# Patient Record
Sex: FEMALE | Race: WHITE | Employment: FULL TIME | URBAN - METROPOLITAN AREA
[De-identification: names, ages, dates, MRNs, and addresses within clinical notes are randomized per-mention and may not be internally consistent; named-entity substitution may affect disease eponyms.]

---

## 2017-01-17 ENCOUNTER — OFFICE VISIT (OUTPATIENT)
Dept: INTERNAL MEDICINE | Facility: CLINIC | Age: 42
End: 2017-01-17
Payer: COMMERCIAL

## 2017-01-17 VITALS
WEIGHT: 182.7 LBS | DIASTOLIC BLOOD PRESSURE: 76 MMHG | TEMPERATURE: 98.2 F | RESPIRATION RATE: 16 BRPM | BODY MASS INDEX: 31.34 KG/M2 | HEART RATE: 74 BPM | OXYGEN SATURATION: 98 % | SYSTOLIC BLOOD PRESSURE: 104 MMHG

## 2017-01-17 DIAGNOSIS — Z01.818 PREOP GENERAL PHYSICAL EXAM: Primary | ICD-10-CM

## 2017-01-17 DIAGNOSIS — Z23 NEED FOR TDAP VACCINATION: ICD-10-CM

## 2017-01-17 PROCEDURE — 99204 OFFICE O/P NEW MOD 45 MIN: CPT | Mod: 25 | Performed by: INTERNAL MEDICINE

## 2017-01-17 PROCEDURE — 90715 TDAP VACCINE 7 YRS/> IM: CPT | Performed by: INTERNAL MEDICINE

## 2017-01-17 PROCEDURE — 90471 IMMUNIZATION ADMIN: CPT | Performed by: INTERNAL MEDICINE

## 2017-01-17 RX ORDER — PHENYTOIN SODIUM 100 MG/1
CAPSULE, EXTENDED RELEASE ORAL
Status: ON HOLD | COMMUNITY
Start: 2017-01-17 | End: 2018-07-21

## 2017-01-17 NOTE — NURSING NOTE
"Chief Complaint   Patient presents with     Pre-Op Exam       Initial /76 mmHg  Pulse 74  Temp(Src) 98.2  F (36.8  C) (Oral)  Resp 16  Wt 182 lb 11.2 oz (82.872 kg)  SpO2 98% Estimated body mass index is 31.34 kg/(m^2) as calculated from the following:    Height as of 12/7/15: 5' 4\" (1.626 m).    Weight as of this encounter: 182 lb 11.2 oz (82.872 kg).  BP completed using cuff size: kim Beasley CMA      "

## 2017-01-17 NOTE — MR AVS SNAPSHOT
After Visit Summary   1/17/2017    Nelsy Cota    MRN: [id number]           Patient Information     Date Of Birth          1975        Visit Information        Provider Department      1/17/2017 4:20 PM Kasandra Solano MD [facility]        Today's Diagnoses     Preop general physical exam    -  1     Need for Tdap vaccination           Care Instructions      Before Your Surgery      Call your surgeon if there is any change in your health. This includes signs of a cold or flu (such as a sore throat, runny nose, cough, rash or fever).    Do not smoke, drink alcohol or take over the counter medicine (unless your surgeon or primary care doctor tells you to) for the 24 hours before and after surgery.    If you take prescribed drugs: Follow your doctor s orders about which medicines to take and which to stop until after surgery.    Eating and drinking prior to surgery: follow the instructions from your surgeon    Take a shower or bath the night before surgery. Use the soap your surgeon gave you to gently clean your skin. If you do not have soap from your surgeon, use your regular soap. Do not shave or scrub the surgery site.  Wear clean pajamas and have clean sheets on your bed.         Follow-ups after your visit        Who to contact     If you have questions or need follow up information about today's clinic visit or your schedule please contact [facility] directly at [phone].  Normal or non-critical lab and imaging results will be communicated to you by MyChart, letter or phone within 4 business days after the clinic has received the results. If you do not hear from us within 7 days, please contact the clinic through MyChart or phone. If you have a critical or abnormal lab result, we will notify you by phone as soon as possible.  Submit refill requests through [employer] or call your pharmacy and they will forward the refill request to us. Please allow 3  business days for your refill to be completed.          Additional Information About Your Visit        MyChart Information     [employer] gives you secure access to your electronic health record. If you see a primary care provider, you can also send messages to your care team and make appointments. If you have questions, please call your primary care clinic.  If you do not have a primary care provider, please call [phone] and they will assist you.        Care EveryWhere ID     This is your Care EveryWhere ID. This could be used by other organizations to access your [place] medical records  [id number]        Your Vitals Were     Pulse Temperature Respirations Pulse Oximetry          74 98.2  F (36.8  C) (Oral) 16 98%         Blood Pressure from Last 3 Encounters:   01/17/17 104/76   12/07/15 123/81   12/07/15 110/68    Weight from Last 3 Encounters:   01/17/17 182 lb 11.2 oz (82.872 kg)   12/07/15 176 lb (79.833 kg)   12/07/15 176 lb (79.833 kg)              We Performed the Following     TDAP (ADACEL AGES 11-64)          Today's Medication Changes          These changes are accurate as of: 1/17/17  4:53 PM.  If you have any questions, ask your nurse or doctor.               These medicines have changed or have updated prescriptions.        Dose/Directions    DILANTIN 100 MG CR capsule   This may have changed:  See the new instructions.   Generic drug:  phenytoin   Changed by:  Kasandra Solano MD        3 caps po bid   Refills:  0                Primary Care Provider Office Phone # Fax #    Kasandra Solano [phone]       [facility] [address]  [facility] [address]        Thank you!     Thank you for choosing [facility]  for your care. Our goal is always to provide you with excellent care. Hearing back from our patients is one way we can continue to improve our services. Please take a few minutes to complete the written survey that you may receive in the  mail after your visit with us. Thank you!             Your Updated Medication List - Protect others around you: Learn how to safely use, store and throw away your medicines at www.disposemymeds.org.          This list is accurate as of: 1/17/17  4:53 PM.  Always use your most recent med list.                   Brand Name Dispense Instructions for use    DILANTIN 100 MG CR capsule   Generic drug:  phenytoin      3 caps po bid

## 2017-01-17 NOTE — PROGRESS NOTES
[place] [address]  [facility] [address]  [phone]  Dept: [phone]    PRE-OP EVALUATION:  Today's date: 2017    Nelsy Cota (: 1975) presents for pre-operative evaluation assessment as requested by Dr. Orosco.  She requires evaluation and anesthesia risk assessment prior to undergoing surgery/procedure for treatment of cataracts .  Proposed procedure: Cataract 2017 LEFT, 02/15/2017 RIGHT    Date of Surgery/ Procedure: 2017, 02/15/2017  Time of Surgery/ Procedure: 3:30 pm  Hospital/Surgical Facility: [facility]   Fax number for surgical facility: [phone]  Primary Physician: Kasandra Solano  Type of Anesthesia Anticipated: Local with MAC    Patient has a Health Care Directive or Living Will:  NO    1. NO - Do you have a history of heart attack, stroke, stent, bypass or surgery on an artery in the head, neck, heart or legs?  2. NO - Do you ever have any pain or discomfort in your chest?  3. NO - Do you have a history of  Heart Failure?  4. NO - Are you troubled by shortness of breath when: walking on the level, up a slight hill or at night?  5. NO - Do you currently have a cold, bronchitis or other respiratory infection?  6. NO - Do you have a cough, shortness of breath or wheezing?  7. NO - Do you sometimes get pains in the calves of your legs when you walk?  8. NO - Do you or anyone in your family have previous history of blood clots?  9. NO - Do you or does anyone in your family have a serious bleeding problem such as prolonged bleeding following surgeries or cuts?  10. NO - Have you ever had problems with anemia or been told to take iron pills?  11. NO - Have you had any abnormal blood loss such as black, tarry or bloody stools, or abnormal vaginal bleeding?  12. NO - Have you ever had a blood transfusion?  13. NO - Have you or any of your relatives ever had problems with anesthesia?  14. NO - Do you have sleep apnea, excessive  snoring or daytime drowsiness?  15. NO - Do you have any prosthetic heart valves?  16. NO - Do you have prosthetic joints?  17. NO - Is there any chance that you may be pregnant?      HPI:                                                      Brief HPI related to upcoming procedure: cataracts      See problem list for active medical problems.  Problems all longstanding and stable, except as noted/documented.  See ROS for pertinent symptoms related to these conditions.                                                                                                  .    MEDICAL HISTORY:                                                      Patient Active Problem List    Diagnosis Date Noted     Convulsions, unspecified convulsion type (H) 12/07/2015     Priority: Medium     Health Care Home 09/19/2013     Priority: Medium     FAM HX-CARDIOVAS DIS NEC 12/21/2009     Priority: Medium     Convulsions (H) 05/29/2008     Priority: Medium     Papanicolaou smear of cervix with atypical squamous cells cannot exclude high grade squamous intraepithelial lesion (ASC-H) 01/24/2007     Priority: Medium      Past Medical History   Diagnosis Date     Epilepsy (H)      2004 last seizure     Past Surgical History   Procedure Laterality Date     C nonspecific procedure  1997     R ankle fracture repair     C nonspecific procedure  2004     R ankle surgery for ligament injury, also tibia injury     Hc closed tx calcaneal fx w/o manipulation  2004     fracture of calcaneus     Hc colp cervix/upper vagina  2005     cryotherapy     Current Outpatient Prescriptions   Medication Sig Dispense Refill     phenytoin (DILANTIN) 100 MG CR capsule 3 caps po bid       OTC products: None, except as noted above and occasionally aleve    No Known Allergies   Latex Allergy: NO    Social History   Substance Use Topics     Smoking status: Current Every Day Smoker -- 1.00 packs/day for 15 years     Types: Cigarettes     Smokeless tobacco: Never Used      Alcohol Use: 0.5 oz/week     1 Standard drinks or equivalent per week      Comment: 12 drinks per week     History   Drug Use No       REVIEW OF SYSTEMS:                                                    GENERAL: negative for, fever, chills, weight loss, weight gain  EYES: visual blurring  ENT: negative  RESPIRATORY: No dyspnea on exertion and No cough  CARDIOVASCULAR: negative for, palpitations, tachycardia, irregular heart beat and chest pain  GI: negative for, nausea, vomiting and abdominal pain  : negative for, dysuria and hematuria  MUSCULOSKELETAL: ankle pain  NEUROLOGIC: positive for seizure history, none since 2004, negative for, headaches, local weakness, numbness or tingling of hands and numbness or tingling of feet  SKIN: negative  ENDOCRINE: negative         EXAM:                                                      Patient is alert, oriented, cooperative in no acute distress.  /76 mmHg  Pulse 74  Temp(Src) 98.2  F (36.8  C) (Oral)  Resp 16  Wt 182 lb 11.2 oz (82.872 kg)  SpO2 98%    HEENT: PERRL, EOMI, TM's are normal. Oropharynx is clear.  NECK: No lymphadenopathy or thyromegaly. Carotid pulses full without bruits.  LUNGS: clear  CV: normal S1, S2 without murmur, S3 or S4 present. Pulses are 2/2 throughout. No JVD.  ABDOMEN: Bowel sounds present, nontender without hepatosplenomegaly. Liver is normal size to percussion.  EXTREMITIES: no edema present, unremarkable joints  NEUROLOGIC: Cranial nerves II-XII intact, reflexes 2/4 throughout, strength 5/5, sensation grossly intact, gait normal.  SKIN: without rashes or significant lesions       DIAGNOSTICS:                                                    No labs or EKG required for low risk surgery (cataract, skin procedure, breast biopsy, etc)    Recent Labs   Lab Test  12/07/15   1615  10/26/11   1318  12/22/09   0400   HGB  11.8  12.8   --    PLT  285  300   --    NA  138   --   138   POTASSIUM  3.5   --   5.0   CR  0.64   --   0.63         IMPRESSION:                                                    Reason for surgery/procedure: cataracts  Diagnosis/reason for consult: preop    The proposed surgical procedure is considered LOW risk.    REVISED CARDIAC RISK INDEX  The patient has the following serious cardiovascular risks for perioperative complications such as (MI, PE, VFib and 3  AV Block):  No serious cardiac risks  INTERPRETATION: 0 risks: Class I (very low risk - 0.4% complication rate)    The patient has the following additional risks for perioperative complications:  No identified additional risks      ICD-10-CM    1. Preop general physical exam Z01.818        RECOMMENDATIONS:                                                        She will take dilantin the am of surgery.       APPROVAL GIVEN to proceed with proposed procedure, without further diagnostic evaluation       Signed Electronically by: Kasandra Solano MD    Copy of this evaluation report is provided to requesting physician.    [place] Preop Guidelines

## 2017-03-16 ENCOUNTER — APPOINTMENT (OUTPATIENT)
Dept: ULTRASOUND IMAGING | Facility: CLINIC | Age: 42
End: 2017-03-16
Attending: EMERGENCY MEDICINE
Payer: COMMERCIAL

## 2017-03-16 ENCOUNTER — HOSPITAL ENCOUNTER (EMERGENCY)
Facility: CLINIC | Age: 42
Discharge: HOME | End: 2017-03-16
Attending: EMERGENCY MEDICINE | Admitting: EMERGENCY MEDICINE
Payer: COMMERCIAL

## 2017-03-16 ENCOUNTER — APPOINTMENT (OUTPATIENT)
Dept: GENERAL RADIOLOGY | Facility: CLINIC | Age: 42
End: 2017-03-16
Attending: EMERGENCY MEDICINE
Payer: COMMERCIAL

## 2017-03-16 VITALS
RESPIRATION RATE: 16 BRPM | SYSTOLIC BLOOD PRESSURE: 121 MMHG | TEMPERATURE: 97.8 F | DIASTOLIC BLOOD PRESSURE: 79 MMHG | HEART RATE: 81 BPM | OXYGEN SATURATION: 97 %

## 2017-03-16 DIAGNOSIS — M25.571 ACUTE RIGHT ANKLE PAIN: ICD-10-CM

## 2017-03-16 PROCEDURE — 99284 EMERGENCY DEPT VISIT MOD MDM: CPT | Mod: 25

## 2017-03-16 PROCEDURE — 93971 EXTREMITY STUDY: CPT | Mod: RT

## 2017-03-16 PROCEDURE — 25000132 ZZH RX MED GY IP 250 OP 250 PS 637: Performed by: EMERGENCY MEDICINE

## 2017-03-16 PROCEDURE — 73610 X-RAY EXAM OF ANKLE: CPT | Mod: RT

## 2017-03-16 RX ORDER — TRAMADOL HYDROCHLORIDE 50 MG/1
50-100 TABLET ORAL EVERY 6 HOURS PRN
Qty: 12 TABLET | Refills: 0 | Status: SHIPPED | OUTPATIENT
Start: 2017-03-16 | End: 2018-07-19

## 2017-03-16 RX ORDER — HYDROCODONE BITARTRATE AND ACETAMINOPHEN 5; 325 MG/1; MG/1
2 TABLET ORAL ONCE
Status: COMPLETED | OUTPATIENT
Start: 2017-03-16 | End: 2017-03-16

## 2017-03-16 RX ADMIN — HYDROCODONE BITARTRATE AND ACETAMINOPHEN 2 TABLET: 5; 325 TABLET ORAL at 12:59

## 2017-03-16 ASSESSMENT — ENCOUNTER SYMPTOMS
HEMATURIA: 0
ARTHRALGIAS: 1
MYALGIAS: 1
BRUISES/BLEEDS EASILY: 1
SHORTNESS OF BREATH: 0
COUGH: 0

## 2017-03-16 NOTE — ED PROVIDER NOTES
History     Chief Complaint:  Leg Pain      HPI   Nelsy Cota is a 41 year old female with a history of right ankle fracture requiring ORIF in 2004 and epilepsy who presents to the emergency department today for evaluation of right leg pain since 0300 this morning. The patient states that she woke up to the pain, which is localized to the anterior aspect of the right ankle that shoots up her right leg. The patient states that she took 2 Aleve this morning, which failed to relieve her symptoms, as she still needs to ambulate with a walker, which is unusual for her. The patient notes that her right ankle has always been more swollen than the left since the fracture, and she regularly receives Cortisone shots to this area. The patient endorses some bruising along the right leg. The patient denies recent injury or fall. The patient denies fever hematuria, bloody nose and hemoptysis. The patient denies chest pain and shortness of breath. The patient denies a history of gout or joint issues.     Cardiac/PE/DVT Risk Factors:  History of hypertension - negative  History of hyperlipidemia - negative  History of diabetes - negative  History of smoking - positive  Personal history of PE/DVT - negative  Family history of PE/DVT - negative  Family history of heart complications - negative  Recent travel - negative  Recent surgery - negative  Other immobilizations - negative  Cancer - negative      Allergies:  No Known Drug Allergies    Medications:    Dilantin      Past Medical History:    Epilepsy   Convulsions   Atypical pap smear     Past Surgical History:    R ankle fracture repair  R ankle surgery for ligament injury, also tibia injury  Closed tx calcaneal fx w/o manipulation   Totz cervix/upper vaginal: cryotherapy    Family History:    Hypertension: father   Epilepsy: father     Social History:  The patient was accompanied to the ED by .  Smoking Status: current, 1.00 ppd for 15 years.   Smokeless Tobacco:  never  Alcohol Use: positive  Marital Status:  Single [1]     Review of Systems   HENT: Negative for nosebleeds.    Respiratory: Negative for cough (hemoptysis) and shortness of breath.    Cardiovascular: Positive for leg swelling (right leg). Negative for chest pain.   Genitourinary: Negative for hematuria.   Musculoskeletal: Positive for arthralgias (right ankle) and myalgias (right leg).   Hematological: Bruises/bleeds easily (right leg).   All other systems reviewed and are negative.    Physical Exam   Vitals:  Patient Vitals for the past 24 hrs:   BP Temp Temp src Pulse Resp SpO2   03/16/17 1345 - - - 81 16 -   03/16/17 1344 - - - - - 97 %   03/16/17 1343 121/79 - - - - -   03/16/17 1210 130/81 97.8  F (36.6  C) Oral 93 16 100 %          Physical Exam    Constitutional:  Pleasant, age appropriate.   EYES:   Conjunctiva normal.  NECK:    Supple, no meningismus.   CV:     Regular rate and rhythm, no murmurs, rubs or gallops.       2+ DP pulses bilateral.  PULM:    Clear to auscultation bilateral.       No respiratory distress.      No wheezing, rales or stridor.  ABD:    Soft, non-tender, non-distended.  No pulsatile masses.       No rebound or guarding.  MSK:     Right lower extremity : No gross deformity.       No tenderness to the proximal fibula..        Cruz test within normal limits.        Achilles tendon is palpated without tenderness and without defect.           Tenderness to the ankle with mild-moderate soft tissue swelling.         Full passive ROM of the ankle although with mild discomfort       No erythema or warmth      No bony tenderness to the foot.  LYMPH:   No cervical lymphadenopathy.  NEURO:   Alert.      Right lower extremity:      Strength and sensation intact.  SKIN:    Warm, dry and intact.       No rash.  PSYCH:    Mood is good and affect is appropriate.    Emergency Department Course     Imaging:  Radiology findings were communicated with the patient who voiced understanding of the  findings.    US Lower Extremity Venous Duplex Right  IMPRESSION: No evidence of DVT.  Reading per radiology    Ankle XR, G/E 3 views, right  IMPRESSION: Postoperative changes of the right distal fibula with an  intact cortical plate and multiple intact cortical screws in place. No  evidence of acute fracture or malalignment otherwise appreciated.  Marked degenerative changes of the right ankle.  Reading per radiology      Interventions:  1259: Norco 2 tablets Oral        Emergency Department Course:  Nursing notes and vitals reviewed.  I performed an exam of the patient as documented above.   The patient was sent for a XR right ankle, US right LE venous duplex while in the emergency department, results above.   The patient was rechecked and was updated on the results of his imaging studies.   I discussed the treatment plan with the patient. They expressed understanding of this plan and consented to discharge. They will be discharged home with instructions for care and follow up. In addition, the patient will return to the emergency department if their symptoms persist, worsen, if new symptoms arise or if there is any concern.  All questions were answered.  I personally reviewed the imaging results with the Patient and answered all related questions prior to discharge.    Impression & Plan      Medical Decision Making:  Nelsy Cota is a 41 year old female with a history of prior right ankle fracture requiring ORIF  who presents to the emergency department today for evaluation of atraumatic pain and swelling of the right leg. On examination, she does have swelling and focal tenderness to the ankle with mild edema to the lower extremity. There is no evidence of infectious symptoms. No evidence of septic arthritis or inflammatory arthritis. Plain films are without hardware malfunction. Doppler ultrasound is negative for DVT. This is likely an exacerbation of her chronic process with chronic swelling in the face of  unrecognized ankle sprain. The patient is safe for discharge to home and an air cast splint was provided along with short term analgesics and RICE therapy. The patient should return to the ED with new or worsening symptoms.       Diagnosis:    ICD-10-CM    1. Acute right ankle pain M25.571        Disposition:   The patient is discharged home.       Discharge Medications:     Details   traMADol (ULTRAM) 50 MG tablet Take 1-2 tablets ( mg) by mouth every 6 hours as needed for pain, Disp-12 tablet, R-0, Local Print             Scribe Disclosure:  I, Cayden Freed, am serving as a scribe at 12:10 PM on 3/16/2017 to document services personally performed by Franco Arnett MD, based on my observations and the provider's statements to me.    3/16/2017   [facility] EMERGENCY DEPARTMENT       Franco Arnett MD  03/16/17 2539

## 2017-03-16 NOTE — ED AVS SNAPSHOT
[facility] Emergency Department    [address]    [facility] [address]    Phone:  [phone]    Fax:  [phone]                                       Nelsy Cota   MRN: [id number]    Department:  [facility] Emergency Department   Date of Visit:  3/16/2017           After Visit Summary Signature Page     I have received my discharge instructions, and my questions have been answered. I have discussed any challenges I see with this plan with the nurse or doctor.    ..........................................................................................................................................  Patient/Patient Representative Signature      ..........................................................................................................................................  Patient Representative Print Name and Relationship to Patient    ..................................................               ................................................  Date                                            Time    ..........................................................................................................................................  Reviewed by Signature/Title    ...................................................              ..............................................  Date                                                            Time

## 2017-03-16 NOTE — ED AVS SNAPSHOT
[facility] Emergency Department    [address]    [facility] [address]    Phone:  [phone]    Fax:  [phone]                                       Nelsy Cota   MRN: [id number]    Department:  [facility] Emergency Department   Date of Visit:  3/16/2017           Patient Information     Date Of Birth          1975        Your diagnoses for this visit were:     Acute right ankle pain        You were seen by Franco Arnett MD.      Follow-up Information     Follow up with Kasandra Solano MD. Schedule an appointment as soon as possible for a visit in 1 week.    Specialty:  Internal Medicine    Why:  As needed    Contact information:    [facility]  [address]  [facility] [address]  [phone]          Discharge Instructions         Arthralgia    Arthralgia is the term for pain in or around the joint. It is a symptom, not a disease. This pain may involve one or more joints. In some cases, the pain moves from joint to joint.  There are many causes for joint pain. These include:    Injury    Osteoarthritis (wearing out of the joint surface)    Gout (inflammation of the joint due to crystals in the joint fluid)    Infection inside the joint      Bursitis (inflammation of the fluid-filled sacs around the joint)    Autoimmune disorders such as rheumatoid arthritis or lupus    Tendonitis (inflamation of chords that attach muscle to bone)  Home care    Rest the involved joint(s) until your symptoms improve.     You may be prescribed pain medication. If none is prescribed, you may use acetaminophen or ibuprofen to control pain and inflammation.  Follow up  Follow up with your healthcare provider or our staff as advised.  When to seek medical care  Contact your healthcare provider right away if any of the following occurs:    Pain, swelling, or redness of joint increases    Pain worsens or recurs after a period of improvement    Pain moves  to other joints    You cannot bear weight on the affected joint     You cannot move the affected joint    Joint appears deformed    New rash appears    Fever of 101 F (38.8 C) or higher, or as directed by your healthcare provider    3584-8441 [employer]. [address] [place] [address], [place] [address]. All rights reserved. This information is not intended as a substitute for professional medical care. Always follow your healthcare professional's instructions.          24 Hour Appointment Hotline       To make an appointment at any [facility], call [phone] ([phone]). If you don't have a family doctor or clinic, we will help you find one. [facility] clinics are conveniently located to serve the needs of you and your family.             Review of your medicines      START taking        Dose / Directions Last dose taken    traMADol 50 MG tablet   Commonly known as:  ULTRAM   Dose:   mg   Quantity:  12 tablet        Take 1-2 tablets ( mg) by mouth every 6 hours as needed for pain   Refills:  0          Our records show that you are taking the medicines listed below. If these are incorrect, please call your family doctor or clinic.        Dose / Directions Last dose taken    DILANTIN 100 MG CR capsule   Generic drug:  phenytoin        3 caps po bid   Refills:  0                Prescriptions were sent or printed at these locations (1 Prescription)                   Other Prescriptions                Printed at Department/Unit printer (1 of 1)         traMADol (ULTRAM) 50 MG tablet                Procedures and tests performed during your visit     Ankle XR, G/E 3 views, right    US Lower Extremity Venous Duplex Right      Orders Needing Specimen Collection     None      Pending Results     Date and Time Order Name Status Description    3/16/2017 1220 US Lower Extremity Venous Duplex Right Preliminary             Pending Culture Results     No orders found from 3/14/2017 to 3/17/2017.              Test Results from your hospital stay     3/16/2017  1:08 PM - Interface, Radiant Ib      Narrative     XR ANKLE RT G/E 3 VW 3/16/2017 12:52 PM    HISTORY: Pain.    COMPARISON: None.        Impression     IMPRESSION: Postoperative changes of the right distal fibula with an  intact cortical plate and multiple intact cortical screws in place. No  evidence of acute fracture or malalignment otherwise appreciated.  Marked degenerative changes of the right ankle.    BRO IVERSON MD         3/16/2017  1:11 PM - Interface, Radiant Ib      Narrative     ULTRASOUND RIGHT LOWER EXTREMITY VENOUS DUPLEX  3/16/2017 12:46 PM     HISTORY: Atraumatic leg pain and swelling.    FINDINGS: The deep veins in the right lower extremity are compressible  throughout. The deep veins demonstrate normal venous augmentation,  waveforms and color Doppler flow. No evidence of superficial  thrombophlebitis.        Impression     IMPRESSION: No evidence of DVT.                Clinical Quality Measure: Blood Pressure Screening     Your blood pressure was checked while you were in the emergency department today. The last reading we obtained was  BP: 130/81 . Please read the guidelines below about what these numbers mean and what you should do about them.  If your systolic blood pressure (the top number) is less than 120 and your diastolic blood pressure (the bottom number) is less than 80, then your blood pressure is normal. There is nothing more that you need to do about it.  If your systolic blood pressure (the top number) is 120-139 or your diastolic blood pressure (the bottom number) is 80-89, your blood pressure may be higher than it should be. You should have your blood pressure rechecked within a year by a primary care provider.  If your systolic blood pressure (the top number) is 140 or greater or your diastolic blood pressure (the bottom number) is 90 or greater, you may have high blood pressure. High blood pressure is treatable,  but if left untreated over time it can put you at risk for heart attack, stroke, or kidney failure. You should have your blood pressure rechecked by a primary care provider within the next 4 weeks.  If your provider in the emergency department today gave you specific instructions to follow-up with your doctor or provider even sooner than that, you should follow that instruction and not wait for up to 4 weeks for your follow-up visit.        Thank you for choosing [facility]       Thank you for choosing [facility] for your care. Our goal is always to provide you with excellent care. Hearing back from our patients is one way we can continue to improve our services. Please take a few minutes to complete the written survey that you may receive in the mail after you visit with us. Thank you!        [employer] Information     [employer] gives you secure access to your electronic health record. If you see a primary care provider, you can also send messages to your care team and make appointments. If you have questions, please call your primary care clinic.  If you do not have a primary care provider, please call [phone] and they will assist you.        Care EveryWhere ID     This is your Care EveryWhere ID. This could be used by other organizations to access your [facility] medical records  [id number]        After Visit Summary       This is your record. Keep this with you and show to your community pharmacist(s) and doctor(s) at your next visit.

## 2017-03-16 NOTE — DISCHARGE INSTRUCTIONS
Arthralgia    Arthralgia is the term for pain in or around the joint. It is a symptom, not a disease. This pain may involve one or more joints. In some cases, the pain moves from joint to joint.  There are many causes for joint pain. These include:    Injury    Osteoarthritis (wearing out of the joint surface)    Gout (inflammation of the joint due to crystals in the joint fluid)    Infection inside the joint      Bursitis (inflammation of the fluid-filled sacs around the joint)    Autoimmune disorders such as rheumatoid arthritis or lupus    Tendonitis (inflamation of chords that attach muscle to bone)  Home care    Rest the involved joint(s) until your symptoms improve.     You may be prescribed pain medication. If none is prescribed, you may use acetaminophen or ibuprofen to control pain and inflammation.  Follow up  Follow up with your healthcare provider or our staff as advised.  When to seek medical care  Contact your healthcare provider right away if any of the following occurs:    Pain, swelling, or redness of joint increases    Pain worsens or recurs after a period of improvement    Pain moves to other joints    You cannot bear weight on the affected joint     You cannot move the affected joint    Joint appears deformed    New rash appears    Fever of 101 F (38.8 C) or higher, or as directed by your healthcare provider    [id number] [employer]. [address] [place] [address], [place] [address]. All rights reserved. This information is not intended as a substitute for professional medical care. Always follow your healthcare professional's instructions.

## 2017-03-16 NOTE — ED NOTES
Right leg and ankle pain flare up starting last night. Patient notes that her ankle is more swollen than normal, and states that there is a bruise on the back of her right calf. Denies injury or strain. Denies SOB or chest pain. Patient took 2 aleve this am. Rates pain 8/10. Intermittent tingling in leg. ABCDs intact.

## 2017-05-02 ENCOUNTER — TELEPHONE (OUTPATIENT)
Dept: INTERNAL MEDICINE | Facility: CLINIC | Age: 42
End: 2017-05-02

## 2017-05-02 NOTE — TELEPHONE ENCOUNTER
Panel Management Review      Patient has the following on her problem list: None      Composite cancer screening  Chart review shows that this patient is due/due soon for the following Pap Smear  Summary:    Patient is due/failing the following:   PAP    Action needed:   Patient needs office visit for GYN with pap.    Type of outreach:    Phone, left message for patient to call back.     Questions for provider review:    None                                                                                                                                     Johnny Beasley, [facility]       Chart routed to Care Team .

## 2017-06-17 ENCOUNTER — HEALTH MAINTENANCE LETTER (OUTPATIENT)
Age: 42
End: 2017-06-17

## 2018-01-04 ENCOUNTER — TELEPHONE (OUTPATIENT)
Dept: INTERNAL MEDICINE | Facility: CLINIC | Age: 43
End: 2018-01-04

## 2018-01-04 NOTE — TELEPHONE ENCOUNTER
Panel Management Review      Patient has the following on her problem list: None      Composite cancer screening  Chart review shows that this patient is due/due soon for the following Pap Smear  Summary:    Patient is due/failing the following:   PAP    Action needed:   Patient needs office visit for physical with pap.    Type of outreach:    Phone, left message for patient to call back.     Questions for provider review:    None                                                                                                                                     Johnny Beasley, [facility]       Chart routed to Care Team .

## 2018-06-23 ENCOUNTER — HEALTH MAINTENANCE LETTER (OUTPATIENT)
Age: 43
End: 2018-06-23

## 2018-07-19 ENCOUNTER — APPOINTMENT (OUTPATIENT)
Dept: CT IMAGING | Facility: CLINIC | Age: 43
DRG: 176 | End: 2018-07-19
Attending: EMERGENCY MEDICINE

## 2018-07-19 ENCOUNTER — APPOINTMENT (OUTPATIENT)
Dept: ULTRASOUND IMAGING | Facility: CLINIC | Age: 43
DRG: 176 | End: 2018-07-19
Attending: EMERGENCY MEDICINE

## 2018-07-19 ENCOUNTER — HOSPITAL ENCOUNTER (INPATIENT)
Facility: CLINIC | Age: 43
LOS: 3 days | Discharge: HOME | DRG: 176 | End: 2018-07-22
Attending: EMERGENCY MEDICINE | Admitting: INTERNAL MEDICINE

## 2018-07-19 DIAGNOSIS — I26.99 PULMONARY EMBOLISM, BILATERAL (H): ICD-10-CM

## 2018-07-19 DIAGNOSIS — R56.9 CONVULSIONS, UNSPECIFIED CONVULSION TYPE (H): ICD-10-CM

## 2018-07-19 DIAGNOSIS — I82.4Z1 ACUTE DEEP VEIN THROMBOSIS (DVT) OF DISTAL VEIN OF RIGHT LOWER EXTREMITY (H): ICD-10-CM

## 2018-07-19 DIAGNOSIS — D53.9 MACROCYTIC ANEMIA: Primary | ICD-10-CM

## 2018-07-19 LAB
ABO + RH BLD: NORMAL
ABO + RH BLD: NORMAL
ALBUMIN SERPL-MCNC: 2.2 G/DL (ref 3.4–5)
ALP SERPL-CCNC: 126 U/L (ref 40–150)
ALT SERPL W P-5'-P-CCNC: 31 U/L (ref 0–50)
ANION GAP SERPL CALCULATED.3IONS-SCNC: 7 MMOL/L (ref 3–14)
AST SERPL W P-5'-P-CCNC: 74 U/L (ref 0–45)
BASOPHILS # BLD AUTO: 0.1 10E9/L (ref 0–0.2)
BASOPHILS NFR BLD AUTO: 0.5 %
BILIRUB DIRECT SERPL-MCNC: 0.2 MG/DL (ref 0–0.2)
BILIRUB SERPL-MCNC: 0.8 MG/DL (ref 0.2–1.3)
BLD GP AB SCN SERPL QL: NORMAL
BLOOD BANK CMNT PATIENT-IMP: NORMAL
BUN SERPL-MCNC: 14 MG/DL (ref 7–30)
CALCIUM SERPL-MCNC: 7.9 MG/DL (ref 8.5–10.1)
CHLORIDE SERPL-SCNC: 97 MMOL/L (ref 94–109)
CO2 SERPL-SCNC: 33 MMOL/L (ref 20–32)
CREAT SERPL-MCNC: 0.56 MG/DL (ref 0.52–1.04)
DIFFERENTIAL METHOD BLD: ABNORMAL
EOSINOPHIL # BLD AUTO: 0.3 10E9/L (ref 0–0.7)
EOSINOPHIL NFR BLD AUTO: 3.1 %
ERYTHROCYTE [DISTWIDTH] IN BLOOD BY AUTOMATED COUNT: 17.5 % (ref 10–15)
GFR SERPL CREATININE-BSD FRML MDRD: >90 ML/MIN/1.7M2
GLUCOSE SERPL-MCNC: 117 MG/DL (ref 70–99)
HCT VFR BLD AUTO: 28 % (ref 35–47)
HGB BLD-MCNC: 10.2 G/DL (ref 11.7–15.7)
IMM GRANULOCYTES # BLD: 0 10E9/L (ref 0–0.4)
IMM GRANULOCYTES NFR BLD: 0.3 %
LIPASE SERPL-CCNC: 156 U/L (ref 73–393)
LYMPHOCYTES # BLD AUTO: 2.4 10E9/L (ref 0.8–5.3)
LYMPHOCYTES NFR BLD AUTO: 22.6 %
MCH RBC QN AUTO: 46.8 PG (ref 26.5–33)
MCHC RBC AUTO-ENTMCNC: 36.4 G/DL (ref 31.5–36.5)
MCV RBC AUTO: 128 FL (ref 78–100)
MONOCYTES # BLD AUTO: 0.6 10E9/L (ref 0–1.3)
MONOCYTES NFR BLD AUTO: 5.8 %
NEUTROPHILS # BLD AUTO: 7.1 10E9/L (ref 1.6–8.3)
NEUTROPHILS NFR BLD AUTO: 67.7 %
NRBC # BLD AUTO: 0 10*3/UL
NRBC BLD AUTO-RTO: 0 /100
PLATELET # BLD AUTO: 330 10E9/L (ref 150–450)
POTASSIUM SERPL-SCNC: 3.1 MMOL/L (ref 3.4–5.3)
POTASSIUM SERPL-SCNC: 3.1 MMOL/L (ref 3.4–5.3)
PROT SERPL-MCNC: 6.5 G/DL (ref 6.8–8.8)
RADIOLOGIST FLAGS: ABNORMAL
RBC # BLD AUTO: 2.18 10E12/L (ref 3.8–5.2)
SODIUM SERPL-SCNC: 137 MMOL/L (ref 133–144)
SPECIMEN EXP DATE BLD: NORMAL
TROPONIN I SERPL-MCNC: <0.015 UG/L (ref 0–0.04)
WBC # BLD AUTO: 10.4 10E9/L (ref 4–11)

## 2018-07-19 PROCEDURE — 99285 EMERGENCY DEPT VISIT HI MDM: CPT | Mod: 25

## 2018-07-19 PROCEDURE — 96365 THER/PROPH/DIAG IV INF INIT: CPT

## 2018-07-19 PROCEDURE — 25000132 ZZH RX MED GY IP 250 OP 250 PS 637: Performed by: EMERGENCY MEDICINE

## 2018-07-19 PROCEDURE — 25000128 H RX IP 250 OP 636: Performed by: EMERGENCY MEDICINE

## 2018-07-19 PROCEDURE — 25000125 ZZHC RX 250: Performed by: INTERNAL MEDICINE

## 2018-07-19 PROCEDURE — 86900 BLOOD TYPING SEROLOGIC ABO: CPT | Performed by: EMERGENCY MEDICINE

## 2018-07-19 PROCEDURE — 96366 THER/PROPH/DIAG IV INF ADDON: CPT

## 2018-07-19 PROCEDURE — 84132 ASSAY OF SERUM POTASSIUM: CPT | Performed by: INTERNAL MEDICINE

## 2018-07-19 PROCEDURE — 86901 BLOOD TYPING SEROLOGIC RH(D): CPT | Performed by: EMERGENCY MEDICINE

## 2018-07-19 PROCEDURE — 93971 EXTREMITY STUDY: CPT | Mod: RT

## 2018-07-19 PROCEDURE — 71260 CT THORAX DX C+: CPT

## 2018-07-19 PROCEDURE — 80048 BASIC METABOLIC PNL TOTAL CA: CPT | Performed by: EMERGENCY MEDICINE

## 2018-07-19 PROCEDURE — 96375 TX/PRO/DX INJ NEW DRUG ADDON: CPT

## 2018-07-19 PROCEDURE — 85025 COMPLETE CBC W/AUTO DIFF WBC: CPT | Performed by: EMERGENCY MEDICINE

## 2018-07-19 PROCEDURE — 84484 ASSAY OF TROPONIN QUANT: CPT | Performed by: EMERGENCY MEDICINE

## 2018-07-19 PROCEDURE — 96376 TX/PRO/DX INJ SAME DRUG ADON: CPT

## 2018-07-19 PROCEDURE — 99223 1ST HOSP IP/OBS HIGH 75: CPT | Mod: AI | Performed by: INTERNAL MEDICINE

## 2018-07-19 PROCEDURE — 93005 ELECTROCARDIOGRAM TRACING: CPT

## 2018-07-19 PROCEDURE — 76705 ECHO EXAM OF ABDOMEN: CPT

## 2018-07-19 PROCEDURE — 80076 HEPATIC FUNCTION PANEL: CPT | Performed by: EMERGENCY MEDICINE

## 2018-07-19 PROCEDURE — 83690 ASSAY OF LIPASE: CPT | Performed by: EMERGENCY MEDICINE

## 2018-07-19 PROCEDURE — 96361 HYDRATE IV INFUSION ADD-ON: CPT

## 2018-07-19 PROCEDURE — 86850 RBC ANTIBODY SCREEN: CPT | Performed by: EMERGENCY MEDICINE

## 2018-07-19 PROCEDURE — 36415 COLL VENOUS BLD VENIPUNCTURE: CPT | Performed by: INTERNAL MEDICINE

## 2018-07-19 PROCEDURE — 12000007 ZZH R&B INTERMEDIATE

## 2018-07-19 RX ORDER — POTASSIUM CL/LIDO/0.9 % NACL 10MEQ/0.1L
10 INTRAVENOUS SOLUTION, PIGGYBACK (ML) INTRAVENOUS
Status: DISCONTINUED | OUTPATIENT
Start: 2018-07-19 | End: 2018-07-22 | Stop reason: HOSPADM

## 2018-07-19 RX ORDER — IOPAMIDOL 755 MG/ML
500 INJECTION, SOLUTION INTRAVASCULAR ONCE
Status: COMPLETED | OUTPATIENT
Start: 2018-07-19 | End: 2018-07-19

## 2018-07-19 RX ORDER — ONDANSETRON 4 MG/1
4 TABLET, ORALLY DISINTEGRATING ORAL EVERY 6 HOURS PRN
Status: DISCONTINUED | OUTPATIENT
Start: 2018-07-19 | End: 2018-07-22 | Stop reason: HOSPADM

## 2018-07-19 RX ORDER — ACETAMINOPHEN 325 MG/1
650 TABLET ORAL EVERY 4 HOURS PRN
Status: DISCONTINUED | OUTPATIENT
Start: 2018-07-19 | End: 2018-07-22 | Stop reason: HOSPADM

## 2018-07-19 RX ORDER — POLYETHYLENE GLYCOL 3350 17 G/17G
17 POWDER, FOR SOLUTION ORAL DAILY PRN
Status: DISCONTINUED | OUTPATIENT
Start: 2018-07-19 | End: 2018-07-22 | Stop reason: HOSPADM

## 2018-07-19 RX ORDER — ACETAMINOPHEN 325 MG/1
650 TABLET ORAL ONCE
Status: COMPLETED | OUTPATIENT
Start: 2018-07-19 | End: 2018-07-19

## 2018-07-19 RX ORDER — POTASSIUM CHLORIDE 29.8 MG/ML
20 INJECTION INTRAVENOUS
Status: DISCONTINUED | OUTPATIENT
Start: 2018-07-19 | End: 2018-07-22 | Stop reason: HOSPADM

## 2018-07-19 RX ORDER — POTASSIUM CHLORIDE 7.45 MG/ML
10 INJECTION INTRAVENOUS
Status: DISCONTINUED | OUTPATIENT
Start: 2018-07-19 | End: 2018-07-22 | Stop reason: HOSPADM

## 2018-07-19 RX ORDER — AMOXICILLIN 250 MG
1 CAPSULE ORAL 2 TIMES DAILY PRN
Status: DISCONTINUED | OUTPATIENT
Start: 2018-07-19 | End: 2018-07-22 | Stop reason: HOSPADM

## 2018-07-19 RX ORDER — AMOXICILLIN 250 MG
2 CAPSULE ORAL 2 TIMES DAILY PRN
Status: DISCONTINUED | OUTPATIENT
Start: 2018-07-19 | End: 2018-07-22 | Stop reason: HOSPADM

## 2018-07-19 RX ORDER — POTASSIUM CHLORIDE 1.5 G/1.58G
20-40 POWDER, FOR SOLUTION ORAL
Status: DISCONTINUED | OUTPATIENT
Start: 2018-07-19 | End: 2018-07-22 | Stop reason: HOSPADM

## 2018-07-19 RX ORDER — NALOXONE HYDROCHLORIDE 0.4 MG/ML
.1-.4 INJECTION, SOLUTION INTRAMUSCULAR; INTRAVENOUS; SUBCUTANEOUS
Status: DISCONTINUED | OUTPATIENT
Start: 2018-07-19 | End: 2018-07-22 | Stop reason: HOSPADM

## 2018-07-19 RX ORDER — MORPHINE SULFATE 4 MG/ML
4 INJECTION, SOLUTION INTRAMUSCULAR; INTRAVENOUS
Status: COMPLETED | OUTPATIENT
Start: 2018-07-19 | End: 2018-07-19

## 2018-07-19 RX ORDER — OXYCODONE HYDROCHLORIDE 5 MG/1
5-10 TABLET ORAL
Status: DISCONTINUED | OUTPATIENT
Start: 2018-07-19 | End: 2018-07-22 | Stop reason: HOSPADM

## 2018-07-19 RX ORDER — ONDANSETRON 2 MG/ML
4 INJECTION INTRAMUSCULAR; INTRAVENOUS EVERY 6 HOURS PRN
Status: DISCONTINUED | OUTPATIENT
Start: 2018-07-19 | End: 2018-07-22 | Stop reason: HOSPADM

## 2018-07-19 RX ORDER — PROCHLORPERAZINE MALEATE 5 MG
10 TABLET ORAL EVERY 6 HOURS PRN
Status: DISCONTINUED | OUTPATIENT
Start: 2018-07-19 | End: 2018-07-22 | Stop reason: HOSPADM

## 2018-07-19 RX ORDER — POTASSIUM CHLORIDE 1500 MG/1
40 TABLET, EXTENDED RELEASE ORAL ONCE
Status: COMPLETED | OUTPATIENT
Start: 2018-07-19 | End: 2018-07-19

## 2018-07-19 RX ORDER — PROCHLORPERAZINE 25 MG
25 SUPPOSITORY, RECTAL RECTAL EVERY 12 HOURS PRN
Status: DISCONTINUED | OUTPATIENT
Start: 2018-07-19 | End: 2018-07-22 | Stop reason: HOSPADM

## 2018-07-19 RX ORDER — POTASSIUM CHLORIDE 1500 MG/1
20-40 TABLET, EXTENDED RELEASE ORAL
Status: DISCONTINUED | OUTPATIENT
Start: 2018-07-19 | End: 2018-07-22 | Stop reason: HOSPADM

## 2018-07-19 RX ORDER — SODIUM CHLORIDE 9 MG/ML
1000 INJECTION, SOLUTION INTRAVENOUS CONTINUOUS
Status: DISCONTINUED | OUTPATIENT
Start: 2018-07-19 | End: 2018-07-19

## 2018-07-19 RX ADMIN — HEPARIN SODIUM 18 UNITS/KG/HR: 10000 INJECTION, SOLUTION INTRAVENOUS at 19:45

## 2018-07-19 RX ADMIN — SODIUM CHLORIDE 1000 ML: 9 INJECTION, SOLUTION INTRAVENOUS at 18:05

## 2018-07-19 RX ADMIN — POTASSIUM CHLORIDE 40 MEQ: 1500 TABLET, EXTENDED RELEASE ORAL at 19:45

## 2018-07-19 RX ADMIN — MORPHINE SULFATE 4 MG: 4 INJECTION INTRAVENOUS at 20:31

## 2018-07-19 RX ADMIN — ONDANSETRON 4 MG: 4 TABLET, ORALLY DISINTEGRATING ORAL at 22:47

## 2018-07-19 RX ADMIN — IOPAMIDOL 69 ML: 755 INJECTION, SOLUTION INTRAVENOUS at 18:32

## 2018-07-19 RX ADMIN — ACETAMINOPHEN 650 MG: 325 TABLET, FILM COATED ORAL at 20:30

## 2018-07-19 RX ADMIN — Medication 5096 UNITS: at 19:45

## 2018-07-19 RX ADMIN — SODIUM CHLORIDE 64 ML: 900 INJECTION, SOLUTION INTRAVENOUS at 18:32

## 2018-07-19 ASSESSMENT — ENCOUNTER SYMPTOMS
CHILLS: 0
DIARRHEA: 0
VOMITING: 0
DYSURIA: 0
NAUSEA: 1
ABDOMINAL PAIN: 0
SHORTNESS OF BREATH: 1
FEVER: 0
CONSTIPATION: 0
COUGH: 0

## 2018-07-19 NOTE — ED PROVIDER NOTES
"  History     Chief Complaint:  Chest Pain; Shortness of Breath; Leg Swelling    HPI   Nelsy Cota is a 43 year old female 1ppd smoker with a history of DVT 1 year ago, not anticoagulated, who presents for evaluation of leg swelling, pleuritic chest pain, and shortness of breath. The patient reports she developed pain and swelling in her right lower leg and right foot 3 weeks ago. She states pain felt similar to her prior DVT. She tried taking Tylenol at home because she thought it would \"thin her blood\" and go away. 5 days ago, the patient developed shortness of breath and pleuritic midsternal chest pain. She describes intermittent episodes of sharp midsternal chest pain that radiates underneath her right breast. Pain is a 5/10 currently. Pain is worse with a deep breath and also when she swallows. She states she feels short of breath and can only walk short distances secondary to dyspnea. She has never had shortness of breath like this before. The patient notes ongoing pain and swelling in her right foot and states it has migrated towards her right foot. She has a history of right ankle fracture repair. She reports associated nausea. She denies any fever, chills, abdominal pain, diarrhea, constipation. No rash or skin changes over her chest wall. The patient is not anticoagulated.     PE/DVT RISK FACTORS:  Sex:    Female  Hormones:   No  Tobacco:   Yes  Cancer:   No  Travel:   No  Surgery:   No  Other immobilization: No  Personal history:  Yes  Family history:  No    Allergies:  No Known Allergies     Medications:    Phenytoin   Tylenol prn    Past Medical History:    Epilepsy  DVT    Past Surgical History:    Right ankle fracture repair  Right ankle surgery for ligament injury and tibia injury  Cryotherapy of cervix     Family History:    Hypertension  Epilepsy    Social History:  Smoking status: Current everyday smoker, at least 1 ppd  Alcohol use: 1-2 drinks daily   Presents to the ED with her significant " "other.   Marital Status:  Single [1]     Review of Systems   Constitutional: Negative for chills and fever.   HENT: Negative for congestion.    Respiratory: Positive for shortness of breath. Negative for cough.    Cardiovascular: Positive for chest pain and leg swelling.   Gastrointestinal: Positive for nausea. Negative for abdominal pain, constipation, diarrhea and vomiting.   Genitourinary: Negative for dysuria.   All other systems reviewed and are negative.      Physical Exam   Patient Vitals for the past 24 hrs:   BP Temp Temp src Pulse Heart Rate Resp SpO2 Height Weight   07/19/18 2030 105/70 - - - 76 - - - -   07/19/18 2015 106/72 - - - 79 - 98 % - -   07/19/18 2000 103/63 - - - 78 - 98 % - -   07/19/18 1945 103/66 - - - 75 - 97 % - -   07/19/18 1930 115/75 - - - 75 - 98 % - -   07/19/18 1900 - - - - - - - 1.626 m (5' 4\") 77.1 kg (170 lb)   07/19/18 1818 110/78 - - - 96 - 100 % - -   07/19/18 1805 - - - - 72 - 100 % - -   07/19/18 1804 101/60 - - - 75 - - - -   07/19/18 1739 117/83 98.7  F (37.1  C) Oral 112 112 - 100 % - -       Physical Exam  Constitutional: Well developed, mildly ill appearing.   Head: Atraumatic.   Mouth/Throat: Oropharynx is clear and moist.   Neck:  no stridor  Eyes: no scleral icterus  Cardiovascular: RRR, 2+ bilat radial, DP pulses  Pulmonary/Chest: nml resp effort, Clear BS bilat  Abdominal: ND, +BS, soft, NT, no rebound or guarding   Ext: Warm, well perfused, no edema  RLE: Medial ankle and dorsal foot erythema, swelling, and tenderness. No calf tenderness or swelling. Extremities warm and well perfused. No LLE edema.   Neurological: A&O, symmetric facies, moves ext x4  Skin: Skin is warm and dry.   Psychiatric: Behavior is normal. Thought content normal.   Nursing note and vitals reviewed.    Emergency Department Course   ECG (17:43:37):  Rate 88 bpm. LA interval 150. QRS duration 82. QT/QTc 374/452. P-R-T axes 59 63 -69. Normal sinus rhythm. ST&T wave abnormality, consider inferior " ischemia. ST & T wave abnormality, consider anterolateral ischemia. Abnormal ECG   Interpreted at 1800 by Tarik Levy MD.    Imaging:  Radiographic findings were communicated with the patient who voiced understanding of the findings.    CT Chest Pulmonary Embolism w contrast  1. A few acute pulmonary emboli within segmental and subsegmental  pulmonary arteries in bilateral lower lobes and the right middle lobe.  2. Small probable pulmonary infarcts in bilateral lower lobes.    [Critical Result: Pulmonary embolism]  As read by Radiology.    US Lower Extremity Venous Duplex Right  1. Occlusive thrombus within the right popliteal vein.  2. Occlusive thrombus within the right gastrocnemius vein.  As read by Radiology.    Abdomen US RUQ only  1. Unremarkable appearance of the gallbladder.  2. No biliary dilatation.  3. Moderate to marked diffuse fatty infiltration of the liver.  As read by Radiology.    Laboratory:  Troponin: <0.015  CBC: HGB 10.2(L), o/w WNL (WBC 10.4, )  BMP: Potassium 3.1(L), Carbon dioxide 33(H), Calcium 7.9(L), Glucose 117(H), o/w WNL (Creatinine 0.56)  Hepatic panel: Albumin 2.2(L), Protein total 6.5(L), AST 74(H), o/w WNL  Lipase: 156  ABO/Rh: B Pos    Interventions:  1805: NS 1L IV Bolus  1945: Heparin infusion 18 units/kg/hr IV  1945: Heparin bolus 5,096 units IV  1945: Potassium chloride 40 mEq oral    Emergency Department Course:  Past medical records, nursing notes, and vitals reviewed.  1815: I performed an exam of the patient and obtained history, as documented above.  IV inserted and blood drawn. The patient was placed on continuous cardiac monitoring and pulse oximetry.  ECG obtained, results above.   The patient was sent for a CT Chest while in the emergency department, findings above.    1855: Call from Dr. Middleton of radiology. CT shows bilateral PE.   1926: Spoke to Dr. Hurst of radiology, US shows DVT.    1927: I rechecked the patient. Explained findings to the  patient.    : I spoke to Dr. Araiza of the hospitalist service who accepts the patient for admission.     Findings and plan explained to the Patient who consents to admission.   Discussed the patient with Dr. Araiza, who will admit the patient to a [facility] bed for further monitoring, evaluation, and treatment.     Impression & Plan      Medical Decision Makin y.o. F presenting with chest pain, shortness of breath, tachycardia on initial VS    DDx includes PE, DVT, biliary pathology, pna, ptx.  ACS seems less likely given the symptomatology although EKG with concerning ST depression.  Labs and imaging noted above and sig for bilat PEs and RLE DVT.  No evidence of arterial insufficiency, cerulea or alba dolens  Given EKG, pt is likely experiencing heart strain. IVF given, heparin gtt ordered.  Pt subsequently admitted to hospitalist for service for further evaluation and mgmt.  Counseled on all results, disposition and diagnosis.  Pt understanding and agreeable to plan. Patient admitted in stable condition.        Diagnosis:    ICD-10-CM   1. Pulmonary embolism, bilateral (H) I26.99   2. Acute deep vein thrombosis (DVT) of distal vein of right lower extremity (H) I82.4Z1     Disposition: Admitted    Jolly Matthewsmore  2018   [facility] EMERGENCY DEPARTMENT    I, Jolly Lawrence, am serving as a scribe at 6:15 PM on 2018 to document services personally performed by Tarik Levy MD based on my observations and the provider's statements to me.        Tarik Levy MD  18 0440

## 2018-07-19 NOTE — LETTER
Transition Communication Hand-off for Care Transitions to Next Level of Care Provider    Hand-off for Care Transitions to Next Level of Care Provider  Name: Nelsy Cota  : 1975    TO ESTABLISH CARE AT [facility]  MRN #: [id number]  Reason for Hospitalization:  Pulmonary embolism, bilateral (H) [I26.99]  Acute deep vein thrombosis (DVT) of distal vein of right lower extremity (H) [I82.4Z1]  Admit Date/Time: 2018  5:35 PM  Discharge Date: 2018    Reason for Communication Hand-off Referral: Other  Pulmonary emboli and right leg DVT    Discharge Plan:  Discharged to: Home with support                   Patient agreeable to post-hospital support suggestions:  Yes    Patient is on new medications:   Yes    MTM follow up recommended: No    Tel-Assurance program:  Ineligible    Follow-up specialty is recommended: Yes. Follow up with Hematology/Oncology in clinic. Recommend a bone marrow biopsy.      Follow-up plan:  Future Appointments  Date Time Provider Department Center   2018 2:20 PM Hamzah Thomas MD \[facility]\""     Any outstanding tests or procedures:   No.       Key Recommendations:  Patient discharged on Lovenox bridge and Warfarin. Please assess pt understanding of these medications. Reinforce importance of f/u with H/O for bone marrow biopsy.     Communicated handoff via EPIC Comm Mgt to Dr. Kasandra Solano's CC at [place] Care Coord.      Diane Lofton RN, BSN, CTS  [facility]  [phone]    AVS/Discharge Summary is the source of truth; this is a helpful guide for improved communication of patient story

## 2018-07-19 NOTE — IP AVS SNAPSHOT
[place] [address] [facility]    [address]    [facility] [address]    Phone:  [phone]    Fax:  [phone]                                       After Visit Summary   7/19/2018    Nelsy Cota    MRN: [id number]           After Visit Summary Signature Page     I have received my discharge instructions, and my questions have been answered. I have discussed any challenges I see with this plan with the nurse or doctor.    ..........................................................................................................................................  Patient/Patient Representative Signature      ..........................................................................................................................................  Patient Representative Print Name and Relationship to Patient    ..................................................               ................................................  Date                                            Time    ..........................................................................................................................................  Reviewed by Signature/Title    ...................................................              ..............................................  Date                                                            Time

## 2018-07-19 NOTE — LETTER
[place] [address] [facility]  [address] [facility] [address]  [phone] July 22, 2018    RE:  Nelsy Cota                                                                                                                                                       [address] [facility] [address]            To whom it may concern:    Nelsy Cota was hospitalized from 7/19-07/22/18.  She will be unable to return to work at this time.  She can return to work on 8/6/2018 without restrictions.      Sincerely,            Neela Araiza MD

## 2018-07-19 NOTE — ED TRIAGE NOTES
Pt presents to ED with complaints of swelling in right leg/ankle for over a week. Pt says SOB started on Sunday and today started having severe midsternal chest pain that's worse with a deep breath. HX DVT last year. ABCs intact. A&Ox3. VSS.

## 2018-07-19 NOTE — IP AVS SNAPSHOT
MRN:[id number]                      After Visit Summary   7/19/2018    Nelsy Cota    MRN: [id number]           Thank you!     Thank you for choosing [facility] for your care. Our goal is always to provide you with excellent care. Hearing back from our patients is one way we can continue to improve our services. Please take a few minutes to complete the written survey that you may receive in the mail after you visit. If you would like to speak to someone directly about your visit please contact Patient Relations at [phone]. Thank you!          Patient Information     Date Of Birth          1975        Designated Caregiver       Most Recent Value    Caregiver    Will someone help with your care after discharge? yes    Name of designated caregiver Silas    Phone number of caregiver [phone]    Caregiver address On file      About your hospital stay     You were admitted on:  July 19, 2018 You last received care in the:  [place] [address] [facility]    You were discharged on:  July 22, 2018        Reason for your hospital stay       You were hospitalized for blood clots in the lung (pulmonary embolus) and in the right leg (deep venous thromboembolism).  You were also found to have low hemoglobin with large cells (will need bone marrow biopsy).                  Who to Call     For medical emergencies, please call 911.  For non-urgent questions about your medical care, please call your primary care provider or clinic, [phone]          Attending Provider     Provider Specialty    Tarik Levy MD Emergency Medicine    Araiza, Neela Bee MD Internal Medicine       Primary Care Provider Office Phone # Fax #    Kasandra Solano [phone]      After Care Instructions     Activity       Your activity upon discharge: activity as tolerated            Diet       Follow this diet upon discharge: Orders Placed This Encounter      Combination Diet  Regular Diet Adult                  Follow-up Appointments     Follow-up and recommended labs and tests        Follow up with primary care on 7/24/2018, at that time you will need an INR checked with dose adjustment of your Coumadin based on those results.    Follow up with Oncology/Hematology in clinic.  You will need to have a bone marrow biopsy.                  Your next 10 appointments already scheduled     Jul 24, 2018  2:20 PM CDT   [facility]    [address]  [facility] [place] [phone]           Bring a current list of meds and any records pertaining to this visit. For Physicals, please bring immunization records and any forms needing to be filled out. Please arrive 10 minutes early to complete paperwork.              Further instructions from your care team       1. Follow up with primary care on 7/24/2018, at that time you will need an INR checked with dose adjustment of your Coumadin based on those results.  When your INR level is 2-3 your Lovenox injections can be stopped.    2. Follow up with Oncology/Hematology in clinic.  You will need to have a bone marrow biopsy.  This is to determine the reason for your low hemoglobin and large red blood cells.    3. You should not take Aspirin, Aleve (Naproxen) or Advil (Ibuprofen) as these will also thin your blood.  You can take Tylenol as needed for pain as this does not thin your blood.    4. It is important to quit smoking as this increases your risk for blood clots.    5. Keep your Right leg elevated to help with swelling and pain until the blood clot resolves.    Warfarin Instruction     You have started taking a medicine called warfarin. This is a blood-thinning medicine (anticoagulant). It helps prevent and treat blood clots.      Before leaving the hospital, make sure you know how much to take and how long to take it.      You will need  "regular blood tests to make sure your blood is clotting safely. It is very important to see your doctor for regular blood tests.    Talk to your doctor before taking any new medicine (this includes over-the-counter drugs and herbal products). Many medicines can interact with warfarin. This may cause more bleeding or too much clotting.     Eating a lot of vitamin K--found in green, leafy vegetables--can change the way warfarin works in your body. Do NOT avoid these foods. Instead, try to eat the same amount each day.     Bleeding is the most common side-effect of warfarin. You may notice bleeding gums, a bloody nose, bruises and bleeding longer when you cut yourself. See a doctor at once if:   o You cough up blood  o You find blood in your stool (poop)  o You have a deep cut, or a cut that bleeds longer than 10 minutes   o You have a bad cut, hard fall, accident or hit your head (go to urgent care or the emergency room).    For women who can get pregnant: This medicine can harm an unborn baby. Be very careful not to get pregnant while taking this medicine. If you think you might be pregnant, call your doctor right away.    For more information, read \"Guide to Warfarin Therapy,  the booklet you received in the hospital.        Pending Results     Date and Time Order Name Status Description    7/21/2018 0000 Protein Immunofixation Serum In process             Statement of Approval     Ordered          07/22/18 1103  I have reviewed and agree with all the recommendations and orders detailed in this document.  EFFECTIVE NOW     Approved and electronically signed by:  Neela Araiza MD             Admission Information     Date & Time Provider Department Dept. Phone    7/19/2018 Neela Araiza MD [place] [address] [facility] [phone]      Your Vitals Were     Blood Pressure Pulse Temperature Respirations Height Weight    87/45 65 98.6  F (37  C) (Oral) 16 1.626 m (5' 4\") 79 kg (174 lb 3.2 oz)    Pulse " Oximetry BMI (Body Mass Index)                97% 29.9 kg/m2          MyChart Information     [employer] ([employer]) gives you secure access to your electronic health record. If you see a primary care provider, you can also send messages to your care team and make appointments. If you have questions, please call your primary care clinic.  If you do not have a primary care provider, please call [phone] and they will assist you.        Care EveryWhere ID     This is your Care EveryWhere ID. This could be used by other organizations to access your [facility] medical records  [id number]        Equal Access to Services     [facility]: Hadanne marie Bhatti, allen snyder, vishal hamilton, shawn rosen . So [facility] [phone].    ATENCIÓN: Si habla español, tiene a ordaz disposición servicios gratuitos de asistencia lingüística. Llame al [phone].    We comply with applicable federal civil rights laws and [place] laws. We do not discriminate on the basis of race, color, national origin, age, disability, sex, sexual orientation, or gender identity.               Review of your medicines      START taking        Dose / Directions    enoxaparin 120 MG/0.8ML injection   Commonly known as:  LOVENOX   Used for:  Pulmonary embolism, bilateral (H), Acute deep vein thrombosis (DVT) of distal vein of right lower extremity (H)        Dose:  1.5 mg/kg   Inject 0.8 mLs (120 mg) Subcutaneous every 24 hours for 5 days   Quantity:  4 mL   Refills:  0       folic acid 1 MG tablet   Commonly known as:  FOLVITE   Used for:  Macrocytic anemia        Dose:  1 mg   Take 1 tablet (1 mg) by mouth daily   Quantity:  30 tablet   Refills:  0       oxyCODONE IR 5 MG tablet   Commonly known as:  ROXICODONE   Used for:  Acute deep vein thrombosis (DVT) of distal vein of right lower extremity (H)        Dose:  5 mg   Take 1 tablet (5 mg) by mouth every 8 hours as needed for other (pain control or improvement  in physical function. Hold dose for analgesic side effects.)   Quantity:  9 tablet   Refills:  0       Phenytoin Sodium Extended 300 MG Caps   Used for:  Convulsions, unspecified convulsion type (H)        Dose:  300 mg   Take 300 mg by mouth 2 times daily   Quantity:  60 capsule   Refills:  0       warfarin 5 MG tablet   Commonly known as:  COUMADIN   Used for:  Acute deep vein thrombosis (DVT) of distal vein of right lower extremity (H), Pulmonary embolism, bilateral (H)        Take 5 mg every evening.  INR needs to be repeated on 7/24/2018.   Quantity:  30 tablet   Refills:  0            Where to get your medicines      These medications were sent [place], [place] - [address] [facility]  [address] [facility] [address]     Phone:  [phone]     enoxaparin 120 MG/0.8ML injection    folic acid 1 MG tablet    Phenytoin Sodium Extended 300 MG Caps    warfarin 5 MG tablet         Some of these will need a paper prescription and others can be bought over the counter. Ask your nurse if you have questions.     Bring a paper prescription for each of these medications     oxyCODONE IR 5 MG tablet                Protect others around you: Learn how to safely use, store and throw away your medicines at www.disposemymeds.org.        Information about OPIOIDS     PRESCRIPTION OPIOIDS: WHAT YOU NEED TO KNOW   We gave you an opioid (narcotic) pain medicine. It is important to manage your pain, but opioids are not always the best choice. You should first try all the other options your care team gave you. Take this medicine for as short a time (and as few doses) as possible.     These medicines have risks:    DO NOT drive when on new or higher doses of pain medicine. These medicines can affect your alertness and reaction times, and you could be arrested for driving under the influence (DUI). If you need to use opioids long-term, talk to your care team about driving.    DO NOT  operate heave machinery    DO NOT do any other dangerous activities while taking these medicines.     DO NOT drink any alcohol while taking these medicines.      If the opioid prescribed includes acetaminophen, DO NOT take with any other medicines that contain acetaminophen. Read all labels carefully. Look for the word  acetaminophen  or  Tylenol.  Ask your pharmacist if you have questions or are unsure.    You can get addicted to pain medicines, especially if you have a history of addiction (chemical, alcohol or substance dependence). Talk to your care team about ways to reduce this risk.    Store your pills in a secure place, locked if possible. We will not replace any lost or stolen medicine. If you don t finish your medicine, please throw away (dispose) as directed by your pharmacist. The [place] Pollution Control Agency has more information about safe disposal: https://www.pca.[facility].[place].us/living-green/managing-unwanted-medications.     All opioids tend to cause constipation. Drink plenty of water and eat foods that have a lot of fiber, such as fruits, vegetables, prune juice, apple juice and high-fiber cereal. Take a laxative (Miralax, milk of magnesia, Colace, Senna) if you don t move your bowels at least every other day.              Medication List: This is a list of all your medications and when to take them. Check marks below indicate your daily home schedule. Keep this list as a reference.      Medications           Morning Afternoon Evening Bedtime As Needed    enoxaparin 120 MG/0.8ML injection   Commonly known as:  LOVENOX   Inject 0.8 mLs (120 mg) Subcutaneous every 24 hours for 5 days   Last time this was given:  120 mg on 7/21/2018  6:43 PM                                   folic acid 1 MG tablet   Commonly known as:  FOLVITE   Take 1 tablet (1 mg) by mouth daily   Last time this was given:  1 mg on 7/22/2018  7:39 AM                                   oxyCODONE IR 5 MG tablet   Commonly known as:   ROXICODONE   Take 1 tablet (5 mg) by mouth every 8 hours as needed for other (pain control or improvement in physical function. Hold dose for analgesic side effects.)   Last time this was given:  10 mg on 7/22/2018  6:33 AM                                   Phenytoin Sodium Extended 300 MG Caps   Take 300 mg by mouth 2 times daily   Last time this was given:  300 mg on 7/22/2018  7:40 AM                                      warfarin 5 MG tablet   Commonly known as:  COUMADIN   Take 5 mg every evening.  INR needs to be repeated on 7/24/2018.   Last time this was given:  7.5 mg on 7/21/2018  6:43 PM                                             More Information        * Deep Vein Thrombosis    Deep Vein Thrombosis (DVT) means there is a blood clot in a deep vein of the leg. This may cause redness, swelling, warmth and pain of the leg. If the blood clot grows larger, a piece may break off and go to the lungs (pulmonary embolus)  Factors that increase risk of a DVT include: overweight, smoking, use of estrogen replacement therapy. Prolonged periods without movement (such as long distance travel, wearing a fracture cast, and prolonged bed rest after surgery or during an illness) also increases the risk of a DVT.  Home Care:    Wear compression stockings as directed by your doctor.    Move your ankles, toes and knees frequently to stimulate blood flow.    You will be prescribed a blood-thinning (anti-coagulant) medicine, either pills or shots. Take it exactly as directed.  Follow Up  Follow up with your doctor as advised. You will need regular blood tests to check your INR (International Normalized Ratio).  Get Prompt Medical Attention  Call your doctor or 911 if any of the following occur:    Shortness of breath or painful breathing    Chest pain, repeated cough or coughing up blood    Increasing swelling or increasing pain in the leg    Spreading redness    Unexpected bleeding (nose, gums, cuts, urinary tract, vagina,  rectum)    7219-4360 [employer] [facility]. [address] [place] [address], [place] [address]. All rights reserved. This information is not intended as a substitute for professional medical care. Always follow your healthcare professional's instructions.  This information has been modified by your health care provider with permission from the publisher.                Pulmonary Embolism (PE)  A pulmonary embolus is most often due to a blood clot that develops in a deep vein of the leg (deep vein thrombosis). If that clot, breaks loose and travels to the lung, it is called a pulmonary embolism (PE). This can cut off the flow of blood in the lungs.  A blood clot in the lungs is a medical emergency and may cause death.   Healthcare providers use the term venous thromboembolism (VTE) to describe these 2 conditions: deep vein thrombosis and pulmonary embolism. They use the term VTE because the 2 conditions are very closely related. And, because their prevention and treatment are also closely related.      A pulmonary embolism occurs when a blood clot forms in a vein and travels to the lungs.   How is pulmonary embolism diagnosed?  Your healthcare provider examines you and asks about your symptoms and health history. You may also have one or more of the following:    Blood tests to check for blood clotting or other problems    Imaging tests to look for clots in the veins or lung    Electrocardiography (ECG) to test how well the heart is working  How is pulmonary embolism treated?    Blood-thinning medicines (anticoagulants). These medicines thin the blood. They may be given as a pill, as an injection, or through a tube into a vein (intravenous or IV). Blood thinners help prevent more blood clots from forming. They also help to prevent an existing clot from getting larger.    Thrombolysis. Thrombolytic medicines are used to quickly dissolve a blood clot. A long, narrow tube (catheter) is used to deliver medicine directly to  the clot. Thrombolytic medicines increase the risk of bleeding so they are used very carefully.    Inferior vena cava (IVC) filter surgery. The vena cava is the body s largest vein. It carries blood from the body to the heart. A small filter traps blood clots in the lower body and prevents them from traveling to the lungs. The filter is inserted into the vein through a catheter. The filter may be used if blood thinners cannot be taken or if they don't work.     Pulmonary embolectomy. This is a procedure to remove a blood clot in the lungs. It may be done with surgery or with a catheter inserted in the body. It may be done when other treatments aren't safe or don't work.  What are the long-term concerns?  With treatment, blood clots are usually dissolved or removed. Some treatments can even help prevent future clots. But having a PE can put you at risk for another life-threatening blood clot. So, you will likely need to take anticoagulants to help keep blood clots from forming again. You may need to take this medicine for months or years.  You may also need to make lifestyle changes. This may include getting more active and eating healthier. You may need to wear elastic (compression) stockings and and take breaks on long trips.  Call 911  Call 911 or get emergency help if you have symptoms of a blood clot that has traveled to the lungs. The symptoms include:    Chest pain    Trouble breathing    Coughing (may cough up blood)    Fainting    Fast heartbeat    Sweating  Call 911 if you have heavy or uncontrolled bleeding.  When to call your healthcare provider  Call your healthcare provider if you have swelling or pain in your leg, arm, or other area. These are symptoms of a blood clot.  You may have bleeding if you take medicine to help prevent blood clots.  Call your healthcare provider if you have signs or symptoms of bleeding. This includes:    Blood in the urine    Bleeding with bowel movements    Bleeding from the  nose, gums, a cut, or vagina   Date Last Reviewed: 2/1/2017 2000-2017 [employer]. [address] [place] [address], [place] [address]. All rights reserved. This information is not intended as a substitute for professional medical care. Always follow your healthcare professional's instructions.

## 2018-07-20 LAB
ALBUMIN SERPL-MCNC: 1.9 G/DL (ref 3.4–5)
ALP SERPL-CCNC: 106 U/L (ref 40–150)
ALT SERPL W P-5'-P-CCNC: 27 U/L (ref 0–50)
ANION GAP SERPL CALCULATED.3IONS-SCNC: 5 MMOL/L (ref 3–14)
AST SERPL W P-5'-P-CCNC: 50 U/L (ref 0–45)
BASOPHILS # BLD AUTO: 0.1 10E9/L (ref 0–0.2)
BASOPHILS NFR BLD AUTO: 0.5 %
BILIRUB SERPL-MCNC: 0.8 MG/DL (ref 0.2–1.3)
BUN SERPL-MCNC: 14 MG/DL (ref 7–30)
CALCIUM SERPL-MCNC: 7.5 MG/DL (ref 8.5–10.1)
CHLORIDE SERPL-SCNC: 102 MMOL/L (ref 94–109)
CO2 SERPL-SCNC: 29 MMOL/L (ref 20–32)
COPATH REPORT: NORMAL
CREAT SERPL-MCNC: 0.57 MG/DL (ref 0.52–1.04)
DIFFERENTIAL METHOD BLD: NORMAL
EOSINOPHIL # BLD AUTO: 0.5 10E9/L (ref 0–0.7)
EOSINOPHIL NFR BLD AUTO: 5.3 %
ERYTHROCYTE [DISTWIDTH] IN BLOOD BY AUTOMATED COUNT: 17.4 % (ref 10–15)
FOLATE SERPL-MCNC: 1 NG/ML
GFR SERPL CREATININE-BSD FRML MDRD: >90 ML/MIN/1.7M2
GLUCOSE SERPL-MCNC: 74 MG/DL (ref 70–99)
HCT VFR BLD AUTO: 24.8 % (ref 35–47)
HGB BLD-MCNC: 8.8 G/DL (ref 11.7–15.7)
IMM GRANULOCYTES # BLD: 0 10E9/L (ref 0–0.4)
IMM GRANULOCYTES NFR BLD: 0.3 %
INR PPP: 1.24 (ref 0.86–1.14)
INTERPRETATION ECG - MUSE: NORMAL
LMWH PPP CHRO-ACNC: 0.39 IU/ML
LMWH PPP CHRO-ACNC: 0.44 IU/ML
LYMPHOCYTES # BLD AUTO: 3.6 10E9/L (ref 0.8–5.3)
LYMPHOCYTES NFR BLD AUTO: 37.2 %
MCH RBC QN AUTO: 47.8 PG (ref 26.5–33)
MCHC RBC AUTO-ENTMCNC: 35.5 G/DL (ref 31.5–36.5)
MCV RBC AUTO: 135 FL (ref 78–100)
MONOCYTES # BLD AUTO: 0.4 10E9/L (ref 0–1.3)
MONOCYTES NFR BLD AUTO: 4 %
NEUTROPHILS # BLD AUTO: 5.2 10E9/L (ref 1.6–8.3)
NEUTROPHILS NFR BLD AUTO: 52.7 %
NRBC # BLD AUTO: 0 10*3/UL
NRBC BLD AUTO-RTO: 0 /100
PLATELET # BLD AUTO: 259 10E9/L (ref 150–450)
POTASSIUM SERPL-SCNC: 3.7 MMOL/L (ref 3.4–5.3)
POTASSIUM SERPL-SCNC: NORMAL MMOL/L (ref 3.4–5.3)
PROT SERPL-MCNC: 5.7 G/DL (ref 6.8–8.8)
RBC # BLD AUTO: 1.84 10E12/L (ref 3.8–5.2)
RETICS # AUTO: 26.1 10E9/L (ref 25–95)
RETICS/RBC NFR AUTO: 1.4 % (ref 0.5–2)
SODIUM SERPL-SCNC: 136 MMOL/L (ref 133–144)
T4 FREE SERPL-MCNC: 1.46 NG/DL (ref 0.76–1.46)
TSH SERPL DL<=0.005 MIU/L-ACNC: 6.98 MU/L (ref 0.4–4)
VIT B12 SERPL-MCNC: 526 PG/ML (ref 193–986)
WBC # BLD AUTO: 9.6 10E9/L (ref 4–11)

## 2018-07-20 PROCEDURE — 82607 VITAMIN B-12: CPT | Performed by: INTERNAL MEDICINE

## 2018-07-20 PROCEDURE — 40000847 ZZHCL STATISTIC MORPHOLOGY W/INTERP HISTOLOGY TC 85060: Performed by: INTERNAL MEDICINE

## 2018-07-20 PROCEDURE — 85060 BLOOD SMEAR INTERPRETATION: CPT | Performed by: INTERNAL MEDICINE

## 2018-07-20 PROCEDURE — 36415 COLL VENOUS BLD VENIPUNCTURE: CPT | Performed by: INTERNAL MEDICINE

## 2018-07-20 PROCEDURE — 85004 AUTOMATED DIFF WBC COUNT: CPT | Performed by: INTERNAL MEDICINE

## 2018-07-20 PROCEDURE — 84439 ASSAY OF FREE THYROXINE: CPT | Performed by: INTERNAL MEDICINE

## 2018-07-20 PROCEDURE — 85520 HEPARIN ASSAY: CPT | Performed by: INTERNAL MEDICINE

## 2018-07-20 PROCEDURE — 85027 COMPLETE CBC AUTOMATED: CPT | Performed by: INTERNAL MEDICINE

## 2018-07-20 PROCEDURE — 99233 SBSQ HOSP IP/OBS HIGH 50: CPT | Performed by: INTERNAL MEDICINE

## 2018-07-20 PROCEDURE — 25000128 H RX IP 250 OP 636: Performed by: INTERNAL MEDICINE

## 2018-07-20 PROCEDURE — 85610 PROTHROMBIN TIME: CPT | Performed by: INTERNAL MEDICINE

## 2018-07-20 PROCEDURE — 84132 ASSAY OF SERUM POTASSIUM: CPT | Performed by: INTERNAL MEDICINE

## 2018-07-20 PROCEDURE — 85045 AUTOMATED RETICULOCYTE COUNT: CPT | Performed by: INTERNAL MEDICINE

## 2018-07-20 PROCEDURE — 80053 COMPREHEN METABOLIC PANEL: CPT | Performed by: INTERNAL MEDICINE

## 2018-07-20 PROCEDURE — 84443 ASSAY THYROID STIM HORMONE: CPT | Performed by: INTERNAL MEDICINE

## 2018-07-20 PROCEDURE — 12000007 ZZH R&B INTERMEDIATE

## 2018-07-20 PROCEDURE — 82746 ASSAY OF FOLIC ACID SERUM: CPT | Performed by: INTERNAL MEDICINE

## 2018-07-20 PROCEDURE — 25000132 ZZH RX MED GY IP 250 OP 250 PS 637: Performed by: INTERNAL MEDICINE

## 2018-07-20 RX ADMIN — OXYCODONE HYDROCHLORIDE 10 MG: 5 TABLET ORAL at 09:34

## 2018-07-20 RX ADMIN — OXYCODONE HYDROCHLORIDE 10 MG: 5 TABLET ORAL at 00:50

## 2018-07-20 RX ADMIN — POTASSIUM CHLORIDE 40 MEQ: 1.5 POWDER, FOR SOLUTION ORAL at 00:42

## 2018-07-20 RX ADMIN — POTASSIUM CHLORIDE 20 MEQ: 1.5 POWDER, FOR SOLUTION ORAL at 03:01

## 2018-07-20 RX ADMIN — HEPARIN SODIUM 1150 UNITS/HR: 10000 INJECTION, SOLUTION INTRAVENOUS at 13:33

## 2018-07-20 RX ADMIN — OXYCODONE HYDROCHLORIDE 10 MG: 5 TABLET ORAL at 13:54

## 2018-07-20 ASSESSMENT — ACTIVITIES OF DAILY LIVING (ADL)
ADLS_ACUITY_SCORE: 13
ADLS_ACUITY_SCORE: 14
ADLS_ACUITY_SCORE: 13
ADLS_ACUITY_SCORE: 13

## 2018-07-20 NOTE — CONSULTS
CTS consulted as pt has no insurance listed, financial counselor made aware and will meet w/ pt.     Consult placed for pharmacy liaison to check cost new meds/anticoagulants if ordered upon discharge. Also, would recommend filling all discharge prescriptions through discharge pharmacy to ensure pt gets at least 30 day supply of medication with arranging other possible coverage.     CM will continue to follow patient for any additional discharge needs.     Monika Eastman RN BSN CTS   [phone]  Care Transitions Team  [facility]

## 2018-07-20 NOTE — ED NOTES
"[facility]  ED Nurse Handoff Report    Nelsy Cota is a 43 year old female   ED Chief complaint: Deep Vein Thrombosis; Shortness of Breath; and Chest Pain  . ED Diagnosis:   Final diagnoses:   Pulmonary embolism, bilateral (H)   Acute deep vein thrombosis (DVT) of distal vein of right lower extremity (H)     Allergies: No Known Allergies    Code Status: Full Code  Activity level - Baseline/Home:  Independent. Activity Level - Current:   Independent. Lift room needed: No. Bariatric: No   Needed: No   Isolation: No. Infection: Not Applicable.     Vital Signs:   Vitals:    07/19/18 1805 07/19/18 1818 07/19/18 1900 07/19/18 1930   BP:  110/78  115/75   Pulse:       Temp:       TempSrc:       SpO2: 100% 100%  98%   Weight:   77.1 kg (170 lb)    Height:   1.626 m (5' 4\")        Cardiac Rhythm:  ,   Cardiac  Cardiac Rhythm: Normal sinus rhythm  Pain level:    Patient confused: No. Patient Falls Risk: No.   Elimination Status: Has voided   Patient Report - Initial Complaint: Pt presents to ED with complaints of swelling in right leg/ankle for over a week. Pt says SOB started on Sunday and today started having severe midsternal chest pain that's worse with a deep breath. HX DVT last year. ABCs intact. A&Ox3. VSS. . Focused Assessment:   18:24 Cardiac Cardiac - Cardiac WDL:  WDL except  Review of Systems (Cardiac) - Cardiac Signs/Symptoms: chest pain; shortness of breath  Chest Pain Assessment - Chest Pain Location: midsternal Precipitating Factors: nothing  Cardiac Monitoring - EKG Monitoring: Yes Cardiac Regularity: Regular Cardiac Rhythm: NSR        18:23 Respiratory Respiratory - Respiratory WDL:  WDL except; all (pain with deep breath) Rhythm/Pattern, Respiratory: unlabored; shortness of breath reported KB    18:23 Musculoskeletal Musculoskeletal - Musculoskeletal WDL:  WDL except Side: Left Pain Body Location: calf CMS Intact: Yes Musculoskeletal Comment: significant left calf/ankle " swelling over last couple weeks         Tests Performed: labs, CT, US, EKG. Abnormal Results:   Labs Ordered and Resulted from Time of ED Arrival Up to the Time of Departure from the ED   CBC WITH PLATELETS DIFFERENTIAL - Abnormal; Notable for the following:        Result Value    RBC Count 2.18 (*)     Hemoglobin 10.2 (*)     Hematocrit 28.0 (*)      (*)     MCH 46.8 (*)     RDW 17.5 (*)     All other components within normal limits   BASIC METABOLIC PANEL - Abnormal; Notable for the following:     Potassium 3.1 (*)     Carbon Dioxide 33 (*)     Glucose 117 (*)     Calcium 7.9 (*)     All other components within normal limits   HEPATIC PANEL - Abnormal; Notable for the following:     Albumin 2.2 (*)     Protein Total 6.5 (*)     AST 74 (*)     All other components within normal limits   TROPONIN I   LIPASE   MEASURE WEIGHT   NOTIFY PHYSICIAN   NOTIFY PHYSICIAN   PLATELETS MONITORED PER HEPARIN TREATMENT PROTOCOL (FOR MEANINGFUL USE   ABO/RH TYPE AND SCREEN     US Lower Extremity Venous Duplex Right   Final Result   IMPRESSION:    1. Occlusive thrombus within the right popliteal vein.   2. Occlusive thrombus within the right gastrocnemius vein.      Results discussed with Tarik Levy at 7:26 PM on 7/19/2018.         BERNARDO FERNANDEZ MD      Abdomen US, limited (RUQ only)   Preliminary Result   IMPRESSION:    1. Unremarkable appearance of the gallbladder.   2. No biliary dilatation.   3. Moderate to marked diffuse fatty infiltration of the liver.      CT Chest Pulmonary Embolism w Contrast   Final Result   Abnormal   IMPRESSION:    1. A few acute pulmonary emboli within segmental and subsegmental   pulmonary arteries in bilateral lower lobes and the right middle lobe.   2. Small probable pulmonary infarcts in bilateral lower lobes.      [Critical Result: Pulmonary embolism]      Finding was identified on 7/19/2018 6:42 PM.       Dr. Levy was contacted by me on 7/19/2018 6:53 PM and verbalized    understanding of the critical result.       SWATHI RIVERA MD          Treatments provided: heparin  Family Comments:  at bedside  OBS brochure/video discussed/provided to patient:  N/A  ED Medications:   Medications   0.9% sodium chloride BOLUS (1,000 mLs Intravenous New Bag 7/19/18 1805)     Followed by   sodium chloride 0.9% infusion (not administered)   heparin infusion 25,000 units in 0.45% NaCl 250 mL (18 Units/kg/hr × 63.7 kg (Adjusted) Intravenous New Bag 7/19/18 1945)   0.9% sodium chloride BOLUS (0 mLs Intravenous Stopped 7/19/18 1834)   iopamidol (ISOVUE-370) solution 500 mL (69 mLs Intravenous Given 7/19/18 1832)   potassium chloride SA (K-DUR/KLOR-CON M) CR tablet 40 mEq (40 mEq Oral Given 7/19/18 1945)   heparin Loading Dose bolus dose from infusion pump 5,096 Units (5,096 Units Intravenous Given 7/19/18 1945)     Drips infusing:  Yes  For the majority of the shift, the patient's behavior Green. Interventions performed were none.     Severe Sepsis OR Septic Shock Diagnosis Present: No      ED Nurse Name/Phone Number: Rosalva Loomis,   7:57 PM    RECEIVING UNIT ED HANDOFF REVIEW    Above ED Nurse Handoff Report was reviewed: Yes  Reviewed by: Luz Marina Leonard on July 19, 2018 at 9:22 PM

## 2018-07-20 NOTE — PLAN OF CARE
"Problem: VTE, DVT and PE (Adult)  Goal: Signs and Symptoms of Listed Potential Problems Will be Absent, Minimized or Managed (VTE, DVT and PE)  Signs and symptoms of listed potential problems will be absent, minimized or managed by discharge/transition of care (reference VTE, DVT and PE (Adult) CPG).   Outcome: No Change  RN SHIFT SUMMARY :  DX/STORY : admit 7/19 with SOB/Pain in mid chesy & RLE pain/swelling -- RLE DVT & Bilat PE found  HX : smoker PPD, ETOH ( 2 a day )   LABS/PROTOCOLS : K ok 3.7, WBC 9.6 ,HGB 8.8 --hematology ordering more tests   TELE : SB-50-60  ASSESS : a/o, pain RLE & Mid ant. Chest- \"6\" --goal of 3. Oxy x2 today . Instructed on IS- gets to 1500  TEACHING : instructed on meds, PE/DVT/ IS   PLAN : at baseline has SO &  From [place]. Works as . sliding scale following as no insurance . Cont. POC of anticoag + pain control  D/C goal: after anticoagulation stabilized         "
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
## 2018-07-20 NOTE — H&P
[facility]  Hospitalist Admission Note  Name: Nelsy Cota    MRN: [id number]  YOB: 1975    Age: 43 year old  Date of admission: 7/19/2018  Primary care provider: Kasandra Solano    Chief Complaint:  SOB/Leg Pain    Assessment and Plan:     Nelsy Cota is a 43 year old female with PMH including epilepsy (no longer on medications), tobacco use disorder and prior superficial thrombophlebitis (patient states this was a DVT) who has not recently seen physicians who was admitted with 3 weeks of progressive right lower extremity pain on 7/19/18 and several day history of chest pain with associated shortness of breath and was found to have right lower extremity DVT and bilateral PEs.    1.  Acute PE/DVT: Patient developed swelling and pain in her right lower extremity about 3 weeks ago.  This has progressed and she developed shortness of breath and chest pain several days ago.  She was found to have a few acute PEs as well as a small probable pulmonary infarcts in the bilateral lower lobes as well as occlusive thrombus in the right popliteal and gastrocnemius veins.  Troponin was negative.  She is a tobacco user but has no other risk factors for VTE.  She was started on heparin drip in the emergency room.  -Continue heparin drip  -Consult hematology/oncology  -Consult social work (lack of medical insurance)  -Pain control with Tylenol or oxycodone    2.  Chest pain: Troponin was negative.  This is due to acute PE.  Treatment as above.    3.  Sinus tachycardia: Due to acute PE.    4.  History of epilepsy: Patient's last seizure was in 2008.  She had been on Dilantin but has not taken this since 2008.  Will not resume this medication as she has been off it for 10 years.    5.  Tobacco use disorder: Discussed importance of cessation given venous thromboembolism.  She denies nicotine replacement therapy.    6.  Lack of medical insurance: Social work consult.    7. Macrocytic Anemia:   with mild anemia.  Checking B12 and Folate.      8.  Alcohol Use: Patient states she drinks 2 drinks daily.  Has mild transaminitis which could be due to alcohol use.  No evidence of withdrawal but will monitor closely.    DVT Prophylaxis: heparin drip  Code Status: Full Code  Discharge Dispo: Admit inpatient status  Estimated Disch Date / # of Days until Disch: At least 2 midnights      History of Present Illness:  Nelsy Cota is a 43 year old female with PMH including epilepsy (no longer on medications), tobacco use disorder and prior superficial thrombophlebitis (patient states this was a DVT) who has not recently seen physicians who was admitted with 3 weeks of progressive right lower extremity pain on 7/19/18 and several day history of chest pain with associated shortness of breath and was found to have right lower extremity DVT and bilateral PEs.  History was obtained through patient interview, chart review and discussion with Dr. Levy in the ER.    The patient states that about 3 weeks ago she noticed pain in her right lower extremity.  It felt warm and was slightly red.  She reports that it felt like this when she had a blood clot.  She states this was about a year ago.  I did review the chart and she was seen in urgent care in 6/2017.  At that time she was diagnosed with superficial thrombophlebitis but ultrasound was negative for DVT.  She states she was given a medication for about a week but is adamant that this was a DVT.    She started to take aspirin (because she thought that this would help in her blood and resolve her pain.  Up to 4 tablets of 81 mg daily) her swelling and pain progressed and spread up her right leg into her foot.  Over the past several days she developed chest pain which was sharp in nature.  It was located in the substernal region but then did radiate to under her right breast.  She also had significant shortness of breath.  This has progressed and at this time she cannot  go up the stairs without resting which is very unusual for her.  She is a  and was so short of breath this past Tuesday that her boss sent her home.  She did call in sick after that.    She has not had fevers or chills.  No nausea or vomiting.  She does get dizzy when she ambulates but has not had syncope.  She smokes 1 pack per day.  She is not on birth control.  She has noticed that she is more fatigued over the past week.  No family history of malignancy, blood clots or heart disease     Past Medical History:  Past Medical History:   Diagnosis Date     Epilepsy (H)     2004 last seizure     Past Surgical History:  Past Surgical History:   Procedure Laterality Date     C NONSPECIFIC PROCEDURE  1997    R ankle fracture repair     C NONSPECIFIC PROCEDURE  2004    R ankle surgery for ligament injury, also tibia injury     HC CLOSED TX CALCANEAL FX W/O MANIPULATION  2004    fracture of calcaneus     HC COLP CERVIX/UPPER VAGINA  2005    cryotherapy     Social History:  Social History   Substance Use Topics     Smoking status: Current Every Day Smoker     Packs/day: 1.00     Years: 15.00     Types: Cigarettes     Smokeless tobacco: Never Used     Alcohol use 0.5 oz/week     1 Standard drinks or equivalent per week      Comment: 12 drinks per week     Social History     Social History Narrative   .  Family History:  Family History   Problem Relation Age of Onset     Hypertension Father      Neurologic Disorder Father      epilepsy     C.A.D. Maternal Grandmother      AAA     Connective Tissue Disorder Paternal Aunt      SLE     Allergies:  No Known Allergies  Medications:  patient is not currently taking any medications.  She was taking aspirin.  Review of Systems:  A Comprehensive greater than 10 system review of systems was carried out.  Pertinent positives and negatives are noted above.  Otherwise negative for contributory information.     Physical Exam:  Blood pressure 115/75, pulse 112, temperature 98.7  F  "(37.1  C), temperature source Oral, height 1.626 m (5' 4\"), weight 77.1 kg (170 lb), SpO2 98 %.  Wt Readings from Last 1 Encounters:   07/19/18 77.1 kg (170 lb)     Exam:   General: Alert, awake, no acute distress.  HEENT: NC/AT, eyes anicteric and without injection, EOMI, face symmetric.  Dentition WNL, MMM.  Cardiac: Tachycardic, regular rhythm,, normal S1, S2.  No murmurs/g/r.  Right lower extremity with nonpitting edema most notable over the ankle with mild erythema  Pulmonary: Normal chest rise, normal work of breathing.  Lungs CTAB without crackles or wheezing  Abdomen: soft, non-tender, non-distended.  Normoactive BS.  No guarding or rebound tenderness.  Extremities: no deformities.  Warm, well perfused.  Skin: no rashes or lesions noted.  Warm and Dry.  Neuro: No focal deficits noted.  Speech clear.  Coordination and strength grossly normal.  Psych: Appropriate affect.  Alert and oriented ×3    Data Reviewed Today:  EKG:  Sinus tachycardia  Imaging:  Results for orders placed or performed during the hospital encounter of 07/19/18   CT Chest Pulmonary Embolism w Contrast     Value    Radiologist flags Pulmonary embolism (AA)    Narrative    CT CHEST WITH CONTRAST   7/19/2018 6:40 PM     HISTORY: Dyspnea. Severe mid sternal chest pain.    COMPARISON: 12/7/2015.    TECHNIQUE: Following the uneventful administration of 69mL Isovue-370  intravenous contrast, helical sections were acquired through the lungs  according to the pulmonary embolism protocol. Coronal reconstructions  were generated. Radiation dose for this scan was reduced using  automated exposure control, adjustment of the mA and/or kV according  to the patient's size, or iterative reconstruction technique.    FINDINGS: A few acute pulmonary emboli within segmental and  subsegmental pulmonary arteries in bilateral lower lobes and the right  middle lobe. The thoracic aorta is normal in caliber without  dissection.    Single small wedge-shaped opacities " in the posterolateral aspects of  bilateral lower lobes, most likely representing small pulmonary  infarcts given the aforementioned pulmonary emboli. The lungs are  otherwise clear. No pleural or pericardial effusion. No enlarged lymph  nodes in the chest.    Scan through the upper abdomen is significant for moderate to severe  diffuse fatty infiltration of the liver.      Impression    IMPRESSION:   1. A few acute pulmonary emboli within segmental and subsegmental  pulmonary arteries in bilateral lower lobes and the right middle lobe.  2. Small probable pulmonary infarcts in bilateral lower lobes.    [Critical Result: Pulmonary embolism]    Finding was identified on 7/19/2018 6:42 PM.     Dr. Levy was contacted by me on 7/19/2018 6:53 PM and verbalized  understanding of the critical result.     SWATHI RIVERA MD   US Lower Extremity Venous Duplex Right    Narrative    VENOUS ULTRASOUND RIGHT LEG  7/19/2018 7:22 PM     HISTORY: RLE swelling, history of DVT.     COMPARISON: Ultrasound 3/16/2017.    TECHNIQUE: Examination of the deep veins was completed with graded  compression and color flow Doppler with spectral wave form analysis.    FINDINGS: There is hyperechoic thrombus with noncompressibility within  the popliteal vein. This thrombus is occlusive. There is also  hyperechoic thrombus in the gastrocnemius vein that also appears  occlusive.    No evidence of thrombus in the common femoral vein, femoral vein,  greater saphenous vein. The posterior tibial vein and peroneal are  compressible without evidence of thrombus.      Impression    IMPRESSION:   1. Occlusive thrombus within the right popliteal vein.  2. Occlusive thrombus within the right gastrocnemius vein.    Results discussed with Tarik Levy at 7:26 PM on 7/19/2018.       BERNARDO FERNANDEZ MD   Abdomen US, limited (RUQ only)    Narrative    ULTRASOUND ABDOMEN LIMITED RIGHT UPPER QUADRANT   7/19/2018 7:22 PM     HISTORY: Right upper quadrant  tenderness.    COMPARISON: 7/19/2018 - CT chest.    FINDINGS: Moderate to marked diffuse increased hepatic echogenicity,  consistent with fatty infiltration. No hepatic masses. A small amount  of sludge within the gallbladder. The gallbladder is otherwise  unremarkable without gallstones, wall thickening or pericholecystic  fluid. No focal tenderness over the gallbladder. No intra or  extrahepatic biliary dilatation. The common duct measures 0.4 cm in  diameter. The right kidney has normal size and echogenicity, measuring  10.5 cm in length. No right intrarenal collecting system dilatation,  calculi or masses. No free fluid in the upper right hemiabdomen.      Impression    IMPRESSION:   1. Unremarkable appearance of the gallbladder.  2. No biliary dilatation.  3. Moderate to marked diffuse fatty infiltration of the liver.    SWATHI RIVERA MD     Labs:    Recent Labs  Lab 07/19/18  1802   WBC 10.4   HGB 10.2*   HCT 28.0*   *          Recent Labs  Lab 07/19/18  1802      POTASSIUM 3.1*   CHLORIDE 97   CO2 33*   ANIONGAP 7   *   BUN 14   CR 0.56   GFRESTIMATED >90   GFRESTBLACK >90   ABA 7.9*       Recent Labs  Lab 07/19/18  1802   TROPI <0.015       Neela Araiza MD  Hospitalist  [facility]

## 2018-07-20 NOTE — PHARMACY
Consulted for anticoagulant coverage check.  Patient is currently uninsured.  She has been seen by Financial Counselors.  They've had her apply for Medical Assistance but I'm not clear on whether she is likely to obtain that insurance or not.    Xarelto, Eliquis, Pradaxa run in excess of $400/mo.  [facility] can provide 1 month free of any of these.  If pt is approved for straight Medical Assistance, they are covered AFTER a PA is approved.  If she is approved for one of the managed care plans, Xarelto is the most well covered of the three NOACs.  (Copays on both types of plans would be low, likely between $3-$20/mo)    Lovenox 80mg x 10 syringes = $243.  Jantoven = $13/mo.  [facility] does not require upfront payment for discharge meds but patient will receive a bill in the mail from [facility] for the charges.  If she is approved for one of the above mentioned insurances, they may or may not backpay for the discharge meds.      PEG Saleh, Pharmacy Technician/Liaison, Discharge Pharmacy [phone]    Addendum:  Per Alexi, Financial Counselor, patient is eligible for Medical Assistance and will likely go on a managed care plan.  However, coverage hinges on the patient providing proof of income statements to the [facility].  The patient will need to follow up with outpatient MD to either supervise INR or to pursue NOAC prior auth if needed.

## 2018-07-20 NOTE — PROGRESS NOTES
[facility]  Hospitalist Progress Note  Neela Araiza MD 07/20/18  Text Page  Pager: [phone] (7am-6pm)    Reason for Stay (Diagnosis): PE/DVT         Assessment and Plan:      Summary of Stay: Nelsy Cota is a 43 year old female with PMH including epilepsy (no longer on medications), tobacco use disorder and prior superficial thrombophlebitis (patient states this was a DVT) who has not recently seen physicians who was admitted with 3 weeks of progressive right lower extremity pain on 7/19/18 and several day history of chest pain with associated shortness of breath and was found to have right lower extremity DVT and bilateral PEs in addition to macrocytic anemia.    Problem List/Assessment and Plan:     1.  Acute PE/DVT: Patient developed swelling and pain in her right lower extremity about 3 weeks ago.  This has progressed and she developed shortness of breath and chest pain several days ago.  She was found to have a few acute PEs as well as a small probable pulmonary infarcts in the bilateral lower lobes as well as occlusive thrombus in the right popliteal and gastrocnemius veins.  Troponin was negative.  She is a tobacco user but has no other risk factors for VTE.  She was started on heparin drip, will need to transition to oral medication (will await information regarding cost as patient does not have insurance).  Still having chest and RLE pain.  -Continue heparin drip  -Consult hematology/oncology  -Consult social work (lack of medical insurance) to determine best oral anticoagulant with no insurance  -Pain control with Tylenol or oxycodone  -Encourage patient to ambulate today to evaluate exertional shortness of breath     2.  Chest pain: Troponin was negative.  This is due to acute PE.  Treatment as above.     3.  Sinus tachycardia: Due to acute PE.  Improving.     4.  History of epilepsy: Patient's last seizure was in 2008.  She had been on Dilantin but has not taken this since  "2008.  Will not resume this medication as she has been off it for 10 years.     5.  Tobacco use disorder: Discussed importance of cessation given venous thromboembolism.  She denies nicotine replacement therapy.     6.  Lack of medical insurance: Social work consult.     7. Macrocytic Anemia:  with anemia.  Checking B12 and Folate.    Peripheral smear also pending.  TSH is elevated and free T4 is pending.     8.  Alcohol Use: Patient states she drinks 2 drinks daily.  Has mild transaminitis which could be due to alcohol use.  No evidence of withdrawal but will monitor closely.    9.  Elevated TSH: Free T4 is pending at this time.    10.  Mild transaminitis: Patient had mild elevation of AST to 74 on admission.  This is improved to 50 today.  This could be related to alcohol use.  This should be repeated in the outpatient setting to ensure that has normalized.    DVT Prophylaxis: Heparin drip  Code Status: Full Code  Discharge Dispo: Home  Estimated Disch Date / # of Days until Disch: potentially tomorrow if transitioned to oral anticoagulants and stable respiratory status with ambulation        Interval History (Subjective):      Patient was seen with her bedside nurse this morning.  She is still having chest pain and RLE pain but the medications are helping.  Feels less SOB at rest but has not ambulated yet.  No dizziness at rest.  No nausea, emesis, abdominal pain.  Eating well.                    Physical Exam:      Last Vital Signs:  BP 97/46 (BP Location: Left arm)  Pulse 87  Temp 97.3  F (36.3  C) (Oral)  Resp 18  Ht 1.626 m (5' 4\")  Wt 79 kg (174 lb 3.2 oz)  SpO2 96%  BMI 29.9 kg/m2    General: Alert, awake, no acute distress.  HEENT: NC/AT, eyes anicteric and without injection, EOMI, face symmetric.  Dentition WNL, MMM.  Cardiac: Tachycardic, regular rhythm,, normal S1, S2.  No murmurs/g/r.  Right lower extremity with nonpitting edema most notable over the ankle with mild erythema  Pulmonary: " Normal chest rise, normal work of breathing.  Lungs CTAB without crackles or wheezing  Abdomen: soft, non-tender, non-distended.  Normoactive BS.  No guarding or rebound tenderness.  Extremities: no deformities.  Warm, well perfused.  Skin: no rashes or lesions noted.  Warm and Dry.  Neuro: No focal deficits noted.  Speech clear.  Coordination and strength grossly normal.  Psych: Appropriate affect.  Alert and oriented ×3         Medications:      All current medications were reviewed with changes reflected in problem list.         Data:      All new lab and imaging data was reviewed.   Labs:    Recent Labs  Lab 07/20/18 0726 07/19/18 1802   WBC 9.6 10.4   HGB 8.8* 10.2*   HCT 24.8* 28.0*   * 128*    330       Recent Labs  Lab 07/20/18 0726 07/19/18  2242 07/19/18  1802     --  137   POTASSIUM 3.7  Canceled, Test credited 3.1* 3.1*   CHLORIDE 102  --  97   CO2 29  --  33*   ANIONGAP 5  --  7   GLC 74  --  117*   BUN 14  --  14   CR 0.57  --  0.56   GFRESTIMATED >90  --  >90   GFRESTBLACK >90  --  >90   ABA 7.5*  --  7.9*   PROTTOTAL 5.7*  --  6.5*   ALBUMIN 1.9*  --  2.2*   BILITOTAL 0.8  --  0.8   ALKPHOS 106  --  126   AST 50*  --  74*   ALT 27  --  31       Recent Labs  Lab 07/20/18 0726   INR 1.24*       Recent Labs  Lab 07/19/18  1802   TROPI <0.015       Recent Labs  Lab 07/20/18 0726   TSH 6.98*      Imaging:   Recent Results (from the past 24 hour(s))   CT Chest Pulmonary Embolism w Contrast   Result Value    Radiologist flags Pulmonary embolism (AA)    Narrative    CT CHEST WITH CONTRAST   7/19/2018 6:40 PM     HISTORY: Dyspnea. Severe mid sternal chest pain.    COMPARISON: 12/7/2015.    TECHNIQUE: Following the uneventful administration of 69mL Isovue-370  intravenous contrast, helical sections were acquired through the lungs  according to the pulmonary embolism protocol. Coronal reconstructions  were generated. Radiation dose for this scan was reduced using  automated exposure  control, adjustment of the mA and/or kV according  to the patient's size, or iterative reconstruction technique.    FINDINGS: A few acute pulmonary emboli within segmental and  subsegmental pulmonary arteries in bilateral lower lobes and the right  middle lobe. The thoracic aorta is normal in caliber without  dissection.    Single small wedge-shaped opacities in the posterolateral aspects of  bilateral lower lobes, most likely representing small pulmonary  infarcts given the aforementioned pulmonary emboli. The lungs are  otherwise clear. No pleural or pericardial effusion. No enlarged lymph  nodes in the chest.    Scan through the upper abdomen is significant for moderate to severe  diffuse fatty infiltration of the liver.      Impression    IMPRESSION:   1. A few acute pulmonary emboli within segmental and subsegmental  pulmonary arteries in bilateral lower lobes and the right middle lobe.  2. Small probable pulmonary infarcts in bilateral lower lobes.    [Critical Result: Pulmonary embolism]    Finding was identified on 7/19/2018 6:42 PM.     Dr. Levy was contacted by me on 7/19/2018 6:53 PM and verbalized  understanding of the critical result.     SWATHI RIVERA MD   US Lower Extremity Venous Duplex Right    Narrative    VENOUS ULTRASOUND RIGHT LEG  7/19/2018 7:22 PM     HISTORY: RLE swelling, history of DVT.     COMPARISON: Ultrasound 3/16/2017.    TECHNIQUE: Examination of the deep veins was completed with graded  compression and color flow Doppler with spectral wave form analysis.    FINDINGS: There is hyperechoic thrombus with noncompressibility within  the popliteal vein. This thrombus is occlusive. There is also  hyperechoic thrombus in the gastrocnemius vein that also appears  occlusive.    No evidence of thrombus in the common femoral vein, femoral vein,  greater saphenous vein. The posterior tibial vein and peroneal are  compressible without evidence of thrombus.      Impression    IMPRESSION:   1.  Occlusive thrombus within the right popliteal vein.  2. Occlusive thrombus within the right gastrocnemius vein.    Results discussed with Tarik Levy at 7:26 PM on 7/19/2018.       BERNARDO FERNANDEZ MD   Abdomen US, limited (RUQ only)    Narrative    ULTRASOUND ABDOMEN LIMITED RIGHT UPPER QUADRANT   7/19/2018 7:22 PM     HISTORY: Right upper quadrant tenderness.    COMPARISON: 7/19/2018 - CT chest.    FINDINGS: Moderate to marked diffuse increased hepatic echogenicity,  consistent with fatty infiltration. No hepatic masses. A small amount  of sludge within the gallbladder. The gallbladder is otherwise  unremarkable without gallstones, wall thickening or pericholecystic  fluid. No focal tenderness over the gallbladder. No intra or  extrahepatic biliary dilatation. The common duct measures 0.4 cm in  diameter. The right kidney has normal size and echogenicity, measuring  10.5 cm in length. No right intrarenal collecting system dilatation,  calculi or masses. No free fluid in the upper right hemiabdomen.      Impression    IMPRESSION:   1. Unremarkable appearance of the gallbladder.  2. No biliary dilatation.  3. Moderate to marked diffuse fatty infiltration of the liver.    MD Neela STREET MD.

## 2018-07-20 NOTE — PLAN OF CARE
BP soft 84/44, MD paged.  All other VSS.  LS clear and satting well on RA.  Pt reports pain when taking a deep breath, PRN oxy x1.  Heparin gtt running at 11.5 mL/hr.  Tele: SR.  K+ replaced, recheck this AM.  Hem/onc and SW following.  Will continue with POC.

## 2018-07-20 NOTE — PHARMACY-ADMISSION MEDICATION HISTORY
Admission medication history interview status for this patient is complete. See [facility] admission navigator for allergy information, prior to admission medications and immunization status.     Medication history interview source(s):Patient  Medication history resources (including written lists, pill bottles, clinic record):None    Changes made to PTA medication list:  Added: none  Deleted: ultram  Changed: none    Actions taken by pharmacist (provider contacted, etc):None     Additional medication history information:None    Medication reconciliation/reorder completed by provider prior to medication history? No    For patients on insulin therapy: no (Yes/No)   Lantus/levemir/NPH/Mix 70/30 dose: ___ in AM/PM or twice daily   Sliding scale Novolog Y/N   If Yes, do you have a baseline novolog pre-meal dose: ______units with meals   Patients eat three meals a day: Y/N ---  How many episodes of hypoglycemia (low blood glucose) do you have weekly: ---   How many missed doses do you have a week: ---  How many times do you check your blood glucose per day: ---  Any Barriers to therapy: cost of medications/comfortable with giving injections (if applicable)/ comfortable and confident with current diabetes regimen ---      Prior to Admission medications    Medication Sig Last Dose Taking? Auth Provider   phenytoin (DILANTIN) 100 MG CR capsule 3 caps po bid More than a month at Unknown time  Kasandra Solano MD

## 2018-07-21 LAB
ANION GAP SERPL CALCULATED.3IONS-SCNC: 6 MMOL/L (ref 3–14)
BUN SERPL-MCNC: 11 MG/DL (ref 7–30)
CALCIUM SERPL-MCNC: 7.7 MG/DL (ref 8.5–10.1)
CHLORIDE SERPL-SCNC: 100 MMOL/L (ref 94–109)
CO2 SERPL-SCNC: 29 MMOL/L (ref 20–32)
CREAT SERPL-MCNC: 0.52 MG/DL (ref 0.52–1.04)
ERYTHROCYTE [DISTWIDTH] IN BLOOD BY AUTOMATED COUNT: ABNORMAL % (ref 10–15)
GFR SERPL CREATININE-BSD FRML MDRD: >90 ML/MIN/1.7M2
GLUCOSE SERPL-MCNC: 83 MG/DL (ref 70–99)
HCT VFR BLD AUTO: 25.1 % (ref 35–47)
HGB BLD-MCNC: 8.9 G/DL (ref 11.7–15.7)
LMWH PPP CHRO-ACNC: 0.35 IU/ML
MCH RBC QN AUTO: 46.4 PG (ref 26.5–33)
MCHC RBC AUTO-ENTMCNC: 35.5 G/DL (ref 31.5–36.5)
MCV RBC AUTO: 131 FL (ref 78–100)
PLATELET # BLD AUTO: 280 10E9/L (ref 150–450)
POTASSIUM SERPL-SCNC: 4.2 MMOL/L (ref 3.4–5.3)
RBC # BLD AUTO: 1.92 10E12/L (ref 3.8–5.2)
SODIUM SERPL-SCNC: 135 MMOL/L (ref 133–144)
WBC # BLD AUTO: 11.4 10E9/L (ref 4–11)

## 2018-07-21 PROCEDURE — 25000128 H RX IP 250 OP 636: Performed by: INTERNAL MEDICINE

## 2018-07-21 PROCEDURE — 12000007 ZZH R&B INTERMEDIATE

## 2018-07-21 PROCEDURE — 80048 BASIC METABOLIC PNL TOTAL CA: CPT | Performed by: INTERNAL MEDICINE

## 2018-07-21 PROCEDURE — 99233 SBSQ HOSP IP/OBS HIGH 50: CPT | Performed by: INTERNAL MEDICINE

## 2018-07-21 PROCEDURE — 82784 ASSAY IGA/IGD/IGG/IGM EACH: CPT | Performed by: INTERNAL MEDICINE

## 2018-07-21 PROCEDURE — 85520 HEPARIN ASSAY: CPT | Performed by: INTERNAL MEDICINE

## 2018-07-21 PROCEDURE — 25000132 ZZH RX MED GY IP 250 OP 250 PS 637: Performed by: INTERNAL MEDICINE

## 2018-07-21 PROCEDURE — 36415 COLL VENOUS BLD VENIPUNCTURE: CPT | Performed by: INTERNAL MEDICINE

## 2018-07-21 PROCEDURE — 85027 COMPLETE CBC AUTOMATED: CPT | Performed by: INTERNAL MEDICINE

## 2018-07-21 PROCEDURE — 86334 IMMUNOFIX E-PHORESIS SERUM: CPT | Performed by: INTERNAL MEDICINE

## 2018-07-21 RX ORDER — FOLIC ACID 1 MG/1
1 TABLET ORAL DAILY
Status: DISCONTINUED | OUTPATIENT
Start: 2018-07-21 | End: 2018-07-22 | Stop reason: HOSPADM

## 2018-07-21 RX ORDER — PHENYTOIN SODIUM 100 MG/1
300 CAPSULE, EXTENDED RELEASE ORAL 2 TIMES DAILY
Status: DISCONTINUED | OUTPATIENT
Start: 2018-07-21 | End: 2018-07-22 | Stop reason: HOSPADM

## 2018-07-21 RX ORDER — WARFARIN SODIUM 7.5 MG/1
7.5 TABLET ORAL
Status: COMPLETED | OUTPATIENT
Start: 2018-07-21 | End: 2018-07-21

## 2018-07-21 RX ADMIN — OXYCODONE HYDROCHLORIDE 5 MG: 5 TABLET ORAL at 06:10

## 2018-07-21 RX ADMIN — RIVAROXABAN 15 MG: 15 TABLET, FILM COATED ORAL at 08:39

## 2018-07-21 RX ADMIN — WARFARIN SODIUM 7.5 MG: 7.5 TABLET ORAL at 18:43

## 2018-07-21 RX ADMIN — OXYCODONE HYDROCHLORIDE 10 MG: 5 TABLET ORAL at 21:32

## 2018-07-21 RX ADMIN — PHENYTOIN SODIUM 300 MG: 100 CAPSULE ORAL at 10:13

## 2018-07-21 RX ADMIN — ENOXAPARIN SODIUM 120 MG: 120 INJECTION SUBCUTANEOUS at 18:43

## 2018-07-21 RX ADMIN — PHENYTOIN SODIUM 300 MG: 100 CAPSULE ORAL at 21:32

## 2018-07-21 RX ADMIN — FOLIC ACID 1 MG: 1 TABLET ORAL at 08:39

## 2018-07-21 RX ADMIN — OXYCODONE HYDROCHLORIDE 10 MG: 5 TABLET ORAL at 13:02

## 2018-07-21 ASSESSMENT — ACTIVITIES OF DAILY LIVING (ADL)
ADLS_ACUITY_SCORE: 14

## 2018-07-21 NOTE — PROGRESS NOTES
Hematology  Please see Dr. Parham consult from yesterday July 20, 2018.  Labs, clinical course, and notes reviewed.    B12 level is normal suggesting pernicious anemia is not the cause for her red blood cell macrocytosis.  Although phenytoin/Dilantin can cause red blood cell macrocytosis, she has apparently been off this medication.      Therefore, as per recommendations yesterday, outpatient bone marrow biopsy to evaluate the red blood cell macrocytosis and exclude myelodysplastic syndrome (MDS) is likely appropriate followed by plans to see Dr. Parham in a few week to discuss the results.      As to the management of her pulmonary embolism, heparin for now with plans to transition to Lovenox/Coumadin (or a newer novel oral anticoagulant in the absence of any inhibitors)  for at least 6 months of total anticoagulation.    Tio Nair MD, MS  Medical Oncologist-Hematologist  [facility]

## 2018-07-21 NOTE — PLAN OF CARE
Problem: Patient Care Overview  Goal: Plan of Care/Patient Progress Review  Outcome: No Change  Orientation A&Ox4   VS stable, BP soft at 90/40 at midnight  O2 98% on RA  Tele SR  LS dim, coarse  BS + audible & active in all quadrants   voiding w/o difficulty   Skin intact, +2 edema to RLE  Activity SBA  IVF Heparin running at 11.5 ml/hour, Hep10a check due 0600  Pain: C/o midsternal pain 4/10, declines intervention/resting  Plan: discharge >3 days home alone, long term anticoagulation options being examined by MD.

## 2018-07-21 NOTE — PROGRESS NOTES
[facility]  Hospitalist Progress Note  Neela Araiza MD 07/20/18  Text Page  Pager: [phone] (7am-6pm)    Reason for Stay (Diagnosis): PE/DVT         Assessment and Plan:      Summary of Stay: Nelsy Cota is a 43 year old female with PMH including epilepsy (no longer on medications), tobacco use disorder and prior superficial thrombophlebitis (patient states this was a DVT) who has not recently seen physicians who was admitted with 3 weeks of progressive right lower extremity pain on 7/19/18 and several day history of chest pain with associated shortness of breath and was found to have right lower extremity DVT and bilateral PEs in addition to macrocytic anemia.  Transitioning to Coumadin today.    Problem List/Assessment and Plan:     1.  Acute PE/DVT: Patient developed swelling and pain in her right lower extremity about 3 weeks ago.  This has progressed and she developed shortness of breath and chest pain several days ago.  She was found to have a few acute PEs as well as a small probable pulmonary infarcts in the bilateral lower lobes as well as occlusive thrombus in the right popliteal and gastrocnemius veins.  Troponin was negative.  She is a tobacco user but has no other risk factors for VTE.  She was started on heparin drip, transitioning to Coumadin as she is supposed to be on Dilantin (see below).  Still having chest and RLE pain but overall improving.  -Stop heparin drip  -Hematology/oncology consulted, appreciate recommendations  -Start Coumadin with Lovenox bridge today  -Pain control with Tylenol or oxycodone     2.  Chest pain: Troponin was negative.  This is due to acute PE.  Treatment as above.     3.  Sinus tachycardia: Due to acute PE.  Improving.     4.  History of epilepsy: Patient's last seizure was in 2008.  She had been on Dilantin but has not taken this for 6 months as she did not have insurance.  She is willing to restart this.  Because of this medication she  "cannot take NOACs so Coumadin will be started as above.  -Resume phenytoin 300 mg twice daily     5.  Tobacco use disorder: Discussed importance of cessation given venous thromboembolism.  She declines nicotine replacement therapy.     6.  Lack of medical insurance: Social work consult.     7. Macrocytic Anemia:  with anemia.  B12 WNL but low folate so will start folate supplement.    Peripheral smear also pending.  TSH is elevated but free T4 is WNL.  - Folate 1 mg QD     8.  Alcohol Use: Patient states she drinks 2 drinks daily.  Has mild transaminitis which could be due to alcohol use.  No evidence of withdrawal but will monitor closely.    9.  Abnormal TSH: TSH slightly elevated but free T4 WNL.  Recommend repeat thyroid studies in 3 months.    10.  Mild transaminitis: Patient had mild elevation of AST to 74 on admission.  This is improved to 50 today.  This could be related to alcohol use.  This should be repeated in the outpatient setting to ensure that has normalized.    DVT Prophylaxis: Heparin drip  Code Status: Full Code  Discharge Dispo: Home  Estimated Disch Date / # of Days until Disch: tomorrow after [facility] teaching assuming stable respiratory status        Interval History (Subjective):      Patient was seen with her bedside nurse this morning.  Overall breathing is improving with ambulation.  Still has pain in the right foot/ankle when she is ambulating.  No CP when she ambulates.  Does have mild CP when she eats.      She states she was supposed to be on Dilantin 300 mg BID but stopped 6 months ago because she did not have insurance.  Her last seizure was 2008, history of epilepsy and had grand mal seizures in the past.                  Physical Exam:      Last Vital Signs:  BP (!) 88/48  Pulse 87  Temp 97.6  F (36.4  C) (Oral)  Resp 16  Ht 1.626 m (5' 4\")  Wt 79 kg (174 lb 3.2 oz)  SpO2 99%  BMI 29.9 kg/m2    General: Alert, awake, no acute distress.  HEENT: NC/AT, eyes anicteric and " without injection, EOMI, face symmetric.  Dentition WNL, MMM.  Cardiac: Tachycardic, regular rhythm,, normal S1, S2.  No murmurs/g/r.  Right lower extremity with nonpitting edema most notable over the ankle with mild erythema  Pulmonary: Normal chest rise, normal work of breathing.  Lungs CTAB without crackles or wheezing  Abdomen: soft, non-tender, non-distended.  Normoactive BS.  No guarding or rebound tenderness.  Extremities: no deformities.  Warm, well perfused.  Skin: no rashes or lesions noted.  Warm and Dry.  Neuro: No focal deficits noted.  Speech clear.  Coordination and strength grossly normal.  Psych: Appropriate affect.  Alert and oriented ×3         Medications:      All current medications were reviewed with changes reflected in problem list.         Data:      All new lab and imaging data was reviewed.   Labs:    Recent Labs  Lab 07/21/18  0708 07/20/18 0726 07/19/18  1802   WBC 11.4* 9.6 10.4   HGB 8.9* 8.8* 10.2*   HCT 25.1* 24.8* 28.0*   * 135* 128*    259 330       Recent Labs  Lab 07/21/18  0708 07/20/18  0726 07/19/18  2242 07/19/18  1802    136  --  137   POTASSIUM 4.2 3.7  Canceled, Test credited 3.1* 3.1*   CHLORIDE 100 102  --  97   CO2 29 29  --  33*   ANIONGAP 6 5  --  7   GLC 83 74  --  117*   BUN 11 14  --  14   CR 0.52 0.57  --  0.56   GFRESTIMATED >90 >90  --  >90   GFRESTBLACK >90 >90  --  >90   ABA 7.7* 7.5*  --  7.9*   PROTTOTAL  --  5.7*  --  6.5*   ALBUMIN  --  1.9*  --  2.2*   BILITOTAL  --  0.8  --  0.8   ALKPHOS  --  106  --  126   AST  --  50*  --  74*   ALT  --  27  --  31       Recent Labs  Lab 07/20/18  0726   INR 1.24*       Recent Labs  Lab 07/19/18  1802   TROPI <0.015       Recent Labs  Lab 07/20/18 0726   TSH 6.98*      Imaging:   No results found for this or any previous visit (from the past 24 hour(s)).    Neela Araiza MD.

## 2018-07-21 NOTE — PHARMACY-ANTICOAGULATION SERVICE
Clinical Pharmacy - Warfarin Dosing Consult     Pharmacy has been consulted to manage this patient s warfarin therapy.  Indication: DVT/ PE Treatment  Therapy Goal: INR 2-3  Warfarin Prior to Admission: No  Significant drug interactions: phenytoin, Lovenox, one dose of Xarelto today    INR   Date Value Ref Range Status   07/20/2018 1.24 (H) 0.86 - 1.14 Final       Recommend warfarin 7.5 mg today.  Pharmacy will monitor Nelsy Cota daily and order warfarin doses to achieve specified goal.      Please contact pharmacy as soon as possible if the warfarin needs to be held for a procedure or if the warfarin goals change.

## 2018-07-21 NOTE — PLAN OF CARE
Problem: VTE, DVT and PE (Adult)  Goal: Signs and Symptoms of Listed Potential Problems Will be Absent, Minimized or Managed (VTE, DVT and PE)  Signs and symptoms of listed potential problems will be absent, minimized or managed by discharge/transition of care (reference VTE, DVT and PE (Adult) CPG).   Outcome: Improving  Summary: Pt admitted 7/19/2018 with c/o RLE pain that had been occurring for 3+ weeks and chest discomfort that had been present for several days. Pt found to have bilateral PE's and DVT RLE. Pt was also anemic.   Hx: seizures and previous thrombophlebitis.   Tele: SB  Pain: Pt reports chest discomfort in mid sternal area r/t PE's with eating and exercise. Pt also has tenderness to RLE w/ movement.   Assessment: Pt AOx4. SBA w/ gaitbelt. BP 94/53 & P 53 - pt has been running soft at baseline. LS = diminished to bases. Heparin gtt infusing to RA @ 11.5. Hep10a 0.39. 1-2+ edema noted to RLE - elevated. Pt completed dorsiflexion exercises w/ encouragement and went on 1 walk (50 ft).   Plan: Possible discharge tomorrow depending on respiratory status and mobility. Will transition to oral anticoagulants. Continue POC.

## 2018-07-21 NOTE — PLAN OF CARE
Problem: Patient Care Overview  Goal: Plan of Care/Patient Progress Review  Outcome: Therapy, progress toward functional goals as expected  Assumed care for patient, alert, oriented, pain controlled. She does have PRN meds for pain.  Rt DVT and a few acute pulmonary emboli bilaterally. Rt leg is slightly swollen. No c/o pain.  Hem/onc following. Will see them out patient for bone marrow bx. Hypotensive. Some seizure history in  2008 without current treatment. Daily alcohol use without s/s of withdrawal.  WBC up slightly from 9.6 to 11.4.  Will start Lovenox and coumadin tonight.

## 2018-07-22 VITALS
HEART RATE: 65 BPM | WEIGHT: 174.2 LBS | RESPIRATION RATE: 16 BRPM | DIASTOLIC BLOOD PRESSURE: 45 MMHG | HEIGHT: 64 IN | OXYGEN SATURATION: 97 % | SYSTOLIC BLOOD PRESSURE: 87 MMHG | TEMPERATURE: 98.6 F | BODY MASS INDEX: 29.74 KG/M2

## 2018-07-22 LAB
ANION GAP SERPL CALCULATED.3IONS-SCNC: 6 MMOL/L (ref 3–14)
BUN SERPL-MCNC: 8 MG/DL (ref 7–30)
CALCIUM SERPL-MCNC: 7.6 MG/DL (ref 8.5–10.1)
CHLORIDE SERPL-SCNC: 101 MMOL/L (ref 94–109)
CO2 SERPL-SCNC: 28 MMOL/L (ref 20–32)
CREAT SERPL-MCNC: 0.54 MG/DL (ref 0.52–1.04)
GFR SERPL CREATININE-BSD FRML MDRD: >90 ML/MIN/1.7M2
GLUCOSE SERPL-MCNC: 78 MG/DL (ref 70–99)
INR PPP: 1.42 (ref 0.86–1.14)
POTASSIUM SERPL-SCNC: 3.9 MMOL/L (ref 3.4–5.3)
SODIUM SERPL-SCNC: 135 MMOL/L (ref 133–144)

## 2018-07-22 PROCEDURE — 80048 BASIC METABOLIC PNL TOTAL CA: CPT | Performed by: INTERNAL MEDICINE

## 2018-07-22 PROCEDURE — 25000132 ZZH RX MED GY IP 250 OP 250 PS 637: Performed by: INTERNAL MEDICINE

## 2018-07-22 PROCEDURE — 85610 PROTHROMBIN TIME: CPT | Performed by: INTERNAL MEDICINE

## 2018-07-22 PROCEDURE — 99239 HOSP IP/OBS DSCHRG MGMT >30: CPT | Performed by: INTERNAL MEDICINE

## 2018-07-22 PROCEDURE — 36415 COLL VENOUS BLD VENIPUNCTURE: CPT | Performed by: INTERNAL MEDICINE

## 2018-07-22 PROCEDURE — 25000128 H RX IP 250 OP 636: Performed by: INTERNAL MEDICINE

## 2018-07-22 RX ORDER — WARFARIN SODIUM 5 MG/1
5 TABLET ORAL
Status: DISCONTINUED | OUTPATIENT
Start: 2018-07-22 | End: 2018-07-22 | Stop reason: HOSPADM

## 2018-07-22 RX ORDER — OXYCODONE HYDROCHLORIDE 5 MG/1
5 TABLET ORAL EVERY 8 HOURS PRN
Qty: 9 TABLET | Refills: 0 | Status: SHIPPED | OUTPATIENT
Start: 2018-07-22 | End: 2018-11-30

## 2018-07-22 RX ORDER — FOLIC ACID 1 MG/1
1 TABLET ORAL DAILY
Qty: 30 TABLET | Refills: 0 | Status: SHIPPED | OUTPATIENT
Start: 2018-07-22 | End: 2019-06-06

## 2018-07-22 RX ORDER — WARFARIN SODIUM 5 MG/1
TABLET ORAL
Qty: 30 TABLET | Refills: 0 | Status: SHIPPED | OUTPATIENT
Start: 2018-07-22 | End: 2019-07-10 | Stop reason: DRUGHIGH

## 2018-07-22 RX ORDER — PHENYTOIN SODIUM 300 MG/1
300 CAPSULE, EXTENDED RELEASE ORAL 2 TIMES DAILY
Qty: 60 CAPSULE | Refills: 0 | Status: SHIPPED | OUTPATIENT
Start: 2018-07-22 | End: 2019-10-07

## 2018-07-22 RX ADMIN — FOLIC ACID 1 MG: 1 TABLET ORAL at 07:39

## 2018-07-22 RX ADMIN — SODIUM CHLORIDE 1000 ML: 9 INJECTION, SOLUTION INTRAVENOUS at 07:39

## 2018-07-22 RX ADMIN — OXYCODONE HYDROCHLORIDE 10 MG: 5 TABLET ORAL at 06:33

## 2018-07-22 RX ADMIN — PHENYTOIN SODIUM 300 MG: 100 CAPSULE ORAL at 07:40

## 2018-07-22 ASSESSMENT — ACTIVITIES OF DAILY LIVING (ADL)
ADLS_ACUITY_SCORE: 14

## 2018-07-22 NOTE — DISCHARGE SUMMARY
[facility]  Discharge Summary  Name: Nelsy Cota    MRN: [id number]  YOB: 1975    Age: 43 year old  Date of Discharge:  7/22/2018  Date of Admission: 7/19/2018  Primary Care Provider: Kasandra Solano  Discharge Physician:  Neela Araiza MD  Discharging Service:  Hospitalist      Discharge Diagnoses:  1.  Acute PE/DVT  2.  Chest pain  3.  Sinus tachycardia   4.  Hypotension   5.  History of epilepsy  6.  Tobacco use disorder   7.  Macrocytic anemia   8.  Alcohol use   9.  Abnormal TSH   10.  Mild transaminitis     Hospital Course:  Nelsy Cota is a 43 year old female with PMH including epilepsy (stopped medication due to lack of medical insurance), tobacco use disorder and prior superficial thrombophlebitis (patient states this was a DVT) who has not recently seen physicians who was admitted with 3 weeks of progressive right lower extremity pain on 7/19/18 and several day history of chest pain with associated shortness of breath and was found to have right lower extremity DVT and bilateral PEs in addition to macrocytic anemia.    She was seen by hematology/oncology.  Initially on heparin drip and transition to Coumadin.     1.  Acute PE/DVT: Patient developed swelling and pain in her right lower extremity about 3 weeks ago.  This has progressed and she developed shortness of breath and chest pain several days prior to admission.  She was found to have a few acute PEs as well as a small probable pulmonary infarcts in the bilateral lower lobes as well as occlusive thrombus in the right popliteal and gastrocnemius veins.  Troponin was negative.  She is a tobacco user but has no other risk factors for VTE.  She was started on heparin drip, transitioned to Coumadin as she is supposed to be on Dilantin (see below).  Her SOB and CP have significantly improved.  Still has pain with ambulating and will discharge with a short course of Oxycodone in addition to Tylenol.  Instructed to avoid ASA  and NSAIDS while on Coumadin.  She will be discharge with Coumadin 5 mg daily.  She is also on Lovenox bridging.  She needs to follow-up with primary care/24 with repeat INR at that time.  Follow-up with oncology as an outpatient setting as below.      2.  Chest pain: Troponin was negative.  This is due to acute PE.  Treatment as above.  This had resolved at time of discharge.      3.  Sinus tachycardia: Due to acute PE, resolved.      4.  History of epilepsy: Patient's last seizure was in 2008.  She had been on Dilantin but has not taken this for 6 months as she did not have insurance.  She is willing to restart this.  Because of this medication she cannot take NOACs so Coumadin will be started as above.  Resumed phenytoin 300 mg twice daily.      5.  Tobacco use disorder: Discussed importance of cessation given venous thromboembolism.  She declines nicotine replacement therapy.  She is going to continue to work on tobacco cessation.      6.  Lack of medical insurance: Social work consulted, given information regarding obtaining medical insurance.  Per [employer] Counselor, patient is eligible for Medical Assistance and will likely go on a managed care plan.  However, coverage hinges on the patient providing proof of income statements to the county.      7. Macrocytic Anemia:  with anemia.  B12 WNL but low folate so will start folate supplement.  TSH is elevated but free T4 is WNL.  Oncology was consulted and has recommended bone marrow biopsy in the outpatient setting.  She needs to schedule an outpatient clinic appointment and has a number to do this.      8.  Alcohol Use: Patient states she drinks 2 drinks daily.  Has mild transaminitis which could be due to alcohol use.  No evidence of withdrawal.     9.  Abnormal TSH: TSH slightly elevated but free T4 WNL.  Recommend repeat thyroid studies in 3 months.     10.  Mild transaminitis: Patient had mild elevation of AST to 74 on admission.  This is  "improved to 50 today.  This could be related to alcohol use.  This should be repeated in the outpatient setting to ensure that has normalized.    11.  Hypotension: Patient had soft blood pressures throughout her entire admission.  She was given normal saline and this did not improve the pressure.  She was asymptomatic from these blood pressure readings and I suspect that this is her baseline.    # Discharge Pain Plan:   - During her hospitalization, Nelsy experienced pain due to PE/DVT.  The pain plan for discharge was discussed with Nelsy and the plan was created in a collaborative fashion.    - Opioids prescribed on discharge: Oxycodone 5 mg Q8H PRN dispense #9  - Duration of opioids after discharge: Per [facility] opioid prescribing guidelines, a 3 day prescription of opioids was provided.  - Bowel regimen: not needed  - Pharmacologic adjuvants:  Acetaminophen         Discharge Disposition:  Discharged to home     Allergies:  No Known Allergies     Condition on Discharge:  Discharge condition: Good   Discharge vitals: Blood pressure (!) 87/45, pulse 65, temperature 98.6  F (37  C), temperature source Oral, resp. rate 16, height 1.626 m (5' 4\"), weight 79 kg (174 lb 3.2 oz), SpO2 97 %.   Code status on discharge: Full Code     History of Illness:  See detailed admission note for full details.    Physical Exam:  Blood pressure (!) 87/45, pulse 65, temperature 98.6  F (37  C), temperature source Oral, resp. rate 16, height 1.626 m (5' 4\"), weight 79 kg (174 lb 3.2 oz), SpO2 97 %.  Wt Readings from Last 1 Encounters:   07/19/18 79 kg (174 lb 3.2 oz)     General: Alert, awake, no acute distress.  HEENT: NC/AT, eyes anicteric and without injection, EOMI, face symmetric.  Dentition WNL, MMM.  Cardiac: Tachycardic, regular rhythm,, normal S1, S2.  No murmurs/g/r.  Right lower extremity with nonpitting edema most notable over the ankle with mild erythema  Pulmonary: Normal chest rise, normal work of breathing.  Lungs " CTAB without crackles or wheezing  Abdomen: soft, non-tender, non-distended.  Normoactive BS.  No guarding or rebound tenderness.  Extremities: no deformities.  Warm, well perfused.  Skin: no rashes or lesions noted.  Warm and Dry.  Neuro: No focal deficits noted.  Speech clear.  Coordination and strength grossly normal.  Psych: Appropriate affect.  Alert and oriented ×3    Procedures other than Imaging:  Heparin drip     Imaging:  Results for orders placed or performed during the hospital encounter of 07/19/18   CT Chest Pulmonary Embolism w Contrast     Value    Radiologist flags Pulmonary embolism (AA)    Narrative    CT CHEST WITH CONTRAST   7/19/2018 6:40 PM     HISTORY: Dyspnea. Severe mid sternal chest pain.    COMPARISON: 12/7/2015.    TECHNIQUE: Following the uneventful administration of 69mL Isovue-370  intravenous contrast, helical sections were acquired through the lungs  according to the pulmonary embolism protocol. Coronal reconstructions  were generated. Radiation dose for this scan was reduced using  automated exposure control, adjustment of the mA and/or kV according  to the patient's size, or iterative reconstruction technique.    FINDINGS: A few acute pulmonary emboli within segmental and  subsegmental pulmonary arteries in bilateral lower lobes and the right  middle lobe. The thoracic aorta is normal in caliber without  dissection.    Single small wedge-shaped opacities in the posterolateral aspects of  bilateral lower lobes, most likely representing small pulmonary  infarcts given the aforementioned pulmonary emboli. The lungs are  otherwise clear. No pleural or pericardial effusion. No enlarged lymph  nodes in the chest.    Scan through the upper abdomen is significant for moderate to severe  diffuse fatty infiltration of the liver.      Impression    IMPRESSION:   1. A few acute pulmonary emboli within segmental and subsegmental  pulmonary arteries in bilateral lower lobes and the right middle  lobe.  2. Small probable pulmonary infarcts in bilateral lower lobes.    [Critical Result: Pulmonary embolism]    Finding was identified on 7/19/2018 6:42 PM.     Dr. Levy was contacted by me on 7/19/2018 6:53 PM and verbalized  understanding of the critical result.     SWATHI RIVERA MD   US Lower Extremity Venous Duplex Right    Narrative    VENOUS ULTRASOUND RIGHT LEG  7/19/2018 7:22 PM     HISTORY: RLE swelling, history of DVT.     COMPARISON: Ultrasound 3/16/2017.    TECHNIQUE: Examination of the deep veins was completed with graded  compression and color flow Doppler with spectral wave form analysis.    FINDINGS: There is hyperechoic thrombus with noncompressibility within  the popliteal vein. This thrombus is occlusive. There is also  hyperechoic thrombus in the gastrocnemius vein that also appears  occlusive.    No evidence of thrombus in the common femoral vein, femoral vein,  greater saphenous vein. The posterior tibial vein and peroneal are  compressible without evidence of thrombus.      Impression    IMPRESSION:   1. Occlusive thrombus within the right popliteal vein.  2. Occlusive thrombus within the right gastrocnemius vein.    Results discussed with Tarik Levy at 7:26 PM on 7/19/2018.       BERNARDO FERNANDEZ MD   Abdomen US, limited (RUQ only)    Narrative    ULTRASOUND ABDOMEN LIMITED RIGHT UPPER QUADRANT   7/19/2018 7:22 PM     HISTORY: Right upper quadrant tenderness.    COMPARISON: 7/19/2018 - CT chest.    FINDINGS: Moderate to marked diffuse increased hepatic echogenicity,  consistent with fatty infiltration. No hepatic masses. A small amount  of sludge within the gallbladder. The gallbladder is otherwise  unremarkable without gallstones, wall thickening or pericholecystic  fluid. No focal tenderness over the gallbladder. No intra or  extrahepatic biliary dilatation. The common duct measures 0.4 cm in  diameter. The right kidney has normal size and echogenicity, measuring  10.5 cm in  length. No right intrarenal collecting system dilatation,  calculi or masses. No free fluid in the upper right hemiabdomen.      Impression    IMPRESSION:   1. Unremarkable appearance of the gallbladder.  2. No biliary dilatation.  3. Moderate to marked diffuse fatty infiltration of the liver.    SWATHI RIVERA MD        Consultations:  Consultations This Hospital Stay   PHARMACY TO DOSE HEPARIN  PHARMACY TO DOSE HEPARIN  HEMATOLOGY & ONCOLOGY IP CONSULT  SOCIAL WORK IP CONSULT  PHARMACY LIAISON FOR MEDICATION COVERAGE CONSULT  PHARMACY DISCHARGE EDUCATION BY PHARMACIST  PHARMACY TO DOSE WARFARIN     Recent Lab Results:    Recent Labs  Lab 07/21/18  0708 07/20/18  0726 07/19/18  1802   WBC 11.4* 9.6 10.4   HGB 8.9* 8.8* 10.2*   HCT 25.1* 24.8* 28.0*   * 135* 128*    259 330       Recent Labs  Lab 07/22/18  0700 07/21/18  0708 07/20/18  0726    135 136   POTASSIUM 3.9 4.2 3.7  Canceled, Test credited   CHLORIDE 101 100 102   CO2 28 29 29   ANIONGAP 6 6 5   GLC 78 83 74   BUN 8 11 14   CR 0.54 0.52 0.57   GFRESTIMATED >90 >90 >90   GFRESTBLACK >90 >90 >90   ABA 7.6* 7.7* 7.5*       Recent Labs  Lab 07/22/18  0700 07/20/18  0726   INR 1.42* 1.24*          Pending Results:    Unresulted Labs Ordered in the Past 30 Days of this Admission     Date and Time Order Name Status Description    7/21/2018 0000 Protein Immunofixation Serum In process            Discharge Instructions and Follow-Up:   Discharge Orders     Reason for your hospital stay   You were hospitalized for blood clots in the lung (pulmonary embolus) and in the right leg (deep venous thromboembolism).  You were also found to have low hemoglobin with large cells (will need bone marrow biopsy).     Follow-up and recommended labs and tests    Follow up with primary care on 7/24/2018, at that time you will need an INR checked with dose adjustment of your Coumadin based on those results.    Follow up with Oncology/Hematology in clinic.  You  will need to have a bone marrow biopsy.     Activity   Your activity upon discharge: activity as tolerated     Full Code     Diet   Follow this diet upon discharge: Orders Placed This Encounter     Combination Diet Regular Diet Adult       Discharge Medications   Current Discharge Medication List      START taking these medications    Details   enoxaparin (LOVENOX) 120 MG/0.8ML injection Inject 0.8 mLs (120 mg) Subcutaneous every 24 hours for 5 days  Qty: 4 mL, Refills: 0    Associated Diagnoses: Pulmonary embolism, bilateral (H); Acute deep vein thrombosis (DVT) of distal vein of right lower extremity (H)      folic acid (FOLVITE) 1 MG tablet Take 1 tablet (1 mg) by mouth daily  Qty: 30 tablet, Refills: 0    Associated Diagnoses: Macrocytic anemia      oxyCODONE IR (ROXICODONE) 5 MG tablet Take 1 tablet (5 mg) by mouth every 8 hours as needed for other (pain control or improvement in physical function. Hold dose for analgesic side effects.)  Qty: 9 tablet, Refills: 0    Associated Diagnoses: Acute deep vein thrombosis (DVT) of distal vein of right lower extremity (H)      phenytoin 300 MG CAPS Take 300 mg by mouth 2 times daily  Qty: 60 capsule, Refills: 0    Associated Diagnoses: Convulsions, unspecified convulsion type (H)      warfarin (COUMADIN) 5 MG tablet Take 5 mg every evening.  INR needs to be repeated on 7/24/2018.  Qty: 30 tablet, Refills: 0    Associated Diagnoses: Acute deep vein thrombosis (DVT) of distal vein of right lower extremity (H); Pulmonary embolism, bilateral (H)           Time Spent on this Encounter   I, Neela Araiza, personally saw the patient today and spent greater than 30 minutes discharging this patient.    Neela Araiza MD

## 2018-07-22 NOTE — PROGRESS NOTES
Hand-off for Care Transitions to Next Level of Care Provider  Name: Nelsy Cota  : 1975    TO ESTABLISH CARE AT [facility]  MRN #: [id number]  Reason for Hospitalization:  Pulmonary embolism, bilateral (H) [I26.99]  Acute deep vein thrombosis (DVT) of distal vein of right lower extremity (H) [I82.4Z1]  Admit Date/Time: 2018  5:35 PM  Discharge Date: 2018    Reason for Communication Hand-off Referral: Other  Pulmonary emboli and right leg DVT    Discharge Plan:  Discharged to: Home with support                   Patient agreeable to post-hospital support suggestions:  Yes    Patient is on new medications:   Yes    MTM follow up recommended: No    Tel-Assurance program:  Ineligible    Follow-up specialty is recommended: Yes. Follow up with Hematology/Oncology in clinic. Recommend a bone marrow biopsy.      Follow-up plan:  Future Appointments  Date Time Provider Department Center   2018 2:20 PM Hamzah Thomas MD [facility]     Any outstanding tests or procedures:   No.       Key Recommendations:  Patient discharged on Lovenox bridge and Warfarin. Please assess pt understanding of these medications. Reinforce importance of f/u with H/O for bone marrow biopsy.     Communicated handoff via EPIC Comm Mgt to Dr. Kasandra Solano's CC at [place] Care Coord.      Diane Lofton RN, BSN, CTS  [facility]  [phone]

## 2018-07-22 NOTE — PHARMACY - DISCHARGE MEDICATION RECONCILIATION
Clinical Pharmacy- Warfarin Discharge Note    Warfarin discharge dose reviewed.  Warfarin 5 mg every evening. INR needs to be repeated on 7/24/2018.    Anticoagulation Dose History     Recent Dosing and Labs Latest Ref Rng & Units 7/20/2018 7/21/2018 7/22/2018    Warfarin 7.5 mg - - 7.5 mg -    INR 0.86 - 1.14 1.24(H) - 1.42(H)

## 2018-07-22 NOTE — PROGRESS NOTES
Md determined this pt was adequate for discharge.  PIV and tele removed.  Writer went through all discharge instructions, including all medications and follow up appointments.  Pt expressed understanding of all discharge teaching and all questions were answered.  Pt was transferred to the main entrance via WC, and family transported home.

## 2018-07-22 NOTE — PLAN OF CARE
Problem: Patient Care Overview  Goal: Plan of Care/Patient Progress Review  Outcome: No Change  Patient is alert and oriented, independent in room. Denies shortness of breath, sternal pain related to PEs, PRN oxy given twice this shift. Soft blood pressures; asymptomatic, vital signs stable, tele SR, LS clear. Plan for possible discharge today.

## 2018-07-22 NOTE — DISCHARGE INSTRUCTIONS
1. Follow up with primary care on 7/24/2018, at that time you will need an INR checked with dose adjustment of your Coumadin based on those results.  When your INR level is 2-3 your Lovenox injections can be stopped.    2. Follow up with Oncology/Hematology in clinic.  You will need to have a bone marrow biopsy.  This is to determine the reason for your low hemoglobin and large red blood cells.    3. You should not take Aspirin, Aleve (Naproxen) or Advil (Ibuprofen) as these will also thin your blood.  You can take Tylenol as needed for pain as this does not thin your blood.    4. It is important to quit smoking as this increases your risk for blood clots.    5. Keep your Right leg elevated to help with swelling and pain until the blood clot resolves.

## 2018-07-23 ENCOUNTER — PATIENT OUTREACH (OUTPATIENT)
Dept: CARE COORDINATION | Facility: CLINIC | Age: 43
End: 2018-07-23

## 2018-07-23 ENCOUNTER — TELEPHONE (OUTPATIENT)
Dept: INTERNAL MEDICINE | Facility: CLINIC | Age: 43
End: 2018-07-23

## 2018-07-23 LAB
IGA SERPL-MCNC: 475 MG/DL (ref 70–380)
IGG SERPL-MCNC: 1560 MG/DL (ref 695–1620)
IGM SERPL-MCNC: 115 MG/DL (ref 60–265)
PROT PATTERN SERPL IFE-IMP: ABNORMAL

## 2018-07-23 ASSESSMENT — ACTIVITIES OF DAILY LIVING (ADL): DEPENDENT_IADLS:: INDEPENDENT

## 2018-07-23 NOTE — LETTER
Health Care Home - Access Care Plan    About Me  Patient Name:  Nelsy Cota    YOB: 1975  Age:                             43 year old   Maria Del Rosario MRN:            [id number] Telephone Information:     Home Phone [phone]   Mobile [phone]       Address:    [address] [place] [address] Email address:  [email or web address]      Emergency Contact(s)  Name Relationship Lgl Grd Work Phone Home Phone Mobile Phone   ARNALDO ALEJANDRO* Father No none [phone]             Health Maintenance: Routine Health maintenance Reviewed: Due/Overdue     My Access Plan  Medical Emergency 911   Questions or concerns during clinic hours Primary Clinic Line, I will call the clinic directly: [facility] - [phone]   24 Hour Appointment Line [phone] or  [phone] (toll free)   24 Hour Nurse Line [phone] (toll free)   Questions or concerns outside clinic hours 24 Hour Appointment Line, I will call the after-hours on-call line:   [facility] [phone] or [phone]) (toll-free)   Preferred Urgent Care [facility], [phone]   Preferred Hospital [facility]  [phone]   Preferred Pharmacy [facility] [id number] [place] [address] [address] AT [facility] [address]     Behavioral Health Crisis Line The National Suicide Prevention Lifeline at 1-414.615.4635 or 911     My Care Team Members  Patient Care Team       Relationship Specialty Notifications Start End    Kasandra Solano MD PCP - General Internal Medicine  1/17/17     Phone: [phone] Fax: [phone]         [address] [facility] [address]           My Medical and Care Information  Problem List   Patient Active Problem List   Diagnosis     Papanicolaou smear of cervix with atypical squamous cells cannot exclude high grade squamous intraepithelial lesion (ASC-H)     FAM  HX-CARDIOVAS DIS [facility]     Health Care Home     Convulsions, unspecified convulsion type (H)     PE (pulmonary thromboembolism) (H)      Current Medications and Allergies:  See printed Medication Report

## 2018-07-23 NOTE — LETTER
[place] CARE COORDINATION  [address] [facility] [address]  [facility] [address]    July 23, 2018    Nelsy Cota  [address] [facility] [address]      Dear Nelsy,    I am a clinic care coordinator who works with Kasandra Solano MD at [facility]. I wanted to thank you for spending the time to talk with me and provide you with my contact information so that you can call me with questions or concerns about your health care. Below is a description of clinic care coordination and how I can further assist you.     The clinic care coordinator is a registered nurse and/or  who understand the health care system. The goal of clinic care coordination is to help you manage your health and improve access to the [place] system in the most efficient manner. The registered nurse can assist you in meeting your health care goals by providing education, coordinating services, and strengthening the communication among your providers. The  can assist you with financial, behavioral, psychosocial, chemical dependency, counseling, and/or psychiatric resources.    Please feel free to contact me at [phone], with any questions or concerns. We at [place] are focused on providing you with the highest-quality healthcare experience possible and that all starts with you.     Sincerely,     Ligia Maradiaga    Enclosed: I have enclosed a copy of a 24 Hour Access Plan. This has helpful phone numbers for you to call when needed. Please keep this in an easy to access place to use as needed.

## 2018-07-23 NOTE — TELEPHONE ENCOUNTER
IP F/U    Date: 07/22/18  Diagnosis: Macrocytic Anemia, Pulmonary Embolism, Bilateral  Is patient active in care coordination? No  Was patient in TCU? No    Next 5 appointments (look out 90 days)     Jul 24, 2018  2:20 PM CDT   Office Visit with Hamzah Thomas MD   [facility]    [address]  [facility] [id number] [phone]

## 2018-07-23 NOTE — PROGRESS NOTES
Clinic Care Coordination Contact    Clinic Care Coordination Contact  OUTREACH    Referral Information:  Referral Source: IP Handoff.    Clinical data:  Patient admitted to [facility] 7/19-7/22 for treatment of DVT/PE.  Patient has history of epilepsy and stopped medication for this due to lack of insurance.      Primary Diagnosis: Other (include Comment box) (PE and DVT)    Chief Complaint   Patient presents with     Clinic Care Coordination - [facility]        [place] Utilization:   Clinic Utilization  Difficulty keeping appointments:: No  Utilization    Last refreshed: 7/23/2018  7:17 AM:  No Show Count (past year) 0       Last refreshed: 7/23/2018  7:17 AM:  ED visits 0       Last refreshed: 7/23/2018  7:17 AM:  Hospital admissions 1          Current as of: 7/23/2018  7:17 AM             Clinical Concerns:  Current Medical Concerns:  DVT/PE.  Spoke with patient over phone and states she is feeling better.  No complaints of shortness of breath or chest pain.  Patient is able to ambulate independently in [facility] and perform ADLs.      Current Behavioral Concerns: none      Education Provided to patient: Encouraged to take medications as prescribed.  Discussed need for PCP follow up.     Medication Management:  No questions or concerns.  All meds filled at hospital pharmacy. Patient discharged with Lovenox injections and doing fine with those.    Functional Status:  Dependent ADLs:: Ambulation-no assistive device  Dependent IADLs:: Independent    Living Situation:  Current living arrangement:: I live alone  Type of residence:: Other (town home)       Financial/Insurance:   Financial/Insurance concerns (GOAL):: Yes (no insurance.  )  Patient was given application for [employer] while in hospital.  Will also send information to patient on [facility].        Future Appointments              Tomorrow Hamzah Thomas MD [place], [place]          Plan: Patient to follow up with PCP on  7/24/18.  Patient to follow discharge recommendations.  RNCC will mail care coordination letter along with information on [facility].  No further outreaches will be made at this time unless a new referral is made or a change in the pt's status occurs. Patient was provided with this writer's contact information and encouraged to call with any questions or concerns.    Ligia Maradiaga RN-BSN   Care Coordination  Phone:  [phone]  Email: [email or web address]

## 2018-07-24 ENCOUNTER — OFFICE VISIT (OUTPATIENT)
Dept: INTERNAL MEDICINE | Facility: CLINIC | Age: 43
End: 2018-07-24

## 2018-07-24 VITALS
TEMPERATURE: 98.7 F | BODY MASS INDEX: 30.06 KG/M2 | HEART RATE: 92 BPM | SYSTOLIC BLOOD PRESSURE: 96 MMHG | DIASTOLIC BLOOD PRESSURE: 62 MMHG | RESPIRATION RATE: 13 BRPM | OXYGEN SATURATION: 95 % | WEIGHT: 175.1 LBS

## 2018-07-24 DIAGNOSIS — I26.99 PE (PULMONARY THROMBOEMBOLISM) (H): Primary | ICD-10-CM

## 2018-07-24 DIAGNOSIS — I82.409 RECURRENT DEEP VENOUS THROMBOSIS (H): ICD-10-CM

## 2018-07-24 DIAGNOSIS — R56.9 CONVULSIONS, UNSPECIFIED CONVULSION TYPE (H): ICD-10-CM

## 2018-07-24 DIAGNOSIS — R74.8 ELEVATED LIVER ENZYMES: ICD-10-CM

## 2018-07-24 DIAGNOSIS — F17.200 TOBACCO USE DISORDER: ICD-10-CM

## 2018-07-24 DIAGNOSIS — R79.89 ELEVATED TSH: ICD-10-CM

## 2018-07-24 DIAGNOSIS — D53.9 MACROCYTIC ANEMIA: ICD-10-CM

## 2018-07-24 PROCEDURE — 99495 TRANSJ CARE MGMT MOD F2F 14D: CPT | Performed by: INTERNAL MEDICINE

## 2018-07-24 NOTE — MR AVS SNAPSHOT
After Visit Summary   7/24/2018    Nelsy Cota    MRN: [id number]           Patient Information     Date Of Birth          1975        Visit Information        Provider Department      7/24/2018 2:20 PM Hamzah Thomas MD [facility]        Today's Diagnoses     PE (pulmonary thromboembolism) (H)    -  1    Recurrent deep venous thrombosis (H)        Convulsions, unspecified convulsion type (H)        Elevated TSH        Tobacco use disorder        Elevated liver enzymes        Macrocytic anemia           Follow-ups after your visit        Additional Services     INR CLINIC REFERRAL       Your provider has referred you to INR Services.    Please be aware that coverage of these services is subject to the terms and limitations of your health insurance plan.  Call member services at your health plan with any benefit or coverage questions.    Indication for Anticoagulation: DVT (recurrent) and Pulmonary Embolism  If nonstandard INR is desired, indicate goal range and explanation:   Expected Duration of Therapy: Other: as per hematologist                  Your next 10 appointments already scheduled     Jul 25, 2018  2:15 PM CDT   Anticoagulation Visit with [facility]   [facility] ([facility])    [address] [facility] [address]  [facility] [address] [phone]              Future tests that were ordered for you today     Open Future Orders        Priority Expected Expires Ordered    TSH with free T4 reflex Routine  7/24/2019 7/24/2018            Who to contact     If you have questions or need follow up information about today's clinic visit or your schedule please contact [facility] directly at [phone].  Normal or non-critical lab and imaging results will be communicated to you by MyChart, letter or phone within 4 business days after the clinic has received the results. If you do not hear from us  within 7 days, please contact the clinic through PA[employer] or phone. If you have a critical or abnormal lab result, we will notify you by phone as soon as possible.  Submit refill requests through [employer] or call your pharmacy and they will forward the refill request to us. Please allow 3 business days for your refill to be completed.          Additional Information About Your Visit        [employer] Information     [employer] gives you secure access to your electronic health record. If you see a primary care provider, you can also send messages to your care team and make appointments. If you have questions, please call your primary care clinic.  If you do not have a primary care provider, please call [phone] and they will assist you.        Care EveryWhere ID     This is your Care EveryWhere ID. This could be used by other organizations to access your [place] medical records  [id number]        Your Vitals Were     Pulse Temperature Respirations Pulse Oximetry BMI (Body Mass Index)       92 98.7  F (37.1  C) (Oral) 13 95% 30.06 kg/m2        Blood Pressure from Last 3 Encounters:   07/24/18 96/62   07/22/18 (!) 87/45   03/16/17 121/79    Weight from Last 3 Encounters:   07/24/18 175 lb 1.6 oz (79.4 kg)   07/19/18 174 lb 3.2 oz (79 kg)   01/17/17 182 lb 11.2 oz (82.9 kg)              We Performed the Following     INR CLINIC REFERRAL        Primary Care Provider Office Phone # Fax #    Kasandra Solano [phone]       [address] [facility] [address]  [facility] [address]        Equal Access to Services     [facility] : Hadii aad ku hadasho Soomaali, waaxda luqadaha, qaybta kaalmada adeegyalucille, shawn rosen . So [facility] [phone].    ATENCIÓN: Si habla español, tiene a ordaz disposición servicios gratuitos de asistencia lingüística. Llame al [phone].    We comply with applicable federal civil rights laws and [place] laws. We do not discriminate on the basis of race, color,  national origin, age, disability, sex, sexual orientation, or gender identity.            Thank you!     Thank you for choosing [facility]  for your care. Our goal is always to provide you with excellent care. Hearing back from our patients is one way we can continue to improve our services. Please take a few minutes to complete the written survey that you may receive in the mail after your visit with us. Thank you!             Your Updated Medication List - Protect others around you: Learn how to safely use, store and throw away your medicines at www.disposemymeds.org.          This list is accurate as of 7/24/18  8:48 PM.  Always use your most recent med list.                   Brand Name Dispense Instructions for use Diagnosis    enoxaparin 120 MG/0.8ML injection    LOVENOX    4 mL    Inject 0.8 mLs (120 mg) Subcutaneous every 24 hours for 5 days    Pulmonary embolism, bilateral (H), Acute deep vein thrombosis (DVT) of distal vein of right lower extremity (H)       folic acid 1 MG tablet    FOLVITE    30 tablet    Take 1 tablet (1 mg) by mouth daily    Macrocytic anemia       oxyCODONE IR 5 MG tablet    ROXICODONE    9 tablet    Take 1 tablet (5 mg) by mouth every 8 hours as needed for other (pain control or improvement in physical function. Hold dose for analgesic side effects.)    Acute deep vein thrombosis (DVT) of distal vein of right lower extremity (H)       Phenytoin Sodium Extended 300 MG Caps     60 capsule    Take 300 mg by mouth 2 times daily    Convulsions, unspecified convulsion type (H)       warfarin 5 MG tablet    COUMADIN    30 tablet    Take 5 mg every evening.  INR needs to be repeated on 7/24/2018.    Acute deep vein thrombosis (DVT) of distal vein of right lower extremity (H), Pulmonary embolism, bilateral (H)

## 2018-07-24 NOTE — NURSING NOTE
BP 96/62  Pulse 129  Temp 98.7  F (37.1  C) (Oral)  Resp 13  Wt 175 lb 1.6 oz (79.4 kg)  SpO2 95%  BMI 30.06 kg/m2  Patient in for hospital follow up.  Dalia Ferguson CMA

## 2018-07-24 NOTE — PROGRESS NOTES
SUBJECTIVE:   Nelsy Cota is a 43 year old female who presents to clinic today for the following health issues:      Hospital Follow-up Visit:    Hospital/Nursing Home/IP Rehab Facility: [facility]  Date of Admission: 7/19/18  Date of Discharge: 7/22/18  Reason(s) for Admission: Pulmonary Embolism and recurrent DVT Rt LE, Macrocytic anemia             Problems taking medications regularly:   lovenox, folic acid, phenytoin, and warfarin       Medication changes since discharge:  None       Problems adhering to non-medication therapy:  None    Summary of hospitalization:  [facility] discharge summary reviewed  Diagnostic Tests/Treatments reviewed.   Other Healthcare Providers Involved in Patient s Care:         Specialist appointment - Heme/Onc specilaist   Update since discharge: improved.     Post Discharge Medication Reconciliation: discharge medications reconciled, continue medications without change.  Plan of care communicated with patient     Coding guidelines for this visit:  Type of Medical   Decision Making Face-to-Face Visit       within 7 Days of discharge Face-to-Face Visit        within 14 days of discharge   Moderate Complexity [id number]   High Complexity 30085 48418             Hospital Course:  Nelsy Cota is a 43 year old female with PMH including epilepsy (stopped medication due to lack of medical insurance), tobacco use disorder and prior superficial thrombophlebitis (patient states this was a DVT) who has not recently seen physicians who was admitted with 3 weeks of progressive right lower extremity pain on 7/19/18 and several day history of chest pain with associated shortness of breath and was found to have right lower extremity DVT and bilateral PEs in addition to macrocytic anemia.    She was seen by hematology/oncology.  Initially on heparin drip and transition to Coumadin. Macrocytic Anemia:  with anemia.  B12 WNL but low folate so started on   folate supplement.    Oncology was consulted and has recommended bone marrow biopsy in the outpatient setting.    Pt also had elevated TSH  but free T4 is WNL.     Patient states that she is feeling much better, no shortness of breath, no chest pain. On  Lovenox and Coumadin, patient needs referral to INR clinic and also needs INR check tomorrow to adjust Coumadin dose and also to check the need for Lovenox.  Patient has appointment to see hematologist to discuss bone marrow biopsy for macrocytic anemia.      Patient Active Problem List   Diagnosis     Papanicolaou smear of cervix with atypical squamous cells cannot exclude high grade squamous intraepithelial lesion (ASC-H)     FAM HX-CARDIOVAS DIS [facility]     Convulsions, unspecified convulsion type (H)     PE (pulmonary thromboembolism) (H)     Recurrent deep venous thrombosis (H)     Past Surgical History:   Procedure Laterality Date     C NONSPECIFIC PROCEDURE  1997    R ankle fracture repair     C NONSPECIFIC PROCEDURE  2004    R ankle surgery for ligament injury, also tibia injury     HC CLOSED TX CALCANEAL FX W/O MANIPULATION  2004    fracture of calcaneus     HC COLP CERVIX/UPPER VAGINA  2005    cryotherapy       Social History   Substance Use Topics     Smoking status: Current Every Day Smoker     Packs/day: 1.00     Years: 15.00     Types: Cigarettes     Smokeless tobacco: Never Used      Comment: 1 ppd      Alcohol use 0.5 oz/week     1 Standard drinks or equivalent per week      Comment: 12 drinks per week     Family History   Problem Relation Age of Onset     Hypertension Father      Neurologic Disorder Father      epilepsy     C.A.D. Maternal Grandmother      AAA     Connective Tissue Disorder Paternal Aunt      SLE         Current Outpatient Prescriptions   Medication Sig Dispense Refill     enoxaparin (LOVENOX) 120 MG/0.8ML injection Inject 0.8 mLs (120 mg) Subcutaneous every 24 hours for 5 days 4 mL 0     folic acid (FOLVITE) 1 MG  tablet Take 1 tablet (1 mg) by mouth daily 30 tablet 0     oxyCODONE IR (ROXICODONE) 5 MG tablet Take 1 tablet (5 mg) by mouth every 8 hours as needed for other (pain control or improvement in physical function. Hold dose for analgesic side effects.) 9 tablet 0     phenytoin 300 MG CAPS Take 300 mg by mouth 2 times daily 60 capsule 0     warfarin (COUMADIN) 5 MG tablet Take 5 mg every evening.  INR needs to be repeated on 7/24/2018. 30 tablet 0       Reviewed and updated as needed this visit by clinical staff  Tobacco  Allergies  Meds  Med Hx  Surg Hx  Fam Hx  Soc Hx      Reviewed and updated as needed this visit by Provider         ROS:  CONSTITUTIONAL: NEGATIVE for fever, chills, change in weight  EYES: NEGATIVE for vision changes or irritation  ENT/MOUTH: NEGATIVE for ear, mouth and throat problems  RESP: NEGATIVE for significant cough or SOB  CV: NEGATIVE for chest pain, palpitations or peripheral edema  GI: NEGATIVE for nausea, abdominal pain, heartburn, or change in bowel habits  MUSCULOSKELETAL: NEGATIVE for significant arthralgias or myalgia  NEURO: NEGATIVE for weakness, dizziness or paresthesias  ROS otherwise negative    OBJECTIVE:                                                    BP 96/62  Pulse 92  Temp 98.7  F (37.1  C) (Oral)  Resp 13  Wt 175 lb 1.6 oz (79.4 kg)  SpO2 95%  BMI 30.06 kg/m2  Body mass index is 30.06 kg/(m^2).   GENERAL: healthy, alert, well nourished, well hydrated, no distress  EYES: Eyes grossly normal to inspection, extraocular movements - intact, and PERRL   RESP: lungs clear to auscultation - no rales, no rhonchi, no wheezes  CV: regular rates and rhythm, -  MS: extremities- Rt LE/ankle swollen compared to lt ankle. No calf tenderness grey at this time     NEURO: strength and tone- normal, sensory exam- grossly normal, mentation- intact, speech- normal, reflexes- symmetric       ASSESSMENT/PLAN:                                                       (I26.99) PE  (pulmonary thromboembolism) (H)  (primary encounter diagnosis)  Plan: Clinically shortness of breath has improved currently on Lovenox and Coumadin, INR appointment done for tomorrow to adjust Coumadin dose and to check the need for Lovenox.  INR CLINIC REFERRAL            (I82.409) Recurrent deep venous thrombosis (H)  Plan: INR CLINIC REFERRAL            (R56.9) Convulsions, unspecified convulsion type (H)  Plan: Has been started on Dilantin 300 mg twice daily, patient follows up with the neurologist      (R94.6) Elevated TSH  Plan: TSH 6.98 but free T4 normal will recheck TSH with free T4 reflex in 4 weeks.  If still elevated then she needs to be started on Levoxyl.      (F17.200) Tobacco use disorder  Plan: Counseled on smoking cessation, patient is not interested to quit smoking at this time.    (R74.8) Elevated liver enzymes  Plan: Ultrasound does show fatty liver.  Advised to follow low-fat diet and also patient was advised to reduce alcohol intake.  Advised not more than 1 drink per day for women.  Repeat liver enzymes in 4 weeks           (D53.9) Macrocytic anemia  Plan: Patient has appointment with the hematologist to discuss bone marrow biopsy.  Continue folate.      Hamzah Thomas MD  [facility]

## 2018-07-25 ENCOUNTER — ANTICOAGULATION THERAPY VISIT (OUTPATIENT)
Dept: ANTICOAGULATION | Facility: CLINIC | Age: 43
End: 2018-07-25

## 2018-07-25 DIAGNOSIS — Z79.01 LONG-TERM (CURRENT) USE OF ANTICOAGULANTS: ICD-10-CM

## 2018-07-25 DIAGNOSIS — I82.409 RECURRENT DEEP VENOUS THROMBOSIS (H): ICD-10-CM

## 2018-07-25 DIAGNOSIS — I26.99 PE (PULMONARY THROMBOEMBOLISM) (H): ICD-10-CM

## 2018-07-25 LAB
INR POINT OF CARE: >8 (ref 0.86–1.14)
INR PPP: 11.08 (ref 0.86–1.14)

## 2018-07-25 PROCEDURE — 99207 ZZC NO CHARGE NURSE ONLY: CPT

## 2018-07-25 PROCEDURE — 85610 PROTHROMBIN TIME: CPT | Performed by: INTERNAL MEDICINE

## 2018-07-25 PROCEDURE — 36416 COLLJ CAPILLARY BLOOD SPEC: CPT

## 2018-07-25 PROCEDURE — 85610 PROTHROMBIN TIME: CPT | Mod: QW

## 2018-07-25 NOTE — MR AVS SNAPSHOT
Nelsy Cota   7/25/2018 2:15 PM   Anticoagulation Therapy Visit    Description:  43 year old female   Provider:  [facility]   Department:  [facility] Anti Coagulation           INR as of 7/25/2018     Today's INR >8.0!      Anticoagulation Summary as of 7/25/2018     INR goal 2.0-3.0   Today's INR >8.0!   Full warfarin instructions 7/25: Hold; 7/26: Hold; Otherwise No maintenance plan   Next INR check 7/27/2018    Indications   Long-term (current) use of anticoagulants [Z79.01] [Z79.01]  PE (pulmonary thromboembolism) (H) [I26.99]  Recurrent deep venous thrombosis (H) [I82.409]         Your next Anticoagulation Clinic appointment(s)     Jul 27, 2018  8:45 AM CDT   Anticoagulation Visit with [facility]   [facility] ([facility])    [address] [facility] [address]  [facility] [id number] [phone]            Jul 31, 2018  3:15 PM CDT   Anticoagulation Visit with [facility]   [facility] ([facility])    [address] [facility] [address]  [facility] [id number] [phone]              Contact Numbers     [facility] Phone Numbers:  Anticoagulation Clinic Appointments : [phone]  Anticoagulation Nurse: [phone]         July 2018 Details    Sun Mon Tue Wed Thu Fri Sat     1               2               3               4               5               6               7                 8               9               10               11               12               13               14                 15               16               17               18               19               20               21                 22               23               24               25      Hold   See details      26 Hold         27            28                 29               30               31                    Date Details   07/25 This INR check       Date of next INR:  7/27/2018         How to take  your warfarin dose     Hold Do not take your warfarin dose. See the Details table to the right for additional instructions.

## 2018-07-25 NOTE — PROGRESS NOTES
ANTICOAGULATION FOLLOW-UP CLINIC VISIT    Patient Name:  Nelsy Cota  Date:  7/25/2018  Contact Type:  Face to Face    SUBJECTIVE:     Patient Findings     Positives Bruising (bruising from lovenox injections.  Denies any other bleeding or bruising), Initiation of therapy    Comments INR was elevated on Coagucheck machine.  Patient to do venous draw and will call with results.             OBJECTIVE    INR Protime   Date Value Ref Range Status   07/25/2018 >8.0 0.86 - 1.14 Final       ASSESSMENT / PLAN  INR assessment SUPRA    Recheck INR In: 2 DAYS    INR Location Clinic      Anticoagulation Summary as of 7/25/2018     INR goal 2.0-3.0   Today's INR >8.0!   Warfarin maintenance plan No maintenance plan   Full warfarin instructions 7/25: Hold; 7/26: Hold; Otherwise No maintenance plan   Next INR check 7/27/2018   Target end date     Indications   Long-term (current) use of anticoagulants [Z79.01] [Z79.01]  PE (pulmonary thromboembolism) (H) [I26.99]  Recurrent deep venous thrombosis (H) [I82.409]         Anticoagulation Episode Summary     INR check location     Preferred lab     Send INR reminders to [facility]    Comments       Anticoagulation Care Providers     Provider Role Specialty Phone number    Hamzah Thomas MD Responsible Internal Medicine [phone]            See the Encounter Report to view Anticoagulation Flowsheet and Dosing Calendar (Go to Encounters tab in chart review, and find the Anticoagulation Therapy Visit)    Dosage adjustment made based on physician directed care plan.    Vania Morse RN

## 2018-07-27 ENCOUNTER — ALLIED HEALTH/NURSE VISIT (OUTPATIENT)
Dept: NURSING | Facility: CLINIC | Age: 43
End: 2018-07-27

## 2018-07-27 ENCOUNTER — TELEPHONE (OUTPATIENT)
Dept: INTERNAL MEDICINE | Facility: CLINIC | Age: 43
End: 2018-07-27

## 2018-07-27 ENCOUNTER — ANTICOAGULATION THERAPY VISIT (OUTPATIENT)
Dept: ANTICOAGULATION | Facility: CLINIC | Age: 43
End: 2018-07-27

## 2018-07-27 VITALS — SYSTOLIC BLOOD PRESSURE: 86 MMHG | DIASTOLIC BLOOD PRESSURE: 42 MMHG

## 2018-07-27 DIAGNOSIS — I95.1 ORTHOSTATIC HYPOTENSION: Primary | ICD-10-CM

## 2018-07-27 DIAGNOSIS — I95.1 ORTHOSTATIC HYPOTENSION: ICD-10-CM

## 2018-07-27 DIAGNOSIS — R42 DIZZINESS: ICD-10-CM

## 2018-07-27 DIAGNOSIS — R03.1 LOW BLOOD PRESSURE READING: Primary | ICD-10-CM

## 2018-07-27 DIAGNOSIS — Z79.01 LONG-TERM (CURRENT) USE OF ANTICOAGULANTS: ICD-10-CM

## 2018-07-27 DIAGNOSIS — I26.99 PE (PULMONARY THROMBOEMBOLISM) (H): ICD-10-CM

## 2018-07-27 DIAGNOSIS — I82.409 RECURRENT DEEP VENOUS THROMBOSIS (H): ICD-10-CM

## 2018-07-27 LAB
HGB BLD-MCNC: 10.6 G/DL (ref 11.7–15.7)
INR POINT OF CARE: >8 (ref 0.86–1.14)
INR PPP: 7.66 (ref 0.86–1.14)

## 2018-07-27 PROCEDURE — 85610 PROTHROMBIN TIME: CPT | Performed by: INTERNAL MEDICINE

## 2018-07-27 PROCEDURE — 99207 ZZC NO CHARGE NURSE ONLY: CPT

## 2018-07-27 PROCEDURE — 85018 HEMOGLOBIN: CPT | Performed by: INTERNAL MEDICINE

## 2018-07-27 PROCEDURE — 85610 PROTHROMBIN TIME: CPT | Mod: QW

## 2018-07-27 PROCEDURE — 36416 COLLJ CAPILLARY BLOOD SPEC: CPT

## 2018-07-27 NOTE — PROGRESS NOTES
ANTICOAGULATION FOLLOW-UP CLINIC VISIT    Patient Name:  Nelsy Cota  Date:  7/27/2018  Contact Type:  Face to Face    SUBJECTIVE:     Patient Findings     Positives Initiation of therapy    Comments Patient denies any changes in diet or medications since last INR visit.  Patient has been having some bruising where lab draws have been on arms, but denies any further bleeding or bruising problems.  Venous lab draw also done today.             OBJECTIVE    INR Protime   Date Value Ref Range Status   07/27/2018 >8.0 0.86 - 1.14 Final       ASSESSMENT / PLAN  INR assessment SUPRA    Recheck INR In: 4 DAYS    INR Location Clinic    Vit K given? 1 mg      Anticoagulation Summary as of 7/27/2018     INR goal 2.0-3.0   Today's INR >8.0!   Warfarin maintenance plan No maintenance plan   Full warfarin instructions 7/27: Hold; 7/28: Hold; 7/29: Hold; 7/30: 2.5 mg; Otherwise No maintenance plan   Next INR check 7/31/2018   Target end date     Indications   Long-term (current) use of anticoagulants [Z79.01] [Z79.01]  PE (pulmonary thromboembolism) (H) [I26.99]  Recurrent deep venous thrombosis (H) [I82.409]         Anticoagulation Episode Summary     INR check location     Preferred lab     Send INR reminders to [facility]    Comments       Anticoagulation Care Providers     Provider Role Specialty Phone number    Hamzah Thomas MD [facility] Internal Medicine [phone]            See the Encounter Report to view Anticoagulation Flowsheet and Dosing Calendar (Go to Encounters tab in chart review, and find the Anticoagulation Therapy Visit)    Dosage adjustment made based on physician directed care plan.    Vania Morse RN

## 2018-07-27 NOTE — TELEPHONE ENCOUNTER
Patient here for an INR-but stopped by  to have BP checked.       BP when checked was 84/52 (manual), 86/64 (on machine). Patient has been feeling light headed and dizzy off/on the last couple weeks. Patient is not on any BP meds. Patient just got out of hosp for blood clots.       INR today >8.0. Patient having blood draw in lab today.

## 2018-07-27 NOTE — MR AVS SNAPSHOT
Nelsy Cota   7/27/2018 8:45 AM   Anticoagulation Therapy Visit    Description:  43 year old female   Provider:  [facility]   Department:  [facility] Anti Coagulation           INR as of 7/27/2018     Today's INR >8.0!      Anticoagulation Summary as of 7/27/2018     INR goal 2.0-3.0   Today's INR >8.0!   Full warfarin instructions 7/27: Hold; 7/28: Hold; 7/29: Hold; 7/30: 2.5 mg; Otherwise No maintenance plan   Next INR check 7/31/2018    Indications   Long-term (current) use of anticoagulants [Z79.01] [Z79.01]  PE (pulmonary thromboembolism) (H) [I26.99]  Recurrent deep venous thrombosis (H) [I82.409]         Your next Anticoagulation Clinic appointment(s)     Jul 31, 2018  3:15 PM CDT   Anticoagulation Visit with [facility]   [facility] ([facility]    [address] [facility] [address]  [facility] [id number] [phone]            Aug 03, 2018  1:00 PM CDT   Anticoagulation Visit with [facility]   [facility] ([facility])    [address] [facility] [address]  [facility] [id number] [phone]              Contact Numbers     [facility] Phone Numbers:  Anticoagulation Clinic Appointments : [phone]  Anticoagulation Nurse: [phone]         July 2018 Details    Sun Mon Tue Wed Thu Fri Sat     1               2               3               4               5               6               7                 8               9               10               11               12               13               14                 15               16               17               18               19               20               21                 22               23               24               25               26               27      Hold   See details      28      Hold           29      Hold         30      2.5 mg         31                 Date Details   07/27 This INR check       Date of next  INR:  7/31/2018         How to take your warfarin dose     To take:  2.5 mg Take 0.5 of a 5 mg tablet.    Hold Do not take your warfarin dose. See the Details table to the right for additional instructions.

## 2018-07-27 NOTE — NURSING NOTE
"Chief Complaint   Patient presents with     Allied Health Visit     came to compare bp cuff with ours     initial BP (!) 86/42 Estimated body mass index is 30.06 kg/(m^2) as calculated from the following:    Height as of 7/19/18: 5' 4\" (1.626 m).    Weight as of 7/24/18: 175 lb 1.6 oz (79.4 kg)..  bp completed using cuff size regular  BLAYNE URIARTE LPN    Pt Blood pressures have been low since she was in the hospital last week. Came into office today to compare her blood pressure machine with ours .Pt continues to have low blood pressure and stated had some lightheadedness and some dizziness.  Pt has an appointment on Tuesday with Dr. Solano.  Having the RN evaluate pt before she leaves.   "

## 2018-07-27 NOTE — TELEPHONE ENCOUNTER
My error in not charting that. I did relay to patient to encourage fluids, patient stated she is trying and will continue to do so.

## 2018-07-30 ENCOUNTER — TRANSFERRED RECORDS (OUTPATIENT)
Dept: HEALTH INFORMATION MANAGEMENT | Facility: CLINIC | Age: 43
End: 2018-07-30

## 2018-07-31 ENCOUNTER — ANTICOAGULATION THERAPY VISIT (OUTPATIENT)
Dept: ANTICOAGULATION | Facility: CLINIC | Age: 43
End: 2018-07-31

## 2018-07-31 DIAGNOSIS — I26.99 PE (PULMONARY THROMBOEMBOLISM) (H): ICD-10-CM

## 2018-07-31 DIAGNOSIS — Z79.01 LONG-TERM (CURRENT) USE OF ANTICOAGULANTS: ICD-10-CM

## 2018-07-31 DIAGNOSIS — I82.409 RECURRENT DEEP VENOUS THROMBOSIS (H): ICD-10-CM

## 2018-07-31 LAB — INR POINT OF CARE: 2.2 (ref 0.86–1.14)

## 2018-07-31 PROCEDURE — 85610 PROTHROMBIN TIME: CPT | Mod: QW

## 2018-07-31 PROCEDURE — 36416 COLLJ CAPILLARY BLOOD SPEC: CPT

## 2018-07-31 PROCEDURE — 99207 ZZC NO CHARGE NURSE ONLY: CPT

## 2018-07-31 NOTE — MR AVS SNAPSHOT
Nelsy Cota   7/31/2018 3:15 PM   Anticoagulation Therapy Visit    Description:  43 year old female   [facility]   Department:  [facility]           INR as of 7/31/2018     Today's INR 2.2      Anticoagulation Summary as of 7/31/2018     INR goal 2.0-3.0   Today's INR 2.2   Full warfarin instructions 7/31: 2.5 mg; 8/1: 2.5 mg; 8/2: 2.5 mg; Otherwise No maintenance plan   Next INR check 8/3/2018    Indications   Long-term (current) use of anticoagulants [Z79.01] [Z79.01]  PE (pulmonary thromboembolism) (H) [I26.99]  Recurrent deep venous thrombosis (H) [I82.409]         Your next Anticoagulation Clinic appointment(s)     Aug 03, 2018  1:00 PM CDT   [facility] with [facility]   [facility] ([facility]    [address] [facility] [address]  [facility] [id number] [phone]              Contact Numbers     [facility] Phone Numbers:  Anticoagulation Clinic Appointments : [phone]  Anticoagulation Nurse: [phone]         July 2018 Details    Sun Mon Tue Wed Thu Fri Sat     1               2               3               4               5               6               7                 8               9               10               11               12               13               14                 15               16               17               18               19               20               21                 22               23               24               25               26               27               28                 29               30               31      2.5 mg   See details           Date Details   07/31 This INR check               How to take your warfarin dose     To take:  2.5 mg Take 0.5 of a 5 mg tablet.           August 2018 Details    Sun Mon Tue Wed Thu Fri Sat        1      2.5 mg         2      2.5 mg         3            4                 5               6                7               8               9               10               11                 12               13               14               15               16               17               18                 19               20               21               22               23               24               25                 26               27               28               29               30               31                 Date Details   No additional details    Date of next INR:  8/3/2018         How to take your warfarin dose     To take:  2.5 mg Take 0.5 of a 5 mg tablet.

## 2018-08-03 ENCOUNTER — ANTICOAGULATION THERAPY VISIT (OUTPATIENT)
Dept: ANTICOAGULATION | Facility: CLINIC | Age: 43
End: 2018-08-03

## 2018-08-03 DIAGNOSIS — I82.409 RECURRENT DEEP VENOUS THROMBOSIS (H): ICD-10-CM

## 2018-08-03 DIAGNOSIS — Z79.01 LONG-TERM (CURRENT) USE OF ANTICOAGULANTS: ICD-10-CM

## 2018-08-03 DIAGNOSIS — I26.99 PE (PULMONARY THROMBOEMBOLISM) (H): ICD-10-CM

## 2018-08-03 LAB — INR POINT OF CARE: 4 (ref 0.86–1.14)

## 2018-08-03 PROCEDURE — 36416 COLLJ CAPILLARY BLOOD SPEC: CPT

## 2018-08-03 PROCEDURE — 85610 PROTHROMBIN TIME: CPT | Mod: QW

## 2018-08-03 PROCEDURE — 99207 ZZC NO CHARGE NURSE ONLY: CPT

## 2018-08-03 RX ORDER — WARFARIN SODIUM 1 MG/1
1 TABLET ORAL DAILY
Qty: 30 TABLET | Refills: 1 | Status: SHIPPED | OUTPATIENT
Start: 2018-08-03 | End: 2018-09-12

## 2018-08-03 NOTE — PROGRESS NOTES
ANTICOAGULATION FOLLOW-UP CLINIC VISIT    Patient Name:  Nelsy Cota  Date:  8/3/2018  Contact Type:  Face to Face    SUBJECTIVE:     Patient Findings     Positives Initiation of therapy    Comments New Rx sent today for 1 mg tablets.           OBJECTIVE    INR Protime   Date Value Ref Range Status   08/03/2018 4.0 (A) 0.86 - 1.14 Final       ASSESSMENT / PLAN  INR assessment SUPRA    Recheck INR In: 5 DAYS    INR Location Clinic      Anticoagulation Summary as of 8/3/2018     INR goal 2.0-3.0   Today's INR 4.0!   Warfarin maintenance plan 1 mg (1 mg x 1) every day   Full warfarin instructions 8/3: Hold; 8/4: Hold; Otherwise 1 mg every day   Weekly warfarin total 7 mg   Plan last modified Vania Morse RN (8/3/2018)   Next INR check 8/8/2018   Target end date     Indications   Long-term (current) use of anticoagulants [Z79.01] [Z79.01]  PE (pulmonary thromboembolism) (H) [I26.99]  Recurrent deep venous thrombosis (H) [I82.409]         Anticoagulation Episode Summary     INR check location     Preferred lab     Send INR reminders to [facility]    Comments       Anticoagulation Care Providers     Provider Role Specialty Phone number    Hamzah Thomas MD Responsible Internal Medicine [phone]            See the Encounter Report to view Anticoagulation Flowsheet and Dosing Calendar (Go to Encounters tab in chart review, and find the Anticoagulation Therapy Visit)    Dosage adjustment made based on physician directed care plan.    Vania Morse, RN

## 2018-08-03 NOTE — MR AVS SNAPSHOT
Nelsy Cota   8/3/2018 1:00 PM   Anticoagulation Therapy Visit    Description:  43 year old female   Provider[facility]   Department:  [facility]           INR as of 8/3/2018     Today's INR 4.0!      Anticoagulation Summary as of 8/3/2018     INR goal 2.0-3.0   Today's INR 4.0!   Full warfarin instructions 8/3: Hold; 8/4: Hold; Otherwise 1 mg every day   Next INR check 8/8/2018    Indications   Long-term (current) use of anticoagulants [Z79.01] [Z79.01]  PE (pulmonary thromboembolism) (H) [I26.99]  Recurrent deep venous thrombosis (H) [I82.409]         Your next Anticoagulation Clinic appointment(s)     Aug 08, 2018  2:00 PM CDT   [facility]   [facility]    [address] [facility] [address]  [facility] [address] [phone]              Contact Numbers     [facility] Phone Numbers:  Anticoagulation Clinic Appointments : [phone]  Anticoagulation Nurse: [phone]         August 2018 Details    Sun Mon Tue Wed Thu Fri Sat        1               2               3      Hold   See details      4      Hold           5      1 mg         6      1 mg         7      1 mg         8            9               10               11                 12               13               14               15               16               17               18                 19               20               21               22               23               24               25                 26               27               28               29               30               31                 Date Details   08/03 This INR check       Date of next INR:  8/8/2018         How to take your warfarin dose     To take:  1 mg Take 1 of the 1 mg tablets.    Hold Do not take your warfarin dose. See the Details table to the right for additional instructions.

## 2018-08-08 ENCOUNTER — ANTICOAGULATION THERAPY VISIT (OUTPATIENT)
Dept: ANTICOAGULATION | Facility: CLINIC | Age: 43
End: 2018-08-08

## 2018-08-08 DIAGNOSIS — I82.409 RECURRENT DEEP VENOUS THROMBOSIS (H): ICD-10-CM

## 2018-08-08 DIAGNOSIS — I26.99 PE (PULMONARY THROMBOEMBOLISM) (H): ICD-10-CM

## 2018-08-08 DIAGNOSIS — Z79.01 LONG-TERM (CURRENT) USE OF ANTICOAGULANTS: ICD-10-CM

## 2018-08-08 LAB — INR POINT OF CARE: 1.5 (ref 0.86–1.14)

## 2018-08-08 PROCEDURE — 99207 ZZC NO CHARGE NURSE ONLY: CPT

## 2018-08-08 PROCEDURE — 36416 COLLJ CAPILLARY BLOOD SPEC: CPT

## 2018-08-08 PROCEDURE — 85610 PROTHROMBIN TIME: CPT | Mod: QW

## 2018-08-08 NOTE — MR AVS SNAPSHOT
Nelsy Cota   8/8/2018 2:00 PM   Anticoagulation Therapy Visit    Description:  43 year old female   [facility]   Department:  [facility]           INR as of 8/8/2018     Today's INR 1.5!      Anticoagulation Summary as of 8/8/2018     INR goal 2.0-3.0   Today's INR 1.5!   Full warfarin instructions 8/8: 2.5 mg; 8/12: 2.5 mg; Otherwise 1 mg every day   Next INR check 8/13/2018    Indications   Long-term (current) use of anticoagulants [Z79.01] [Z79.01]  PE (pulmonary thromboembolism) (H) [I26.99]  Recurrent deep venous thrombosis (H) [I82.409]         Your next Anticoagulation Clinic appointment(s)     Aug 13, 2018  3:30 PM CDT   [facility]   [facility]    [address] [address]  [facility] [id number] [phone]            Aug 17, 2018  2:00 PM CDT   [facility]   [facility]    [address]  [facility] [id number] [phone]              Contact Numbers     [facility] Phone Numbers:  Anticoagulation Clinic Appointments : [phone]  Anticoagulation Nurse: [phone]         August 2018 Details    Sun Mon Tue Wed Thu Fri Sat        1               2               3               4                 5               6               7               8      2.5 mg   See details      9      1 mg         10      1 mg         11      1 mg           12      2.5 mg         13            14               15               16               17               18                 19               20               21               22               23               24               25                 26               27               28               29               30               31                 Date Details   08/08 This INR check       Date of next INR:  8/13/2018          How to take your warfarin dose     To take:  1 mg Take 1 of the 1 mg tablets.    To take:  2.5 mg Take 0.5 of a 5 mg tablet.

## 2018-08-08 NOTE — PROGRESS NOTES
ANTICOAGULATION FOLLOW-UP CLINIC VISIT    Patient Name:  Nelsy Cota  Date:  8/8/2018  Contact Type:  Face to Face    SUBJECTIVE:     Patient Findings     Positives Intentional hold of therapy (intentionally held due to elevated INR)    Comments Patient was not able to  the 1 mg tablets as recommended.  She has been quartering her 5 mg tablets.  She plans to  the warfarin 1 mg tablets today.           OBJECTIVE    INR Protime   Date Value Ref Range Status   08/08/2018 1.5 (A) 0.86 - 1.14 Final       ASSESSMENT / PLAN  INR assessment SUB    Recheck INR In: 5 DAYS    INR Location Clinic      Anticoagulation Summary as of 8/8/2018     INR goal 2.0-3.0   Today's INR 1.5!   Warfarin maintenance plan 1 mg (1 mg x 1) every day   Full warfarin instructions 8/8: 2.5 mg; 8/12: 2.5 mg; Otherwise 1 mg every day   Weekly warfarin total 7 mg   Plan last modified Vania Morse RN (8/3/2018)   Next INR check 8/13/2018   Target end date     Indications   Long-term (current) use of anticoagulants [Z79.01] [Z79.01]  PE (pulmonary thromboembolism) (H) [I26.99]  Recurrent deep venous thrombosis (H) [I82.409]         Anticoagulation Episode Summary     INR check location     Preferred lab     Send INR reminders to [facility]    Comments       Anticoagulation Care Providers     Provider Role Specialty Phone number    Hamzah Thomas MD [facility] Internal Medicine [phone]            See the Encounter Report to view Anticoagulation Flowsheet and Dosing Calendar (Go to Encounters tab in chart review, and find the Anticoagulation Therapy Visit)    Dosage adjustment made based on physician directed care plan.    Vania Morse RN

## 2018-08-13 ENCOUNTER — ANTICOAGULATION THERAPY VISIT (OUTPATIENT)
Dept: ANTICOAGULATION | Facility: CLINIC | Age: 43
End: 2018-08-13

## 2018-08-13 DIAGNOSIS — I26.99 PE (PULMONARY THROMBOEMBOLISM) (H): ICD-10-CM

## 2018-08-13 DIAGNOSIS — I82.409 RECURRENT DEEP VENOUS THROMBOSIS (H): ICD-10-CM

## 2018-08-13 DIAGNOSIS — Z79.01 LONG-TERM (CURRENT) USE OF ANTICOAGULANTS: ICD-10-CM

## 2018-08-13 LAB — INR POINT OF CARE: 1.5 (ref 0.86–1.14)

## 2018-08-13 PROCEDURE — 85610 PROTHROMBIN TIME: CPT | Mod: QW

## 2018-08-13 PROCEDURE — 99207 ZZC NO CHARGE NURSE ONLY: CPT

## 2018-08-13 PROCEDURE — 36416 COLLJ CAPILLARY BLOOD SPEC: CPT

## 2018-08-13 NOTE — PROGRESS NOTES
ANTICOAGULATION FOLLOW-UP CLINIC VISIT    Patient Name:  Nelsy Cota  Date:  8/13/2018  Contact Type:  Face to Face    SUBJECTIVE:     Patient Findings     Positives Initiation of therapy    Comments Pt denies any changes or missed doses since last INR visit.            OBJECTIVE    INR Protime   Date Value Ref Range Status   08/13/2018 1.5 (A) 0.86 - 1.14 Final       ASSESSMENT / PLAN  INR assessment SUB    Recheck INR In: 4 DAYS    INR Location Clinic      Anticoagulation Summary as of 8/13/2018     INR goal 2.0-3.0   Today's INR 1.5!   Warfarin maintenance plan 1 mg (1 mg x 1) every day   Full warfarin instructions 8/13: 3 mg; 8/14: 3 mg; 8/15: 2 mg; 8/16: 2 mg; Otherwise 1 mg every day   Weekly warfarin total 7 mg   Plan last modified Vania Morse RN (8/3/2018)   Next INR check 8/17/2018   Target end date     Indications   Long-term (current) use of anticoagulants [Z79.01] [Z79.01]  PE (pulmonary thromboembolism) (H) [I26.99]  Recurrent deep venous thrombosis (H) [I82.409]         Anticoagulation Episode Summary     INR check location     Preferred lab     Send INR reminders to [facility]    Comments       Anticoagulation Care Providers     Provider Role Specialty Phone number    Hamzah Thomas MD [facility] Internal Medicine [phone]            See the Encounter Report to view Anticoagulation Flowsheet and Dosing Calendar (Go to Encounters tab in chart review, and find the Anticoagulation Therapy Visit)    Dosage adjustment made based on physician directed care plan.    Latosha Perdomo RN

## 2018-08-13 NOTE — MR AVS SNAPSHOT
Nelsy Cota   8/13/2018 3:30 PM   Anticoagulation Therapy Visit    Description:  43 year old female   [facility]   Department:  [facility]           INR as of 8/13/2018     Today's INR 1.5!      Anticoagulation Summary as of 8/13/2018     INR goal 2.0-3.0   Today's INR 1.5!   Full warfarin instructions 8/13: 3 mg; 8/14: 3 mg; 8/15: 2 mg; 8/16: 2 mg; Otherwise 1 mg every day   Next INR check 8/17/2018    Indications   Long-term (current) use of anticoagulants [Z79.01] [Z79.01]  PE (pulmonary thromboembolism) (H) [I26.99]  Recurrent deep venous thrombosis (H) [I82.409]         Your next Anticoagulation Clinic appointment(s)     Aug 17, 2018  2:00 PM CDT   [facility]   [facility]    [address] [facility] [address]  [facility] [address] [phone]              Contact Numbers     [facility] Phone Numbers:  Anticoagulation Clinic Appointments : [phone]  Anticoagulation Nurse: [phone]         August 2018 Details    Sun Mon Tue Wed Thu Fri Sat        1               2               3               4                 5               6               7               8               9               10               11                 12               13      3 mg   See details      14      3 mg         15      2 mg         16      2 mg         17            18                 19               20               21               22               23               24               25                 26               27               28               29               30               31                 Date Details   08/13 This INR check       Date of next INR:  8/17/2018         How to take your warfarin dose     To take:  1 mg Take 1 of the 1 mg tablets.    To take:  2 mg Take 2 of the 1 mg tablets.    To take:  3 mg Take 3 of the 1 mg tablets.

## 2018-08-17 ENCOUNTER — ANTICOAGULATION THERAPY VISIT (OUTPATIENT)
Dept: ANTICOAGULATION | Facility: CLINIC | Age: 43
End: 2018-08-17

## 2018-08-17 DIAGNOSIS — I82.409 RECURRENT DEEP VENOUS THROMBOSIS (H): ICD-10-CM

## 2018-08-17 DIAGNOSIS — Z79.01 LONG-TERM (CURRENT) USE OF ANTICOAGULANTS: ICD-10-CM

## 2018-08-17 DIAGNOSIS — I26.99 PE (PULMONARY THROMBOEMBOLISM) (H): ICD-10-CM

## 2018-08-17 LAB — INR POINT OF CARE: 3 (ref 0.86–1.14)

## 2018-08-17 PROCEDURE — 99207 ZZC NO CHARGE NURSE ONLY: CPT

## 2018-08-17 PROCEDURE — 85610 PROTHROMBIN TIME: CPT | Mod: QW

## 2018-08-17 PROCEDURE — 36416 COLLJ CAPILLARY BLOOD SPEC: CPT

## 2018-08-17 NOTE — MR AVS SNAPSHOT
Nelsy Cota   8/17/2018 2:00 PM   Anticoagulation Therapy Visit    Description:  43 year old female   [facility]   Department:  [facility]           INR as of 8/17/2018     Today's INR 3.0      Anticoagulation Summary as of 8/17/2018     INR goal 2.0-3.0   Today's INR 3.0   Full warfarin instructions 8/18: 2 mg; 8/20: 2 mg; 8/21: 2 mg; Otherwise 1 mg every day   Next INR check 8/22/2018    Indications   Long-term (current) use of anticoagulants [Z79.01] [Z79.01]  PE (pulmonary thromboembolism) (H) [I26.99]  Recurrent deep venous thrombosis (H) [I82.409]         Your next Anticoagulation Clinic appointment(s)     Aug 22, 2018  2:15 PM CDT   [facility]   [facility]    [address] [facility] [address]  [facility] [id number] [phone]              Contact Numbers     [facility] Phone Numbers:  Anticoagulation Clinic Appointments : [phone]  Anticoagulation Nurse: [phone]         August 2018 Details    Sun Mon Tue Wed Thu Fri Sat        1               2               3               4                 5               6               7               8               9               10               11                 12               13               14               15               16               17      1 mg   See details      18      2 mg           19      1 mg         20      2 mg         21      2 mg         22            23               24               25                 26               27               28               29               30               31                 Date Details   08/17 This INR check       Date of next INR:  8/22/2018         How to take your warfarin dose     To take:  1 mg Take 1 of the 1 mg tablets.    To take:  2 mg Take 2 of the 1 mg tablets.

## 2018-08-17 NOTE — PROGRESS NOTES
ANTICOAGULATION FOLLOW-UP CLINIC VISIT    Patient Name:  Nelsy Cota  Date:  8/17/2018  Contact Type:  Face to Face    SUBJECTIVE:     Patient Findings     Positives No Problem Findings           OBJECTIVE    INR Protime   Date Value Ref Range Status   08/17/2018 3.0 (A) 0.86 - 1.14 Final       ASSESSMENT / PLAN  INR assessment THER    Recheck INR In: 5 DAYS    INR Location Clinic      Anticoagulation Summary as of 8/17/2018     INR goal 2.0-3.0   Today's INR 3.0   Warfarin maintenance plan 1 mg (1 mg x 1) every day   Full warfarin instructions 8/18: 2 mg; 8/20: 2 mg; 8/21: 2 mg; Otherwise 1 mg every day   Weekly warfarin total 7 mg   Plan last modified Vania Morse RN (8/3/2018)   Next INR check 8/22/2018   Target end date     Indications   Long-term (current) use of anticoagulants [Z79.01] [Z79.01]  PE (pulmonary thromboembolism) (H) [I26.99]  Recurrent deep venous thrombosis (H) [I82.409]         Anticoagulation Episode Summary     INR check location     Preferred lab     Send INR reminders to [facility]    Comments       Anticoagulation Care Providers     Provider Role Specialty Phone number    Hamzah Thomas MD Responsible Internal Medicine [phone]            See the Encounter Report to view Anticoagulation Flowsheet and Dosing Calendar (Go to Encounters tab in chart review, and find the Anticoagulation Therapy Visit)    Dosage adjustment made based on physician directed care plan.    Vania Morse RN

## 2018-08-22 ENCOUNTER — ANTICOAGULATION THERAPY VISIT (OUTPATIENT)
Dept: ANTICOAGULATION | Facility: CLINIC | Age: 43
End: 2018-08-22

## 2018-08-22 DIAGNOSIS — I26.99 PE (PULMONARY THROMBOEMBOLISM) (H): ICD-10-CM

## 2018-08-22 DIAGNOSIS — Z79.01 LONG-TERM (CURRENT) USE OF ANTICOAGULANTS: ICD-10-CM

## 2018-08-22 DIAGNOSIS — I82.409 RECURRENT DEEP VENOUS THROMBOSIS (H): ICD-10-CM

## 2018-08-22 LAB — INR POINT OF CARE: 2 (ref 0.86–1.14)

## 2018-08-22 PROCEDURE — 99207 ZZC NO CHARGE NURSE ONLY: CPT

## 2018-08-22 PROCEDURE — 36416 COLLJ CAPILLARY BLOOD SPEC: CPT

## 2018-08-22 PROCEDURE — 85610 PROTHROMBIN TIME: CPT | Mod: QW

## 2018-08-22 NOTE — PROGRESS NOTES
ANTICOAGULATION FOLLOW-UP CLINIC VISIT    Patient Name:  Nelsy Cota  Date:  8/22/2018  Contact Type:  Face to Face    SUBJECTIVE:     Patient Findings     Positives No Problem Findings           OBJECTIVE    INR Protime   Date Value Ref Range Status   08/22/2018 2.0 (A) 0.86 - 1.14 Final       ASSESSMENT / PLAN  INR assessment THER    Recheck INR In: 1 WEEK    INR Location Clinic      Anticoagulation Summary as of 8/22/2018     INR goal 2.0-3.0   Today's INR 2.0   Warfarin maintenance plan 1 mg (1 mg x 1) every day   Full warfarin instructions 8/22: 2 mg; 8/24: 2 mg; 8/25: 2 mg; 8/27: 2 mg; 8/28: 2 mg; Otherwise 1 mg every day   Weekly warfarin total 7 mg   Plan last modified Vania Morse RN (8/3/2018)   Next INR check 8/29/2018   Target end date     Indications   Long-term (current) use of anticoagulants [Z79.01] [Z79.01]  PE (pulmonary thromboembolism) (H) [I26.99]  Recurrent deep venous thrombosis (H) [I82.409]         Anticoagulation Episode Summary     INR check location     Preferred lab     Send INR reminders to [facility]    Comments       Anticoagulation Care Providers     Provider Role Specialty Phone number    Hamzah Thomas MD Responsible Internal Medicine [phone]            See the Encounter Report to view Anticoagulation Flowsheet and Dosing Calendar (Go to Encounters tab in chart review, and find the Anticoagulation Therapy Visit)    Dosage adjustment made based on physician directed care plan.    Vania Morse RN

## 2018-08-22 NOTE — MR AVS SNAPSHOT
Nelsy Cota   8/22/2018 2:15 PM   Anticoagulation Therapy Visit    Description:  43 year old female   [facility]   Department:  [facility]           INR as of 8/22/2018     Today's INR 2.0      Anticoagulation Summary as of 8/22/2018     INR goal 2.0-3.0   Today's INR 2.0   Full warfarin instructions 8/22: 2 mg; 8/24: 2 mg; 8/25: 2 mg; 8/27: 2 mg; 8/28: 2 mg; Otherwise 1 mg every day   Next INR check 8/29/2018    Indications   Long-term (current) use of anticoagulants [Z79.01] [Z79.01]  PE (pulmonary thromboembolism) (H) [I26.99]  Recurrent deep venous thrombosis (H) [I82.409]         Your next Anticoagulation Clinic appointment(s)     Aug 29, 2018  2:45 PM CDT   [facility]   [facility] ([facility])    [address] [facility] [address]  [facility] [address] [phone]              Contact Numbers     [facility] Phone Numbers:  Anticoagulation Clinic Appointments : [phone]  Anticoagulation Nurse: [phone]         August 2018 Details    Sun Mon Tue Wed Thu Fri Sat        1               2               3               4                 5               6               7               8               9               10               11                 12               13               14               15               16               17               18                 19               20               21               22      2 mg   See details      23      1 mg         24      2 mg         25      2 mg           26      1 mg         27      2 mg         28      2 mg         29            30               31                 Date Details   08/22 This INR check       Date of next INR:  8/29/2018         How to take your warfarin dose     To take:  1 mg Take 1 of the 1 mg tablets.    To take:  2 mg Take 2 of the 1 mg tablets.

## 2018-08-29 ENCOUNTER — ANTICOAGULATION THERAPY VISIT (OUTPATIENT)
Dept: ANTICOAGULATION | Facility: CLINIC | Age: 43
End: 2018-08-29

## 2018-08-29 DIAGNOSIS — Z79.01 LONG-TERM (CURRENT) USE OF ANTICOAGULANTS: ICD-10-CM

## 2018-08-29 DIAGNOSIS — I26.99 PE (PULMONARY THROMBOEMBOLISM) (H): ICD-10-CM

## 2018-08-29 DIAGNOSIS — I82.409 RECURRENT DEEP VENOUS THROMBOSIS (H): ICD-10-CM

## 2018-08-29 LAB — INR POINT OF CARE: 1.8 (ref 0.86–1.14)

## 2018-08-29 PROCEDURE — 99207 ZZC NO CHARGE NURSE ONLY: CPT

## 2018-08-29 PROCEDURE — 36416 COLLJ CAPILLARY BLOOD SPEC: CPT

## 2018-08-29 PROCEDURE — 85610 PROTHROMBIN TIME: CPT | Mod: QW

## 2018-08-29 NOTE — PROGRESS NOTES
ANTICOAGULATION FOLLOW-UP CLINIC VISIT    Patient Name:  Nelsy Cota  Date:  8/29/2018  Contact Type:  Face to Face    SUBJECTIVE:     Patient Findings     Positives Unexplained INR or factor level change           OBJECTIVE    INR Protime   Date Value Ref Range Status   08/29/2018 1.8 (A) 0.86 - 1.14 Final       ASSESSMENT / PLAN  INR assessment SUB    Recheck INR In: 2 WEEKS    INR Location Clinic      Anticoagulation Summary as of 8/29/2018     INR goal 2.0-3.0   Today's INR 1.8!   Warfarin maintenance plan 1 mg (1 mg x 1) every day   Full warfarin instructions 8/29: 2 mg; 8/30: 2 mg; 8/31: 2 mg; 9/1: 2 mg; 9/3: 2 mg; 9/4: 2 mg; 9/5: 2 mg; 9/6: 2 mg; 9/7: 2 mg; 9/8: 2 mg; 9/10: 2 mg; 9/11: 2 mg; Otherwise 1 mg every day   Weekly warfarin total 7 mg   Plan last modified Vania Morse RN (8/3/2018)   Next INR check 9/12/2018   Target end date     Indications   Long-term (current) use of anticoagulants [Z79.01] [Z79.01]  PE (pulmonary thromboembolism) (H) [I26.99]  Recurrent deep venous thrombosis (H) [I82.409]         Anticoagulation Episode Summary     INR check location     Preferred lab     Send INR reminders to [facility]    Comments       Anticoagulation Care Providers     Provider Role Specialty Phone number    Hamzah Thomas MD [facility] Internal Medicine [phone]            See the Encounter Report to view Anticoagulation Flowsheet and Dosing Calendar (Go to Encounters tab in chart review, and find the Anticoagulation Therapy Visit)    Dosage adjustment made based on physician directed care plan.    Kaylynn Saenz, FAUSTINO

## 2018-08-29 NOTE — MR AVS SNAPSHOT
Nelsy Cota   8/29/2018 2:45 PM   Anticoagulation Therapy Visit    Description:  43 year old female   Provider[facility]  [facility]   Department:  [facility]           INR as of 8/29/2018     Today's INR 1.8!      Anticoagulation Summary as of 8/29/2018     INR goal 2.0-3.0   Today's INR 1.8!   Full warfarin instructions 8/29: 2 mg; 8/30: 2 mg; 8/31: 2 mg; 9/1: 2 mg; 9/3: 2 mg; 9/4: 2 mg; 9/5: 2 mg; 9/6: 2 mg; 9/7: 2 mg; 9/8: 2 mg; 9/10: 2 mg; 9/11: 2 mg; Otherwise 1 mg every day   Next INR check 9/12/2018    Indications   Long-term (current) use of anticoagulants [Z79.01] [Z79.01]  PE (pulmonary thromboembolism) (H) [I26.99]  Recurrent deep venous thrombosis (H) [I82.409]         Your next Anticoagulation Clinic appointment(s)     Sep 12, 2018  1:45 PM CDT   [facility] with [facility]   [facility] ([facility])    [address] [facility] [address]  [facility] [address] [phone]              Contact Numbers     [facility] Phone Numbers:  Anticoagulation Clinic Appointments : [phone]  Anticoagulation Nurse: [phone]         August 2018 Details    Sun Mon Tue Wed Thu Fri Sat        1               2               3               4                 5               6               7               8               9               10               11                 12               13               14               15               16               17               18                 19               20               21               22               23               24               25                 26               27               28               29      2 mg   See details      30      2 mg         31      2 mg           Date Details   08/29 This INR check               How to take your warfarin dose     To take:  2 mg Take 2 of the 1 mg tablets.           September 2018 Details    Sun Mon Tue Wed Thu Fri Sat            1      2 mg           2      1 mg         3      2 mg         4      2 mg         5      2 mg         6      2 mg         7      2 mg         8      2 mg           9      1 mg         10      2 mg         11      2 mg         12            13               14               15                 16               17               18               19               20               21               22                 23               24               25               26               27               28               29                 30                      Date Details   No additional details    Date of next INR:  9/12/2018         How to take your warfarin dose     To take:  1 mg Take 1 of the 1 mg tablets.    To take:  2 mg Take 2 of the 1 mg tablets.

## 2018-09-10 ENCOUNTER — TELEPHONE (OUTPATIENT)
Dept: INTERNAL MEDICINE | Facility: CLINIC | Age: 43
End: 2018-09-10

## 2018-09-10 NOTE — TELEPHONE ENCOUNTER
Panel Management Review      Patient has the following on her problem list: None      Composite cancer screening  Chart review shows that this patient is due/due soon for the following Pap Smear  Summary:    Patient is due/failing the following:   PAP    Action needed:   Patient needs office visit for physical with pap.    Type of outreach:    Sent letter.    Questions for provider review:    None                                                                                                                                     Johnny Beasley, [facility]       Chart routed to self .

## 2018-09-10 NOTE — LETTER
[facility]  [address], [address]  [place], [place]  [address]                                            TEL:[phone]  FAX:[phone]      Nelsy Cota  [address] [facility] [address]      October 4, 2018    Dear Nelsy,    We sent you a letter a couple of weeks ago informing you of what you are due for, we hope that you did receive it. Your health is extremely important to us. Please take the time to consider calling the clinic to discuss your preventative health measures.?   Thank you for allowing us to help you take care of your health!       Sincerely,      Kasandra Solano M.D.                  ?

## 2018-09-10 NOTE — LETTER
[facility]  [address], [address]  [place], [place]  [address]                                            TEL:[phone]  FAX:[phone]      Nelsy Cota  [address] [facility] [address]          September 10, 2018    Dear Nelsy,    In order to ensure we are providing the best quality care, we have reviewed your chart and see that you are due for:     1. Pap smear, last reported 12/21/2009      Please call the clinic at your earliest convenience to schedule an appointment.   Thank you for trusting us with your health care.          Sincerely,      Kasandra Solano M.D.

## 2018-09-12 ENCOUNTER — ANTICOAGULATION THERAPY VISIT (OUTPATIENT)
Dept: ANTICOAGULATION | Facility: CLINIC | Age: 43
End: 2018-09-12

## 2018-09-12 DIAGNOSIS — I26.99 PE (PULMONARY THROMBOEMBOLISM) (H): ICD-10-CM

## 2018-09-12 DIAGNOSIS — Z79.01 LONG-TERM (CURRENT) USE OF ANTICOAGULANTS: ICD-10-CM

## 2018-09-12 DIAGNOSIS — I26.99 PULMONARY EMBOLISM, BILATERAL (H): ICD-10-CM

## 2018-09-12 DIAGNOSIS — I82.4Z1 ACUTE DEEP VEIN THROMBOSIS (DVT) OF DISTAL VEIN OF RIGHT LOWER EXTREMITY (H): ICD-10-CM

## 2018-09-12 DIAGNOSIS — I82.409 RECURRENT DEEP VENOUS THROMBOSIS (H): ICD-10-CM

## 2018-09-12 LAB — INR POINT OF CARE: 1.7 (ref 0.86–1.14)

## 2018-09-12 PROCEDURE — 36416 COLLJ CAPILLARY BLOOD SPEC: CPT

## 2018-09-12 PROCEDURE — 85610 PROTHROMBIN TIME: CPT | Mod: QW

## 2018-09-12 PROCEDURE — 99207 ZZC NO CHARGE NURSE ONLY: CPT

## 2018-09-12 RX ORDER — WARFARIN SODIUM 1 MG/1
TABLET ORAL
Qty: 60 TABLET | Refills: 1 | Status: SHIPPED | OUTPATIENT
Start: 2018-09-12 | End: 2018-12-11

## 2018-09-12 NOTE — MR AVS SNAPSHOT
Nelsy Cota   9/12/2018 1:45 PM   Anticoagulation Therapy Visit    Description:  43 year old female   Provider[facility]   Department:  [facility]           INR as of 9/12/2018     Today's INR 1.7!      Anticoagulation Summary as of 9/12/2018     INR goal 2.0-3.0   Today's INR 1.7!   Full warfarin instructions 9/12: 5 mg; Otherwise 2 mg every day   Next INR check 9/26/2018    Indications   Long-term (current) use of anticoagulants [Z79.01] [Z79.01]  PE (pulmonary thromboembolism) (H) [I26.99]  Recurrent deep venous thrombosis (H) [I82.409]         Your next Anticoagulation Clinic appointment(s)     Sep 26, 2018  2:00 PM CDT   [facility] with [facility]   [facility] ([facility])    [address] [facility] [address]  [facility] [id number] [phone]              Contact Numbers     [facility] Phone Numbers:  Anticoagulation Clinic Appointments : [phone]  Anticoagulation Nurse: [phone]         September 2018 Details    Sun Mon Tue Wed Thu Fri Sat           1                 2               3               4               5               6               7               8                 9               10               11               12      5 mg   See details      13      2 mg         14      2 mg         15      2 mg           16      2 mg         17      2 mg         18      2 mg         19      2 mg         20      2 mg         21      2 mg         22      2 mg           23      2 mg         24      2 mg         25      2 mg         26            27               28               29                 30                      Date Details   09/12 This INR check       Date of next INR:  9/26/2018         How to take your warfarin dose     To take:  2 mg Take 2 of the 1 mg tablets.    To take:  5 mg Take 1 of the 5 mg tablets.

## 2018-09-12 NOTE — PROGRESS NOTES
ANTICOAGULATION FOLLOW-UP CLINIC VISIT    Patient Name:  Nelsy Cota  Date:  9/12/2018  Contact Type:  Face to Face    SUBJECTIVE:     Patient Findings     Positives Initiation of therapy    Comments Patient denies any changes in diet or medications since last INR visit.  Denies any bleeding or bruising problems             OBJECTIVE    INR Protime   Date Value Ref Range Status   09/12/2018 1.7 (A) 0.86 - 1.14 Final       ASSESSMENT / PLAN  INR assessment SUB    Recheck INR In: 2 WEEKS    INR Location Clinic      Anticoagulation Summary as of 9/12/2018     INR goal 2.0-3.0   Today's INR 1.7!   Warfarin maintenance plan 2 mg (1 mg x 2) every day   Full warfarin instructions 9/12: 5 mg; Otherwise 2 mg every day   Weekly warfarin total 14 mg   Plan last modified Vania Morse RN (9/12/2018)   Next INR check 9/26/2018   Target end date     Indications   Long-term (current) use of anticoagulants [Z79.01] [Z79.01]  PE (pulmonary thromboembolism) (H) [I26.99]  Recurrent deep venous thrombosis (H) [I82.409]         Anticoagulation Episode Summary     INR check location     Preferred lab     Send INR reminders to [facility]    Comments       Anticoagulation Care Providers     Provider Role Specialty Phone number    Hamzah Thomas MD [facility] Internal Medicine [phone]            See the Encounter Report to view Anticoagulation Flowsheet and Dosing Calendar (Go to Encounters tab in chart review, and find the Anticoagulation Therapy Visit)    Dosage adjustment made based on physician directed care plan.    Vania Morse RN

## 2018-09-26 ENCOUNTER — ANTICOAGULATION THERAPY VISIT (OUTPATIENT)
Dept: ANTICOAGULATION | Facility: CLINIC | Age: 43
End: 2018-09-26

## 2018-09-26 DIAGNOSIS — Z79.01 LONG-TERM (CURRENT) USE OF ANTICOAGULANTS: ICD-10-CM

## 2018-09-26 DIAGNOSIS — I82.409 RECURRENT DEEP VENOUS THROMBOSIS (H): ICD-10-CM

## 2018-09-26 DIAGNOSIS — I26.99 PE (PULMONARY THROMBOEMBOLISM) (H): ICD-10-CM

## 2018-09-26 LAB — INR POINT OF CARE: 2.7 (ref 0.86–1.14)

## 2018-09-26 PROCEDURE — 99207 ZZC NO CHARGE NURSE ONLY: CPT

## 2018-09-26 PROCEDURE — 85610 PROTHROMBIN TIME: CPT | Mod: QW

## 2018-09-26 PROCEDURE — 36416 COLLJ CAPILLARY BLOOD SPEC: CPT

## 2018-09-26 NOTE — MR AVS SNAPSHOT
Nelsy CORONA Beata   9/26/2018 2:00 PM   Anticoagulation Therapy Visit    Description:  43 year old female   Provider[facility]   Department:  [facility]           INR as of 9/26/2018     Today's INR 2.7      Anticoagulation Summary as of 9/26/2018     INR goal 2.0-3.0   Today's INR 2.7   Full warfarin instructions 2 mg every day   Next INR check 10/17/2018    Indications   Long-term (current) use of anticoagulants [Z79.01] [Z79.01]  PE (pulmonary thromboembolism) (H) [I26.99]  Recurrent deep venous thrombosis (H) [I82.409]         Your next Anticoagulation Clinic appointment(s)     Oct 17, 2018  2:00 PM CDT   [facility]   [facility]    [address] [facility] [address]  [facility] [address] [phone]              Contact Numbers     [facility] Phone Numbers:  Anticoagulation Clinic Appointments : [phone]  Anticoagulation Nurse: [phone]         September 2018 Details    Sun Mon Tue Wed Thu Fri Sat           1                 2               3               4               5               6               7               8                 9               10               11               12               13               14               15                 16               17               18               19               20               21               22                 23               24               25               26      2 mg   See details      27      2 mg         28      2 mg         29      2 mg           30      2 mg                Date Details   09/26 This INR check               How to take your warfarin dose     To take:  2 mg Take 2 of the 1 mg tablets.           October 2018 Details    Sun Mon Tue Wed Thu Fri Sat      1      2 mg         2      2 mg         3      2 mg         4      2 mg         5      2 mg         6      2 mg           7      2 mg         8       2 mg         9      2 mg         10      2 mg         11      2 mg         12      2 mg         13      2 mg           14      2 mg         15      2 mg         16      2 mg         17            18               19               20                 21               22               23               24               25               26               27                 28               29               30               31                   Date Details   No additional details    Date of next INR:  10/17/2018         How to take your warfarin dose     To take:  2 mg Take 2 of the 1 mg tablets.

## 2018-09-26 NOTE — PROGRESS NOTES
ANTICOAGULATION FOLLOW-UP CLINIC VISIT    Patient Name:  Nelsy Cota  Date:  9/26/2018  Contact Type:  Face to Face    SUBJECTIVE:     Patient Findings     Comments Patient has been complaining of intermittent chest pain and SOB within the last week.  She has had more stress recently at work.  Notified her that another embolism is less likely since INR is in range today, but advised her to see an MD if chest pain becomes stronger or persistent or if SOB worsens.            OBJECTIVE    INR Protime   Date Value Ref Range Status   09/26/2018 2.7 (A) 0.86 - 1.14 Final       ASSESSMENT / PLAN  INR assessment THER    Recheck INR In: 3 WEEKS    INR Location Clinic      Anticoagulation Summary as of 9/26/2018     INR goal 2.0-3.0   Today's INR 2.7   Warfarin maintenance plan 2 mg (1 mg x 2) every day   Full warfarin instructions 2 mg every day   Weekly warfarin total 14 mg   No change documented Vania Morse RN   Plan last modified Vania Morse RN (9/12/2018)   Next INR check 10/17/2018   Target end date     Indications   Long-term (current) use of anticoagulants [Z79.01] [Z79.01]  PE (pulmonary thromboembolism) (H) [I26.99]  Recurrent deep venous thrombosis (H) [I82.409]         Anticoagulation Episode Summary     INR check location     Preferred lab     Send INR reminders to [facility]    Comments       Anticoagulation Care Providers     Provider Role Specialty Phone number    Hamzah Thomas MD [facility] Internal Medicine [phone]            See the Encounter Report to view Anticoagulation Flowsheet and Dosing Calendar (Go to Encounters tab in chart review, and find the Anticoagulation Therapy Visit)    Dosage adjustment made based on physician directed care plan.    Vania Morse RN

## 2018-10-17 ENCOUNTER — ANTICOAGULATION THERAPY VISIT (OUTPATIENT)
Dept: ANTICOAGULATION | Facility: CLINIC | Age: 43
End: 2018-10-17

## 2018-10-17 DIAGNOSIS — I82.409 RECURRENT DEEP VENOUS THROMBOSIS (H): ICD-10-CM

## 2018-10-17 DIAGNOSIS — I26.99 PE (PULMONARY THROMBOEMBOLISM) (H): ICD-10-CM

## 2018-10-17 LAB — INR POINT OF CARE: 1.5 (ref 0.86–1.14)

## 2018-10-17 PROCEDURE — 85610 PROTHROMBIN TIME: CPT | Mod: QW

## 2018-10-17 PROCEDURE — 99207 ZZC NO CHARGE NURSE ONLY: CPT

## 2018-10-17 PROCEDURE — 36416 COLLJ CAPILLARY BLOOD SPEC: CPT

## 2018-10-17 NOTE — MR AVS SNAPSHOT
Nelsy Cota   10/17/2018 2:00 PM   Anticoagulation Therapy Visit    Description:  43 year old female   [facility]   Department:  [facility]           INR as of 10/17/2018     Today's INR 1.5!      Anticoagulation Summary as of 10/17/2018     INR goal 2.0-3.0   Today's INR 1.5!   Full warfarin instructions 10/17: 4 mg; Otherwise 2 mg every day   Next INR check 10/31/2018    Indications   Long-term (current) use of anticoagulants [Z79.01] [Z79.01]  PE (pulmonary thromboembolism) (H) [I26.99]  Recurrent deep venous thrombosis (H) [I82.409]         Your next Anticoagulation Clinic appointment(s)     Oct 31, 2018  2:00 PM CDT   [facility] with [facility]   [facility] ([facility])    [address] [facility] [address]  [facility] [id number] [phone]              Contact Numbers     [facility] Phone Numbers:  Anticoagulation Clinic Appointments : [phone]  Anticoagulation Nurse: [phone]         October 2018 Details    Sun Mon Tue Wed Thu Fri Sat      1               2               3               4               5               6                 7               8               9               10               11               12               13                 14               15               16               17      4 mg   See details      18      2 mg         19      2 mg         20      2 mg           21      2 mg         22      2 mg         23      2 mg         24      2 mg         25      2 mg         26      2 mg         27      2 mg           28      2 mg         29      2 mg         30      2 mg         31                Date Details   10/17 This INR check       Date of next INR:  10/31/2018         How to take your warfarin dose     To take:  2 mg Take 2 of the 1 mg tablets.    To take:  4 mg Take 4 of the 1 mg tablets.

## 2018-10-17 NOTE — PROGRESS NOTES
ANTICOAGULATION FOLLOW-UP CLINIC VISIT    Patient Name:  Nelsy Cota  Date:  10/17/2018  Contact Type:  Face to Face    SUBJECTIVE:     Patient Findings     Positives OTC meds (Hip pain.  Patient has been taking Tylenol prn for pain control.), Missed doses (Missed dose last night)           OBJECTIVE    INR Protime   Date Value Ref Range Status   10/17/2018 1.5 (A) 0.86 - 1.14 Final       ASSESSMENT / PLAN  INR assessment SUB    Recheck INR In: 2 WEEKS    INR Location Clinic      Anticoagulation Summary as of 10/17/2018     INR goal 2.0-3.0   Today's INR 1.5!   Warfarin maintenance plan 2 mg (1 mg x 2) every day   Full warfarin instructions 10/17: 4 mg; Otherwise 2 mg every day   Weekly warfarin total 14 mg   Plan last modified Vania Morse RN (10/17/2018)   Next INR check 10/31/2018   Target end date     Indications   Long-term (current) use of anticoagulants [Z79.01] [Z79.01]  PE (pulmonary thromboembolism) (H) [I26.99]  Recurrent deep venous thrombosis (H) [I82.409]         Anticoagulation Episode Summary     INR check location     Preferred lab     Send INR reminders to [facility]    Comments       Anticoagulation Care Providers     Provider Role Specialty Phone number    Hamzah Thomas MD Responsible Internal Medicine [phone]            See the Encounter Report to view Anticoagulation Flowsheet and Dosing Calendar (Go to Encounters tab in chart review, and find the Anticoagulation Therapy Visit)    Dosage adjustment made based on physician directed care plan.    Vania Morse RN

## 2018-10-29 DIAGNOSIS — I82.409 RECURRENT DEEP VENOUS THROMBOSIS (H): ICD-10-CM

## 2018-10-29 DIAGNOSIS — Z79.01 LONG TERM CURRENT USE OF ANTICOAGULANT THERAPY: ICD-10-CM

## 2018-10-29 DIAGNOSIS — I26.99 PE (PULMONARY THROMBOEMBOLISM) (H): ICD-10-CM

## 2018-10-29 RX ORDER — WARFARIN SODIUM 1 MG/1
TABLET ORAL
Qty: 30 TABLET | Refills: 0 | OUTPATIENT
Start: 2018-10-29

## 2018-10-29 NOTE — TELEPHONE ENCOUNTER
"Patient calls requesting refill of warfarin. Informed patient should have refill left. Called [facility] and informed of 9/12/18 prescription. Patient had used old prescription number for refill request.    Denied - new prescription sent 9/12/18.      Requested Prescriptions   Pending Prescriptions Disp Refills     warfarin (COUMADIN) 1 MG tablet [Pharmacy Med Name: WARFARIN SOD 1MG TABLETS (PINK)]  Last Written Prescription Date:  9/12/18  Last Fill Quantity: 60,  # refills: 1   Last office visit: 7/24/2018 with prescribing provider:  Yes   Future Office Visit:     30 tablet 0     Sig: TAKE 1 TABLET(1 MG) BY MOUTH DAILY    Vitamin K Antagonists Failed    10/29/2018  2:24 PM       Failed - INR is within goal in the past 6 weeks    Confirm INR is within goal in the past 6 weeks.     Recent Labs   Lab Test 10/17/18   INR  1.5*          Passed - Recent (12 mo) or future (30 days) visit within the authorizing provider's specialty    Patient had office visit in the last 12 months or has a visit in the next 30 days with authorizing provider or within the authorizing provider's specialty.  See \"Patient Info\" tab in inbasket, or \"Choose Columns\" in Meds & Orders section of the refill encounter.         Passed - Patient is 18 years of age or older       Passed - Patient is not pregnant       Passed - No positive pregnancy on file in past 12 months        "

## 2018-10-31 ENCOUNTER — ANTICOAGULATION THERAPY VISIT (OUTPATIENT)
Dept: ANTICOAGULATION | Facility: CLINIC | Age: 43
End: 2018-10-31

## 2018-10-31 DIAGNOSIS — I82.409 RECURRENT DEEP VENOUS THROMBOSIS (H): ICD-10-CM

## 2018-10-31 DIAGNOSIS — I26.99 PE (PULMONARY THROMBOEMBOLISM) (H): ICD-10-CM

## 2018-10-31 LAB — INR POINT OF CARE: 1.4 (ref 0.86–1.14)

## 2018-10-31 PROCEDURE — 36416 COLLJ CAPILLARY BLOOD SPEC: CPT

## 2018-10-31 PROCEDURE — 85610 PROTHROMBIN TIME: CPT | Mod: QW

## 2018-10-31 PROCEDURE — 99207 ZZC NO CHARGE NURSE ONLY: CPT

## 2018-10-31 NOTE — PROGRESS NOTES
ANTICOAGULATION FOLLOW-UP CLINIC VISIT    Patient Name:  Nelsy Cota  Date:  10/31/2018  Contact Type:  Face to Face    SUBJECTIVE:     Patient Findings     Positives Unexplained INR or factor level change    Comments Patient denies any changes in diet or medications since last INR visit.  Denies any bleeding or bruising problems.  Patient has been interviewing for a new job.           OBJECTIVE    INR Protime   Date Value Ref Range Status   10/31/2018 1.4 (A) 0.86 - 1.14 Final       ASSESSMENT / PLAN  INR assessment SUB    Recheck INR In: 1 WEEK    INR Location Clinic      Anticoagulation Summary as of 10/31/2018     INR goal 2.0-3.0   Today's INR 1.4!   Warfarin maintenance plan 2 mg (1 mg x 2) every day   Full warfarin instructions 10/31: 3 mg; 11/1: 3 mg; 11/4: 3 mg; 11/6: 3 mg; Otherwise 2 mg every day   Weekly warfarin total 14 mg   Plan last modified Vania Morse RN (10/17/2018)   Next INR check 11/7/2018   Target end date     Indications   Long-term (current) use of anticoagulants [Z79.01] [Z79.01]  PE (pulmonary thromboembolism) (H) [I26.99]  Recurrent deep venous thrombosis (H) [I82.409]         Anticoagulation Episode Summary     INR check location     Preferred lab     Send INR reminders to [facility]    Comments       Anticoagulation Care Providers     Provider Role Specialty Phone number    Hamzah Thomas MD [facility] Internal Medicine [phone]            See the Encounter Report to view Anticoagulation Flowsheet and Dosing Calendar (Go to Encounters tab in chart review, and find the Anticoagulation Therapy Visit)    Dosage adjustment made based on physician directed care plan.    Vania Morse RN

## 2018-10-31 NOTE — MR AVS SNAPSHOT
Nelsy Cota   10/31/2018 2:00 PM   Anticoagulation Therapy Visit    Description:  43 year old female   [facility]   Department:  [facility]           INR as of 10/31/2018     Today's INR 1.4!      Anticoagulation Summary as of 10/31/2018     INR goal 2.0-3.0   Today's INR 1.4!   Full warfarin instructions 10/31: 3 mg; 11/1: 3 mg; 11/4: 3 mg; 11/6: 3 mg; Otherwise 2 mg every day   Next INR check 11/7/2018    Indications   Long-term (current) use of anticoagulants [Z79.01] [Z79.01]  PE (pulmonary thromboembolism) (H) [I26.99]  Recurrent deep venous thrombosis (H) [I82.409]         Your next Anticoagulation Clinic appointment(s)     Nov 07, 2018  2:00 PM CST   [facility] with [facility]   [facility]    [address] [facility] [address]  [facility] [address] [phone]              Contact Numbers     [facility] Phone Numbers:  Anticoagulation Clinic Appointments : [phone]  Anticoagulation Nurse: [phone]         October 2018 Details    Sun Mon Tue Wed Thu Fri Sat      1               2               3               4               5               6                 7               8               9               10               11               12               13                 14               15               16               17               18               19               20                 21               22               23               24               25               26               27                 28               29               30               31      3 mg   See details          Date Details   10/31 This INR check               How to take your warfarin dose     To take:  3 mg Take 3 of the 1 mg tablets.           November 2018 Details    Sun Mon Tue Wed Thu Fri Sat         1      3 mg         2      2 mg         3      2 mg           4      3 mg         5      2  mg         6      3 mg         7            8               9               10                 11               12               13               14               15               16               17                 18               19               20               21               22               23               24                 25               26               27               28               29               30                 Date Details   No additional details    Date of next INR:  11/7/2018         How to take your warfarin dose     To take:  2 mg Take 2 of the 1 mg tablets.    To take:  3 mg Take 3 of the 1 mg tablets.

## 2018-11-07 ENCOUNTER — ANTICOAGULATION THERAPY VISIT (OUTPATIENT)
Dept: ANTICOAGULATION | Facility: CLINIC | Age: 43
End: 2018-11-07

## 2018-11-07 DIAGNOSIS — I82.409 RECURRENT DEEP VENOUS THROMBOSIS (H): ICD-10-CM

## 2018-11-07 DIAGNOSIS — I26.99 PE (PULMONARY THROMBOEMBOLISM) (H): ICD-10-CM

## 2018-11-07 LAB — INR POINT OF CARE: 1.7 (ref 0.86–1.14)

## 2018-11-07 PROCEDURE — 36416 COLLJ CAPILLARY BLOOD SPEC: CPT

## 2018-11-07 PROCEDURE — 85610 PROTHROMBIN TIME: CPT | Mod: QW

## 2018-11-07 PROCEDURE — 99207 ZZC NO CHARGE NURSE ONLY: CPT

## 2018-11-07 NOTE — MR AVS SNAPSHOT
Nelsy Cota   11/7/2018 2:00 PM   Anticoagulation Therapy Visit    Description:  43 year old female   Provider[facility]  [facility]   Department:  [facility]           INR as of 11/7/2018     Today's INR 1.7!      Anticoagulation Summary as of 11/7/2018     INR goal 2.0-3.0   Today's INR 1.7!   Full warfarin instructions 11/7: 3 mg; 11/8: 3 mg; 11/9: 3 mg; 11/11: 3 mg; 11/12: 3 mg; 11/13: 3 mg; Otherwise 2 mg every day   Next INR check 11/14/2018    Indications   Long-term (current) use of anticoagulants [Z79.01] [Z79.01]  PE (pulmonary thromboembolism) (H) [I26.99]  Recurrent deep venous thrombosis (H) [I82.409]         Your next Anticoagulation Clinic appointment(s)     Nov 14, 2018  2:00 PM CST   [facility]agulation Visit with [facility]   [facility]    [address]  [facility] [address] [phone]              Contact Numbers     [facility] Phone Numbers:  Anticoagulation Clinic Appointments : [phone]  Anticoagulation Nurse: [phone]         November 2018 Details    Sun Mon Tue Wed Thu Fri Sat         1               2               3                 4               5               6               7      3 mg   See details      8      3 mg         9      3 mg         10      2 mg           11      3 mg         12      3 mg         13      3 mg         14            15               16               17                 18               19               20               21               22               23               24                 25               26               27               28               29               30                 Date Details   11/07 This INR check       Date of next INR:  11/14/2018         How to take your warfarin dose     To take:  2 mg Take 2 of the 1 mg tablets.    To take:  3 mg Take 3 of the 1 mg tablets.

## 2018-11-07 NOTE — PROGRESS NOTES
ANTICOAGULATION FOLLOW-UP CLINIC VISIT    Patient Name:  Nelsy Cota  Date:  11/7/2018  Contact Type:  Face to Face    SUBJECTIVE:     Patient Findings     Positives Unexplained INR or factor level change    Comments Patient denies any changes in diet or medications since last INR visit.  Denies any bleeding or bruising problems.  Patient will be starting a new job soon.             OBJECTIVE    INR Protime   Date Value Ref Range Status   11/07/2018 1.7 (A) 0.86 - 1.14 Final       ASSESSMENT / PLAN  INR assessment SUB    Recheck INR In: 1 WEEK    INR Location Clinic      Anticoagulation Summary as of 11/7/2018     INR goal 2.0-3.0   Today's INR 1.7!   Warfarin maintenance plan 2 mg (1 mg x 2) every day   Full warfarin instructions 11/7: 3 mg; 11/8: 3 mg; 11/9: 3 mg; 11/11: 3 mg; 11/12: 3 mg; 11/13: 3 mg; Otherwise 2 mg every day   Weekly warfarin total 14 mg   Plan last modified Vania Morse RN (10/17/2018)   Next INR check 11/14/2018   Target end date     Indications   Long-term (current) use of anticoagulants [Z79.01] [Z79.01]  PE (pulmonary thromboembolism) (H) [I26.99]  Recurrent deep venous thrombosis (H) [I82.409]         Anticoagulation Episode Summary     INR check location     Preferred lab     Send INR reminders to [facility]    Comments       Anticoagulation Care Providers     Provider Role Specialty Phone number    Hamzah Thomas MD Responsible Internal Medicine [phone]            See the Encounter Report to view Anticoagulation Flowsheet and Dosing Calendar (Go to Encounters tab in chart review, and find the Anticoagulation Therapy Visit)    Dosage adjustment made based on physician directed care plan.    Vania Morse RN

## 2018-11-14 ENCOUNTER — ANTICOAGULATION THERAPY VISIT (OUTPATIENT)
Dept: ANTICOAGULATION | Facility: CLINIC | Age: 43
End: 2018-11-14

## 2018-11-14 DIAGNOSIS — I82.409 RECURRENT DEEP VENOUS THROMBOSIS (H): ICD-10-CM

## 2018-11-14 DIAGNOSIS — I26.99 PE (PULMONARY THROMBOEMBOLISM) (H): ICD-10-CM

## 2018-11-14 LAB — INR POINT OF CARE: 3 (ref 0.86–1.14)

## 2018-11-14 PROCEDURE — 99207 ZZC NO CHARGE NURSE ONLY: CPT

## 2018-11-14 PROCEDURE — 85610 PROTHROMBIN TIME: CPT | Mod: QW

## 2018-11-14 PROCEDURE — 36416 COLLJ CAPILLARY BLOOD SPEC: CPT

## 2018-11-14 NOTE — PROGRESS NOTES
ANTICOAGULATION FOLLOW-UP CLINIC VISIT    Patient Name:  Nelsy Cota  Date:  11/14/2018  Contact Type:  Face to Face    SUBJECTIVE:     Patient Findings     Positives No Problem Findings           OBJECTIVE    INR Protime   Date Value Ref Range Status   11/14/2018 3.0 (A) 0.86 - 1.14 Final       ASSESSMENT / PLAN  INR assessment THER    Recheck INR In: 10 DAYS    INR Location Clinic      Anticoagulation Summary as of 11/14/2018     INR goal 2.0-3.0   Today's INR 3.0   Warfarin maintenance plan 2 mg (1 mg x 2) on Sat; 3 mg (1 mg x 3) all other days   Full warfarin instructions 2 mg on Sat; 3 mg all other days   Weekly warfarin total 20 mg   Plan last modified Vania Morse RN (11/14/2018)   Next INR check 11/23/2018   Target end date     Indications   Long-term (current) use of anticoagulants [Z79.01] [Z79.01]  PE (pulmonary thromboembolism) (H) [I26.99]  Recurrent deep venous thrombosis (H) [I82.409]         Anticoagulation Episode Summary     INR check location     Preferred lab     Send INR reminders to [facility]    Comments       Anticoagulation Care Providers     Provider Role Specialty Phone number    Hamzah Thomas MD Responsible Internal Medicine [phone]            See the Encounter Report to view Anticoagulation Flowsheet and Dosing Calendar (Go to Encounters tab in chart review, and find the Anticoagulation Therapy Visit)    Dosage adjustment made based on physician directed care plan.    Vania Morse RN

## 2018-11-14 NOTE — MR AVS SNAPSHOT
Nelsy Cota   11/14/2018 2:00 PM   Anticoagulation Therapy Visit    Description:  43 year old female   Provider[facility]  [facility]   Department:  [facility]           INR as of 11/14/2018     Today's INR 3.0      Anticoagulation Summary as of 11/14/2018     INR goal 2.0-3.0   Today's INR 3.0   Full warfarin instructions 2 mg on Sat; 3 mg all other days   Next INR check 11/23/2018    Indications   Long-term (current) use of anticoagulants [Z79.01] [Z79.01]  PE (pulmonary thromboembolism) (H) [I26.99]  Recurrent deep venous thrombosis (H) [I82.409]         Your next Anticoagulation Clinic appointment(s)     Nov 23, 2018  4:00 PM CST   [facility] Visit with [facility]   [facility]    [address] [facility] [address]  [facility] [id number] [phone]              Contact Numbers     [facility] Phone Numbers:  Anticoagulation Clinic Appointments : [phone]  Anticoagulation Nurse: [phone]         November 2018 Details    Sun Mon Tue Wed Thu Fri Sat         1               2               3                 4               5               6               7               8               9               10                 11               12               13               14      3 mg   See details      15      3 mg         16      3 mg         17      2 mg           18      3 mg         19      3 mg         20      3 mg         21      3 mg         22      3 mg         23            24                 25               26               27               28               29               30                 Date Details   11/14 This INR check       Date of next INR:  11/23/2018         How to take your warfarin dose     To take:  2 mg Take 2 of the 1 mg tablets.    To take:  3 mg Take 3 of the 1 mg tablets.

## 2018-11-30 ENCOUNTER — HOSPITAL ENCOUNTER (EMERGENCY)
Facility: CLINIC | Age: 43
Discharge: LEFT BEFORE DISCHARGE | End: 2018-11-30
Admitting: EMERGENCY MEDICINE

## 2018-11-30 ENCOUNTER — APPOINTMENT (OUTPATIENT)
Dept: ULTRASOUND IMAGING | Facility: CLINIC | Age: 43
End: 2018-11-30
Attending: EMERGENCY MEDICINE

## 2018-11-30 VITALS
BODY MASS INDEX: 28.32 KG/M2 | RESPIRATION RATE: 18 BRPM | SYSTOLIC BLOOD PRESSURE: 128 MMHG | WEIGHT: 165 LBS | OXYGEN SATURATION: 99 % | DIASTOLIC BLOOD PRESSURE: 85 MMHG | HEART RATE: 84 BPM | TEMPERATURE: 98 F

## 2018-11-30 PROCEDURE — 93971 EXTREMITY STUDY: CPT | Mod: RT

## 2018-11-30 PROCEDURE — 40000268 ZZH STATISTIC NO CHARGES

## 2018-12-11 DIAGNOSIS — Z79.01 LONG TERM CURRENT USE OF ANTICOAGULANT THERAPY: ICD-10-CM

## 2018-12-11 DIAGNOSIS — I26.99 PE (PULMONARY THROMBOEMBOLISM) (H): ICD-10-CM

## 2018-12-11 DIAGNOSIS — I82.409 RECURRENT DEEP VENOUS THROMBOSIS (H): ICD-10-CM

## 2018-12-12 RX ORDER — WARFARIN SODIUM 1 MG/1
TABLET ORAL
Qty: 80 TABLET | Refills: 3 | Status: SHIPPED | OUTPATIENT
Start: 2018-12-12 | End: 2019-07-10 | Stop reason: DRUGHIGH

## 2018-12-12 NOTE — TELEPHONE ENCOUNTER
"Warfarin 1 mg      Last Written Prescription Date:  9/12/18  Last Fill Quantity: 60,   # refills: 1  Last Office Visit: 7/24/18  Future Office visit:         Requested Prescriptions   Pending Prescriptions Disp Refills     warfarin (COUMADIN) 1 MG tablet [Pharmacy Med Name: WARFARIN SOD 1MG TABLETS (PINK)] 60 tablet 0     Sig: TAKE 2 TABLETS BY MOUTH DAILY    Vitamin K Antagonists Failed - 12/11/2018 12:35 PM       Failed - INR is within goal in the past 6 weeks    Confirm INR is within goal in the past 6 weeks.     Recent Labs   Lab Test 11/14/18   INR 3.0*                      Passed - Recent (12 mo) or future (30 days) visit within the authorizing provider's specialty    Patient had office visit in the last 12 months or has a visit in the next 30 days with authorizing provider or within the authorizing provider's specialty.  See \"Patient Info\" tab in inbasket, or \"Choose Columns\" in Meds & Orders section of the refill encounter.             Passed - Patient is 18 years of age or older       Passed - Patient is not pregnant       Passed - No positive pregnancy on file in past 12 months        Prescription approved per [facility] Refill Protocol.  Latosha Perdomo RN    "

## 2019-03-13 ENCOUNTER — TELEPHONE (OUTPATIENT)
Dept: INTERNAL MEDICINE | Facility: CLINIC | Age: 44
End: 2019-03-13

## 2019-03-13 NOTE — TELEPHONE ENCOUNTER
Last INR check was November 2018.  Reminder letter has been mailed with no response.  Left message for patient to call INR clinic to schedule visit or notify us if she is having her medication managed at a different facility.  Vania Morse RN

## 2019-04-03 ENCOUNTER — TELEPHONE (OUTPATIENT)
Dept: ANTICOAGULATION | Facility: CLINIC | Age: 44
End: 2019-04-03

## 2019-04-03 NOTE — LETTER
April 3, 2019      Nelsy Cota  [address] [facility] [place] [address]      Dear Nelsy,    We are concerned about your Warfarin Therapy.  You have failed to have the necessary laboratory work required for monitoring your warfarin therapy and you have not responded to the Missed Appointment Letter we recently sent you.    We value you as a patient and seek to provide you with excellent quality of care.  We need to be notified if you have changed physicians or if you are being monitored elsewhere so we can update our files.    If we do not hear from you within 14 days of the date of this letter, we will assume that you no longer are requesting our services and will inactivate you from our warfarin monitoring service.  For any further laboratory work and warfarin dose adjustments you will need to contact your care provider for anticoagulation management.    We want to remind you that there are potential serious risks involved in stopping your warfarin or taking warfarin without careful monitoring.    Please contact our office at [phone] with any information or questions you may have.    Sincerely,        Kasandra Solano MD

## 2019-04-03 NOTE — TELEPHONE ENCOUNTER
I have not seen her since she had PE: just saw Dr. Thomas in July. She will be out of medication in a few weeks so can notify her then will not continue to refill without appt. Please advise her on phone that she must be seen before any refills.

## 2019-04-03 NOTE — TELEPHONE ENCOUNTER
We have sent multiple letters to your patient regarding the importance for monitoring.  Third letter mailed today and patient was also left phone messages with no response.    As the referring MD please advise if attempts to contact patient should be continued or discharge from anticoagulation services.  If Nelsy Cota is discharged from the [facility] clinic further monitoring and dosing will need to be done by PCP or we will need a new referral.    Please advise.  Vania Morse RN

## 2019-04-24 NOTE — TELEPHONE ENCOUNTER
No refill request has been received.  Left message again today for patient to call INR Clinic.  Advised her that if we do not hear back from her we will assume that she is no longer taking the medication.      Will keep encounter open at this time in case patient calls back.    Message sent to Dr. Solano and Dr. Thomas as an FYI.  Vania Morse RN

## 2019-05-01 ENCOUNTER — HOSPITAL ENCOUNTER (EMERGENCY)
Facility: CLINIC | Age: 44
Discharge: HOME | End: 2019-05-01
Attending: EMERGENCY MEDICINE | Admitting: EMERGENCY MEDICINE
Payer: COMMERCIAL

## 2019-05-01 ENCOUNTER — APPOINTMENT (OUTPATIENT)
Dept: ULTRASOUND IMAGING | Facility: CLINIC | Age: 44
End: 2019-05-01
Attending: EMERGENCY MEDICINE
Payer: COMMERCIAL

## 2019-05-01 VITALS
SYSTOLIC BLOOD PRESSURE: 120 MMHG | OXYGEN SATURATION: 100 % | RESPIRATION RATE: 16 BRPM | WEIGHT: 164 LBS | TEMPERATURE: 98.3 F | DIASTOLIC BLOOD PRESSURE: 82 MMHG | BODY MASS INDEX: 28.15 KG/M2

## 2019-05-01 DIAGNOSIS — I82.412 ACUTE DEEP VEIN THROMBOSIS (DVT) OF FEMORAL VEIN OF LEFT LOWER EXTREMITY (H): ICD-10-CM

## 2019-05-01 DIAGNOSIS — M79.662 PAIN OF LEFT LOWER LEG: ICD-10-CM

## 2019-05-01 LAB
ANION GAP SERPL CALCULATED.3IONS-SCNC: 4 MMOL/L (ref 3–14)
BASOPHILS # BLD AUTO: 0.1 10E9/L (ref 0–0.2)
BASOPHILS NFR BLD AUTO: 0.4 %
BUN SERPL-MCNC: 10 MG/DL (ref 7–30)
CALCIUM SERPL-MCNC: 8.7 MG/DL (ref 8.5–10.1)
CHLORIDE SERPL-SCNC: 101 MMOL/L (ref 94–109)
CO2 SERPL-SCNC: 30 MMOL/L (ref 20–32)
CREAT SERPL-MCNC: 0.72 MG/DL (ref 0.52–1.04)
DIFFERENTIAL METHOD BLD: ABNORMAL
EOSINOPHIL # BLD AUTO: 0.2 10E9/L (ref 0–0.7)
EOSINOPHIL NFR BLD AUTO: 1.5 %
ERYTHROCYTE [DISTWIDTH] IN BLOOD BY AUTOMATED COUNT: 14.1 % (ref 10–15)
GFR SERPL CREATININE-BSD FRML MDRD: >90 ML/MIN/{1.73_M2}
GLUCOSE SERPL-MCNC: 113 MG/DL (ref 70–99)
HCT VFR BLD AUTO: 35.9 % (ref 35–47)
HGB BLD-MCNC: 12.7 G/DL (ref 11.7–15.7)
IMM GRANULOCYTES # BLD: 0 10E9/L (ref 0–0.4)
IMM GRANULOCYTES NFR BLD: 0.3 %
INR PPP: 1.07 (ref 0.86–1.14)
LYMPHOCYTES # BLD AUTO: 2.7 10E9/L (ref 0.8–5.3)
LYMPHOCYTES NFR BLD AUTO: 19.4 %
MCH RBC QN AUTO: 45.4 PG (ref 26.5–33)
MCHC RBC AUTO-ENTMCNC: 35.4 G/DL (ref 31.5–36.5)
MCV RBC AUTO: 128 FL (ref 78–100)
MONOCYTES # BLD AUTO: 1 10E9/L (ref 0–1.3)
MONOCYTES NFR BLD AUTO: 7.5 %
NEUTROPHILS # BLD AUTO: 9.9 10E9/L (ref 1.6–8.3)
NEUTROPHILS NFR BLD AUTO: 70.9 %
NRBC # BLD AUTO: 0 10*3/UL
NRBC BLD AUTO-RTO: 0 /100
PLATELET # BLD AUTO: 374 10E9/L (ref 150–450)
POTASSIUM SERPL-SCNC: 5.2 MMOL/L (ref 3.4–5.3)
RBC # BLD AUTO: 2.8 10E12/L (ref 3.8–5.2)
SODIUM SERPL-SCNC: 135 MMOL/L (ref 133–144)
WBC # BLD AUTO: 14 10E9/L (ref 4–11)

## 2019-05-01 PROCEDURE — 85610 PROTHROMBIN TIME: CPT | Performed by: EMERGENCY MEDICINE

## 2019-05-01 PROCEDURE — 25000128 H RX IP 250 OP 636: Performed by: EMERGENCY MEDICINE

## 2019-05-01 PROCEDURE — 99285 EMERGENCY DEPT VISIT HI MDM: CPT | Mod: 25

## 2019-05-01 PROCEDURE — 96372 THER/PROPH/DIAG INJ SC/IM: CPT

## 2019-05-01 PROCEDURE — 25000132 ZZH RX MED GY IP 250 OP 250 PS 637: Performed by: EMERGENCY MEDICINE

## 2019-05-01 PROCEDURE — 36415 COLL VENOUS BLD VENIPUNCTURE: CPT | Performed by: EMERGENCY MEDICINE

## 2019-05-01 PROCEDURE — 85025 COMPLETE CBC W/AUTO DIFF WBC: CPT | Performed by: EMERGENCY MEDICINE

## 2019-05-01 PROCEDURE — 93971 EXTREMITY STUDY: CPT | Mod: LT

## 2019-05-01 PROCEDURE — 80048 BASIC METABOLIC PNL TOTAL CA: CPT | Performed by: EMERGENCY MEDICINE

## 2019-05-01 RX ORDER — ACETAMINOPHEN 500 MG
500 TABLET ORAL EVERY 4 HOURS PRN
Status: DISCONTINUED | OUTPATIENT
Start: 2019-05-01 | End: 2019-05-01 | Stop reason: HOSPADM

## 2019-05-01 RX ORDER — HYDROCODONE BITARTRATE AND ACETAMINOPHEN 5; 325 MG/1; MG/1
1 TABLET ORAL EVERY 6 HOURS PRN
Qty: 10 TABLET | Refills: 0 | Status: SHIPPED | OUTPATIENT
Start: 2019-05-01 | End: 2019-10-07

## 2019-05-01 RX ORDER — WARFARIN SODIUM 5 MG/1
5 TABLET ORAL
Status: COMPLETED | OUTPATIENT
Start: 2019-05-01 | End: 2019-05-01

## 2019-05-01 RX ADMIN — WARFARIN SODIUM 5 MG: 5 TABLET ORAL at 21:33

## 2019-05-01 RX ADMIN — ACETAMINOPHEN 500 MG: 500 TABLET, FILM COATED ORAL at 18:22

## 2019-05-01 RX ADMIN — ENOXAPARIN SODIUM 75 MG: 80 INJECTION SUBCUTANEOUS at 21:33

## 2019-05-01 ASSESSMENT — ENCOUNTER SYMPTOMS
CHILLS: 1
FEVER: 0
BACK PAIN: 0

## 2019-05-01 NOTE — LETTER
May 2, 2019      To Whom It May Concern:      Nelsy Cota was seen in our Emergency Department today, 05/01/19.  I expect her condition to improve over the next 2-3 days.  She may return to work/school when improved.    Sincerely,        Joanne Monroy RN

## 2019-05-01 NOTE — ED AVS SNAPSHOT
[facility] Emergency Department  [address]  [facility] [address]  Phone:  [phone]  Fax:  [phone]                                    Nelsy Cota   MRN: [id number]    Department:  [facility] Emergency Department   Date of Visit:  5/1/2019           After Visit Summary Signature Page    I have received my discharge instructions, and my questions have been answered. I have discussed any challenges I see with this plan with the nurse or doctor.    ..........................................................................................................................................  Patient/Patient Representative Signature      ..........................................................................................................................................  Patient Representative Print Name and Relationship to Patient    ..................................................               ................................................  Date                                   Time    ..........................................................................................................................................  Reviewed by Signature/Title    ...................................................              ..............................................  Date                                               Time          22EPIC Rev 08/18

## 2019-05-01 NOTE — ED TRIAGE NOTES
Pt has had known cyst in bilateral posterior knees for over a year.  This past week pt has had progressive increase of pain and swelling in the left leg

## 2019-05-01 NOTE — ED PROVIDER NOTES
History     Chief Complaint:  Left Leg Pain    HPI   Nelsy Cota is a 44 year old female with a history of PE and recurrent DVT who presents with left leg pain. The patient reports that she has had known cysts to her bilateral posterior knees for over a year. In the past 3-4 days the patient states she has developed increased pain behind her left knee that has become progressively worse with time and now radiates down into her left calf and foot and up into her left buttocks. The patient notes that she has had some chills as well, but otherwise denies any fevers, back pain, or any recent falls or traumas to her left leg. Of note, the patient has a history of PE and recurrent DVT and is supposed to be on Coumadin, however she has not been taking this for the past 3-4 months due to issues with insurance.     Allergies:  No known drug allergies.     Medications:    Folvite  Phenytoin 300 mg CAPS  Coumadin    Past Medical History:    Epilepsy  Papanicolaou smear of cervix with atypical squamous cells cannot exclude high grade squamous intraepithelial lesion (ASC-H)  PE  Recurrent DVT    Past Surgical History:    Right ankle fracture repair   Surgery for right ankle ligament injury and tibia injury  Surgery for fracture of calcaneus  Cryotherapy    Family History:    Hypertension  Epilepsy  CAD  AAA  Connective Tissue Disorder (SLE)    Social History:  Smoking Status: Current Smoker  Alcohol Use: Yes  Patient presents alone.  Marital Status:  Single     Review of Systems   Constitutional: Positive for chills. Negative for fever.   Cardiovascular: Positive for leg swelling.   Musculoskeletal: Negative for back pain.        + Leg Pain     All other systems reviewed and are negative.    Physical Exam   First Vitals:  BP: 120/82  Heart Rate: 99  Temp: 98.3  F (36.8  C)  Resp: 16  Weight: 74.4 kg (164 lb)  SpO2: 100 %    Physical Exam  General:  No respiratory distress.    Cardiovascular: Good cap  refill.    Respiratory: Breathing non labored.     Musculoskeletal: Tenderness and swelling over the dorsum of the left calf. No bony deformity.    Skin: No rashes or petechiae.    Neurologic: Non focal.    Psychiatric: Appropriate.    Lymph: There is swelling.     Emergency Department Course     Imaging:  Radiographic findings were communicated with the patient who voiced understanding of the findings.    US Lower Extremity Venous Duplex Left  Deep venous thrombosis in the distal left femoral vein,  left popliteal vein and proximal posterior tibial veins.  As read by radiology.    Laboratory:    INR: 1.07    BMP: Glucose 113 (H), o/w AWNL (Creatinine 0.72)    CBC: WBC 14.0 (H), RBC 2.80 (L),  (H), MCH 45.4 (H), Absolute Neutrophil 9.9 (H), o/w AWNL (HGB 12.7, )    Interventions:    1822 :Tylenol 500 mg PO  2133: Coumadin 5 mg PO  2133: Lovenox 75 mg Subcutaneous    Emergency Department Course:  Past medical records, nursing notes, and vitals reviewed.  1807: I performed an exam of the patient and obtained history, as documented above.    IV inserted and blood drawn.    The patient was sent for an ultrasound while in the emergency department, findings above.    2024: I rechecked the patient. Findings and plan explained to the Patient. Patient discharged home with instructions regarding supportive care, medications, and reasons to return. The importance of close follow-up was reviewed.      Impression & Plan      Medical Decision Making:  Nelsy Cota is a 44 year old female with a history of a cyst in her left leg, but reports complaining of pain and swelling. I did not think that this was CHF in that she lacked chest symptoms. I was suspicious of DVT due to her history of DVT and because she was off her anticoagulation. She in fact does have a DVT. The patient does not show any signs of vascular compromise. She was started on pain medication. I discussed means of anticoagulation and she elected to  go with Lovenox and Coumadin. First dose was given here and she was discharged home in good condition with close follow up.     Diagnosis:    ICD-10-CM    1. Acute deep vein thrombosis (DVT) of femoral vein of left lower extremity (H) I82.412    2. Pain of left lower leg M79.662        Disposition:  Discharged to home.    Discharge Medications:     Medication List      Started    enoxaparin 80 MG/0.8ML syringe  Commonly known as:  LOVENOX  75 mg, Subcutaneous, 2 TIMES DAILY     HYDROcodone-acetaminophen 5-325 MG tablet  Commonly known as:  NORCO  1 tablet, Oral, EVERY 6 HOURS PRN              Annmarie Bustamante  5/1/2019   [facility] EMERGENCY DEPARTMENT  I, Annmarie Bustamante, am serving as a scribe at 6:07 PM on 5/1/2019 to document services personally performed by Tarik Fairchild MD based on my observations and the provider's statements to me.        aTrik Fairchild MD  05/01/19 1476

## 2019-05-02 NOTE — TELEPHONE ENCOUNTER
Patient was seen in ED yesterday and diagnosed with a DVT.  Per review of ED notes, patient was off her warfarin for 3-4 months due to insurance problems.  No INR appointment scheduled at this time.  Vania Morse RN

## 2019-05-08 ENCOUNTER — ANTICOAGULATION THERAPY VISIT (OUTPATIENT)
Dept: ANTICOAGULATION | Facility: CLINIC | Age: 44
End: 2019-05-08
Payer: COMMERCIAL

## 2019-05-08 DIAGNOSIS — I26.99 PE (PULMONARY THROMBOEMBOLISM) (H): ICD-10-CM

## 2019-05-08 DIAGNOSIS — I82.409 RECURRENT DEEP VENOUS THROMBOSIS (H): ICD-10-CM

## 2019-05-08 LAB — INR POINT OF CARE: 1.1 (ref 0.86–1.14)

## 2019-05-08 PROCEDURE — 85610 PROTHROMBIN TIME: CPT | Mod: QW

## 2019-05-08 PROCEDURE — 36416 COLLJ CAPILLARY BLOOD SPEC: CPT

## 2019-05-08 PROCEDURE — 99207 ZZC NO CHARGE NURSE ONLY: CPT

## 2019-05-08 RX ORDER — WARFARIN SODIUM 2 MG/1
TABLET ORAL
Qty: 60 TABLET | Refills: 1 | Status: SHIPPED | OUTPATIENT
Start: 2019-05-08 | End: 2019-07-10

## 2019-05-08 NOTE — PROGRESS NOTES
ANTICOAGULATION FOLLOW-UP CLINIC VISIT    Patient Name:  Nelsy Cota  Date:  2019  Contact Type:  Face to Face    SUBJECTIVE:     Patient Findings     Comments:   Patient was off of warfarin for 3-4 months when she started having leg pain, was seen in the ED and dx with a DVT on 19.  Patient was only started on lovenox with the ER visit and not started on warfarin.  Prescription for warfarin sent to the pharmacy today.  Patient only has 3 days of lovenox remaining.  Previous therapeutic dose was 3 mg daily except 2 mg on Saturday.  Will double dose to try to get INR to come up quickly since patient has very few lovenox injections left.           OBJECTIVE    INR Protime   Date Value Ref Range Status   2019 1.1 0.86 - 1.14 Final       ASSESSMENT / PLAN  INR assessment SUB    Recheck INR In: 2 DAYS    INR Location Clinic      Anticoagulation Summary  As of 2019    INR goal:   2.0-3.0   TTR:   45.0 % (9.2 mo)   INR used for dosin.1! (2019)   Warfarin maintenance plan:   No maintenance plan   Full warfarin instructions:   : 6 mg; : 6 mg   Plan last modified:   Vania Morse RN (2019)   Next INR check:   5/10/2019   Target end date:       Indications    Long-term (current) use of anticoagulants [Z79.01] [Z79.01]  PE (pulmonary thromboembolism) (H) [I26.99]  Recurrent deep venous thrombosis (H) [I82.409]             Anticoagulation Episode Summary     INR check location:       Preferred lab:       Send INR reminders to:   [facility]    Comments:         Anticoagulation Care Providers     Provider Role Specialty Phone number    Hamzah Thomas MD [facility] Internal Medicine [phone]            See the Encounter Report to view Anticoagulation Flowsheet and Dosing Calendar (Go to Encounters tab in chart review, and find the Anticoagulation Therapy Visit)    Dosage adjustment made based on physician directed care plan.    Vania Morse RN

## 2019-05-10 ENCOUNTER — ANTICOAGULATION THERAPY VISIT (OUTPATIENT)
Dept: ANTICOAGULATION | Facility: CLINIC | Age: 44
End: 2019-05-10
Payer: COMMERCIAL

## 2019-05-10 DIAGNOSIS — I82.409 RECURRENT DEEP VENOUS THROMBOSIS (H): ICD-10-CM

## 2019-05-10 DIAGNOSIS — I26.99 PE (PULMONARY THROMBOEMBOLISM) (H): ICD-10-CM

## 2019-05-10 LAB — INR POINT OF CARE: 1.2 (ref 0.86–1.14)

## 2019-05-10 PROCEDURE — 85610 PROTHROMBIN TIME: CPT | Mod: QW

## 2019-05-10 PROCEDURE — 99207 ZZC NO CHARGE NURSE ONLY: CPT

## 2019-05-10 PROCEDURE — 36416 COLLJ CAPILLARY BLOOD SPEC: CPT

## 2019-05-10 NOTE — PROGRESS NOTES
ANTICOAGULATION FOLLOW-UP CLINIC VISIT    Patient Name:  Nelsy Cota  Date:  5/10/2019  Contact Type:  Face to Face    SUBJECTIVE:  Patient Findings     Comments:   Patient will continue with lovenox bridging, new rx sent today.  The patient was assessed for diet, medication, and activity level changes, missed or extra doses, bruising or bleeding, with no problem findings.  Patient will be getting  in two weeks.          Clinical Outcomes     Negatives:   Major bleeding event, Thromboembolic event, Anticoagulation-related hospital admission, Anticoagulation-related ED visit, Anticoagulation-related fatality    Comments:   Patient will continue with lovenox bridging, new rx sent today.  The patient was assessed for diet, medication, and activity level changes, missed or extra doses, bruising or bleeding, with no problem findings.  Patient will be getting  in two weeks.             OBJECTIVE    INR Protime   Date Value Ref Range Status   05/10/2019 1.2 (A) 0.86 - 1.14 Final       ASSESSMENT / PLAN  INR assessment SUB    Recheck INR In: 4 DAYS    INR Location Clinic      Anticoagulation Summary  As of 5/10/2019    INR goal:   2.0-3.0   TTR:   44.6 % (9.3 mo)   INR used for dosin.2! (5/10/2019)   Warfarin maintenance plan:   No maintenance plan   Full warfarin instructions:   5/10: 6 mg; : 6 mg; : 6 mg; : 6 mg   Plan last modified:   Vania Morse RN (2019)   Next INR check:   2019   Target end date:       Indications    Long-term (current) use of anticoagulants [Z79.01] [Z79.01]  PE (pulmonary thromboembolism) (H) [I26.99]  Recurrent deep venous thrombosis (H) [I82.409]             Anticoagulation Episode Summary     INR check location:       Preferred lab:       Send INR reminders to:   [facility]    Comments:         Anticoagulation Care Providers     Provider Role Specialty Phone number    Hamzah Thomas MD [facility] Internal Medicine [phone]             See the Encounter Report to view Anticoagulation Flowsheet and Dosing Calendar (Go to Encounters tab in chart review, and find the Anticoagulation Therapy Visit)    Dosage adjustment made based on physician directed care plan.    Vania Morse RN

## 2019-05-14 ENCOUNTER — ANTICOAGULATION THERAPY VISIT (OUTPATIENT)
Dept: ANTICOAGULATION | Facility: CLINIC | Age: 44
End: 2019-05-14
Payer: COMMERCIAL

## 2019-05-14 DIAGNOSIS — I82.409 RECURRENT DEEP VENOUS THROMBOSIS (H): ICD-10-CM

## 2019-05-14 DIAGNOSIS — I26.99 PE (PULMONARY THROMBOEMBOLISM) (H): ICD-10-CM

## 2019-05-14 LAB — INR POINT OF CARE: 2.8 (ref 0.86–1.14)

## 2019-05-14 PROCEDURE — 99207 ZZC NO CHARGE NURSE ONLY: CPT

## 2019-05-14 PROCEDURE — 36416 COLLJ CAPILLARY BLOOD SPEC: CPT

## 2019-05-14 PROCEDURE — 85610 PROTHROMBIN TIME: CPT | Mod: QW

## 2019-05-14 NOTE — PROGRESS NOTES
ANTICOAGULATION FOLLOW-UP CLINIC VISIT    Patient Name:  Nelsy Cota  Date:  2019  Contact Type:  Face to Face    SUBJECTIVE:  Patient Findings     Comments:   The patient was assessed for diet, medication, and activity level changes, missed or extra doses, bruising or bleeding, with no problem findings. Patient reports last Lovenox injection was last evening.           Clinical Outcomes     Negatives:   Major bleeding event, Thromboembolic event, Anticoagulation-related hospital admission, Anticoagulation-related ED visit, Anticoagulation-related fatality    Comments:   The patient was assessed for diet, medication, and activity level changes, missed or extra doses, bruising or bleeding, with no problem findings. Patient reports last Lovenox injection was last evening.              OBJECTIVE    INR Protime   Date Value Ref Range Status   2019 2.8 (A) 0.86 - 1.14 Final       ASSESSMENT / PLAN  INR assessment THER    Recheck INR In: 3 DAYS    INR Location Clinic      Anticoagulation Summary  As of 2019    INR goal:   2.0-3.0   TTR:   44.7 % (9.4 mo)   INR used for dosin.8 (2019)   Warfarin maintenance plan:   No maintenance plan   Full warfarin instructions:   : 2 mg; 5/15: 2 mg; : 2 mg   Plan last modified:   Vania Morse RN (2019)   Next INR check:   2019   Target end date:       Indications    Long-term (current) use of anticoagulants [Z79.01] [Z79.01]  PE (pulmonary thromboembolism) (H) [I26.99]  Recurrent deep venous thrombosis (H) [I82.409]             Anticoagulation Episode Summary     INR check location:       Preferred lab:       Send INR reminders to:   [facility]    Comments:         Anticoagulation Care Providers     Provider Role Specialty Phone number    Hamzah Thomas MD [facility] Internal Medicine [phone]            See the Encounter Report to view Anticoagulation Flowsheet and Dosing Calendar (Go to Encounters tab in chart review,  and find the Anticoagulation Therapy Visit)    Dosage adjustment made based on physician directed care plan.    Latosha Perdomo RN

## 2019-05-17 ENCOUNTER — ANTICOAGULATION THERAPY VISIT (OUTPATIENT)
Dept: ANTICOAGULATION | Facility: CLINIC | Age: 44
End: 2019-05-17
Payer: COMMERCIAL

## 2019-05-17 DIAGNOSIS — I82.409 RECURRENT DEEP VENOUS THROMBOSIS (H): ICD-10-CM

## 2019-05-17 DIAGNOSIS — I26.99 PE (PULMONARY THROMBOEMBOLISM) (H): ICD-10-CM

## 2019-05-17 LAB — INR POINT OF CARE: 2.2 (ref 0.86–1.14)

## 2019-05-17 PROCEDURE — 85610 PROTHROMBIN TIME: CPT | Mod: QW

## 2019-05-17 PROCEDURE — 99207 ZZC NO CHARGE NURSE ONLY: CPT

## 2019-05-17 PROCEDURE — 36416 COLLJ CAPILLARY BLOOD SPEC: CPT

## 2019-05-17 NOTE — PROGRESS NOTES
ANTICOAGULATION FOLLOW-UP CLINIC VISIT    Patient Name:  Nelsy Cota  Date:  2019  Contact Type:  Face to Face    SUBJECTIVE:  Patient Findings     Comments:   The patient was assessed for diet, medication, and activity level changes, missed or extra doses, bruising or bleeding, with no problem findings.          Clinical Outcomes     Negatives:   Major bleeding event, Thromboembolic event, Anticoagulation-related hospital admission, Anticoagulation-related ED visit, Anticoagulation-related fatality    Comments:   The patient was assessed for diet, medication, and activity level changes, missed or extra doses, bruising or bleeding, with no problem findings.             OBJECTIVE    INR Protime   Date Value Ref Range Status   2019 2.2 (A) 0.86 - 1.14 Final       ASSESSMENT / PLAN  INR assessment THER    Recheck INR In: 5 DAYS    INR Location Clinic      Anticoagulation Summary  As of 2019    INR goal:   2.0-3.0   TTR:   45.3 % (9.5 mo)   INR used for dosin.2 (2019)   Warfarin maintenance plan:   No maintenance plan   Full warfarin instructions:   : 4 mg; : 4 mg; : 6 mg; : 4 mg; : 4 mg   Plan last modified:   Vania Morse RN (2019)   Next INR check:   2019   Target end date:       Indications    Long-term (current) use of anticoagulants [Z79.01] [Z79.01]  PE (pulmonary thromboembolism) (H) [I26.99]  Recurrent deep venous thrombosis (H) [I82.409]             Anticoagulation Episode Summary     INR check location:       Preferred lab:       Send INR reminders to:   [facility]    Comments:         Anticoagulation Care Providers     Provider Role Specialty Phone number    Hamzah Thomas MD [facility] Internal Medicine [phone]            See the Encounter Report to view Anticoagulation Flowsheet and Dosing Calendar (Go to Encounters tab in chart review, and find the Anticoagulation Therapy Visit)    Dosage adjustment made based on physician  directed care plan.    Latosha Perdomo RN

## 2019-05-29 ENCOUNTER — ANTICOAGULATION THERAPY VISIT (OUTPATIENT)
Dept: ANTICOAGULATION | Facility: CLINIC | Age: 44
End: 2019-05-29
Payer: COMMERCIAL

## 2019-05-29 DIAGNOSIS — I82.409 RECURRENT DEEP VENOUS THROMBOSIS (H): ICD-10-CM

## 2019-05-29 DIAGNOSIS — I26.99 PE (PULMONARY THROMBOEMBOLISM) (H): ICD-10-CM

## 2019-05-29 LAB — INR POINT OF CARE: 3.4 (ref 0.86–1.14)

## 2019-05-29 PROCEDURE — 99207 ZZC NO CHARGE NURSE ONLY: CPT

## 2019-05-29 PROCEDURE — 36416 COLLJ CAPILLARY BLOOD SPEC: CPT

## 2019-05-29 PROCEDURE — 85610 PROTHROMBIN TIME: CPT | Mod: QW

## 2019-05-29 NOTE — PROGRESS NOTES
ANTICOAGULATION FOLLOW-UP CLINIC VISIT    Patient Name:  Nelsy Cota  Date:  5/29/2019  Contact Type:  Face to Face    SUBJECTIVE:  Patient Findings     Comments:   The patient was assessed for diet, medication, and activity level changes, missed or extra doses, bruising or bleeding, with no problem findings.  Patient missed her appointment last week and got  on Friday 5/24.  She has been taking 4 mg daily last week.            Clinical Outcomes     Negatives:   Major bleeding event, Thromboembolic event, Anticoagulation-related hospital admission, Anticoagulation-related ED visit, Anticoagulation-related fatality    Comments:   The patient was assessed for diet, medication, and activity level changes, missed or extra doses, bruising or bleeding, with no problem findings.  Patient missed her appointment last week and got  on Friday 5/24.  She has been taking 4 mg daily last week.               OBJECTIVE    INR Protime   Date Value Ref Range Status   05/29/2019 3.4 (A) 0.86 - 1.14 Final       ASSESSMENT / PLAN  INR assessment SUPRA    Recheck INR In: 1 WEEK    INR Location Clinic      Anticoagulation Summary  As of 5/29/2019    INR goal:   2.0-3.0   TTR:   46.2 % (9.9 mo)   INR used for dosing:   3.4! (5/29/2019)   Warfarin maintenance plan:   No maintenance plan   Full warfarin instructions:   5/29: 2 mg; 5/30: 4 mg; 5/31: 4 mg; 6/1: 4 mg; 6/2: 2 mg; 6/3: 4 mg; 6/4: 4 mg; 6/5: 2 mg   Plan last modified:   Vania Morse RN (5/8/2019)   Next INR check:   6/6/2019   Target end date:       Indications    Long-term (current) use of anticoagulants [Z79.01] [Z79.01]  PE (pulmonary thromboembolism) (H) [I26.99]  Recurrent deep venous thrombosis (H) [I82.409]             Anticoagulation Episode Summary     INR check location:       Preferred lab:       Send INR reminders to:   [facility]    Comments:         Anticoagulation Care Providers     Provider Role Specialty Phone number    Hamzah Thomas,  MD Responsible Internal Medicine [phone]            See the Encounter Report to view Anticoagulation Flowsheet and Dosing Calendar (Go to Encounters tab in chart review, and find the Anticoagulation Therapy Visit)    Dosage adjustment made based on physician directed care plan.    Vania Morse RN

## 2019-06-06 ENCOUNTER — ANTICOAGULATION THERAPY VISIT (OUTPATIENT)
Dept: ANTICOAGULATION | Facility: CLINIC | Age: 44
End: 2019-06-06
Payer: COMMERCIAL

## 2019-06-06 DIAGNOSIS — I82.409 RECURRENT DEEP VENOUS THROMBOSIS (H): ICD-10-CM

## 2019-06-06 DIAGNOSIS — I26.99 PE (PULMONARY THROMBOEMBOLISM) (H): ICD-10-CM

## 2019-06-06 LAB — INR POINT OF CARE: 3.7 (ref 0.86–1.14)

## 2019-06-06 PROCEDURE — 36416 COLLJ CAPILLARY BLOOD SPEC: CPT

## 2019-06-06 PROCEDURE — 85610 PROTHROMBIN TIME: CPT | Mod: QW

## 2019-06-06 PROCEDURE — 99207 ZZC NO CHARGE NURSE ONLY: CPT

## 2019-06-06 NOTE — PROGRESS NOTES
ANTICOAGULATION FOLLOW-UP CLINIC VISIT    Patient Name:  Nelsy Cota  Date:  6/6/2019  Contact Type:  Face to Face    SUBJECTIVE:  Patient Findings     Comments:   The patient was assessed for diet, medication, and activity level changes, missed or extra doses, bruising or bleeding, with no problem findings.  Patient denies any identifiable changes that caused the supratherapeutic INR.           Clinical Outcomes     Negatives:   Major bleeding event, Thromboembolic event, Anticoagulation-related hospital admission, Anticoagulation-related ED visit, Anticoagulation-related fatality    Comments:   The patient was assessed for diet, medication, and activity level changes, missed or extra doses, bruising or bleeding, with no problem findings.  Patient denies any identifiable changes that caused the supratherapeutic INR.              OBJECTIVE    INR Protime   Date Value Ref Range Status   06/06/2019 3.7 (A) 0.86 - 1.14 Final       ASSESSMENT / PLAN  INR assessment SUPRA    Recheck INR In: 1 WEEK    INR Location Clinic      Anticoagulation Summary  As of 6/6/2019    INR goal:   2.0-3.0   TTR:   45.0 % (10.2 mo)   INR used for dosing:   3.7! (6/6/2019)   Warfarin maintenance plan:   No maintenance plan   Full warfarin instructions:   6/6: 2 mg; 6/7: 2 mg; 6/8: 4 mg; 6/9: 2 mg; 6/10: 4 mg; 6/11: 2 mg; 6/12: 4 mg   Plan last modified:   Vania Morse, RN (5/8/2019)   Next INR check:   6/13/2019   Target end date:       Indications    Long-term (current) use of anticoagulants [Z79.01] [Z79.01]  PE (pulmonary thromboembolism) (H) [I26.99]  Recurrent deep venous thrombosis (H) [I82.409]             Anticoagulation Episode Summary     INR check location:       Preferred lab:       Send INR reminders to:   [facility]    Comments:         Anticoagulation Care Providers     Provider Role Specialty Phone number    Hamzah Thomas MD [facility] Internal Medicine [phone]            See the Encounter Report to view  Anticoagulation Flowsheet and Dosing Calendar (Go to Encounters tab in chart review, and find the Anticoagulation Therapy Visit)    Dosage adjustment made based on physician directed care plan.    Latosha Perdomo RN

## 2019-06-13 ENCOUNTER — ANTICOAGULATION THERAPY VISIT (OUTPATIENT)
Dept: ANTICOAGULATION | Facility: CLINIC | Age: 44
End: 2019-06-13
Payer: COMMERCIAL

## 2019-06-13 DIAGNOSIS — I26.99 PE (PULMONARY THROMBOEMBOLISM) (H): ICD-10-CM

## 2019-06-13 DIAGNOSIS — I82.409 RECURRENT DEEP VENOUS THROMBOSIS (H): ICD-10-CM

## 2019-06-13 LAB — INR POINT OF CARE: 3.2 (ref 0.86–1.14)

## 2019-06-13 PROCEDURE — 99207 ZZC NO CHARGE NURSE ONLY: CPT

## 2019-06-13 PROCEDURE — 36416 COLLJ CAPILLARY BLOOD SPEC: CPT

## 2019-06-13 PROCEDURE — 85610 PROTHROMBIN TIME: CPT | Mod: QW

## 2019-06-13 NOTE — PROGRESS NOTES
ANTICOAGULATION FOLLOW-UP CLINIC VISIT    Patient Name:  Nelsy Cota  Date:  6/13/2019  Contact Type:  Face to Face    SUBJECTIVE:  Patient Findings     Comments:   The patient was assessed for diet, medication, and activity level changes, missed or extra doses, bruising or bleeding, with no problem findings.  INR continues to be elevated consistently so maintenance dose was adjusted today.          Clinical Outcomes     Negatives:   Major bleeding event, Thromboembolic event, Anticoagulation-related hospital admission, Anticoagulation-related ED visit, Anticoagulation-related fatality    Comments:   The patient was assessed for diet, medication, and activity level changes, missed or extra doses, bruising or bleeding, with no problem findings.  INR continues to be elevated consistently so maintenance dose was adjusted today.             OBJECTIVE    INR Protime   Date Value Ref Range Status   06/13/2019 3.2 (A) 0.86 - 1.14 Final       ASSESSMENT / PLAN  INR assessment SUPRA    Recheck INR In: 1 WEEK    INR Location Clinic      Anticoagulation Summary  As of 6/13/2019    INR goal:   2.0-3.0   TTR:   44.0 % (10.4 mo)   INR used for dosing:   3.2! (6/13/2019)   Warfarin maintenance plan:   4 mg (2 mg x 2) every Mon, Sat; 2 mg (2 mg x 1) all other days   Full warfarin instructions:   4 mg every Mon, Sat; 2 mg all other days   Weekly warfarin total:   18 mg   Plan last modified:   Vania Morse RN (6/13/2019)   Next INR check:   6/20/2019   Target end date:       Indications    Long-term (current) use of anticoagulants [Z79.01] [Z79.01]  PE (pulmonary thromboembolism) (H) [I26.99]  Recurrent deep venous thrombosis (H) [I82.409]             Anticoagulation Episode Summary     INR check location:       Preferred lab:       Send INR reminders to:   [facility]    Comments:         Anticoagulation Care Providers     Provider Role Specialty Phone number    Hamzah Thomas MD Responsible Internal Medicine  [phone]            See the Encounter Report to view Anticoagulation Flowsheet and Dosing Calendar (Go to Encounters tab in chart review, and find the Anticoagulation Therapy Visit)    Dosage adjustment made based on physician directed care plan.    Vania Morse RN

## 2019-06-17 PROBLEM — Z79.01 LONG TERM CURRENT USE OF ANTICOAGULANT THERAPY: Status: ACTIVE | Noted: 2018-07-25

## 2019-06-20 ENCOUNTER — ANTICOAGULATION THERAPY VISIT (OUTPATIENT)
Dept: ANTICOAGULATION | Facility: CLINIC | Age: 44
End: 2019-06-20
Payer: COMMERCIAL

## 2019-06-20 DIAGNOSIS — I26.99 PE (PULMONARY THROMBOEMBOLISM) (H): ICD-10-CM

## 2019-06-20 DIAGNOSIS — I82.409 RECURRENT DEEP VENOUS THROMBOSIS (H): ICD-10-CM

## 2019-06-20 DIAGNOSIS — Z79.01 LONG TERM CURRENT USE OF ANTICOAGULANT THERAPY: ICD-10-CM

## 2019-06-20 LAB — INR POINT OF CARE: 4.5 (ref 0.86–1.14)

## 2019-06-20 PROCEDURE — 36416 COLLJ CAPILLARY BLOOD SPEC: CPT

## 2019-06-20 PROCEDURE — 85610 PROTHROMBIN TIME: CPT | Mod: QW

## 2019-06-20 PROCEDURE — 99207 ZZC NO CHARGE NURSE ONLY: CPT

## 2019-06-20 NOTE — PROGRESS NOTES
ANTICOAGULATION FOLLOW-UP CLINIC VISIT    Patient Name:  Nelsy Cota  Date:  2019  Contact Type:  Face to Face    SUBJECTIVE:  Patient Findings     Comments:   The patient was assessed for diet, medication, and activity level changes, missed or extra doses, bruising or bleeding, with no problem findings.  Patient denies any identifiable changes that caused the supratherapeutic INR.           Clinical Outcomes     Negatives:   Major bleeding event, Thromboembolic event, Anticoagulation-related hospital admission, Anticoagulation-related ED visit, Anticoagulation-related fatality    Comments:   The patient was assessed for diet, medication, and activity level changes, missed or extra doses, bruising or bleeding, with no problem findings.  Patient denies any identifiable changes that caused the supratherapeutic INR.              OBJECTIVE    INR Protime   Date Value Ref Range Status   2019 4.5 (A) 0.86 - 1.14 Final       ASSESSMENT / PLAN  INR assessment SUPRA    Recheck INR In: 1 WEEK    INR Location Clinic      Anticoagulation Summary  As of 2019    INR goal:   2.0-3.0   TTR:   43.0 % (10.7 mo)   INR used for dosin.5! (2019)   Warfarin maintenance plan:   4 mg (2 mg x 2) every Mon, Sat; 2 mg (2 mg x 1) all other days   Full warfarin instructions:   : Hold; : 2 mg; Otherwise 4 mg every Mon, Sat; 2 mg all other days   Weekly warfarin total:   18 mg   Plan last modified:   Vania Morse RN (2019)   Next INR check:   2019   Target end date:       Indications    Long-term (current) use of anticoagulants [Z79.01] [Z79.01]  PE (pulmonary thromboembolism) (H) [I26.99]  Recurrent deep venous thrombosis (H) [I82.409]             Anticoagulation Episode Summary     INR check location:       Preferred lab:       Send INR reminders to:   [facility]    Comments:         Anticoagulation Care Providers     Provider Role Specialty Phone number    Hamzah Thomas MD  [facility] Internal Medicine [phone]            See the Encounter Report to view Anticoagulation Flowsheet and Dosing Calendar (Go to Encounters tab in chart review, and find the Anticoagulation Therapy Visit)    Dosage adjustment made based on physician directed care plan.    Latosha Perdomo RN

## 2019-06-27 ENCOUNTER — ANTICOAGULATION THERAPY VISIT (OUTPATIENT)
Dept: ANTICOAGULATION | Facility: CLINIC | Age: 44
End: 2019-06-27
Payer: COMMERCIAL

## 2019-06-27 DIAGNOSIS — I82.409 RECURRENT DEEP VENOUS THROMBOSIS (H): ICD-10-CM

## 2019-06-27 DIAGNOSIS — Z79.01 LONG TERM CURRENT USE OF ANTICOAGULANT THERAPY: ICD-10-CM

## 2019-06-27 DIAGNOSIS — I26.99 PE (PULMONARY THROMBOEMBOLISM) (H): ICD-10-CM

## 2019-06-27 LAB — INR POINT OF CARE: 2.6 (ref 0.86–1.14)

## 2019-06-27 PROCEDURE — 85610 PROTHROMBIN TIME: CPT | Mod: QW

## 2019-06-27 PROCEDURE — 99207 ZZC NO CHARGE NURSE ONLY: CPT

## 2019-06-27 PROCEDURE — 36416 COLLJ CAPILLARY BLOOD SPEC: CPT

## 2019-06-27 NOTE — PROGRESS NOTES
ANTICOAGULATION FOLLOW-UP CLINIC VISIT    Patient Name:  Nelsy Cota  Date:  2019  Contact Type:  Face to Face    SUBJECTIVE:  Patient Findings     Positives:   Missed doses (Warfarin was held 19 d/t elevated INR. ), Change in medications (Patient reports today that she has been taking Ibuprofen.)    Comments:   The patient was assessed for diet, medication, and activity level changes, extra doses, bruising or bleeding, with no problem findings.          Clinical Outcomes     Comments:   The patient was assessed for diet, medication, and activity level changes, extra doses, bruising or bleeding, with no problem findings.             OBJECTIVE    INR Protime   Date Value Ref Range Status   2019 2.6 (A) 0.86 - 1.14 Final       ASSESSMENT / PLAN  INR assessment THER    Recheck INR In: 2 WEEKS    INR Location Clinic      Anticoagulation Summary  As of 2019    INR goal:   2.0-3.0   TTR:   42.5 % (10.9 mo)   INR used for dosin.6 (2019)   Warfarin maintenance plan:   4 mg (2 mg x 2) every Mon, Sat; 2 mg (2 mg x 1) all other days   Full warfarin instructions:   4 mg every Mon, Sat; 2 mg all other days   Weekly warfarin total:   18 mg   Plan last modified:   Vania Morse RN (2019)   Next INR check:   7/10/2019   Target end date:       Indications    Long-term (current) use of anticoagulants [Z79.01] [Z79.01]  PE (pulmonary thromboembolism) (H) [I26.99]  Recurrent deep venous thrombosis (H) [I82.409]             Anticoagulation Episode Summary     INR check location:       Preferred lab:       Send INR reminders to:   [facility]    Comments:         Anticoagulation Care Providers     Provider Role Specialty Phone number    Hamzah Thomas MD [facility] Internal Medicine [phone]            See the Encounter Report to view Anticoagulation Flowsheet and Dosing Calendar (Go to Encounters tab in chart review, and find the Anticoagulation Therapy  Visit)    Dosage adjustment made based on physician directed care plan.    Latosha Perdomo RN

## 2019-07-10 ENCOUNTER — ANTICOAGULATION THERAPY VISIT (OUTPATIENT)
Dept: ANTICOAGULATION | Facility: CLINIC | Age: 44
End: 2019-07-10
Payer: COMMERCIAL

## 2019-07-10 DIAGNOSIS — I82.409 RECURRENT DEEP VENOUS THROMBOSIS (H): ICD-10-CM

## 2019-07-10 DIAGNOSIS — I26.99 PE (PULMONARY THROMBOEMBOLISM) (H): ICD-10-CM

## 2019-07-10 DIAGNOSIS — Z79.01 LONG TERM CURRENT USE OF ANTICOAGULANT THERAPY: ICD-10-CM

## 2019-07-10 DIAGNOSIS — I26.99 PULMONARY EMBOLISM, BILATERAL (H): ICD-10-CM

## 2019-07-10 DIAGNOSIS — I82.4Z1 ACUTE DEEP VEIN THROMBOSIS (DVT) OF DISTAL VEIN OF RIGHT LOWER EXTREMITY (H): ICD-10-CM

## 2019-07-10 LAB — INR POINT OF CARE: 2.2 (ref 0.86–1.14)

## 2019-07-10 PROCEDURE — 85610 PROTHROMBIN TIME: CPT | Mod: QW

## 2019-07-10 PROCEDURE — 99207 ZZC NO CHARGE NURSE ONLY: CPT

## 2019-07-10 PROCEDURE — 36416 COLLJ CAPILLARY BLOOD SPEC: CPT

## 2019-07-10 RX ORDER — WARFARIN SODIUM 2 MG/1
TABLET ORAL
Qty: 60 TABLET | Refills: 1 | Status: SHIPPED | OUTPATIENT
Start: 2019-07-10 | End: 2019-10-01

## 2019-07-10 NOTE — PROGRESS NOTES
ANTICOAGULATION FOLLOW-UP CLINIC VISIT    Patient Name:  Nelsy Cota  Date:  7/10/2019  Contact Type:  Face to Face    SUBJECTIVE:  Patient Findings     Comments:   The patient was assessed for diet, medication, and activity level changes, missed or extra doses, bruising or bleeding, with no problem findings.          Clinical Outcomes     Negatives:   Major bleeding event, Thromboembolic event, Anticoagulation-related hospital admission, Anticoagulation-related ED visit, Anticoagulation-related fatality    Comments:   The patient was assessed for diet, medication, and activity level changes, missed or extra doses, bruising or bleeding, with no problem findings.             OBJECTIVE    INR Protime   Date Value Ref Range Status   07/10/2019 2.2 (A) 0.86 - 1.14 Final       ASSESSMENT / PLAN  INR assessment THER    Recheck INR In: 3 WEEKS    INR Location Clinic      Anticoagulation Summary  As of 7/10/2019    INR goal:   2.0-3.0   TTR:   44.7 % (11.3 mo)   INR used for dosin.2 (7/10/2019)   Warfarin maintenance plan:   4 mg (2 mg x 2) every Mon, Sat; 2 mg (2 mg x 1) all other days   Full warfarin instructions:   4 mg every Mon, Sat; 2 mg all other days   Weekly warfarin total:   18 mg   No change documented:   Vania Morse RN   Plan last modified:   Vania Morse RN (2019)   Next INR check:   2019   Target end date:       Indications    Long-term (current) use of anticoagulants [Z79.01] [Z79.01]  PE (pulmonary thromboembolism) (H) [I26.99]  Recurrent deep venous thrombosis (H) [I82.409]             Anticoagulation Episode Summary     INR check location:       Preferred lab:       Send INR reminders to:   [facility]    Comments:         Anticoagulation Care Providers     Provider Role Specialty Phone number    Hamzah Thomas MD Responsible Internal Medicine [phone]            See the Encounter Report to view Anticoagulation Flowsheet and Dosing Calendar (Go to  Encounters tab in chart review, and find the Anticoagulation Therapy Visit)    Dosage adjustment made based on physician directed care plan.    Vania Morse RN

## 2019-08-01 ENCOUNTER — ANTICOAGULATION THERAPY VISIT (OUTPATIENT)
Dept: ANTICOAGULATION | Facility: CLINIC | Age: 44
End: 2019-08-01
Payer: COMMERCIAL

## 2019-08-01 DIAGNOSIS — Z79.01 LONG TERM CURRENT USE OF ANTICOAGULANT THERAPY: ICD-10-CM

## 2019-08-01 DIAGNOSIS — I82.409 RECURRENT DEEP VENOUS THROMBOSIS (H): ICD-10-CM

## 2019-08-01 DIAGNOSIS — I26.99 PE (PULMONARY THROMBOEMBOLISM) (H): ICD-10-CM

## 2019-08-01 LAB — INR POINT OF CARE: 3.1 (ref 0.86–1.14)

## 2019-08-01 PROCEDURE — 99207 ZZC NO CHARGE NURSE ONLY: CPT

## 2019-08-01 PROCEDURE — 85610 PROTHROMBIN TIME: CPT | Mod: QW

## 2019-08-01 PROCEDURE — 36416 COLLJ CAPILLARY BLOOD SPEC: CPT

## 2019-08-01 NOTE — PROGRESS NOTES
ANTICOAGULATION FOLLOW-UP CLINIC VISIT    Patient Name:  Nelsy Cota  Date:  8/1/2019  Contact Type:  Face to Face    SUBJECTIVE:  Patient Findings     Positives:   Change in medications (Patient reports had 2 ibuprofen last night. )    Comments:   The patient was assessed for diet and activity level changes, missed or extra doses, bruising or bleeding, with no problem findings.          Clinical Outcomes     Comments:   The patient was assessed for diet and activity level changes, missed or extra doses, bruising or bleeding, with no problem findings.             OBJECTIVE    INR Protime   Date Value Ref Range Status   08/01/2019 3.1 (A) 0.86 - 1.14 Final       ASSESSMENT / PLAN  INR assessment SUPRA    Recheck INR In: 3 WEEKS    INR Location Clinic      Anticoagulation Summary  As of 8/1/2019    INR goal:   2.0-3.0   TTR:   47.4 % (1 y)   INR used for dosing:   3.1! (8/1/2019)   Warfarin maintenance plan:   4 mg (2 mg x 2) every Mon, Sat; 2 mg (2 mg x 1) all other days   Full warfarin instructions:   4 mg every Mon, Sat; 2 mg all other days   Weekly warfarin total:   18 mg   Plan last modified:   Vania Morse RN (6/13/2019)   Next INR check:      Target end date:       Indications    Long-term (current) use of anticoagulants [Z79.01] [Z79.01]  PE (pulmonary thromboembolism) (H) [I26.99]  Recurrent deep venous thrombosis (H) [I82.409]             Anticoagulation Episode Summary     INR check location:       Preferred lab:       Send INR reminders to:   [facility]    Comments:         Anticoagulation Care Providers     Provider Role Specialty Phone number    Hamzah Thomas MD [facility] Internal Medicine [phone]            See the Encounter Report to view Anticoagulation Flowsheet and Dosing Calendar (Go to Encounters tab in chart review, and find the Anticoagulation Therapy Visit)    Dosage adjustment made based on physician directed care plan.    Latosha Perdomo RN

## 2019-08-08 DIAGNOSIS — I26.99 PE (PULMONARY THROMBOEMBOLISM) (H): ICD-10-CM

## 2019-08-08 DIAGNOSIS — I82.409 RECURRENT DEEP VENOUS THROMBOSIS (H): ICD-10-CM

## 2019-08-09 RX ORDER — WARFARIN SODIUM 2 MG/1
TABLET ORAL
Qty: 60 TABLET | Refills: 0 | Status: SHIPPED | OUTPATIENT
Start: 2019-08-09 | End: 2019-10-29

## 2019-08-09 NOTE — TELEPHONE ENCOUNTER
"Requested Prescriptions   Pending Prescriptions Disp Refills     warfarin (COUMADIN) 2 MG tablet [Pharmacy Med Name: WARFARIN SOD 2MG  Last Written Prescription Date:  7/10/2019  Last Fill Quantity: 60,  # refills: 1   Last office visit: 7/24/2018 with prescribing provider:     Future Office Visit:   TABLETS (PURPLE)] 60 tablet 0     Sig: TAKE 2 TABLETS BY MOUTH DAILY AS DIRECTED PER INR CLINIC       Vitamin K Antagonists Failed - 8/8/2019  5:43 PM        Failed - INR is within goal in the past 6 weeks     Confirm INR is within goal in the past 6 weeks.     Recent Labs   Lab Test 08/01/19   INR 3.1*                       Passed - Recent (12 mo) or future (30 days) visit within the authorizing provider's specialty     Patient had office visit in the last 12 months or has a visit in the next 30 days with authorizing provider or within the authorizing provider's specialty.  See \"Patient Info\" tab in inbasket, or \"Choose Columns\" in Meds & Orders section of the refill encounter.              Passed - Medication is active on med list        Passed - Patient is 18 years of age or older        Passed - Patient is not pregnant        Passed - No positive pregnancy on file in past 12 months        "

## 2019-09-05 ENCOUNTER — ANTICOAGULATION THERAPY VISIT (OUTPATIENT)
Dept: ANTICOAGULATION | Facility: CLINIC | Age: 44
End: 2019-09-05
Payer: COMMERCIAL

## 2019-09-05 DIAGNOSIS — I26.99 PE (PULMONARY THROMBOEMBOLISM) (H): ICD-10-CM

## 2019-09-05 DIAGNOSIS — Z79.01 LONG TERM CURRENT USE OF ANTICOAGULANT THERAPY: ICD-10-CM

## 2019-09-05 DIAGNOSIS — I82.409 RECURRENT DEEP VENOUS THROMBOSIS (H): ICD-10-CM

## 2019-09-05 LAB — INR POINT OF CARE: 1.7 (ref 0.86–1.14)

## 2019-09-05 PROCEDURE — 85610 PROTHROMBIN TIME: CPT | Mod: QW

## 2019-09-05 PROCEDURE — 99207 ZZC NO CHARGE NURSE ONLY: CPT

## 2019-09-05 PROCEDURE — 36416 COLLJ CAPILLARY BLOOD SPEC: CPT

## 2019-09-05 NOTE — PROGRESS NOTES
ANTICOAGULATION FOLLOW-UP CLINIC VISIT    Patient Name:  Nelsy Cota  Date:  2019  Contact Type:  Face to Face    SUBJECTIVE:  Patient Findings     Positives:   Change in medications (Patient reports began taking Keto 2 weeks ago, this is a dietary supplement. )    Comments:   The patient was assessed for diet and activity level changes, missed or extra doses, bruising or bleeding, with no problem findings.  Patient reports will bring Keto bottle to next INR appointment        Clinical Outcomes     Comments:   The patient was assessed for diet and activity level changes, missed or extra doses, bruising or bleeding, with no problem findings.  Patient reports will bring Keto bottle to next INR appointment           OBJECTIVE    INR Protime   Date Value Ref Range Status   2019 1.7 (A) 0.86 - 1.14 Final       ASSESSMENT / PLAN  INR assessment SUB    Recheck INR In: 1 WEEK    INR Location Clinic      Anticoagulation Summary  As of 2019    INR goal:   2.0-3.0   TTR:   49.5 % (1.1 y)   INR used for dosin.7! (2019)   Warfarin maintenance plan:   4 mg (2 mg x 2) every Mon, Sat; 2 mg (2 mg x 1) all other days   Full warfarin instructions:   : 4 mg; Otherwise 4 mg every Mon, Sat; 2 mg all other days   Weekly warfarin total:   18 mg   Plan last modified:   Vania Morse RN (2019)   Next INR check:   2019   Target end date:       Indications    Long-term (current) use of anticoagulants [Z79.01] [Z79.01]  PE (pulmonary thromboembolism) (H) [I26.99]  Recurrent deep venous thrombosis (H) [I82.409]             Anticoagulation Episode Summary     INR check location:       Preferred lab:       Send INR reminders to:   [facility]    Comments:         Anticoagulation Care Providers     Provider Role Specialty Phone number    Hamzah Thomas MD [facility] Internal Medicine [phone]            See the Encounter Report to view Anticoagulation Flowsheet and Dosing  Calendar (Go to Encounters tab in chart review, and find the Anticoagulation Therapy Visit)    Dosage adjustment made based on physician directed care plan.    Latosha Perdomo RN

## 2019-09-12 ENCOUNTER — ANTICOAGULATION THERAPY VISIT (OUTPATIENT)
Dept: ANTICOAGULATION | Facility: CLINIC | Age: 44
End: 2019-09-12
Payer: COMMERCIAL

## 2019-09-12 DIAGNOSIS — I26.99 PE (PULMONARY THROMBOEMBOLISM) (H): ICD-10-CM

## 2019-09-12 DIAGNOSIS — Z79.01 LONG TERM CURRENT USE OF ANTICOAGULANT THERAPY: ICD-10-CM

## 2019-09-12 DIAGNOSIS — I82.409 RECURRENT DEEP VENOUS THROMBOSIS (H): ICD-10-CM

## 2019-09-12 LAB — INR POINT OF CARE: 1.4 (ref 0.86–1.14)

## 2019-09-12 PROCEDURE — 36416 COLLJ CAPILLARY BLOOD SPEC: CPT

## 2019-09-12 PROCEDURE — 85610 PROTHROMBIN TIME: CPT | Mod: QW

## 2019-09-12 PROCEDURE — 99207 ZZC NO CHARGE NURSE ONLY: CPT

## 2019-09-12 NOTE — PROGRESS NOTES
ANTICOAGULATION FOLLOW-UP CLINIC VISIT    Patient Name:  Nelsy Cota  Date:  2019  Contact Type:  Face to Face    SUBJECTIVE:  Patient Findings     Positives:   Change in medications (Patient reports continues to use Keto supplement. Patient brought in the Keto bottle, it contains green tree extract, green coffee extract, esteban. The green tea can decrease INR and the esteban could increase INR. )    Comments:   The patient was assessed for diet, medication, and activity level changes, missed or extra doses, bruising or bleeding, with no problem findings.          Clinical Outcomes     Comments:   The patient was assessed for diet, medication, and activity level changes, missed or extra doses, bruising or bleeding, with no problem findings.             OBJECTIVE    INR Protime   Date Value Ref Range Status   2019 1.4 (A) 0.86 - 1.14 Final       ASSESSMENT / PLAN  INR assessment SUB    Recheck INR In: 8 DAYS    INR Location Clinic      Anticoagulation Summary  As of 2019    INR goal:   2.0-3.0   TTR:   48.7 % (1.1 y)   INR used for dosin.4! (2019)   Warfarin maintenance plan:   4 mg (2 mg x 2) every Mon, Sat; 2 mg (2 mg x 1) all other days   Full warfarin instructions:   : 4 mg; : 4 mg; : 4 mg; : 4 mg; Otherwise 4 mg every Mon, Sat; 2 mg all other days   Weekly warfarin total:   18 mg   Plan last modified:   Vania Morse RN (2019)   Next INR check:   2019   Target end date:       Indications    Long-term (current) use of anticoagulants [Z79.01] [Z79.01]  PE (pulmonary thromboembolism) (H) [I26.99]  Recurrent deep venous thrombosis (H) [I82.409]             Anticoagulation Episode Summary     INR check location:       Preferred lab:       Send INR reminders to:   [facility]    Comments:         Anticoagulation Care Providers     Provider Role Specialty Phone number    Hamzah Thomas MD [facility] Internal Medicine [phone]             See the Encounter Report to view Anticoagulation Flowsheet and Dosing Calendar (Go to Encounters tab in chart review, and find the Anticoagulation Therapy Visit)    Dosage adjustment made based on physician directed care plan.    Latosha Perdomo RN

## 2019-09-20 ENCOUNTER — ANTICOAGULATION THERAPY VISIT (OUTPATIENT)
Dept: ANTICOAGULATION | Facility: CLINIC | Age: 44
End: 2019-09-20
Payer: COMMERCIAL

## 2019-09-20 DIAGNOSIS — Z79.01 LONG TERM CURRENT USE OF ANTICOAGULANT THERAPY: ICD-10-CM

## 2019-09-20 DIAGNOSIS — I26.99 PE (PULMONARY THROMBOEMBOLISM) (H): ICD-10-CM

## 2019-09-20 DIAGNOSIS — I82.409 RECURRENT DEEP VENOUS THROMBOSIS (H): ICD-10-CM

## 2019-09-20 LAB — INR POINT OF CARE: 3.6 (ref 0.86–1.14)

## 2019-09-20 PROCEDURE — 36416 COLLJ CAPILLARY BLOOD SPEC: CPT

## 2019-09-20 PROCEDURE — 99207 ZZC NO CHARGE NURSE ONLY: CPT

## 2019-09-20 PROCEDURE — 85610 PROTHROMBIN TIME: CPT | Mod: QW

## 2019-09-20 NOTE — PROGRESS NOTES
ANTICOAGULATION FOLLOW-UP CLINIC VISIT    Patient Name:  Nelsy Cota  Date:  9/20/2019  Contact Type:  Face to Face    SUBJECTIVE:  Patient Findings     Positives:   Extra doses (Patient was directed to take extra doses d/t low INR.)    Comments:   The patient was assessed for diet, medication, and activity level changes, missed or extra doses, bruising or bleeding, with no problem findings.          Clinical Outcomes     Comments:   The patient was assessed for diet, medication, and activity level changes, missed or extra doses, bruising or bleeding, with no problem findings.             OBJECTIVE    INR Protime   Date Value Ref Range Status   09/20/2019 3.6 (A) 0.86 - 1.14 Final       ASSESSMENT / PLAN  INR assessment SUPRA    Recheck INR In: 10 DAYS    INR Location Clinic      Anticoagulation Summary  As of 9/20/2019    INR goal:   2.0-3.0   TTR:   48.6 % (1.1 y)   INR used for dosing:   3.6! (9/20/2019)   Warfarin maintenance plan:   4 mg (2 mg x 2) every Mon, Sat; 2 mg (2 mg x 1) all other days   Full warfarin instructions:   4 mg every Mon, Sat; 2 mg all other days   Weekly warfarin total:   18 mg   Plan last modified:   Vania Morse RN (6/13/2019)   Next INR check:   10/1/2019   Target end date:       Indications    Long-term (current) use of anticoagulants [Z79.01] [Z79.01]  PE (pulmonary thromboembolism) (H) [I26.99]  Recurrent deep venous thrombosis (H) [I82.409]             Anticoagulation Episode Summary     INR check location:       Preferred lab:       [facility]    Comments:         Anticoagulation Care Providers     Provider Role Specialty Phone number    Hamzah Thomas MD [facility] Internal Medicine [phone]            See the Encounter Report to view Anticoagulation Flowsheet and Dosing Calendar (Go to Encounters tab in chart review, and find the Anticoagulation Therapy Visit)    Dosage adjustment made based on physician directed  care plan.    Latosha Perdomo RN

## 2019-10-01 ENCOUNTER — HEALTH MAINTENANCE LETTER (OUTPATIENT)
Age: 44
End: 2019-10-01

## 2019-10-01 ENCOUNTER — ANTICOAGULATION THERAPY VISIT (OUTPATIENT)
Dept: ANTICOAGULATION | Facility: CLINIC | Age: 44
End: 2019-10-01
Payer: COMMERCIAL

## 2019-10-01 DIAGNOSIS — I26.99 PE (PULMONARY THROMBOEMBOLISM) (H): ICD-10-CM

## 2019-10-01 DIAGNOSIS — I82.409 RECURRENT DEEP VENOUS THROMBOSIS (H): ICD-10-CM

## 2019-10-01 DIAGNOSIS — Z79.01 LONG TERM CURRENT USE OF ANTICOAGULANT THERAPY: ICD-10-CM

## 2019-10-01 LAB — INR POINT OF CARE: 2.9 (ref 0.86–1.14)

## 2019-10-01 PROCEDURE — 99207 ZZC NO CHARGE NURSE ONLY: CPT

## 2019-10-01 PROCEDURE — 36416 COLLJ CAPILLARY BLOOD SPEC: CPT

## 2019-10-01 PROCEDURE — 85610 PROTHROMBIN TIME: CPT | Mod: QW

## 2019-10-01 NOTE — PROGRESS NOTES
ANTICOAGULATION FOLLOW-UP CLINIC VISIT    Patient Name:  Nelsy Cota  Date:  10/1/2019  Contact Type:  Face to Face    SUBJECTIVE:  Patient Findings     Positives:   Missed doses (Patient reports missing warfarin dose 19 but did take it in the morning of 19 and also took warfarin at regular time on 19)    Comments:   The patient was assessed for diet, medication, and activity level changes,extra doses, bruising or bleeding, with no problem findings.          Clinical Outcomes     Negatives:   Major bleeding event, Thromboembolic event, Anticoagulation-related hospital admission, Anticoagulation-related ED visit, Anticoagulation-related fatality    Comments:   The patient was assessed for diet, medication, and activity level changes,extra doses, bruising or bleeding, with no problem findings.             OBJECTIVE    INR Protime   Date Value Ref Range Status   10/01/2019 2.9 (A) 0.86 - 1.14 Final       ASSESSMENT / PLAN  INR assessment THER    Recheck INR In: 3 WEEKS    INR Location Clinic      Anticoagulation Summary  As of 10/1/2019    INR goal:   2.0-3.0   TTR:   47.7 % (1.2 y)   INR used for dosin.9 (10/1/2019)   Warfarin maintenance plan:   4 mg (2 mg x 2) every Mon, Sat; 2 mg (2 mg x 1) all other days   Full warfarin instructions:   4 mg every Mon, Sat; 2 mg all other days   Weekly warfarin total:   18 mg   No change documented:   Latosha Perdomo RN   Plan last modified:   Vania Morse RN (2019)   Next INR check:   10/22/2019   Target end date:       Indications    Long-term (current) use of anticoagulants [Z79.01] [Z79.01]  PE (pulmonary thromboembolism) (H) [I26.99]  Recurrent deep venous thrombosis (H) [I82.409]             Anticoagulation Episode Summary     INR check location:       Preferred lab:       [facility]    Comments:         Anticoagulation Care Providers     Provider Role Specialty Phone number    Hamzah Thomas  MD [facility] Internal Medicine [phone]            See the Encounter Report to view Anticoagulation Flowsheet and Dosing Calendar (Go to Encounters tab in chart review, and find the Anticoagulation Therapy Visit)    Dosage adjustment made based on physician directed care plan.    Latosha Perdomo RN

## 2019-10-07 ENCOUNTER — OFFICE VISIT (OUTPATIENT)
Dept: INTERNAL MEDICINE | Facility: CLINIC | Age: 44
End: 2019-10-07
Payer: COMMERCIAL

## 2019-10-07 VITALS
RESPIRATION RATE: 20 BRPM | DIASTOLIC BLOOD PRESSURE: 78 MMHG | WEIGHT: 158.4 LBS | HEIGHT: 64 IN | TEMPERATURE: 97.7 F | OXYGEN SATURATION: 98 % | BODY MASS INDEX: 27.04 KG/M2 | HEART RATE: 94 BPM | SYSTOLIC BLOOD PRESSURE: 114 MMHG

## 2019-10-07 DIAGNOSIS — R16.0 HEPATOMEGALY: ICD-10-CM

## 2019-10-07 DIAGNOSIS — I26.99 PE (PULMONARY THROMBOEMBOLISM) (H): ICD-10-CM

## 2019-10-07 DIAGNOSIS — D75.89 MACROCYTIC: ICD-10-CM

## 2019-10-07 DIAGNOSIS — R56.9 CONVULSIONS, UNSPECIFIED CONVULSION TYPE (H): ICD-10-CM

## 2019-10-07 DIAGNOSIS — I82.493 DEEP VEIN THROMBOSIS (DVT) OF OTHER VEIN OF BOTH LOWER EXTREMITIES, UNSPECIFIED CHRONICITY (H): ICD-10-CM

## 2019-10-07 DIAGNOSIS — Z00.00 ROUTINE HISTORY AND PHYSICAL EXAMINATION OF ADULT: Primary | ICD-10-CM

## 2019-10-07 LAB
ANISOCYTOSIS BLD QL SMEAR: ABNORMAL
DIFFERENTIAL METHOD BLD: ABNORMAL
EOSINOPHIL # BLD AUTO: 0.2 10E9/L (ref 0–0.7)
EOSINOPHIL NFR BLD AUTO: 2 %
ERYTHROCYTE [DISTWIDTH] IN BLOOD BY AUTOMATED COUNT: 15.2 % (ref 10–15)
HCT VFR BLD AUTO: 34.9 % (ref 35–47)
HGB BLD-MCNC: 12.3 G/DL (ref 11.7–15.7)
LYMPHOCYTES # BLD AUTO: 2.9 10E9/L (ref 0.8–5.3)
LYMPHOCYTES NFR BLD AUTO: 28 %
MACROCYTES BLD QL SMEAR: PRESENT
MCH RBC QN AUTO: 45.4 PG (ref 26.5–33)
MCHC RBC AUTO-ENTMCNC: 35.2 G/DL (ref 31.5–36.5)
MCV RBC AUTO: 129 FL (ref 78–100)
MONOCYTES # BLD AUTO: 0.7 10E9/L (ref 0–1.3)
MONOCYTES NFR BLD AUTO: 7 %
NEUTROPHILS # BLD AUTO: 6.6 10E9/L (ref 1.6–8.3)
NEUTROPHILS NFR BLD AUTO: 63 %
PLATELET # BLD AUTO: 325 10E9/L (ref 150–450)
PLATELET # BLD EST: ABNORMAL 10*3/UL
RBC # BLD AUTO: 2.71 10E12/L (ref 3.8–5.2)
WBC # BLD AUTO: 10.4 10E9/L (ref 4–11)

## 2019-10-07 PROCEDURE — 80061 LIPID PANEL: CPT | Performed by: INTERNAL MEDICINE

## 2019-10-07 PROCEDURE — 84443 ASSAY THYROID STIM HORMONE: CPT | Performed by: INTERNAL MEDICINE

## 2019-10-07 PROCEDURE — 80053 COMPREHEN METABOLIC PANEL: CPT | Performed by: INTERNAL MEDICINE

## 2019-10-07 PROCEDURE — 99396 PREV VISIT EST AGE 40-64: CPT | Performed by: INTERNAL MEDICINE

## 2019-10-07 PROCEDURE — 85025 COMPLETE CBC W/AUTO DIFF WBC: CPT | Performed by: INTERNAL MEDICINE

## 2019-10-07 PROCEDURE — 36415 COLL VENOUS BLD VENIPUNCTURE: CPT | Performed by: INTERNAL MEDICINE

## 2019-10-07 PROCEDURE — 99213 OFFICE O/P EST LOW 20 MIN: CPT | Mod: 25 | Performed by: INTERNAL MEDICINE

## 2019-10-07 RX ORDER — WARFARIN SODIUM 2 MG/1
TABLET ORAL
Qty: 60 TABLET | Refills: 0 | Status: CANCELLED | OUTPATIENT
Start: 2019-10-07

## 2019-10-07 RX ORDER — PHENYTOIN SODIUM 300 MG/1
300 CAPSULE, EXTENDED RELEASE ORAL 2 TIMES DAILY
Qty: 60 CAPSULE | Refills: 0 | Status: CANCELLED | OUTPATIENT
Start: 2019-10-07

## 2019-10-07 SDOH — HEALTH STABILITY: MENTAL HEALTH: HOW MANY STANDARD DRINKS CONTAINING ALCOHOL DO YOU HAVE ON A TYPICAL DAY?: 1 OR 2

## 2019-10-07 ASSESSMENT — ENCOUNTER SYMPTOMS
HEARTBURN: 0
HEMATURIA: 0
HEADACHES: 0
EYE PAIN: 0
NAUSEA: 0
FREQUENCY: 0
JOINT SWELLING: 0
DYSURIA: 0
ARTHRALGIAS: 0
FEVER: 0
MYALGIAS: 0
DIZZINESS: 0
NERVOUS/ANXIOUS: 0
ABDOMINAL PAIN: 0
COUGH: 0
WEAKNESS: 0
SHORTNESS OF BREATH: 0
CHILLS: 0
BREAST MASS: 0
CONSTIPATION: 0
DIARRHEA: 0
HEMATOCHEZIA: 0
PARESTHESIAS: 0
SORE THROAT: 0
PALPITATIONS: 0

## 2019-10-07 ASSESSMENT — MIFFLIN-ST. JEOR: SCORE: 1353.5

## 2019-10-07 NOTE — PROGRESS NOTES
SUBJECTIVE:   CC: Nelsy Cota is an 44 year old woman who presents for preventive health visit.     Healthy Habits:     Getting at least 3 servings of Calcium per day:  NO    Bi-annual eye exam:  Yes    Dental care twice a year:  NO    Sleep apnea or symptoms of sleep apnea:  None    Diet:  Regular (no restrictions)    Frequency of exercise:  2-3 days/week    Duration of exercise:  15-30 minutes    Taking medications regularly:  Yes    Medication side effects:  None    PHQ-2 Total Score: 0    Additional concerns today:  No      Patient has history of seizures and has not seen a neurologist for few years and has been off of her Dilantin since 1 year.  Last seizures 2010    Patient also has a history of smoking 1 pack a day for 25 years. Not interested in smoking cessation .    H/o recurrent DVT  and bilateral PE , patient had stopped taking warfarin and was diagnosed with new DVT in the left lower extremity 05/19 and and currently on warfarin     Patient also has h/o macrocytosis and has seen hematologist and was started on folic acid but has stopped taking it . and continues to drink alcohol at least 2 drinks a day.          Today's PHQ-2 Score:   PHQ-2 ( 1999 Pfizer) 10/7/2019   Q1: Little interest or pleasure in doing things 0   Q2: Feeling down, depressed or hopeless 0   PHQ-2 Score 0   Q1: Little interest or pleasure in doing things Not at all   Q2: Feeling down, depressed or hopeless Not at all   PHQ-2 Score 0       Abuse: Current or Past(Physical, Sexual or Emotional)- No  Do you feel safe in your environment? Yes      Past Medical History:   Diagnosis Date     DVT (deep venous thrombosis) (H)     grey LE      Epilepsy (H)     2004 last seizure     Pulmonary emboli (H)          Past Surgical History:   Procedure Laterality Date     C NONSPECIFIC PROCEDURE  1997    R ankle fracture repair     C NONSPECIFIC PROCEDURE  2004    R ankle surgery for ligament injury, also tibia injury     HC CLOSED TX  CALCANEAL FX W/O MANIPULATION  2004    fracture of calcaneus     HC COLP CERVIX/UPPER VAGINA  2005    cryotherapy       Current Outpatient Medications   Medication Sig Dispense Refill     warfarin (COUMADIN) 2 MG tablet Take 2 tablets (4 mg) Mon, Sat and 1 tablet (2 mg) Sun, Tu, We, Th, Fri or as directed by INR Clinic 60 tablet 0       Social History     Tobacco Use     Smoking status: Current Every Day Smoker     Packs/day: 1.00     Years: 15.00     Pack years: 15.00     Types: Cigarettes     Smokeless tobacco: Never Used     Tobacco comment: 1 ppd    Substance Use Topics     Alcohol use: Yes     Alcohol/week: 1.0 - 2.0 standard drinks     Types: 1 - 2 Standard drinks or equivalent per week     Drinks per session: 1 or 2     Comment: 12 drinks per week     If you drink alcohol do you typically have >3 drinks per day or >7 drinks per week? No       Reviewed orders with patient.  Reviewed health maintenance and updated orders accordingly - Yes     History of abnormal Pap smear: YES - updated in Problem List and Health Maintenance accordingly  PAP / HPV 1/12/2007   PAP ASC-H(A)     Reviewed and updated as needed this visit by clinical staff  Tobacco  Allergies  Meds  Med Hx  Surg Hx  Fam Hx  Soc Hx        Reviewed and updated as needed this visit by Provider            Review of Systems   Constitutional: Negative for chills and fever.   HENT: Negative for congestion, ear pain, hearing loss and sore throat.    Eyes: Negative for pain and visual disturbance.   Respiratory: Negative for cough and shortness of breath.    Cardiovascular: Negative for chest pain, palpitations and peripheral edema.   Gastrointestinal: Negative for abdominal pain, constipation, diarrhea, heartburn, hematochezia and nausea.   Breasts:  Negative for tenderness, breast mass and discharge.   Genitourinary: Negative for dysuria, frequency, genital sores, hematuria, pelvic pain, urgency, vaginal bleeding and vaginal discharge.  "  Musculoskeletal: Negative for arthralgias, joint swelling and myalgias.   Skin: Negative for rash.   Neurological: Negative for dizziness, weakness, headaches and paresthesias.   Psychiatric/Behavioral: Negative for mood changes. The patient is not nervous/anxious.       OBJECTIVE:   /78   Pulse 94   Temp 97.7  F (36.5  C) (Oral)   Resp 20   Ht 1.626 m (5' 4\")   Wt 71.8 kg (158 lb 6.4 oz)   SpO2 98%   BMI 27.19 kg/m     Physical Exam  GENERAL: healthy, alert and no distress  EYES: Eyes grossly normal to inspection, PERRL and conjunctivae and sclerae normal  HENT: ear canals and TM's normal, nose and mouth without ulcers or lesions  NECK: no adenopathy, no asymmetry, masses, or scars and thyroid normal to palpation  RESP: lungs clear to auscultation - no rales, rhonchi or wheezes  BREAST: normal without masses, tenderness or nipple discharge and no palpable axillary masses or adenopathy  CV: regular rate and rhythm,  no peripheral edema and peripheral pulses strong  ABDOMEN: soft, nontender, Hepatomegaly noted, bowel sounds normal  MS: chronic rt ankle edema, Trace LE edema no calf tenderness grey   ; pt declined   NEURO: Normal strength and tone, mentation intact and speech normal  PSYCH: mentation appears normal, affect normal/bright        ASSESSMENT/PLAN:     (Z00.00) Routine history and physical examination of adult  (primary encounter diagnosis)  Plan: CBC with platelets differential, Comprehensive         metabolic panel, Lipid panel reflex to direct         LDL Fasting, TSH with free T4 reflex            (R56.9) Convulsions, unspecified convulsion type (H)  Plan:off of dilantin since 1 yr. Referred to neurologist  NEUROLOGY ADULT REFERRAL       (I26.99) PE (pulmonary thromboembolism) (H)  Plan: on warfarin     (D75.89) Macrocytosis  Plan: check CBC, pt has stopped taking folic acid, MCV/MCH has increased since before, referred to hematologist       (R16.0) Hepatomegaly  Plan: US Abdomen " "Complete.pt was told I will contact her after results and proceed accordingly.            (I86.990) Deep vein thrombosis (DVT) of other vein of both lower extremities, unspecified chronicity (H)  Plan: on warfarin     Pt was advised to schedule for pap.  Patient states she drinks about 12 drinks per week, , patient was advised to reduce alcohol intake, and recommendation is not more than 1 drink per day  or not more than 7 drinks per week for women    COUNSELING:  Reviewed preventive health counseling, as reflected in patient instructions       Regular exercise       Healthy diet/nutrition    Estimated body mass index is 27.19 kg/m  as calculated from the following:    Height as of 7/19/18: 1.626 m (5' 4\").    Weight as of this encounter: 71.8 kg (158 lb 6.4 oz).    Weight management plan: Discussed healthy diet and exercise guidelines     reports that she has been smoking cigarettes. She has a 15.00 pack-year smoking history. She has never used smokeless tobacco.  Tobacco Cessation Action Plan: Information offered: Patient not interested at this time    Counseling Resources:  ATP IV Guidelines  Pooled Cohorts Equation Calculator  Breast Cancer Risk Calculator  FRAX Risk Assessment  ICSI Preventive Guidelines  Dietary Guidelines for Americans, 2010  USDA's MyPlate  ASA Prophylaxis  Lung CA Screening    Hamzah Thomas MD  [facility]  "

## 2019-10-07 NOTE — NURSING NOTE
"/78   Pulse 144   Temp 97.7  F (36.5  C) (Oral)   Resp 20   Ht 1.626 m (5' 4\")   Wt 71.8 kg (158 lb 6.4 oz)   SpO2 98%   BMI 27.19 kg/m    Patient in for annual Female Physical.  Dalia Ferguson CMA    "

## 2019-10-08 ENCOUNTER — TELEPHONE (OUTPATIENT)
Dept: ANTICOAGULATION | Facility: CLINIC | Age: 44
End: 2019-10-08

## 2019-10-08 ENCOUNTER — TELEPHONE (OUTPATIENT)
Dept: ONCOLOGY | Facility: CLINIC | Age: 44
End: 2019-10-08

## 2019-10-08 DIAGNOSIS — I82.409 RECURRENT DEEP VENOUS THROMBOSIS (H): ICD-10-CM

## 2019-10-08 DIAGNOSIS — I26.99 PE (PULMONARY THROMBOEMBOLISM) (H): Primary | ICD-10-CM

## 2019-10-08 LAB
ALBUMIN SERPL-MCNC: 3 G/DL (ref 3.4–5)
ALP SERPL-CCNC: 202 U/L (ref 40–150)
ALT SERPL W P-5'-P-CCNC: 42 U/L (ref 0–50)
ANION GAP SERPL CALCULATED.3IONS-SCNC: 9 MMOL/L (ref 3–14)
AST SERPL W P-5'-P-CCNC: 98 U/L (ref 0–45)
BILIRUB SERPL-MCNC: 1.2 MG/DL (ref 0.2–1.3)
BUN SERPL-MCNC: 6 MG/DL (ref 7–30)
CALCIUM SERPL-MCNC: 8.6 MG/DL (ref 8.5–10.1)
CHLORIDE SERPL-SCNC: 101 MMOL/L (ref 94–109)
CHOLEST SERPL-MCNC: 128 MG/DL
CO2 SERPL-SCNC: 26 MMOL/L (ref 20–32)
CREAT SERPL-MCNC: 0.58 MG/DL (ref 0.52–1.04)
GFR SERPL CREATININE-BSD FRML MDRD: >90 ML/MIN/{1.73_M2}
GLUCOSE SERPL-MCNC: 98 MG/DL (ref 70–99)
HDLC SERPL-MCNC: 65 MG/DL
LDLC SERPL CALC-MCNC: 41 MG/DL
NONHDLC SERPL-MCNC: 63 MG/DL
POTASSIUM SERPL-SCNC: 4.1 MMOL/L (ref 3.4–5.3)
PROT SERPL-MCNC: 6.7 G/DL (ref 6.8–8.8)
SODIUM SERPL-SCNC: 136 MMOL/L (ref 133–144)
TRIGL SERPL-MCNC: 108 MG/DL
TSH SERPL DL<=0.005 MIU/L-ACNC: 2.1 MU/L (ref 0.4–4)

## 2019-10-08 NOTE — TELEPHONE ENCOUNTER
For insurance purposes, an annual INR referral is required. Please complete and sign the order then route [facility] to [facility]. Latosha Perdomo RN

## 2019-10-08 NOTE — TELEPHONE ENCOUNTER
ONCOLOGY INTAKE: Records Information      APPT INFORMATION:  Referring provider:  Dr. Robert Thomas  Referring provider s clinic:  [facility]  Reason for visit/diagnosis:  Macrocytic [D75.89]  Has patient been notified of appointment date and time?: NA    RECORDS INFORMATION:  Were the records received with the referral (via [employer])? Internal Referral    ADDITIONAL INFORMATION:  Left VM with hours and phone. Letter sent for follow up. Referral in Epic

## 2019-10-14 ENCOUNTER — HOSPITAL ENCOUNTER (OUTPATIENT)
Dept: ULTRASOUND IMAGING | Facility: CLINIC | Age: 44
Discharge: HOME | End: 2019-10-14
Attending: INTERNAL MEDICINE | Admitting: INTERNAL MEDICINE
Payer: COMMERCIAL

## 2019-10-14 DIAGNOSIS — R16.0 HEPATOMEGALY: ICD-10-CM

## 2019-10-14 PROCEDURE — 76700 US EXAM ABDOM COMPLETE: CPT

## 2019-10-15 ENCOUNTER — TELEPHONE (OUTPATIENT)
Dept: INTERNAL MEDICINE | Facility: CLINIC | Age: 44
End: 2019-10-15

## 2019-10-15 DIAGNOSIS — R79.89 ELEVATED LIVER FUNCTION TESTS: Primary | ICD-10-CM

## 2019-10-16 NOTE — TELEPHONE ENCOUNTER
Liver US - Diffuse fatty infiltration of the liver and possible hepatomegaly. Please advise to follow low fat diet and  exercise.  Please also check with patient if she has read her [employer] message about her lab results especially liver test results

## 2019-10-22 ENCOUNTER — ANTICOAGULATION THERAPY VISIT (OUTPATIENT)
Dept: ANTICOAGULATION | Facility: CLINIC | Age: 44
End: 2019-10-22
Payer: COMMERCIAL

## 2019-10-22 DIAGNOSIS — Z79.01 LONG TERM CURRENT USE OF ANTICOAGULANT THERAPY: ICD-10-CM

## 2019-10-22 DIAGNOSIS — I82.409 RECURRENT DEEP VENOUS THROMBOSIS (H): ICD-10-CM

## 2019-10-22 DIAGNOSIS — I26.99 PE (PULMONARY THROMBOEMBOLISM) (H): ICD-10-CM

## 2019-10-22 LAB — INR POINT OF CARE: 1.3 (ref 0.86–1.14)

## 2019-10-22 PROCEDURE — 85610 PROTHROMBIN TIME: CPT | Mod: QW

## 2019-10-22 PROCEDURE — 36416 COLLJ CAPILLARY BLOOD SPEC: CPT

## 2019-10-22 PROCEDURE — 99207 ZZC NO CHARGE NURSE ONLY: CPT

## 2019-10-22 NOTE — PROGRESS NOTES
ANTICOAGULATION FOLLOW-UP CLINIC VISIT    Patient Name:  Nelsy Cota  Date:  10/22/2019  Contact Type:  Face to Face    SUBJECTIVE:  Patient Findings     Positives:   Change in medications (Patient reports has not been taking any supplements for about a week. )    Comments:   The patient was assessed for diet and activity level changes, missed or extra doses, bruising or bleeding, with no problem findings.  Patient denies any identifiable changes that caused the subtherapeutic INR.           Clinical Outcomes     Comments:   The patient was assessed for diet and activity level changes, missed or extra doses, bruising or bleeding, with no problem findings.  Patient denies any identifiable changes that caused the subtherapeutic INR.              OBJECTIVE    INR Protime   Date Value Ref Range Status   10/22/2019 1.3 (A) 0.86 - 1.14 Final       ASSESSMENT / PLAN  INR assessment SUB    Recheck INR In: 1 WEEK    INR Location Clinic      Anticoagulation Summary  As of 10/22/2019    INR goal:   2.0-3.0   TTR:   48.1 % (1.2 y)   INR used for dosin.3! (10/22/2019)   Warfarin maintenance plan:   4 mg (2 mg x 2) every Mon, Sat; 2 mg (2 mg x 1) all other days   Full warfarin instructions:   10/22: 4 mg; 10/23: 4 mg; Otherwise 4 mg every Mon, Sat; 2 mg all other days   Weekly warfarin total:   18 mg   Plan last modified:   Vania Morse RN (2019)   Next INR check:   10/29/2019   Target end date:       Indications    Long-term (current) use of anticoagulants [Z79.01] [Z79.01]  PE (pulmonary thromboembolism) (H) [I26.99]  Recurrent deep venous thrombosis (H) [I82.409]             Anticoagulation Episode Summary     INR check location:       Preferred lab:       Send INR reminders to:   [facility]    Comments:         Anticoagulation Care Providers     Provider Role Specialty Phone number    Hamzah Thomas MD [facility] Internal Medicine [phone]            See the Encounter Report  to view Anticoagulation Flowsheet and Dosing Calendar (Go to Encounters tab in chart review, and find the Anticoagulation Therapy Visit)    Dosage adjustment made based on physician directed care plan.    Latosha Perdomo RN

## 2019-10-29 ENCOUNTER — ANTICOAGULATION THERAPY VISIT (OUTPATIENT)
Dept: ANTICOAGULATION | Facility: CLINIC | Age: 44
End: 2019-10-29
Payer: COMMERCIAL

## 2019-10-29 DIAGNOSIS — I82.409 RECURRENT DEEP VENOUS THROMBOSIS (H): ICD-10-CM

## 2019-10-29 DIAGNOSIS — I26.99 PE (PULMONARY THROMBOEMBOLISM) (H): ICD-10-CM

## 2019-10-29 DIAGNOSIS — Z79.01 LONG TERM CURRENT USE OF ANTICOAGULANT THERAPY: ICD-10-CM

## 2019-10-29 LAB — INR POINT OF CARE: 1.2 (ref 0.86–1.14)

## 2019-10-29 PROCEDURE — 36416 COLLJ CAPILLARY BLOOD SPEC: CPT

## 2019-10-29 PROCEDURE — 99207 ZZC NO CHARGE NURSE ONLY: CPT

## 2019-10-29 PROCEDURE — 85610 PROTHROMBIN TIME: CPT | Mod: QW

## 2019-10-29 RX ORDER — WARFARIN SODIUM 2 MG/1
TABLET ORAL
Qty: 160 TABLET | Refills: 0 | Status: SHIPPED | OUTPATIENT
Start: 2019-10-29 | End: 2020-02-27

## 2019-10-29 NOTE — PROGRESS NOTES
ANTICOAGULATION FOLLOW-UP CLINIC VISIT    Patient Name:  Nelsy Cota  Date:  10/29/2019  Contact Type:  Face to Face    SUBJECTIVE:  Patient Findings     Positives:   Change in alcohol use (Patient reports has decreased alcohol intake. )    Comments:   The patient was assessed for diet, medication, and activity level changes, missed or extra doses, bruising or bleeding, with no problem findings.  Patient denies any identifiable changes that caused the subtherapeutic INR.   Patient is not able to come prior to 19, not able to come in earlier than 430.        Clinical Outcomes     Comments:   The patient was assessed for diet, medication, and activity level changes, missed or extra doses, bruising or bleeding, with no problem findings.  Patient denies any identifiable changes that caused the subtherapeutic INR.   Patient is not able to come prior to 19, not able to come in earlier than 430.           OBJECTIVE    INR Protime   Date Value Ref Range Status   10/29/2019 1.2 (A) 0.86 - 1.14 Final       ASSESSMENT / PLAN  INR assessment SUB    Recheck INR In: 9 DAYS    INR Location Clinic      Anticoagulation Summary  As of 10/29/2019    INR goal:   2.0-3.0   TTR:   47.4 % (1.2 y)   INR used for dosin.2! (10/29/2019)   Warfarin maintenance plan:   4 mg (2 mg x 2) every Mon, Sat; 2 mg (2 mg x 1) all other days   Full warfarin instructions:   10/29: 4 mg; 10/30: 4 mg; 10/31: 4 mg; : 4 mg; : 4 mg; : 4 mg; Otherwise 4 mg every Mon, Sat; 2 mg all other days   Weekly warfarin total:   18 mg   Plan last modified:   Vania Morse RN (2019)   Next INR check:   2019   Target end date:       Indications    Long-term (current) use of anticoagulants [Z79.01] [Z79.01]  PE (pulmonary thromboembolism) (H) [I26.99]  Recurrent deep venous thrombosis (H) [I82.409]             Anticoagulation Episode Summary     INR check location:       Preferred lab:       Send INR reminders to:   [facility]  [facility]    Comments:         Anticoagulation Care Providers     Provider Role Specialty Phone number    Hamzah Thomas MD [facility] Internal Medicine [phone]            See the Encounter Report to view Anticoagulation Flowsheet and Dosing Calendar (Go to Encounters tab in chart review, and find the Anticoagulation Therapy Visit)    Dosage adjustment made based on physician directed care plan.    Latosha Perdomo RN

## 2019-11-07 ENCOUNTER — ANTICOAGULATION THERAPY VISIT (OUTPATIENT)
Dept: ANTICOAGULATION | Facility: CLINIC | Age: 44
End: 2019-11-07
Payer: COMMERCIAL

## 2019-11-07 DIAGNOSIS — I26.99 PE (PULMONARY THROMBOEMBOLISM) (H): ICD-10-CM

## 2019-11-07 DIAGNOSIS — I82.409 RECURRENT DEEP VENOUS THROMBOSIS (H): ICD-10-CM

## 2019-11-07 DIAGNOSIS — Z79.01 LONG TERM CURRENT USE OF ANTICOAGULANT THERAPY: ICD-10-CM

## 2019-11-07 LAB — INR POINT OF CARE: 2.7 (ref 0.86–1.14)

## 2019-11-07 PROCEDURE — 36416 COLLJ CAPILLARY BLOOD SPEC: CPT

## 2019-11-07 PROCEDURE — 85610 PROTHROMBIN TIME: CPT | Mod: QW

## 2019-11-07 PROCEDURE — 99207 ZZC NO CHARGE NURSE ONLY: CPT

## 2019-11-07 NOTE — PROGRESS NOTES
ANTICOAGULATION FOLLOW-UP CLINIC VISIT    Patient Name:  Nelsy Cota  Date:  2019  Contact Type:  Face to Face    SUBJECTIVE:  Patient Findings     Comments:   The patient was assessed for diet, medication, and activity level changes, missed or extra doses, bruising or bleeding, with no problem findings.          Clinical Outcomes     Negatives:   Major bleeding event, Thromboembolic event, Anticoagulation-related hospital admission, Anticoagulation-related ED visit, Anticoagulation-related fatality    Comments:   The patient was assessed for diet, medication, and activity level changes, missed or extra doses, bruising or bleeding, with no problem findings.             OBJECTIVE    INR Protime   Date Value Ref Range Status   2019 2.7 (A) 0.86 - 1.14 Final       ASSESSMENT / PLAN  INR assessment THER    Recheck INR In: 2 WEEKS    INR Location Clinic      Anticoagulation Summary  As of 2019    INR goal:   2.0-3.0   TTR:   47.4 % (1.3 y)   INR used for dosin.7 (2019)   Warfarin maintenance plan:   2 mg (2 mg x 1) every Sun, Thu, Fri; 4 mg (2 mg x 2) all other days   Full warfarin instructions:   : 4 mg; 11/15: 4 mg; Otherwise 2 mg every Sun, Thu, Fri; 4 mg all other days   Weekly warfarin total:   22 mg   Plan last modified:   Latosha Perdomo RN (2019)   Next INR check:   2019   Target end date:       Indications    Long-term (current) use of anticoagulants [Z79.01] [Z79.01]  PE (pulmonary thromboembolism) (H) [I26.99]  Recurrent deep venous thrombosis (H) [I82.409]             Anticoagulation Episode Summary     INR check location:       Preferred lab:       Send INR reminders to:   [facility]    Comments:         Anticoagulation Care Providers     Provider Role Specialty Phone number    Hamzah Thomas MD [facility] Internal Medicine [phone]            See the Encounter Report to view Anticoagulation Flowsheet and Dosing Calendar (Go to  Encounters tab in chart review, and find the Anticoagulation Therapy Visit)    Dosage adjustment made based on physician directed care plan.    Latosha Perdomo RN

## 2019-11-21 ENCOUNTER — ANTICOAGULATION THERAPY VISIT (OUTPATIENT)
Dept: ANTICOAGULATION | Facility: CLINIC | Age: 44
End: 2019-11-21
Payer: COMMERCIAL

## 2019-11-21 DIAGNOSIS — Z79.01 LONG TERM CURRENT USE OF ANTICOAGULANT THERAPY: ICD-10-CM

## 2019-11-21 DIAGNOSIS — I26.99 PE (PULMONARY THROMBOEMBOLISM) (H): ICD-10-CM

## 2019-11-21 DIAGNOSIS — I82.409 RECURRENT DEEP VENOUS THROMBOSIS (H): ICD-10-CM

## 2019-11-21 LAB — INR POINT OF CARE: 1.7 (ref 0.86–1.14)

## 2019-11-21 PROCEDURE — 99207 ZZC NO CHARGE NURSE ONLY: CPT

## 2019-11-21 PROCEDURE — 36416 COLLJ CAPILLARY BLOOD SPEC: CPT

## 2019-11-21 PROCEDURE — 85610 PROTHROMBIN TIME: CPT | Mod: QW

## 2019-11-21 NOTE — PROGRESS NOTES
ANTICOAGULATION FOLLOW-UP CLINIC VISIT    Patient Name:  Nelsy Cota  Date:  2019  Contact Type:  Face to Face    SUBJECTIVE:  Patient Findings     Comments:   The patient was assessed for diet, medication, and activity level changes, missed or extra doses, bruising or bleeding, with no problem findings.  Patient denies any identifiable changes that caused the subtherapeutic INR.           Clinical Outcomes     Negatives:   Major bleeding event, Thromboembolic event, Anticoagulation-related hospital admission, Anticoagulation-related ED visit, Anticoagulation-related fatality    Comments:   The patient was assessed for diet, medication, and activity level changes, missed or extra doses, bruising or bleeding, with no problem findings.  Patient denies any identifiable changes that caused the subtherapeutic INR.              OBJECTIVE    INR Protime   Date Value Ref Range Status   2019 1.7 (A) 0.86 - 1.14 Final       ASSESSMENT / PLAN  INR assessment SUB    Recheck INR In: 2 WEEKS    INR Location Clinic      Anticoagulation Summary  As of 2019    INR goal:   2.0-3.0   TTR:   50.7 % (6.8 mo)   INR used for dosin.7! (2019)   Warfarin maintenance plan:   2 mg (2 mg x 1) every Sun; 4 mg (2 mg x 2) all other days   Full warfarin instructions:   2 mg every Sun; 4 mg all other days   Weekly warfarin total:   26 mg   Plan last modified:   Latosha Perdomo RN (2019)   Next INR check:   2019   Target end date:       Indications    Long-term (current) use of anticoagulants [Z79.01] [Z79.01]  PE (pulmonary thromboembolism) (H) [I26.99]  Recurrent deep venous thrombosis (H) [I82.409]             Anticoagulation Episode Summary     INR check location:       Preferred lab:       Send INR reminders to:   [facility]    Comments:         Anticoagulation Care Providers     Provider Role Specialty Phone number    Hamzah Thomas MD Responsible Internal Medicine  [phone]            See the Encounter Report to view Anticoagulation Flowsheet and Dosing Calendar (Go to Encounters tab in chart review, and find the Anticoagulation Therapy Visit)    Dosage adjustment made based on physician directed care plan.    Latosha Perdomo RN

## 2019-12-05 ENCOUNTER — ANTICOAGULATION THERAPY VISIT (OUTPATIENT)
Dept: ANTICOAGULATION | Facility: CLINIC | Age: 44
End: 2019-12-05
Payer: COMMERCIAL

## 2019-12-05 DIAGNOSIS — I82.409 RECURRENT DEEP VENOUS THROMBOSIS (H): ICD-10-CM

## 2019-12-05 DIAGNOSIS — I26.99 PE (PULMONARY THROMBOEMBOLISM) (H): ICD-10-CM

## 2019-12-05 DIAGNOSIS — Z79.01 LONG TERM CURRENT USE OF ANTICOAGULANT THERAPY: ICD-10-CM

## 2019-12-05 LAB — INR POINT OF CARE: 1.6 (ref 0.86–1.14)

## 2019-12-05 PROCEDURE — 99207 ZZC NO CHARGE NURSE ONLY: CPT

## 2019-12-05 PROCEDURE — 85610 PROTHROMBIN TIME: CPT | Mod: QW

## 2019-12-05 PROCEDURE — 36416 COLLJ CAPILLARY BLOOD SPEC: CPT

## 2019-12-05 NOTE — PROGRESS NOTES
ANTICOAGULATION FOLLOW-UP CLINIC VISIT    Patient Name:  Nelsy Cota  Date:  2019  Contact Type:  Face to Face    SUBJECTIVE:  Patient Findings     Positives:   Change in health (Patient reports edema in her feet has decreased. )    Comments:   The patient was assessed for diet, medication, and activity level changes, missed or extra doses, bruising or bleeding, with no problem findings.  Patient denies any identifiable changes that caused the subtherapeutic INR.           Clinical Outcomes     Comments:   The patient was assessed for diet, medication, and activity level changes, missed or extra doses, bruising or bleeding, with no problem findings.  Patient denies any identifiable changes that caused the subtherapeutic INR.              OBJECTIVE    INR Protime   Date Value Ref Range Status   2019 1.6 (A) 0.86 - 1.14 Final       ASSESSMENT / PLAN  INR assessment SUB    Recheck INR In: 2 WEEKS    INR Location Clinic      Anticoagulation Summary  As of 2019    INR goal:   2.0-3.0   TTR:   47.4 % (7.2 mo)   INR used for dosin.6! (2019)   Warfarin maintenance plan:   2 mg (2 mg x 1) every Sun; 4 mg (2 mg x 2) all other days   Full warfarin instructions:   : 6 mg; : 4 mg; : 5 mg; 12/15: 4 mg; Otherwise 2 mg every Sun; 4 mg all other days   Weekly warfarin total:   26 mg   Plan last modified:   Latosha Perdomo RN (2019)   Next INR check:   2019   Target end date:       Indications    Long-term (current) use of anticoagulants [Z79.01] [Z79.01]  PE (pulmonary thromboembolism) (H) [I26.99]  Recurrent deep venous thrombosis (H) [I82.409]             Anticoagulation Episode Summary     INR check location:       Preferred lab:       Send INR reminders to:   [facility]    Comments:         Anticoagulation Care Providers     Provider Role Specialty Phone number    Hamzah Thomas MD [facility] Internal Medicine [phone]            See the  Encounter Report to view Anticoagulation Flowsheet and Dosing Calendar (Go to Encounters tab in chart review, and find the Anticoagulation Therapy Visit)    Dosage adjustment made based on physician directed care plan.    Latosha Perdomo RN

## 2019-12-19 ENCOUNTER — ANTICOAGULATION THERAPY VISIT (OUTPATIENT)
Dept: ANTICOAGULATION | Facility: CLINIC | Age: 44
End: 2019-12-19
Payer: COMMERCIAL

## 2019-12-19 DIAGNOSIS — Z79.01 LONG TERM CURRENT USE OF ANTICOAGULANT THERAPY: ICD-10-CM

## 2019-12-19 DIAGNOSIS — I26.99 PE (PULMONARY THROMBOEMBOLISM) (H): ICD-10-CM

## 2019-12-19 DIAGNOSIS — I82.409 RECURRENT DEEP VENOUS THROMBOSIS (H): ICD-10-CM

## 2019-12-19 LAB — INR POINT OF CARE: 2.2 (ref 0.86–1.14)

## 2019-12-19 PROCEDURE — 36416 COLLJ CAPILLARY BLOOD SPEC: CPT

## 2019-12-19 PROCEDURE — 85610 PROTHROMBIN TIME: CPT | Mod: QW

## 2019-12-19 PROCEDURE — 99207 ZZC NO CHARGE NURSE ONLY: CPT

## 2019-12-19 NOTE — PROGRESS NOTES
ANTICOAGULATION FOLLOW-UP CLINIC VISIT    Patient Name:  Nelsy Cota  Date:  2019  Contact Type:  Face to Face    SUBJECTIVE:  Patient Findings     Comments:   The patient was assessed for diet, medication, and activity level changes, missed or extra doses, bruising or bleeding, with no problem findings.          Clinical Outcomes     Negatives:   Major bleeding event, Thromboembolic event, Anticoagulation-related hospital admission, Anticoagulation-related ED visit, Anticoagulation-related fatality    Comments:   The patient was assessed for diet, medication, and activity level changes, missed or extra doses, bruising or bleeding, with no problem findings.             OBJECTIVE    INR Protime   Date Value Ref Range Status   2019 2.2 (A) 0.86 - 1.14 Final       ASSESSMENT / PLAN  INR assessment THER    Recheck INR In: 2 WEEKS    INR Location Clinic      Anticoagulation Summary  As of 2019    INR goal:   2.0-3.0   TTR:   46.6 % (7.7 mo)   INR used for dosin.2 (2019)   Warfarin maintenance plan:   5 mg (2 mg x 2.5) every Thu; 4 mg (2 mg x 2) all other days   Full warfarin instructions:   5 mg every Thu; 4 mg all other days   Weekly warfarin total:   29 mg   Plan last modified:   Latosha Perdomo RN (2019)   Next INR check:   2020   Target end date:       Indications    Long-term (current) use of anticoagulants [Z79.01] [Z79.01]  PE (pulmonary thromboembolism) (H) [I26.99]  Recurrent deep venous thrombosis (H) [I82.409]             Anticoagulation Episode Summary     INR check location:       Preferred lab:       Send INR reminders to:   [facility]    Comments:         Anticoagulation Care Providers     Provider Role Specialty Phone number    Hamzah Thomas MD [facility] Internal Medicine [phone]            See the Encounter Report to view Anticoagulation Flowsheet and Dosing Calendar (Go to Encounters tab in chart review, and find the  Anticoagulation Therapy Visit)    Dosage adjustment made based on physician directed care plan.    Latosha Perdomo RN

## 2020-01-02 ENCOUNTER — ANTICOAGULATION THERAPY VISIT (OUTPATIENT)
Dept: ANTICOAGULATION | Facility: CLINIC | Age: 45
End: 2020-01-02
Payer: COMMERCIAL

## 2020-01-02 DIAGNOSIS — Z79.01 LONG TERM CURRENT USE OF ANTICOAGULANT THERAPY: ICD-10-CM

## 2020-01-02 DIAGNOSIS — I26.99 PE (PULMONARY THROMBOEMBOLISM) (H): ICD-10-CM

## 2020-01-02 DIAGNOSIS — I82.409 RECURRENT DEEP VENOUS THROMBOSIS (H): ICD-10-CM

## 2020-01-02 LAB — INR POINT OF CARE: 3.3 (ref 0.86–1.14)

## 2020-01-02 PROCEDURE — 85610 PROTHROMBIN TIME: CPT | Mod: QW

## 2020-01-02 PROCEDURE — 36416 COLLJ CAPILLARY BLOOD SPEC: CPT

## 2020-01-02 PROCEDURE — 99207 ZZC NO CHARGE NURSE ONLY: CPT

## 2020-01-02 NOTE — PROGRESS NOTES
ANTICOAGULATION FOLLOW-UP CLINIC VISIT    Patient Name:  Nelsy Cota  Date:  1/2/2020  Contact Type:  Face to Face    SUBJECTIVE:  Patient Findings     Positives:   Extra doses (recent warfarin dose increase)    Comments:   The patient was assessed for diet, medication, and activity level changes, missed or extra doses, bruising or bleeding, with no problem findings.          Clinical Outcomes     Negatives:   Major bleeding event, Thromboembolic event, Anticoagulation-related hospital admission, Anticoagulation-related ED visit, Anticoagulation-related fatality    Comments:   The patient was assessed for diet, medication, and activity level changes, missed or extra doses, bruising or bleeding, with no problem findings.             OBJECTIVE    INR Protime   Date Value Ref Range Status   01/02/2020 3.3 (A) 0.86 - 1.14 Final       ASSESSMENT / PLAN  INR assessment SUPRA    Recheck INR In: 2 WEEKS    INR Location Clinic      Anticoagulation Summary  As of 1/2/2020    INR goal:   2.0-3.0   TTR:   48.1 % (8.2 mo)   INR used for dosing:   3.3! (1/2/2020)   Warfarin maintenance plan:   5 mg (2 mg x 2.5) every Thu; 4 mg (2 mg x 2) all other days   Full warfarin instructions:   1/2: 4 mg; 1/9: 4 mg; Otherwise 5 mg every Thu; 4 mg all other days   Weekly warfarin total:   29 mg   Plan last modified:   Latosha Perdomo RN (12/19/2019)   Next INR check:   1/16/2020   Target end date:       Indications    Long-term (current) use of anticoagulants [Z79.01] [Z79.01]  PE (pulmonary thromboembolism) (H) [I26.99]  Recurrent deep venous thrombosis (H) [I82.409]             Anticoagulation Episode Summary     INR check location:       Preferred lab:       Send INR reminders to:   [facility]    Comments:         Anticoagulation Care Providers     Provider Role Specialty Phone number    Hamzah Thomas MD [facility] Internal Medicine [phone]            See the Encounter Report to view  Anticoagulation Flowsheet and Dosing Calendar (Go to Encounters tab in chart review, and find the Anticoagulation Therapy Visit)    Dosage adjustment made based on physician directed care plan.    Latosha Perdomo RN

## 2020-02-12 ENCOUNTER — TELEPHONE (OUTPATIENT)
Dept: ANTICOAGULATION | Facility: CLINIC | Age: 45
End: 2020-02-12

## 2020-02-12 NOTE — TELEPHONE ENCOUNTER
Left voicemail on patient's cell phone reminding her that she is due for an INR appointment.  Vania Morse RN

## 2020-02-27 ENCOUNTER — ANTICOAGULATION THERAPY VISIT (OUTPATIENT)
Dept: ANTICOAGULATION | Facility: CLINIC | Age: 45
End: 2020-02-27
Payer: COMMERCIAL

## 2020-02-27 DIAGNOSIS — I26.99 PE (PULMONARY THROMBOEMBOLISM) (H): ICD-10-CM

## 2020-02-27 DIAGNOSIS — Z79.01 LONG TERM CURRENT USE OF ANTICOAGULANT THERAPY: ICD-10-CM

## 2020-02-27 DIAGNOSIS — I82.409 RECURRENT DEEP VENOUS THROMBOSIS (H): ICD-10-CM

## 2020-02-27 LAB — INR POINT OF CARE: 5.1 (ref 0.86–1.14)

## 2020-02-27 PROCEDURE — 99207 ZZC NO CHARGE NURSE ONLY: CPT

## 2020-02-27 PROCEDURE — 36416 COLLJ CAPILLARY BLOOD SPEC: CPT

## 2020-02-27 PROCEDURE — 85610 PROTHROMBIN TIME: CPT | Mod: QW

## 2020-02-27 RX ORDER — WARFARIN SODIUM 2 MG/1
TABLET ORAL
Qty: 160 TABLET | Refills: 0 | Status: SHIPPED | OUTPATIENT
Start: 2020-02-27 | End: 2020-07-01

## 2020-02-27 NOTE — PROGRESS NOTES
ANTICOAGULATION FOLLOW-UP CLINIC VISIT    Patient Name:  Nelsy Cota  Date:  2/27/2020  Contact Type:  Face to Face    SUBJECTIVE:  Patient Findings     Positives:   Missed doses (Patient reports has missed warfarin the last 2 days, was out of warfarin and forgot to call in refill.)    Comments:   The patient was assessed for diet, medication, and activity level changes, extra doses, bruising or bleeding, with no problem findings.  Patient has been under more stress than usual, patient's dad passed away in January.   Patient denies any identifiable changes that caused the supratherapeutic INR. Some signs and symptoms of bleeding include: Nose bleed or cut that does not stop bleeding in 10 minutes, bleeding of the gums, vomiting (will look like coffee grounds) or coughing up blood, unusual, easy or large areas of bruising, increased or unexpected vaginal bleeding or increased menstrual flow, red or black stools, red or orange urine, prolonged or severe headache, pale skin, unusual or constant tiredness.  If you have these please call 911 or seek medical care immediately.             Clinical Outcomes     Comments:   The patient was assessed for diet, medication, and activity level changes, extra doses, bruising or bleeding, with no problem findings.  Patient has been under more stress than usual, patient's dad passed away in January.   Patient denies any identifiable changes that caused the supratherapeutic INR. Some signs and symptoms of bleeding include: Nose bleed or cut that does not stop bleeding in 10 minutes, bleeding of the gums, vomiting (will look like coffee grounds) or coughing up blood, unusual, easy or large areas of bruising, increased or unexpected vaginal bleeding or increased menstrual flow, red or black stools, red or orange urine, prolonged or severe headache, pale skin, unusual or constant tiredness.  If you have these please call 911 or seek medical care immediately.                 OBJECTIVE    INR Protime   Date Value Ref Range Status   2020 5.1 (A) 0.86 - 1.14 Final       ASSESSMENT / PLAN  INR assessment SUPRA    Recheck INR In: 5 DAYS    INR Location Clinic      Anticoagulation Summary  As of 2020    INR goal:   2.0-3.0   TTR:   39.1 % (10 mo)   INR used for dosin.1! (2020)   Warfarin maintenance plan:   5 mg (2 mg x 2.5) every Thu; 4 mg (2 mg x 2) all other days   Full warfarin instructions:   : Hold; : Hold; Otherwise 5 mg every Thu; 4 mg all other days   Weekly warfarin total:   29 mg   Plan last modified:   Latosha Perdomo RN (2019)   Next INR check:   3/3/2020   Target end date:       Indications    Long-term (current) use of anticoagulants [Z79.01] [Z79.01]  PE (pulmonary thromboembolism) (H) [I26.99]  Recurrent deep venous thrombosis (H) [I82.409]             Anticoagulation Episode Summary     INR check location:       Preferred lab:       Send INR reminders to:   [facility]    Comments:         Anticoagulation Care Providers     Provider Role Specialty Phone number    Hamzah Thomas MD [facility] Internal Medicine [phone]            See the Encounter Report to view Anticoagulation Flowsheet and Dosing Calendar (Go to Encounters tab in chart review, and find the Anticoagulation Therapy Visit)    Dosage adjustment made based on physician directed care plan.    Latosha Perdomo, RN

## 2020-03-03 ENCOUNTER — ANTICOAGULATION THERAPY VISIT (OUTPATIENT)
Dept: ANTICOAGULATION | Facility: CLINIC | Age: 45
End: 2020-03-03
Payer: COMMERCIAL

## 2020-03-03 DIAGNOSIS — Z79.01 LONG TERM CURRENT USE OF ANTICOAGULANT THERAPY: ICD-10-CM

## 2020-03-03 DIAGNOSIS — I82.409 RECURRENT DEEP VENOUS THROMBOSIS (H): ICD-10-CM

## 2020-03-03 DIAGNOSIS — I26.99 PE (PULMONARY THROMBOEMBOLISM) (H): ICD-10-CM

## 2020-03-03 LAB — INR POINT OF CARE: 3.1 (ref 0.86–1.14)

## 2020-03-03 PROCEDURE — 99207 ZZC NO CHARGE NURSE ONLY: CPT

## 2020-03-03 PROCEDURE — 36416 COLLJ CAPILLARY BLOOD SPEC: CPT

## 2020-03-03 PROCEDURE — 85610 PROTHROMBIN TIME: CPT | Mod: QW

## 2020-03-03 NOTE — PROGRESS NOTES
ANTICOAGULATION FOLLOW-UP CLINIC VISIT    Patient Name:  Nelsy Cota  Date:  3/3/2020  Contact Type:  Face to Face    SUBJECTIVE:  Patient Findings     Positives:   Missed doses (Warfarin was held 2/27 and 2/28/20 d/t elevated INR)    Comments:   The patient was assessed for diet, medication, and activity level changes, extra doses, bruising or bleeding, with no problem findings.          Clinical Outcomes     Comments:   The patient was assessed for diet, medication, and activity level changes, extra doses, bruising or bleeding, with no problem findings.             OBJECTIVE    INR Protime   Date Value Ref Range Status   03/03/2020 3.1 (A) 0.86 - 1.14 Final       ASSESSMENT / PLAN  INR assessment SUPRA    Recheck INR In: 1 WEEK    INR Location Clinic      Anticoagulation Summary  As of 3/3/2020    INR goal:   2.0-3.0   TTR:   38.5 % (10.2 mo)   INR used for dosing:   3.1! (3/3/2020)   Warfarin maintenance plan:   5 mg (2 mg x 2.5) every Thu; 4 mg (2 mg x 2) all other days   Full warfarin instructions:   3/5: 4 mg; Otherwise 5 mg every Thu; 4 mg all other days   Weekly warfarin total:   29 mg   Plan last modified:   Latosha Perdomo RN (12/19/2019)   Next INR check:   3/10/2020   Target end date:       Indications    Long-term (current) use of anticoagulants [Z79.01] [Z79.01]  PE (pulmonary thromboembolism) (H) [I26.99]  Recurrent deep venous thrombosis (H) [I82.409]             Anticoagulation Episode Summary     INR check location:       Preferred lab:       Send INR reminders to:   [facility]    Comments:         Anticoagulation Care Providers     Provider Role Specialty Phone number    Hamzah Thomas MD Responsible Internal Medicine [phone]            See the Encounter Report to view Anticoagulation Flowsheet and Dosing Calendar (Go to Encounters tab in chart review, and find the Anticoagulation Therapy Visit)    Dosage adjustment made based on physician directed care  plan.    Latosha Perdomo RN

## 2020-03-10 ENCOUNTER — ANTICOAGULATION THERAPY VISIT (OUTPATIENT)
Dept: ANTICOAGULATION | Facility: CLINIC | Age: 45
End: 2020-03-10
Payer: COMMERCIAL

## 2020-03-10 DIAGNOSIS — R79.1 ELEVATED INR: Primary | ICD-10-CM

## 2020-03-10 DIAGNOSIS — Z79.01 LONG TERM CURRENT USE OF ANTICOAGULANT THERAPY: ICD-10-CM

## 2020-03-10 DIAGNOSIS — I82.409 RECURRENT DEEP VENOUS THROMBOSIS (H): ICD-10-CM

## 2020-03-10 DIAGNOSIS — I26.99 PE (PULMONARY THROMBOEMBOLISM) (H): ICD-10-CM

## 2020-03-10 PROCEDURE — 99207 ZZC NO CHARGE NURSE ONLY: CPT

## 2020-03-10 PROCEDURE — 36415 COLL VENOUS BLD VENIPUNCTURE: CPT | Performed by: INTERNAL MEDICINE

## 2020-03-10 PROCEDURE — 85610 PROTHROMBIN TIME: CPT | Performed by: INTERNAL MEDICINE

## 2020-03-10 NOTE — PROGRESS NOTES
ANTICOAGULATION FOLLOW-UP CLINIC VISIT    Patient Name:  Nelsy Cota  Date:  3/10/2020  Contact Type:  Face to Face    SUBJECTIVE:  Patient Findings     Comments:   The patient was assessed for diet, medication, and activity level changes, missed or extra doses, bruising or bleeding, with no problem findings.  Patient denies any identifiable changes that caused the supratherapeutic INR.   Some signs and symptoms of bleeding include: Nose bleed or cut that does not stop bleeding in 10 minutes, bleeding of the gums, vomiting (will look like coffee grounds) or coughing up blood, unusual, easy or large areas of bruising, increased or unexpected vaginal bleeding or increased menstrual flow, red or black stools, red or orange urine, prolonged or severe headache, pale skin, unusual or constant tiredness.  If you have these please call 911 or seek medical care immediately.   POC today was >8, patient sent to lab for venous draw.           Clinical Outcomes     Negatives:   Major bleeding event, Thromboembolic event, Anticoagulation-related hospital admission, Anticoagulation-related ED visit, Anticoagulation-related fatality    Comments:   The patient was assessed for diet, medication, and activity level changes, missed or extra doses, bruising or bleeding, with no problem findings.  Patient denies any identifiable changes that caused the supratherapeutic INR.   Some signs and symptoms of bleeding include: Nose bleed or cut that does not stop bleeding in 10 minutes, bleeding of the gums, vomiting (will look like coffee grounds) or coughing up blood, unusual, easy or large areas of bruising, increased or unexpected vaginal bleeding or increased menstrual flow, red or black stools, red or orange urine, prolonged or severe headache, pale skin, unusual or constant tiredness.  If you have these please call 911 or seek medical care immediately.   POC today was >8, patient sent to lab for venous draw.              OBJECTIVE     INR Protime   Date Value Ref Range Status   03/03/2020 3.1 (A) 0.86 - 1.14 Final       ASSESSMENT / PLAN  INR assessment SUPRA    Recheck INR In: 2 DAYS    INR Location Clinic      Anticoagulation Summary  As of 3/10/2020    INR goal:   2.0-3.0   TTR:   38.5 % (10.2 mo)   INR used for dosing:      Warfarin maintenance plan:   5 mg (2 mg x 2.5) every Thu; 4 mg (2 mg x 2) all other days   Full warfarin instructions:   3/10: Hold; 3/11: Hold; Otherwise 5 mg every Thu; 4 mg all other days   Weekly warfarin total:   29 mg   Plan last modified:   Latosha Perdomo RN (12/19/2019)   Next INR check:   3/12/2020   Target end date:       Indications    Long-term (current) use of anticoagulants [Z79.01] [Z79.01]  PE (pulmonary thromboembolism) (H) [I26.99]  Recurrent deep venous thrombosis (H) [I82.409]             Anticoagulation Episode Summary     INR check location:       Preferred lab:       Send INR reminders to:   [facility]    Comments:         Anticoagulation Care Providers     Provider Role Specialty Phone number    Hamzah Thomas MD Responsible Internal Medicine [phone]            See the Encounter Report to view Anticoagulation Flowsheet and Dosing Calendar (Go to Encounters tab in chart review, and find the Anticoagulation Therapy Visit)    Dosage adjustment made based on physician directed care plan.    Latosha Perdomo, RN

## 2020-03-11 LAB — INR PPP: 6 (ref 0.86–1.14)

## 2020-03-11 NOTE — PROGRESS NOTES
3/11/20 1:20 pm--Called and notified patient of venous draw results.  Will continue to hold warfarin and recheck tomorrow as previously planned.  Patient denies having any bleeding or bruising problems.   Vania Morse RN

## 2020-03-12 ENCOUNTER — ANTICOAGULATION THERAPY VISIT (OUTPATIENT)
Dept: ANTICOAGULATION | Facility: CLINIC | Age: 45
End: 2020-03-12
Payer: COMMERCIAL

## 2020-03-12 DIAGNOSIS — Z79.01 LONG TERM CURRENT USE OF ANTICOAGULANT THERAPY: ICD-10-CM

## 2020-03-12 DIAGNOSIS — I82.409 RECURRENT DEEP VENOUS THROMBOSIS (H): ICD-10-CM

## 2020-03-12 DIAGNOSIS — I26.99 PE (PULMONARY THROMBOEMBOLISM) (H): ICD-10-CM

## 2020-03-12 LAB — INR POINT OF CARE: 4.7 (ref 0.86–1.14)

## 2020-03-12 PROCEDURE — 99207 ZZC NO CHARGE NURSE ONLY: CPT

## 2020-03-12 PROCEDURE — 36416 COLLJ CAPILLARY BLOOD SPEC: CPT

## 2020-03-12 PROCEDURE — 85610 PROTHROMBIN TIME: CPT | Mod: QW

## 2020-03-12 NOTE — PROGRESS NOTES
ANTICOAGULATION FOLLOW-UP CLINIC VISIT    Patient Name:  Nelsy Cota  Date:  3/12/2020  Contact Type:  Face to Face    SUBJECTIVE:  Patient Findings     Comments:   The patient was assessed for diet, medication, and activity level changes, missed or extra doses, bruising or bleeding, with no problem findings.  Patient denies any identifiable changes that caused the supratherapeutic INR.           Clinical Outcomes     Negatives:   Major bleeding event, Thromboembolic event, Anticoagulation-related hospital admission, Anticoagulation-related ED visit, Anticoagulation-related fatality    Comments:   The patient was assessed for diet, medication, and activity level changes, missed or extra doses, bruising or bleeding, with no problem findings.  Patient denies any identifiable changes that caused the supratherapeutic INR.              OBJECTIVE    INR Protime   Date Value Ref Range Status   2020 4.7 (A) 0.86 - 1.14 Final       ASSESSMENT / PLAN  INR assessment SUPRA    Recheck INR In: 5 DAYS    INR Location Clinic      Anticoagulation Summary  As of 3/12/2020    INR goal:   2.0-3.0   TTR:   37.4 % (10.5 mo)   INR used for dosin.7! (3/12/2020)   Warfarin maintenance plan:   5 mg (2 mg x 2.5) every Thu; 4 mg (2 mg x 2) all other days   Full warfarin instructions:   3/12: Hold; 3/13: 2 mg; Otherwise 5 mg every Thu; 4 mg all other days   Weekly warfarin total:   29 mg   Plan last modified:   Latosha Perdomo RN (2019)   Next INR check:   3/17/2020   Target end date:       Indications    Long-term (current) use of anticoagulants [Z79.01] [Z79.01]  PE (pulmonary thromboembolism) (H) [I26.99]  Recurrent deep venous thrombosis (H) [I82.409]             Anticoagulation Episode Summary     INR check location:       Preferred lab:       Send INR reminders to:   [facility]    Comments:         Anticoagulation Care Providers     Provider Role Specialty Phone number    Hamzah Thomas  MD [facility] Internal Medicine [phone]            See the Encounter Report to view Anticoagulation Flowsheet and Dosing Calendar (Go to Encounters tab in chart review, and find the Anticoagulation Therapy Visit)    Dosage adjustment made based on physician directed care plan.    Latosha Perdomo RN

## 2020-03-16 ENCOUNTER — HOSPITAL ENCOUNTER (EMERGENCY)
Facility: CLINIC | Age: 45
Discharge: HOME | End: 2020-03-16
Attending: EMERGENCY MEDICINE | Admitting: EMERGENCY MEDICINE
Payer: COMMERCIAL

## 2020-03-16 ENCOUNTER — APPOINTMENT (OUTPATIENT)
Dept: ULTRASOUND IMAGING | Facility: CLINIC | Age: 45
End: 2020-03-16
Attending: EMERGENCY MEDICINE
Payer: COMMERCIAL

## 2020-03-16 ENCOUNTER — APPOINTMENT (OUTPATIENT)
Dept: GENERAL RADIOLOGY | Facility: CLINIC | Age: 45
End: 2020-03-16
Attending: EMERGENCY MEDICINE
Payer: COMMERCIAL

## 2020-03-16 VITALS
WEIGHT: 157 LBS | DIASTOLIC BLOOD PRESSURE: 65 MMHG | SYSTOLIC BLOOD PRESSURE: 101 MMHG | OXYGEN SATURATION: 100 % | HEIGHT: 64 IN | RESPIRATION RATE: 16 BRPM | TEMPERATURE: 98.5 F | BODY MASS INDEX: 26.8 KG/M2 | HEART RATE: 90 BPM

## 2020-03-16 DIAGNOSIS — R60.0 BILATERAL LOWER EXTREMITY EDEMA: ICD-10-CM

## 2020-03-16 DIAGNOSIS — K82.8 GALLBLADDER SLUDGE: ICD-10-CM

## 2020-03-16 DIAGNOSIS — R07.9 RIGHT-SIDED CHEST PAIN: Primary | ICD-10-CM

## 2020-03-16 DIAGNOSIS — M71.22 BAKER'S CYST OF KNEE, LEFT: ICD-10-CM

## 2020-03-16 DIAGNOSIS — K76.0 HEPATIC STEATOSIS: ICD-10-CM

## 2020-03-16 DIAGNOSIS — R74.01 TRANSAMINITIS: ICD-10-CM

## 2020-03-16 LAB
ALBUMIN SERPL-MCNC: 2.5 G/DL (ref 3.4–5)
ALP SERPL-CCNC: 294 U/L (ref 40–150)
ALT SERPL W P-5'-P-CCNC: 64 U/L (ref 0–50)
ANION GAP SERPL CALCULATED.3IONS-SCNC: 6 MMOL/L (ref 3–14)
AST SERPL W P-5'-P-CCNC: 227 U/L (ref 0–45)
BASOPHILS # BLD AUTO: 0.1 10E9/L (ref 0–0.2)
BASOPHILS NFR BLD AUTO: 0.7 %
BILIRUB DIRECT SERPL-MCNC: 0.5 MG/DL (ref 0–0.2)
BILIRUB SERPL-MCNC: 1 MG/DL (ref 0.2–1.3)
BUN SERPL-MCNC: 6 MG/DL (ref 7–30)
CALCIUM SERPL-MCNC: 8.5 MG/DL (ref 8.5–10.1)
CHLORIDE SERPL-SCNC: 103 MMOL/L (ref 94–109)
CO2 SERPL-SCNC: 27 MMOL/L (ref 20–32)
CREAT SERPL-MCNC: 0.55 MG/DL (ref 0.52–1.04)
D DIMER PPP FEU-MCNC: 0.3 UG/ML FEU (ref 0–0.5)
DIFFERENTIAL METHOD BLD: ABNORMAL
EOSINOPHIL # BLD AUTO: 0.2 10E9/L (ref 0–0.7)
EOSINOPHIL NFR BLD AUTO: 1.8 %
ERYTHROCYTE [DISTWIDTH] IN BLOOD BY AUTOMATED COUNT: 15.6 % (ref 10–15)
GFR SERPL CREATININE-BSD FRML MDRD: >90 ML/MIN/{1.73_M2}
GLUCOSE SERPL-MCNC: 86 MG/DL (ref 70–99)
HCT VFR BLD AUTO: 32.6 % (ref 35–47)
HGB BLD-MCNC: 11.4 G/DL (ref 11.7–15.7)
IMM GRANULOCYTES # BLD: 0 10E9/L (ref 0–0.4)
IMM GRANULOCYTES NFR BLD: 0.3 %
INR PPP: 3.03 (ref 0.86–1.14)
LYMPHOCYTES # BLD AUTO: 2.3 10E9/L (ref 0.8–5.3)
LYMPHOCYTES NFR BLD AUTO: 22.7 %
MCH RBC QN AUTO: 47.5 PG (ref 26.5–33)
MCHC RBC AUTO-ENTMCNC: 35 G/DL (ref 31.5–36.5)
MCV RBC AUTO: 136 FL (ref 78–100)
MONOCYTES # BLD AUTO: 1 10E9/L (ref 0–1.3)
MONOCYTES NFR BLD AUTO: 10.3 %
NEUTROPHILS # BLD AUTO: 6.5 10E9/L (ref 1.6–8.3)
NEUTROPHILS NFR BLD AUTO: 64.2 %
NRBC # BLD AUTO: 0 10*3/UL
NRBC BLD AUTO-RTO: 0 /100
NT-PROBNP SERPL-MCNC: 304 PG/ML (ref 0–450)
PLATELET # BLD AUTO: 435 10E9/L (ref 150–450)
POTASSIUM SERPL-SCNC: 3.9 MMOL/L (ref 3.4–5.3)
PROT SERPL-MCNC: 7.1 G/DL (ref 6.8–8.8)
RBC # BLD AUTO: 2.4 10E12/L (ref 3.8–5.2)
SODIUM SERPL-SCNC: 136 MMOL/L (ref 133–144)
T4 FREE SERPL-MCNC: 1.14 NG/DL (ref 0.76–1.46)
TROPONIN I SERPL-MCNC: <0.015 UG/L (ref 0–0.04)
TSH SERPL DL<=0.005 MIU/L-ACNC: 4.37 MU/L (ref 0.4–4)
WBC # BLD AUTO: 10.1 10E9/L (ref 4–11)

## 2020-03-16 PROCEDURE — 93970 EXTREMITY STUDY: CPT

## 2020-03-16 PROCEDURE — 80048 BASIC METABOLIC PNL TOTAL CA: CPT | Performed by: EMERGENCY MEDICINE

## 2020-03-16 PROCEDURE — 84443 ASSAY THYROID STIM HORMONE: CPT | Performed by: EMERGENCY MEDICINE

## 2020-03-16 PROCEDURE — 85025 COMPLETE CBC W/AUTO DIFF WBC: CPT | Performed by: EMERGENCY MEDICINE

## 2020-03-16 PROCEDURE — 85610 PROTHROMBIN TIME: CPT | Performed by: EMERGENCY MEDICINE

## 2020-03-16 PROCEDURE — 84484 ASSAY OF TROPONIN QUANT: CPT | Performed by: EMERGENCY MEDICINE

## 2020-03-16 PROCEDURE — 99285 EMERGENCY DEPT VISIT HI MDM: CPT | Mod: 25

## 2020-03-16 PROCEDURE — 85379 FIBRIN DEGRADATION QUANT: CPT | Performed by: EMERGENCY MEDICINE

## 2020-03-16 PROCEDURE — 83880 ASSAY OF NATRIURETIC PEPTIDE: CPT | Performed by: EMERGENCY MEDICINE

## 2020-03-16 PROCEDURE — 93005 ELECTROCARDIOGRAM TRACING: CPT

## 2020-03-16 PROCEDURE — 80076 HEPATIC FUNCTION PANEL: CPT | Performed by: EMERGENCY MEDICINE

## 2020-03-16 PROCEDURE — 25000132 ZZH RX MED GY IP 250 OP 250 PS 637: Performed by: EMERGENCY MEDICINE

## 2020-03-16 PROCEDURE — 84439 ASSAY OF FREE THYROXINE: CPT | Performed by: EMERGENCY MEDICINE

## 2020-03-16 PROCEDURE — 76700 US EXAM ABDOM COMPLETE: CPT

## 2020-03-16 PROCEDURE — 71046 X-RAY EXAM CHEST 2 VIEWS: CPT

## 2020-03-16 RX ORDER — ACETAMINOPHEN 325 MG/1
650 TABLET ORAL ONCE
Status: COMPLETED | OUTPATIENT
Start: 2020-03-16 | End: 2020-03-16

## 2020-03-16 RX ADMIN — ACETAMINOPHEN 650 MG: 325 TABLET, FILM COATED ORAL at 16:47

## 2020-03-16 ASSESSMENT — ENCOUNTER SYMPTOMS
SHORTNESS OF BREATH: 1
MYALGIAS: 1
FEVER: 0
BACK PAIN: 0
COUGH: 0

## 2020-03-16 ASSESSMENT — MIFFLIN-ST. JEOR: SCORE: 1347.15

## 2020-03-16 NOTE — ED AVS SNAPSHOT
[facility] Emergency Department  [address]  [facility] [address]  Phone:  [phone]  Fax:  [phone]                                    Nelsy Cota   MRN: [id number]    Department:  [facility] Emergency Department   Date of Visit:  3/16/2020           After Visit Summary Signature Page    I have received my discharge instructions, and my questions have been answered. I have discussed any challenges I see with this plan with the nurse or doctor.    ..........................................................................................................................................  Patient/Patient Representative Signature      ..........................................................................................................................................  Patient Representative Print Name and Relationship to Patient    ..................................................               ................................................  Date                                   Time    ..........................................................................................................................................  Reviewed by Signature/Title    ...................................................              ..............................................  Date                                               Time          22EPIC Rev 08/18

## 2020-03-16 NOTE — ED PROVIDER NOTES
"  History     Chief Complaint:  Chest Pain       The history is provided by the patient.      Nelsy Cota is a 44 year old female with a history of tobacco use, DVT and pulmonary emboli, anticoagulated on warfarin, who presents for evaluation of chest pain starting yesterday. The patient states that her pain is right sided, exacerbated with exertion and radiates towards the left side. She states that her pain has been increasing today. She also notes slight shortness of breath and that her legs have been increasingly swollen over the past two weeks. She endorses generalized myalgias as well. She denies any fever, cough, congestion, back pain and history of asthma. She has not taken any medications for this.     Allergies:  No Known Drug Allergies    Medications:    Warfarin    Past Medical History:    DVT (deep venous thrombosis)  Epilepsy  Pulmonary emboli    Past Surgical History:    Right ankle fracture repair  Right ankle surgery for ligament injury  Fracture of calcaneus  Cryotherapy    Family History:    Hypertension  Epilepsy    Social History:  The patient presents to the ED alone.  Smoking Status: Current Every Day Smoker, 1PPD, 15 pack years  Smokeless Tobacco: Never Used  Alcohol Use: Yes  Drug Use: No  PCP: No Ref-Primary, Physician       Review of Systems   Constitutional: Negative for fever.   HENT: Negative for congestion.    Respiratory: Positive for shortness of breath. Negative for cough.    Cardiovascular: Positive for chest pain and leg swelling.   Musculoskeletal: Positive for myalgias. Negative for back pain.   All other systems reviewed and are negative.    Physical Exam     Patient Vitals for the past 24 hrs:   BP Temp Temp src Pulse Resp SpO2 Height Weight   03/16/20 1934 101/65 -- -- -- -- -- -- --   03/16/20 1532 -- 98.5  F (36.9  C) Oral 90 16 100 % 1.626 m (5' 4\") 71.2 kg (157 lb)       Physical Exam  General: Alert, appears well-developed and well-nourished. Cooperative.     In " mild distress  HEENT:  Head:  Atraumatic  Ears:  External ears are normal  Mouth/Throat:  Oropharynx is without erythema or exudate and mucous membranes are moist.   Eyes:   Conjunctivae normal and EOM are normal. No scleral icterus.  CV:  Normal rate, regular rhythm, normal heart sounds and radial pulses are 2+ and symmetric.  No murmur.  Resp:  Breath sounds are clear bilaterally    Non-labored, no retractions or accessory muscle use  GI:  Abdomen is soft, no distension, no tenderness. No rebound or guarding.  No CVA tenderness bilaterally  MS:  Normal range of motion. No edema.    Normal strength in all 4 extremities.     Back atraumatic.    No midline cervical, thoracic, or lumbar tenderness    Right anterior chest wall tender to palpation with no detected crepitus, no overlying redness.  Skin:  Warm and dry.  No rash or lesions noted.  Neuro:   Alert. Normal strength.  Sensation intact in all 4 extremities. GCS: 15  Psych: Normal mood and affect.  Lymph: No anterior or posterior cervical lymphadenopathy noted.    Emergency Department Course     ECG:  Indication: Chest Pain  Time: 1534  Vent. Rate 89 bpm. IN interval 154. QRS duration 72. QT/QTc 376/457. P-R-T axis 58 46 15. Normal sinus rhythm. T wave abnormality, consider anterolateral ischemia. Abnormal ECG.  No significant change compared to EKG dated 7/19/18  Read time: 1539      Imaging:  Radiology findings were communicated with the patient who voiced understanding of the findings.    US Lower Extremity Venous Duplex, bilateral:  1. No evidence for deep venous thrombosis in the bilateral lower extremity veins.   2. Slightly complex left Baker's cyst.   As per radiology.     XR Chest 2 Views:  Negative chest. Lungs clear.  As per radiology.     Abdomen US, Complete:  1.  Dense hepatic steatosis.  2.  Small amount of gallbladder sludge without definite stones.  As per radiology.     Laboratory:  Laboratory findings were communicated with the patient who  voiced understanding of the findings.    BMP: Urea Nitrogen 6 (low), o/w WNL (Creatinine: 0.55)    CBC: WBC: 10.1, HGB: 11.4 (low), PLT: 435    INR: 3.03 (high)    D-dimer quantitative: 0.3     TSH w/ free T4 Reflex: 4.37 (high)     T4 Free: 1.14    BNP: 304    1542 Troponin: <0.015     Hepatic Panel: Bilirubin Direct 0.5 (high), Albumin 2.5 (low), Alkaline Phosphatase 294 (high), ALT 64 (high),  (high)    Interventions:  1647 Tylenol 650mg PO    Emergency Department Course:  Past medical records, nursing notes, and vitals reviewed.    1532 I performed an exam of the patient as documented above.     EKG obtained in the ED, see results above.   IV was inserted and blood was drawn for laboratory testing, results above.  The patient was sent for an ultrasound and x-ray while in the emergency department, results above.     1718 I rechecked the patient and discussed the results of her workup thus far.     1915 I rechecked the patient and discussed the results of her workup thus far. At this point I feel that the patient is safe for discharge, and the patient agrees.    Findings and plan explained to the patient. Patient discharged home with instructions regarding supportive care, medications, and reasons to return. The importance of close follow-up was reviewed.     I personally reviewed the laboratory and imaging results with the patient and answered all related questions prior to discharge.     Impression & Plan     Medical Decision Making:  Patient is a 44-year-old female with a history of pulmonary embolism on chronic anticoagulation therapy with Coumadin, macrocytic anemia, and frequent alcohol use who presents with right-sided chest wall pain starting yesterday. Patient also notes that her lower extremities have been increasingly swollen over the last 2 weeks. Concern for DVT in the setting of long-term anticoagulation and appropriate anticoagulation use.  Reassuringly her INR is within normal limits at 3  and additionally ultrasound of the lower extremity shows no evidence of DVT. There was a noted left Baker's cyst which patient already knows about.  Patient does have some mild liver dysfunction, likely consistent with alcoholic hepatic steatosis.  This was confirmed on ultrasound imaging of her abdomen.  There is also some gallbladder sludge without evidence of cholecystitis and/or gallstones.  In terms of the patient's chest discomfort she does seem acutely tender to gentle palpation of the right anterior chest wall.  There is no obvious hematoma or bruising to the area.  Reassuringly no crepitus and chest x-ray shows no evidence of pneumothorax or focal infiltrate.  Patient has no fever no infectious symptoms, very low concern for infectious etiologies.  Reassuringly EKG shows no concerning ischemic changes and her troponin is undetectable, very low concern for ACS.  Patient has no evidence of heart failure as patient's lower extremity edema is most likely related to chronic liver dysfunction rather than heart failure particularly in the setting of no evidence of new ischemic changes and normal limit BNP.  Patient has no evidence of thyroid dysfunction.  D-dimer also within normal limits and lower concern that this would be related to new pulmonary embolism, especially with acute tenderness to the chest wall on the right side.  After work-up here tonight, unclear to the exact etiology of the patient's chest discomfort, but much more likely musculoskeletal etiology.  Close outpatient follow-up with her primary care indicated in the next 1 week.  After all questions answered return precautions understood, discharged home.     Diagnosis:    ICD-10-CM    1. Right-sided chest pain  R07.9    2. Hepatic steatosis  K76.0    3. Gallbladder sludge  K82.8    4. Transaminitis  R74.0    5. Bilateral lower extremity edema  R60.0    6. Baker's cyst of knee, left  M71.22        Disposition:  Discharged to home.    [place]  Disclosure:  I, Israel Greer, am serving as a scribe at 3:32 PM on 3/16/2020 to document services personally performed by Jacobo Reed MD based on my observations and the provider's statements to me.      Jacobo Reed MD  03/16/20 1028

## 2020-03-16 NOTE — LETTER
March 16, 2020      To Whom It May Concern:      Nelsy Cota was seen in our Emergency Department today, 03/16/20.  I expect her condition to improve over the next 1-2 days.  She may return to work/school when improved.    Sincerely,        Jody Duckworth RN

## 2020-03-16 NOTE — ED TRIAGE NOTES
Pt aox4, abcs intact. Pt c/o chest pain and SOB that started yesterday. Pt states that her legs started getting swollen 2 weeks ago. Hx of DVT and PE on warfarin

## 2020-03-17 ENCOUNTER — ANTICOAGULATION THERAPY VISIT (OUTPATIENT)
Dept: ANTICOAGULATION | Facility: CLINIC | Age: 45
End: 2020-03-17
Payer: COMMERCIAL

## 2020-03-17 DIAGNOSIS — Z79.01 LONG TERM CURRENT USE OF ANTICOAGULANT THERAPY: ICD-10-CM

## 2020-03-17 DIAGNOSIS — I26.99 PE (PULMONARY THROMBOEMBOLISM) (H): ICD-10-CM

## 2020-03-17 DIAGNOSIS — I82.409 RECURRENT DEEP VENOUS THROMBOSIS (H): ICD-10-CM

## 2020-03-17 LAB
INR POINT OF CARE: 5.9 (ref 0.86–1.14)
INTERPRETATION ECG - MUSE: NORMAL

## 2020-03-17 PROCEDURE — 36416 COLLJ CAPILLARY BLOOD SPEC: CPT

## 2020-03-17 PROCEDURE — 85610 PROTHROMBIN TIME: CPT | Mod: QW

## 2020-03-17 PROCEDURE — 99207 ZZC NO CHARGE NURSE ONLY: CPT

## 2020-03-17 NOTE — PROGRESS NOTES
ANTICOAGULATION FOLLOW-UP CLINIC VISIT    Patient Name:  Nelsy Cota  Date:  3/17/2020  Contact Type:  Face to Face    SUBJECTIVE:  Patient Findings     Positives:   Emergency department visit (Patient was seen at ED for chest pain 3/16/20, thought to be chest wall.  ), Missed doses (Warfarin was held 3/10, 3/11, and 3/12/20 d/t elevated INR. ), Change in diet/appetite (Upon further questioning, patient reports has been drinking a lot of cranberry juice, 2 - 16 oz glasses a day. )    Comments:   The patient was assessed for diet, medication, and activity level changes, extra doses, bruising or bleeding, with no problem findings.  Patient denies any identifiable changes that caused the supratherapeutic INR  Some signs and symptoms of bleeding include: Nose bleed or cut that does not stop bleeding in 10 minutes, bleeding of the gums, vomiting (will look like coffee grounds) or coughing up blood, unusual, easy or large areas of bruising, increased or unexpected vaginal bleeding or increased menstrual flow, red or black stools, red or orange urine, prolonged or severe headache, pale skin, unusual or constant tiredness.  If you have these please call 911 or seek medical care immediately.         Clinical Outcomes     Comments:   The patient was assessed for diet, medication, and activity level changes, extra doses, bruising or bleeding, with no problem findings.  Patient denies any identifiable changes that caused the supratherapeutic INR  Some signs and symptoms of bleeding include: Nose bleed or cut that does not stop bleeding in 10 minutes, bleeding of the gums, vomiting (will look like coffee grounds) or coughing up blood, unusual, easy or large areas of bruising, increased or unexpected vaginal bleeding or increased menstrual flow, red or black stools, red or orange urine, prolonged or severe headache, pale skin, unusual or constant tiredness.  If you have these please call 911 or seek medical care  immediately.            OBJECTIVE    INR Protime   Date Value Ref Range Status   2020 5.9 (A) 0.86 - 1.14 Final       ASSESSMENT / PLAN  INR assessment SUPRA    Recheck INR In: 2 DAYS    INR Location Clinic      Anticoagulation Summary  As of 3/17/2020    INR goal:   2.0-3.0   TTR:   36.8 % (10.7 mo)   INR used for dosin.9! (3/17/2020)   Warfarin maintenance plan:   5 mg (2 mg x 2.5) every Thu; 4 mg (2 mg x 2) all other days   Full warfarin instructions:   3/17: Hold; 3/18: Hold; Otherwise 5 mg every Thu; 4 mg all other days   Weekly warfarin total:   29 mg   Plan last modified:   Latosha Perdomo RN (2019)   Next INR check:   3/19/2020   Priority:   Critical   Target end date:       Indications    Long-term (current) use of anticoagulants [Z79.01] [Z79.01]  PE (pulmonary thromboembolism) (H) [I26.99]  Recurrent deep venous thrombosis (H) [I82.409]             Anticoagulation Episode Summary     INR check location:       Preferred lab:       Send INR reminders to:   [facility]    Comments:         Anticoagulation Care Providers     Provider Role Specialty Phone number    Hamzah Thomas MD [facility] Internal Medicine [phone]            See the Encounter Report to view Anticoagulation Flowsheet and Dosing Calendar (Go to Encounters tab in chart review, and find the Anticoagulation Therapy Visit)    Dosage adjustment made based on physician directed care plan.    Latosha Perdomo, RN

## 2020-03-19 ENCOUNTER — ANTICOAGULATION THERAPY VISIT (OUTPATIENT)
Dept: ANTICOAGULATION | Facility: CLINIC | Age: 45
End: 2020-03-19
Payer: COMMERCIAL

## 2020-03-19 DIAGNOSIS — Z79.01 LONG TERM CURRENT USE OF ANTICOAGULANT THERAPY: ICD-10-CM

## 2020-03-19 DIAGNOSIS — I26.99 PE (PULMONARY THROMBOEMBOLISM) (H): ICD-10-CM

## 2020-03-19 DIAGNOSIS — I82.409 RECURRENT DEEP VENOUS THROMBOSIS (H): ICD-10-CM

## 2020-03-19 LAB — INR POINT OF CARE: 3.2 (ref 0.86–1.14)

## 2020-03-19 PROCEDURE — 99207 ZZC NO CHARGE NURSE ONLY: CPT

## 2020-03-19 PROCEDURE — 36416 COLLJ CAPILLARY BLOOD SPEC: CPT

## 2020-03-19 PROCEDURE — 85610 PROTHROMBIN TIME: CPT | Mod: QW

## 2020-03-19 NOTE — ADDENDUM NOTE
Addended by: PAOLA SOLOMON on: 3/19/2020 05:19 PM     Modules accepted: Level of Service, SmartSet

## 2020-03-19 NOTE — PROGRESS NOTES
ANTICOAGULATION FOLLOW-UP CLINIC VISIT    Patient Name:  Nelsy Cota  Date:  3/19/2020  Contact Type:  Face to Face    SUBJECTIVE:  Patient Findings     Positives:   Missed doses (Warfarin was held 3/17 and 3/18 d/t elevated INR)    Comments:   The patient was assessed for diet, medication, and activity level changes, extra doses, bruising or bleeding, with no problem findings.  Patient unable to come back to clinic until 3/25/20 d/t work schedule.        Clinical Outcomes     Comments:   The patient was assessed for diet, medication, and activity level changes, extra doses, bruising or bleeding, with no problem findings.  Patient unable to come back to clinic until 3/25/20 d/t work schedule.           OBJECTIVE    INR Protime   Date Value Ref Range Status   03/19/2020 3.2 (A) 0.86 - 1.14 Final       ASSESSMENT / PLAN  INR assessment SUPRA    Recheck INR In: 6 DAYS    INR Location Clinic      Anticoagulation Summary  As of 3/19/2020    INR goal:   2.0-3.0   TTR:   36.6 % (10.7 mo)   INR used for dosing:   3.2! (3/19/2020)   Warfarin maintenance plan:   5 mg (2 mg x 2.5) every Thu; 4 mg (2 mg x 2) all other days   Full warfarin instructions:   3/19: 3 mg; Otherwise 5 mg every Thu; 4 mg all other days   Weekly warfarin total:   29 mg   Plan last modified:   Latosha Perdomo RN (12/19/2019)   Next INR check:   3/25/2020   Priority:   Critical   Target end date:       Indications    Long-term (current) use of anticoagulants [Z79.01] [Z79.01]  PE (pulmonary thromboembolism) (H) [I26.99]  Recurrent deep venous thrombosis (H) [I82.409]             Anticoagulation Episode Summary     INR check location:       Preferred lab:       Send INR reminders to:   [facility]    Comments:         Anticoagulation Care Providers     Provider Role Specialty Phone number    Hamzah Thomas MD [facility] Internal Medicine [phone]            See the Encounter Report to view Anticoagulation Flowsheet  and Dosing Calendar (Go to Encounters tab in chart review, and find the Anticoagulation Therapy Visit)    Dosage adjustment made based on physician directed care plan.    Latosha Perdomo RN

## 2020-03-24 DIAGNOSIS — I82.409 DVT (DEEP VENOUS THROMBOSIS) (H): ICD-10-CM

## 2020-03-24 DIAGNOSIS — I82.409 RECURRENT DEEP VENOUS THROMBOSIS (H): ICD-10-CM

## 2020-03-24 DIAGNOSIS — Z79.01 LONG TERM CURRENT USE OF ANTICOAGULANT THERAPY: Primary | ICD-10-CM

## 2020-03-24 DIAGNOSIS — I26.99 PE (PULMONARY THROMBOEMBOLISM) (H): ICD-10-CM

## 2020-04-01 ENCOUNTER — ANTICOAGULATION THERAPY VISIT (OUTPATIENT)
Dept: ANTICOAGULATION | Facility: CLINIC | Age: 45
End: 2020-04-01

## 2020-04-01 DIAGNOSIS — Z79.01 LONG TERM CURRENT USE OF ANTICOAGULANT THERAPY: ICD-10-CM

## 2020-04-01 DIAGNOSIS — I26.99 PE (PULMONARY THROMBOEMBOLISM) (H): ICD-10-CM

## 2020-04-01 DIAGNOSIS — I82.409 RECURRENT DEEP VENOUS THROMBOSIS (H): ICD-10-CM

## 2020-04-01 LAB
CAPILLARY BLOOD COLLECTION: NORMAL
INR PPP: 4.1 (ref 0.86–1.14)

## 2020-04-01 PROCEDURE — 36416 COLLJ CAPILLARY BLOOD SPEC: CPT | Performed by: INTERNAL MEDICINE

## 2020-04-01 PROCEDURE — 99207 ZZC NO CHARGE NURSE ONLY: CPT | Performed by: INTERNAL MEDICINE

## 2020-04-01 PROCEDURE — 85610 PROTHROMBIN TIME: CPT | Performed by: INTERNAL MEDICINE

## 2020-04-07 ENCOUNTER — TELEPHONE (OUTPATIENT)
Dept: INTERNAL MEDICINE | Facility: CLINIC | Age: 45
End: 2020-04-07

## 2020-04-07 ENCOUNTER — ANTICOAGULATION THERAPY VISIT (OUTPATIENT)
Dept: ANTICOAGULATION | Facility: CLINIC | Age: 45
End: 2020-04-07

## 2020-04-07 DIAGNOSIS — I26.99 PE (PULMONARY THROMBOEMBOLISM) (H): ICD-10-CM

## 2020-04-07 DIAGNOSIS — Z79.01 LONG TERM CURRENT USE OF ANTICOAGULANT THERAPY: ICD-10-CM

## 2020-04-07 DIAGNOSIS — I82.409 RECURRENT DEEP VENOUS THROMBOSIS (H): ICD-10-CM

## 2020-04-07 LAB
CAPILLARY BLOOD COLLECTION: NORMAL
INR PPP: 5.34 (ref 0.86–1.14)
INR PPP: 8 (ref 0.86–1.14)

## 2020-04-07 PROCEDURE — 99207 ZZC NO CHARGE NURSE ONLY: CPT | Performed by: INTERNAL MEDICINE

## 2020-04-07 PROCEDURE — 85610 PROTHROMBIN TIME: CPT | Performed by: INTERNAL MEDICINE

## 2020-04-07 PROCEDURE — 36416 COLLJ CAPILLARY BLOOD SPEC: CPT | Performed by: INTERNAL MEDICINE

## 2020-04-07 NOTE — PROGRESS NOTES
"ANTICOAGULATION FOLLOW-UP CLINIC VISIT    Patient Name:  Nelsy Cota  Date:  4/7/2020  Contact Type:  Telephone/ discussed wit patient    SUBJECTIVE:  Patient Findings     Positives:   Change in alcohol use (Pt had one to two drinks in the past week.  She also had more alcohol recently to \"celebrate her birthday\"), Change in medications (Tums for heartburn symptoms (no effect)), Bruising (reports that her bruising has improved within the last week)    Comments:   INR was >8.0 on POC machine.  Lab will also do a venous draw to confirm results.  The patient was assessed for diet, activity level changes, missed or extra doses, bleeding, with no problem findings.          Clinical Outcomes     Negatives:   Major bleeding event, Thromboembolic event, Anticoagulation-related hospital admission, Anticoagulation-related ED visit, Anticoagulation-related fatality    Comments:   INR was >8.0 on POC machine.  Lab will also do a venous draw to confirm results.  The patient was assessed for diet, activity level changes, missed or extra doses, bleeding, with no problem findings.             OBJECTIVE    INR   Date Value Ref Range Status   04/01/2020 4.10 (H) 0.86 - 1.14 Final     Comment:     This test is intended for monitoring Coumadin therapy.  Results are not   accurate in patients with prolonged INR due to factor deficiency.         ASSESSMENT / PLAN  INR assessment SUPRA    Recheck INR In: 1 DAY    INR Location Clinic      Anticoagulation Summary  As of 4/7/2020    INR goal:   2.0-3.0   TTR:   35.1 % (11.2 mo)   INR used for dosing:      Warfarin maintenance plan:   5 mg (2 mg x 2.5) every Thu; 4 mg (2 mg x 2) all other days   Full warfarin instructions:   4/7: Hold; Otherwise 5 mg every Thu; 4 mg all other days   Weekly warfarin total:   29 mg   Plan last modified:   Latosha Perdomo RN (12/19/2019)   Next INR check:   4/8/2020   Priority:   High   Target end date:       Indications    Long-term (current) use of " anticoagulants [Z79.01] [Z79.01]  PE (pulmonary thromboembolism) (H) [I26.99]  Recurrent deep venous thrombosis (H) [I82.409]             Anticoagulation Episode Summary     INR check location:       Preferred lab:       Send INR reminders to:   [facility]    Comments:         Anticoagulation Care Providers     Provider Role Specialty Phone number    Hamzah Thomas MD [facility] Internal Medicine [phone]            See the Encounter Report to view Anticoagulation Flowsheet and Dosing Calendar (Go to Encounters tab in chart review, and find the Anticoagulation Therapy Visit)    Dosage adjustment made based on physician directed care plan.    Vania Morse RN

## 2020-04-07 NOTE — TELEPHONE ENCOUNTER
Please see 4/7/20 Anticoagulation Encounter for dosing instruction, patient is aware to hold her warfarin.   Latosha Perdomo RN

## 2020-04-07 NOTE — PROGRESS NOTES
Left message for patient that INR Clinic will call her with further dosing instruction on 4/8/20.  Latosha Perdomo RN

## 2020-04-08 NOTE — PROGRESS NOTES
4/8/20 at 3:30 pm.  Notified patient of venous INR results and gave dosing instructions.  She denies any problems with bleeding or bruising.  She also reports that she is only able to come in on Tuesdays for INR checks due to her work schedule.  Vania Morse RN

## 2020-04-14 ENCOUNTER — ANTICOAGULATION THERAPY VISIT (OUTPATIENT)
Dept: ANTICOAGULATION | Facility: CLINIC | Age: 45
End: 2020-04-14

## 2020-04-14 DIAGNOSIS — I26.99 PE (PULMONARY THROMBOEMBOLISM) (H): ICD-10-CM

## 2020-04-14 DIAGNOSIS — I82.409 RECURRENT DEEP VENOUS THROMBOSIS (H): ICD-10-CM

## 2020-04-14 DIAGNOSIS — Z79.01 LONG TERM CURRENT USE OF ANTICOAGULANT THERAPY: ICD-10-CM

## 2020-04-14 LAB
CAPILLARY BLOOD COLLECTION: NORMAL
INR PPP: 4.1 (ref 0.86–1.14)

## 2020-04-14 PROCEDURE — 85610 PROTHROMBIN TIME: CPT | Performed by: INTERNAL MEDICINE

## 2020-04-14 PROCEDURE — 99207 ZZC NO CHARGE NURSE ONLY: CPT

## 2020-04-14 PROCEDURE — 36416 COLLJ CAPILLARY BLOOD SPEC: CPT | Performed by: INTERNAL MEDICINE

## 2020-04-14 NOTE — PROGRESS NOTES
ANTICOAGULATION FOLLOW-UP CLINIC VISIT    Patient Name:  Nelsy Cota  Date:  2020  Contact Type:  Telephone/ Called patient, denies any changes. Orders discussed with patient and she repeated dosing instruction.     SUBJECTIVE:  Patient Findings     Positives:   Missed doses (Warfarin was held  and 20 d/t elevated INR. )    Comments:   The patient was assessed for diet, medication, and activity level changes, extra doses, bruising or bleeding, with no problem findings.          Clinical Outcomes     Comments:   The patient was assessed for diet, medication, and activity level changes, extra doses, bruising or bleeding, with no problem findings.             OBJECTIVE    INR   Date Value Ref Range Status   2020 4.10 (H) 0.86 - 1.14 Final     Comment:     This test is intended for monitoring Coumadin therapy.  Results are not   accurate in patients with prolonged INR due to factor deficiency.         ASSESSMENT / PLAN  INR assessment SUPRA    Recheck INR In: 1 WEEK    INR Location Outside lab      Anticoagulation Summary  As of 2020    INR goal:   2.0-3.0   TTR:   33.8 % (11.6 mo)   INR used for dosin.10! (2020)   Warfarin maintenance plan:   5 mg (2 mg x 2.5) every Thu; 4 mg (2 mg x 2) all other days   Full warfarin instructions:   : Hold; 4/15: 2 mg; : 2 mg; : 2 mg; : 2 mg; : 2 mg; Otherwise 5 mg every Thu; 4 mg all other days   Weekly warfarin total:   29 mg   Plan last modified:   Latosha Perdomo RN (2019)   Next INR check:   2020   Priority:   High   Target end date:       Indications    Long-term (current) use of anticoagulants [Z79.01] [Z79.01]  PE (pulmonary thromboembolism) (H) [I26.99]  Recurrent deep venous thrombosis (H) [I82.409]             Anticoagulation Episode Summary     INR check location:       Preferred lab:       [facility]    Comments:         Anticoagulation Care Providers      Provider Role Specialty Phone number    Hamzah Thomas MD Responsible Internal Medicine [phone]            See the Encounter Report to view Anticoagulation Flowsheet and Dosing Calendar (Go to Encounters tab in chart review, and find the Anticoagulation Therapy Visit)    Dosage adjustment made based on physician directed care plan.    Latosha Perdmoo RN

## 2020-04-21 ENCOUNTER — ANTICOAGULATION THERAPY VISIT (OUTPATIENT)
Dept: ANTICOAGULATION | Facility: CLINIC | Age: 45
End: 2020-04-21

## 2020-04-21 DIAGNOSIS — Z79.01 LONG TERM CURRENT USE OF ANTICOAGULANT THERAPY: ICD-10-CM

## 2020-04-21 DIAGNOSIS — I26.99 PE (PULMONARY THROMBOEMBOLISM) (H): ICD-10-CM

## 2020-04-21 DIAGNOSIS — I82.409 RECURRENT DEEP VENOUS THROMBOSIS (H): ICD-10-CM

## 2020-04-21 LAB
CAPILLARY BLOOD COLLECTION: NORMAL
INR PPP: 3.2 (ref 0.86–1.14)

## 2020-04-21 PROCEDURE — 36416 COLLJ CAPILLARY BLOOD SPEC: CPT | Performed by: INTERNAL MEDICINE

## 2020-04-21 PROCEDURE — 85610 PROTHROMBIN TIME: CPT | Performed by: INTERNAL MEDICINE

## 2020-04-21 PROCEDURE — 99207 ZZC NO CHARGE NURSE ONLY: CPT

## 2020-04-21 NOTE — PROGRESS NOTES
ANTICOAGULATION FOLLOW-UP CLINIC VISIT    Patient Name:  Nelsy Cota  Date:  4/21/2020  Contact Type:  Telephone/ Called patient, denies any changes. Orders discussed with patient.     SUBJECTIVE:  Patient Findings     Positives:   Missed doses (Warfarin held 4/14/20 d/t elevated INR, dose also decreased)    Comments:   The patient was assessed for diet, medication, and activity level changes, extra doses, bruising or bleeding, with no problem findings.  Patient denies any identifiable changes that caused the supratherapeutic INR.           Clinical Outcomes     Comments:   The patient was assessed for diet, medication, and activity level changes, extra doses, bruising or bleeding, with no problem findings.  Patient denies any identifiable changes that caused the supratherapeutic INR.              OBJECTIVE    INR   Date Value Ref Range Status   04/21/2020 3.20 (H) 0.86 - 1.14 Final     Comment:     This test is intended for monitoring Coumadin therapy.  Results are not   accurate in patients with prolonged INR due to factor deficiency.         ASSESSMENT / PLAN  INR assessment SUPRA    Recheck INR In: 1 WEEK    INR Location Outside lab      Anticoagulation Summary  As of 4/21/2020    INR goal:   2.0-3.0   TTR:   33.1 % (11.8 mo)   INR used for dosing:   3.20! (4/21/2020)   Warfarin maintenance plan:   5 mg (2 mg x 2.5) every Thu; 4 mg (2 mg x 2) all other days   Full warfarin instructions:   4/21: 1 mg; 4/22: 2 mg; 4/23: 2 mg; 4/24: 2 mg; 4/25: 2 mg; 4/26: 2 mg; 4/27: 2 mg; Otherwise 5 mg every Thu; 4 mg all other days   Weekly warfarin total:   29 mg   Plan last modified:   Latosha Perdomo RN (12/19/2019)   Next INR check:   4/28/2020   Priority:   High   Target end date:       Indications    Long-term (current) use of anticoagulants [Z79.01] [Z79.01]  PE (pulmonary thromboembolism) (H) [I26.99]  Recurrent deep venous thrombosis (H) [I82.409]             Anticoagulation Episode Summary     INR check  location:       Preferred lab:       Send INR reminders to:   [facility]    Comments:         Anticoagulation Care Providers     Provider Role Specialty Phone number    Hamzah Thomas MD [facility] Internal Medicine [phone]            See the Encounter Report to view Anticoagulation Flowsheet and Dosing Calendar (Go to Encounters tab in chart review, and find the Anticoagulation Therapy Visit)    Dosage adjustment made based on physician directed care plan.    Latosha Perdomo RN

## 2020-04-28 ENCOUNTER — ANTICOAGULATION THERAPY VISIT (OUTPATIENT)
Dept: ANTICOAGULATION | Facility: CLINIC | Age: 45
End: 2020-04-28

## 2020-04-28 DIAGNOSIS — Z79.01 LONG TERM CURRENT USE OF ANTICOAGULANT THERAPY: ICD-10-CM

## 2020-04-28 DIAGNOSIS — I26.99 PE (PULMONARY THROMBOEMBOLISM) (H): ICD-10-CM

## 2020-04-28 DIAGNOSIS — I82.409 RECURRENT DEEP VENOUS THROMBOSIS (H): ICD-10-CM

## 2020-04-28 LAB
CAPILLARY BLOOD COLLECTION: NORMAL
INR PPP: 1.7 (ref 0.86–1.14)

## 2020-04-28 PROCEDURE — 36416 COLLJ CAPILLARY BLOOD SPEC: CPT | Performed by: INTERNAL MEDICINE

## 2020-04-28 PROCEDURE — 85610 PROTHROMBIN TIME: CPT | Performed by: INTERNAL MEDICINE

## 2020-04-28 PROCEDURE — 99207 ZZC NO CHARGE NURSE ONLY: CPT

## 2020-04-28 NOTE — PROGRESS NOTES
ANTICOAGULATION FOLLOW-UP CLINIC VISIT    Patient Name:  Nelsy Cota  Date:  2020  Contact Type:  Telephone/ Called patient, reports missed dose but did take it the next night, denies any other changes. Orders discussed with patient.     SUBJECTIVE:  Patient Findings     Positives:   Missed doses (Patient reports missing warfarin dose 20 did take both doses on 20)    Comments:   The patient was assessed for diet, medication, and activity level changes, extra doses, bruising or bleeding, with no problem findings.          Clinical Outcomes     Comments:   The patient was assessed for diet, medication, and activity level changes, extra doses, bruising or bleeding, with no problem findings.             OBJECTIVE    INR   Date Value Ref Range Status   2020 1.70 (H) 0.86 - 1.14 Final     Comment:     This test is intended for monitoring Coumadin therapy.  Results are not   accurate in patients with prolonged INR due to factor deficiency.         ASSESSMENT / PLAN  INR assessment SUB    Recheck INR In: 1 WEEK    INR Location Outside lab      Anticoagulation Summary  As of 2020    INR goal:   2.0-3.0   TTR:   33.8 % (1 y)   INR used for dosin.70! (2020)   Warfarin maintenance plan:   5 mg (2 mg x 2.5) every Thu; 4 mg (2 mg x 2) all other days   Full warfarin instructions:   29: 2 mg; /30: 2 mg; 5/2: 2 mg; 5/3: 2 mg; 5/4: 2 mg; Otherwise 5 mg every Thu; 4 mg all other days   Weekly warfarin total:   29 mg   Plan last modified:   Latosha Perdomo RN (2019)   Next INR check:   2020   Priority:   High   Target end date:       Indications    Long-term (current) use of anticoagulants [Z79.01] [Z79.01]  PE (pulmonary thromboembolism) (H) [I26.99]  Recurrent deep venous thrombosis (H) [I82.409]             Anticoagulation Episode Summary     INR check location:       Preferred lab:       Send INR reminders to:   [facility]    Comments:         Anticoagulation  Care Providers     Provider Role Specialty Phone number    Hamzah Thomas MD Responsible Internal Medicine [phone]            See the Encounter Report to view Anticoagulation Flowsheet and Dosing Calendar (Go to Encounters tab in chart review, and find the Anticoagulation Therapy Visit)    Dosage adjustment made based on physician directed care plan.    Latosha Perdomo RN

## 2020-05-05 ENCOUNTER — ANTICOAGULATION THERAPY VISIT (OUTPATIENT)
Dept: ANTICOAGULATION | Facility: CLINIC | Age: 45
End: 2020-05-05

## 2020-05-05 DIAGNOSIS — Z79.01 LONG TERM CURRENT USE OF ANTICOAGULANT THERAPY: ICD-10-CM

## 2020-05-05 DIAGNOSIS — I26.99 PE (PULMONARY THROMBOEMBOLISM) (H): ICD-10-CM

## 2020-05-05 DIAGNOSIS — I82.409 RECURRENT DEEP VENOUS THROMBOSIS (H): ICD-10-CM

## 2020-05-05 LAB
CAPILLARY BLOOD COLLECTION: NORMAL
INR PPP: 2.7 (ref 0.86–1.14)

## 2020-05-05 PROCEDURE — 85610 PROTHROMBIN TIME: CPT | Performed by: INTERNAL MEDICINE

## 2020-05-05 PROCEDURE — 99207 ZZC NO CHARGE NURSE ONLY: CPT | Performed by: INTERNAL MEDICINE

## 2020-05-05 PROCEDURE — 36416 COLLJ CAPILLARY BLOOD SPEC: CPT | Performed by: INTERNAL MEDICINE

## 2020-05-05 NOTE — PROGRESS NOTES
ANTICOAGULATION FOLLOW-UP CLINIC VISIT    Patient Name:  Nelsy Cota  Date:  2020  Contact Type:  Telephone/ Called patient, she denies any missed warfarin doses or any changes. Orders discussed with patient.     SUBJECTIVE:  Patient Findings     Comments:   The patient was assessed for diet, medication, and activity level changes, missed or extra doses, bruising or bleeding, with no problem findings.          Clinical Outcomes     Negatives:   Major bleeding event, Thromboembolic event, Anticoagulation-related hospital admission, Anticoagulation-related ED visit, Anticoagulation-related fatality    Comments:   The patient was assessed for diet, medication, and activity level changes, missed or extra doses, bruising or bleeding, with no problem findings.             OBJECTIVE    INR   Date Value Ref Range Status   2020 2.70 (H) 0.86 - 1.14 Final     Comment:     This test is intended for monitoring Coumadin therapy.  Results are not   accurate in patients with prolonged INR due to factor deficiency.         ASSESSMENT / PLAN  INR assessment THER    Recheck INR In: 1 WEEK    INR Location Outside lab      Anticoagulation Summary  As of 2020    INR goal:   2.0-3.0   TTR:   34.9 % (1 y)   INR used for dosin.70 (2020)   Warfarin maintenance plan:   4 mg (2 mg x 2) every Tue, Fri; 2 mg (2 mg x 1) all other days   Full warfarin instructions:   4 mg every Tue, Fri; 2 mg all other days   Weekly warfarin total:   18 mg   Plan last modified:   Latosha Perdomo RN (2020)   Next INR check:   2020   Priority:   High   Target end date:       Indications    Long-term (current) use of anticoagulants [Z79.01] [Z79.01]  PE (pulmonary thromboembolism) (H) [I26.99]  Recurrent deep venous thrombosis (H) [I82.409]             Anticoagulation Episode Summary     INR check location:       Preferred lab:       Send INR reminders to:   [facility]    Comments:         Anticoagulation Care  Providers     Provider Role Specialty Phone number    Hamzah Thomas MD Responsible Internal Medicine [phone]            See the Encounter Report to view Anticoagulation Flowsheet and Dosing Calendar (Go to Encounters tab in chart review, and find the Anticoagulation Therapy Visit)    Dosage adjustment made based on physician directed care plan.    Latosha Perdomo RN

## 2020-05-12 ENCOUNTER — ANTICOAGULATION THERAPY VISIT (OUTPATIENT)
Dept: ANTICOAGULATION | Facility: CLINIC | Age: 45
End: 2020-05-12

## 2020-05-12 DIAGNOSIS — I82.409 RECURRENT DEEP VENOUS THROMBOSIS (H): ICD-10-CM

## 2020-05-12 DIAGNOSIS — I26.99 PE (PULMONARY THROMBOEMBOLISM) (H): ICD-10-CM

## 2020-05-12 DIAGNOSIS — Z79.01 LONG TERM CURRENT USE OF ANTICOAGULANT THERAPY: ICD-10-CM

## 2020-05-12 LAB
CAPILLARY BLOOD COLLECTION: NORMAL
INR PPP: 2.7 (ref 0.86–1.14)

## 2020-05-12 PROCEDURE — 99207 ZZC NO CHARGE NURSE ONLY: CPT | Performed by: INTERNAL MEDICINE

## 2020-05-12 PROCEDURE — 85610 PROTHROMBIN TIME: CPT | Performed by: INTERNAL MEDICINE

## 2020-05-12 PROCEDURE — 36416 COLLJ CAPILLARY BLOOD SPEC: CPT | Performed by: INTERNAL MEDICINE

## 2020-05-12 NOTE — PROGRESS NOTES
ANTICOAGULATION FOLLOW-UP CLINIC VISIT    Patient Name:  Nelsy Cota  Date:  2020  Contact Type:  Telephone/ Called patient, she denies any changes. Orders discussed with patient.     SUBJECTIVE:  Patient Findings     Comments:   The patient was assessed for diet, medication, and activity level changes, missed or extra doses, bruising or bleeding, with no problem findings.          Clinical Outcomes     Negatives:   Major bleeding event, Thromboembolic event, Anticoagulation-related hospital admission, Anticoagulation-related ED visit, Anticoagulation-related fatality    Comments:   The patient was assessed for diet, medication, and activity level changes, missed or extra doses, bruising or bleeding, with no problem findings.             OBJECTIVE    Recent labs: (last 7 days)     20  1536   INR 2.70*       ASSESSMENT / PLAN  INR assessment THER    Recheck INR In: 2 WEEKS    INR Location Outside lab      Anticoagulation Summary  As of 2020    INR goal:   2.0-3.0   TTR:   36.5 % (1 y)   INR used for dosin.70 (2020)   Warfarin maintenance plan:   4 mg (2 mg x 2) every Tue, Fri; 2 mg (2 mg x 1) all other days   Full warfarin instructions:   4 mg every Tue, Fri; 2 mg all other days   Weekly warfarin total:   18 mg   No change documented:   Latosha Perdomo, RN   Plan last modified:   Latosha Perdomo RN (2020)   Next INR check:   2020   Priority:   High   Target end date:       Indications    Long-term (current) use of anticoagulants [Z79.01] [Z79.01]  PE (pulmonary thromboembolism) (H) [I26.99]  Recurrent deep venous thrombosis (H) [I82.409]             Anticoagulation Episode Summary     INR check location:       Preferred lab:       Send INR reminders to:   [facility]    Comments:         Anticoagulation Care Providers     Provider Role Specialty Phone number    Hamzah Thomas MD [facility] Internal Medicine [phone]            See the  Encounter Report to view Anticoagulation Flowsheet and Dosing Calendar (Go to Encounters tab in chart review, and find the Anticoagulation Therapy Visit)    Dosage adjustment made based on physician directed care plan.    Latosha Perdomo RN

## 2020-05-27 ENCOUNTER — ANTICOAGULATION THERAPY VISIT (OUTPATIENT)
Dept: ANTICOAGULATION | Facility: CLINIC | Age: 45
End: 2020-05-27

## 2020-05-27 DIAGNOSIS — Z79.01 LONG TERM CURRENT USE OF ANTICOAGULANT THERAPY: ICD-10-CM

## 2020-05-27 DIAGNOSIS — I82.409 RECURRENT DEEP VENOUS THROMBOSIS (H): ICD-10-CM

## 2020-05-27 DIAGNOSIS — I26.99 PE (PULMONARY THROMBOEMBOLISM) (H): ICD-10-CM

## 2020-05-27 LAB
CAPILLARY BLOOD COLLECTION: NORMAL
INR PPP: 1.7 (ref 0.86–1.14)

## 2020-05-27 PROCEDURE — 85610 PROTHROMBIN TIME: CPT | Performed by: INTERNAL MEDICINE

## 2020-05-27 PROCEDURE — 99207 ZZC NO CHARGE NURSE ONLY: CPT | Performed by: INTERNAL MEDICINE

## 2020-05-27 PROCEDURE — 36416 COLLJ CAPILLARY BLOOD SPEC: CPT | Performed by: INTERNAL MEDICINE

## 2020-05-27 NOTE — PROGRESS NOTES
ANTICOAGULATION FOLLOW-UP CLINIC VISIT    Patient Name:  Nelsy Cota  Date:  2020  Contact Type:  Telephone/ Called patient, she reports change in alcohol use, tobacco use, denies any missed warfarin doses or any other changes. Orders discussed with patient.     SUBJECTIVE:  Patient Findings     Positives:   Change in alcohol use (Patient reports she is having 3 glasses of wine every night instead of drinking bacardi.)    Comments:   The patient was assessed for diet, medication, and activity level changes, missed or extra doses, bruising or bleeding, with no problem findings.  Patient reports she might me smoking more then usual.         Clinical Outcomes     Comments:   The patient was assessed for diet, medication, and activity level changes, missed or extra doses, bruising or bleeding, with no problem findings.  Patient reports she might me smoking more then usual.            OBJECTIVE    Recent labs: (last 7 days)     20  1542   INR 1.70*       ASSESSMENT / PLAN  INR assessment SUB    Recheck INR In: 2 WEEKS    INR Location Outside lab      Anticoagulation Summary  As of 2020    INR goal:   2.0-3.0   TTR:   36.1 % (1 y)   INR used for dosin.70! (2020)   Warfarin maintenance plan:   4 mg (2 mg x 2) every Tue, Fri; 2 mg (2 mg x 1) all other days   Full warfarin instructions:   : 4 mg; Otherwise 4 mg every Tue, Fri; 2 mg all other days   Weekly warfarin total:   18 mg   Plan last modified:   Latosha Perdomo RN (2020)   Next INR check:   6/10/2020   Priority:   High   Target end date:       Indications    Long-term (current) use of anticoagulants [Z79.01] [Z79.01]  PE (pulmonary thromboembolism) (H) [I26.99]  Recurrent deep venous thrombosis (H) [I82.409]             Anticoagulation Episode Summary     INR check location:       Preferred lab:       Send INR reminders to:   [facility]    Comments:         Anticoagulation Care Providers     Provider Role  Specialty Phone number    Hamzah Thomas MD Responsible Internal Medicine [phone]            See the Encounter Report to view Anticoagulation Flowsheet and Dosing Calendar (Go to Encounters tab in chart review, and find the Anticoagulation Therapy Visit)    Dosage adjustment made based on physician directed care plan.    Latosha Perdomo RN

## 2020-06-10 ENCOUNTER — ANTICOAGULATION THERAPY VISIT (OUTPATIENT)
Dept: ANTICOAGULATION | Facility: CLINIC | Age: 45
End: 2020-06-10

## 2020-06-10 DIAGNOSIS — Z79.01 LONG TERM CURRENT USE OF ANTICOAGULANT THERAPY: ICD-10-CM

## 2020-06-10 DIAGNOSIS — I26.99 PE (PULMONARY THROMBOEMBOLISM) (H): ICD-10-CM

## 2020-06-10 DIAGNOSIS — I82.409 RECURRENT DEEP VENOUS THROMBOSIS (H): ICD-10-CM

## 2020-06-10 LAB
CAPILLARY BLOOD COLLECTION: NORMAL
INR PPP: 2.9 (ref 0.86–1.14)

## 2020-06-10 PROCEDURE — 36416 COLLJ CAPILLARY BLOOD SPEC: CPT | Performed by: INTERNAL MEDICINE

## 2020-06-10 PROCEDURE — 85610 PROTHROMBIN TIME: CPT | Performed by: INTERNAL MEDICINE

## 2020-06-10 PROCEDURE — 99207 ZZC NO CHARGE NURSE ONLY: CPT | Performed by: INTERNAL MEDICINE

## 2020-06-10 NOTE — PROGRESS NOTES
ANTICOAGULATION FOLLOW-UP CLINIC VISIT    Patient Name:  Nelsy Cota  Date:  6/10/2020  Contact Type:  Telephone/ Called patient, she denies any changes or missed warfarin doses. Orders discussed with patient.     SUBJECTIVE:  Patient Findings     Comments:   The patient was assessed for diet, medication, and activity level changes, missed or extra doses, bruising or bleeding, with no problem findings.          Clinical Outcomes     Negatives:   Major bleeding event, Thromboembolic event, Anticoagulation-related hospital admission, Anticoagulation-related ED visit, Anticoagulation-related fatality    Comments:   The patient was assessed for diet, medication, and activity level changes, missed or extra doses, bruising or bleeding, with no problem findings.             OBJECTIVE    Recent labs: (last 7 days)     06/10/20  1540   INR 2.90*       ASSESSMENT / PLAN  INR assessment THER    Recheck INR In: 2 WEEKS    INR Location Outside lab      Anticoagulation Summary  As of 6/10/2020    INR goal:   2.0-3.0   TTR:   39.0 % (1 y)   INR used for dosin.90 (6/10/2020)   Warfarin maintenance plan:   4 mg (2 mg x 2) every Tue, Fri; 2 mg (2 mg x 1) all other days   Full warfarin instructions:   4 mg every Tue, Fri; 2 mg all other days   Weekly warfarin total:   18 mg   No change documented:   Latosha Perdomo, RN   Plan last modified:   Latosha Perdomo RN (2020)   Next INR check:   2020   Priority:   High   Target end date:       Indications    Long-term (current) use of anticoagulants [Z79.01] [Z79.01]  PE (pulmonary thromboembolism) (H) [I26.99]  Recurrent deep venous thrombosis (H) [I82.409]             Anticoagulation Episode Summary     INR check location:       Preferred lab:       Send INR reminders to:   [facility]    Comments:         Anticoagulation Care Providers     Provider Role Specialty Phone number    Hamzah Thomas MD [facility] Internal Medicine [phone]             See the Encounter Report to view Anticoagulation Flowsheet and Dosing Calendar (Go to Encounters tab in chart review, and find the Anticoagulation Therapy Visit)    Dosage adjustment made based on physician directed care plan.    Latosha Perdomo RN

## 2020-06-24 ENCOUNTER — ANTICOAGULATION THERAPY VISIT (OUTPATIENT)
Dept: INTERNAL MEDICINE | Facility: CLINIC | Age: 45
End: 2020-06-24

## 2020-06-24 DIAGNOSIS — I82.409 RECURRENT DEEP VENOUS THROMBOSIS (H): ICD-10-CM

## 2020-06-24 DIAGNOSIS — Z79.01 LONG TERM CURRENT USE OF ANTICOAGULANT THERAPY: ICD-10-CM

## 2020-06-24 DIAGNOSIS — I26.99 PE (PULMONARY THROMBOEMBOLISM) (H): ICD-10-CM

## 2020-06-24 LAB
CAPILLARY BLOOD COLLECTION: NORMAL
INR PPP: 2.5 (ref 0.86–1.14)

## 2020-06-24 PROCEDURE — 36416 COLLJ CAPILLARY BLOOD SPEC: CPT | Performed by: INTERNAL MEDICINE

## 2020-06-24 PROCEDURE — 85610 PROTHROMBIN TIME: CPT | Performed by: INTERNAL MEDICINE

## 2020-06-24 NOTE — PROGRESS NOTES
ANTICOAGULATION MANAGEMENT     Patient Name:  Nelsy Cota  Date:  2020    ASSESSMENT /SUBJECTIVE:    Today's INR result of 2.5 is therapeutic. Goal INR of 2.0-3.0      Warfarin dose taken: Warfarin taken as previously instructed    Diet: No new diet changes affecting INR    Medication changes/ interactions: No new medications/supplements affecting INR    Previous INR: Therapeutic     S/S of bleeding or thromboembolism: No    New injury or illness: No    Upcoming surgery, procedure or cardioversion: No    Additional findings: None      PLAN:    Spoke with Nelsy regarding INR result and instructed:     Warfarin Dosing Instructions: Continue your current warfarin dose 4 mg every Tue, Fri; 2 mg all other days    Instructed patient to follow up no later than: 2 weeks  Lab visit scheduled    Education provided: Target INR goal and significance of current INR result, Importance of notifying clinic for changes in medications and Importance of notifying clinic for diarrhea, nausea/vomiting, reduced intake and/or illness      Nelsy verbalizes understanding and agrees to warfarin dosing plan.    Instructed to call the Anticoagulation Clinic for any changes, questions or concerns. (#[phone])        OBJECTIVE:  INR   Date Value Ref Range Status   2020 2.50 (H) 0.86 - 1.14 Final     Comment:     This test is intended for monitoring Coumadin therapy.  Results are not   accurate in patients with prolonged INR due to factor deficiency.           No question data found.  Anticoagulation Summary  As of 2020    INR goal:   2.0-3.0   TTR:   42.8 % (1 y)   INR used for dosin.50 (2020)   Warfarin maintenance plan:   4 mg (2 mg x 2) every Tue, Fri; 2 mg (2 mg x 1) all other days   Full warfarin instructions:   4 mg every Tue, Fri; 2 mg all other days   Weekly warfarin total:   18 mg   No change documented:   Cristel Hodge RN   Plan last modified:   Latosha Perdomo RN (2020)   Next INR check:    7/8/2020   Priority:   High   Target end date:       Indications    Long-term (current) use of anticoagulants [Z79.01] [Z79.01]  PE (pulmonary thromboembolism) (H) [I26.99]  Recurrent deep venous thrombosis (H) [I82.409]             Anticoagulation Episode Summary     INR check location:       Preferred lab:       Send INR reminders to:   [facility]    Comments:         Anticoagulation Care Providers     Provider Role Specialty Phone number    Hamzah Thomas MD [facility] Internal Medicine [phone]

## 2020-06-24 NOTE — PROGRESS NOTES
Anticoagulation Management    Unable to reach Nelsy today.    Today's INR result of 2.5 is therapeutic (goal INR of 2.0-3.0).  Result received from: Clinic Lab    Follow up required to confirm warfarin dose taken and assess for changes    Left msg to call back      Anticoagulation clinic to follow up    Cristel Hodge RN

## 2020-06-30 DIAGNOSIS — I82.409 RECURRENT DEEP VENOUS THROMBOSIS (H): ICD-10-CM

## 2020-06-30 DIAGNOSIS — I26.99 PE (PULMONARY THROMBOEMBOLISM) (H): ICD-10-CM

## 2020-07-01 RX ORDER — WARFARIN SODIUM 2 MG/1
TABLET ORAL
Qty: 120 TABLET | Refills: 0 | Status: SHIPPED | OUTPATIENT
Start: 2020-07-01 | End: 2020-09-24

## 2020-07-01 NOTE — TELEPHONE ENCOUNTER
Prescription approved per [facility] Refill Protocol.  Latosha Perdomo RN

## 2020-07-01 NOTE — TELEPHONE ENCOUNTER
Pending Prescriptions:                       Disp   Refills    warfarin ANTICOAGULANT (COUMADIN) 2 MG tab*160 ta*0        Sig: TAKE 2 TABLETS BY MOUTH DAILY EXCEPT 1 TABLET ON           SUNDAY OR AS DIRECTED PER INR CLINIC

## 2020-07-08 ENCOUNTER — ANTICOAGULATION THERAPY VISIT (OUTPATIENT)
Dept: ANTICOAGULATION | Facility: CLINIC | Age: 45
End: 2020-07-08

## 2020-07-08 DIAGNOSIS — I82.409 RECURRENT DEEP VENOUS THROMBOSIS (H): ICD-10-CM

## 2020-07-08 DIAGNOSIS — Z79.01 LONG TERM CURRENT USE OF ANTICOAGULANT THERAPY: ICD-10-CM

## 2020-07-08 DIAGNOSIS — I26.99 PE (PULMONARY THROMBOEMBOLISM) (H): ICD-10-CM

## 2020-07-08 LAB
CAPILLARY BLOOD COLLECTION: NORMAL
INR PPP: 3.3 (ref 0.86–1.14)

## 2020-07-08 PROCEDURE — 85610 PROTHROMBIN TIME: CPT | Performed by: INTERNAL MEDICINE

## 2020-07-08 PROCEDURE — 99207 ZZC NO CHARGE NURSE ONLY: CPT | Performed by: INTERNAL MEDICINE

## 2020-07-08 PROCEDURE — 36416 COLLJ CAPILLARY BLOOD SPEC: CPT | Performed by: INTERNAL MEDICINE

## 2020-07-08 NOTE — PROGRESS NOTES
ANTICOAGULATION FOLLOW-UP CLINIC VISIT    Patient Name:  Nelsy Cota  Date:  7/8/2020  Contact Type:  Telephone/ Called patient, she reports med change, denies any other changes. Orders discussed with patient, she agrees to plan, lab INR appointment scheduled on 7/22/20.    SUBJECTIVE:  Patient Findings     Positives:   Change in medications (Patient reports has been taking ibuprofen every night, last dose was Monday, has now bought more Tylenol )    Comments:   The patient was assessed for diet, medication, and activity level changes, missed or extra doses, bruising or bleeding, with no problem findings. Signs and symptoms of bleeding discussed with patient.           Clinical Outcomes     Comments:   The patient was assessed for diet, medication, and activity level changes, missed or extra doses, bruising or bleeding, with no problem findings. Signs and symptoms of bleeding discussed with patient.              OBJECTIVE    Recent labs: (last 7 days)     07/08/20  1546   INR 3.30*       ASSESSMENT / PLAN  INR assessment SUPRA    Recheck INR In: 2 WEEKS    INR Location Outside lab      Anticoagulation Summary  As of 7/8/2020    INR goal:   2.0-3.0   TTR:   41.5 % (1 y)   INR used for dosing:   3.30! (7/8/2020)   Warfarin maintenance plan:   4 mg (2 mg x 2) every Tue, Fri; 2 mg (2 mg x 1) all other days   Full warfarin instructions:   4 mg every Tue, Fri; 2 mg all other days   Weekly warfarin total:   18 mg   Plan last modified:   Latosha Perdomo RN (5/5/2020)   Next INR check:   7/22/2020   Priority:   High   Target end date:       Indications    Long-term (current) use of anticoagulants [Z79.01] [Z79.01]  PE (pulmonary thromboembolism) (H) [I26.99]  Recurrent deep venous thrombosis (H) [I82.409]             Anticoagulation Episode Summary     INR check location:       Preferred lab:       Send INR reminders to:   [facility]    Comments:         Anticoagulation Care Providers     Provider Role  Specialty Phone number    Hamzah Thomas MD Responsible Internal Medicine [phone]            See the Encounter Report to view Anticoagulation Flowsheet and Dosing Calendar (Go to Encounters tab in chart review, and find the Anticoagulation Therapy Visit)    Dosage adjustment made based on physician directed care plan.    Latosha Perdomo RN

## 2020-07-22 ENCOUNTER — ANTICOAGULATION THERAPY VISIT (OUTPATIENT)
Dept: ANTICOAGULATION | Facility: CLINIC | Age: 45
End: 2020-07-22

## 2020-07-22 DIAGNOSIS — I26.99 PE (PULMONARY THROMBOEMBOLISM) (H): ICD-10-CM

## 2020-07-22 DIAGNOSIS — I82.409 RECURRENT DEEP VENOUS THROMBOSIS (H): ICD-10-CM

## 2020-07-22 DIAGNOSIS — Z79.01 LONG TERM CURRENT USE OF ANTICOAGULANT THERAPY: ICD-10-CM

## 2020-07-22 LAB
CAPILLARY BLOOD COLLECTION: NORMAL
INR PPP: 3.7 (ref 0.86–1.14)

## 2020-07-22 PROCEDURE — 85610 PROTHROMBIN TIME: CPT | Performed by: INTERNAL MEDICINE

## 2020-07-22 PROCEDURE — 36416 COLLJ CAPILLARY BLOOD SPEC: CPT | Performed by: INTERNAL MEDICINE

## 2020-07-22 PROCEDURE — 99207 ZZC NO CHARGE NURSE ONLY: CPT | Performed by: INTERNAL MEDICINE

## 2020-07-22 NOTE — PROGRESS NOTES
ANTICOAGULATION FOLLOW-UP CLINIC VISIT    Patient Name:  Nelsy Cota  Date:  7/22/2020  Contact Type:  Telephone/ Called patient, she reports bruising and health change. Patient denies any other changes. Orders discussed with patient, she agrees with plan. Lab INR appointment scheduled on 8/5/20.    SUBJECTIVE:  Patient Findings     Positives:   Change in health (Patient reports has some swelling in feet in legs, weight is stable. Patient reports it does not matter what the them temp is.  Patient states legs are swollen most days when she wakes up. Patient is going to schedule MD appointment.), Change in diet/appetite (Patient reports she is going to try to incorporated green veggies in her diet.), Bruising (Patient reports she has some bruises on her legs and she knows she did not bump her leg, also on her knee.)    Comments:   The patient was assessed for diet, medication, and activity level changes, missed or extra doses, bruising or bleeding, with no problem findings.  Patient reports she will try to add spinach to her scrambled eggs 1 or 2x a week.         Clinical Outcomes     Comments:   The patient was assessed for diet, medication, and activity level changes, missed or extra doses, bruising or bleeding, with no problem findings.  Patient reports she will try to add spinach to her scrambled eggs 1 or 2x a week.            OBJECTIVE    Recent labs: (last 7 days)     07/22/20  1533   INR 3.70*       ASSESSMENT / PLAN  INR assessment SUPRA    Recheck INR In: 2 WEEKS    INR Location Outside lab      Anticoagulation Summary  As of 7/22/2020    INR goal:   2.0-3.0   TTR:   37.7 % (1 y)   INR used for dosing:   3.70! (7/22/2020)   Warfarin maintenance plan:   4 mg (2 mg x 2) every Tue, Fri; 2 mg (2 mg x 1) all other days   Full warfarin instructions:   7/22: Hold; Otherwise 4 mg every Tue, Fri; 2 mg all other days   Weekly warfarin total:   18 mg   Plan last modified:   Latosha Perdomo RN (5/5/2020)   Next  INR check:   8/5/2020   Priority:   High   Target end date:       Indications    Long-term (current) use of anticoagulants [Z79.01] [Z79.01]  PE (pulmonary thromboembolism) (H) [I26.99]  Recurrent deep venous thrombosis (H) [I82.409]             Anticoagulation Episode Summary     INR check location:       Preferred lab:       Send INR reminders to:   [facility]    Comments:         Anticoagulation Care Providers     Provider Role Specialty Phone number    Hamzah Thomas MD [facility] Internal Medicine [phone]            See the Encounter Report to view Anticoagulation Flowsheet and Dosing Calendar (Go to Encounters tab in chart review, and find the Anticoagulation Therapy Visit)    Dosage adjustment made based on physician directed care plan.    Latosha Perdomo RN

## 2020-08-05 ENCOUNTER — ANTICOAGULATION THERAPY VISIT (OUTPATIENT)
Dept: ANTICOAGULATION | Facility: CLINIC | Age: 45
End: 2020-08-05

## 2020-08-05 DIAGNOSIS — I26.99 PE (PULMONARY THROMBOEMBOLISM) (H): ICD-10-CM

## 2020-08-05 DIAGNOSIS — I82.409 RECURRENT DEEP VENOUS THROMBOSIS (H): ICD-10-CM

## 2020-08-05 DIAGNOSIS — Z79.01 LONG TERM CURRENT USE OF ANTICOAGULANT THERAPY: ICD-10-CM

## 2020-08-05 LAB
CAPILLARY BLOOD COLLECTION: NORMAL
INR PPP: 3.5 (ref 0.86–1.14)

## 2020-08-05 PROCEDURE — 85610 PROTHROMBIN TIME: CPT | Performed by: INTERNAL MEDICINE

## 2020-08-05 PROCEDURE — 36416 COLLJ CAPILLARY BLOOD SPEC: CPT | Performed by: INTERNAL MEDICINE

## 2020-08-05 PROCEDURE — 99207 ZZC NO CHARGE NURSE ONLY: CPT | Performed by: INTERNAL MEDICINE

## 2020-08-05 NOTE — PROGRESS NOTES
ANTICOAGULATION FOLLOW-UP CLINIC VISIT    Patient Name:  Nelsy Cota  Date:  8/5/2020  Contact Type:  Telephone/ discussed with patient    SUBJECTIVE:  Patient Findings     Positives:   Change in diet/appetite (switched from eating iceberg lettuce salads to roberto lettuce in her salads to try to increase her vitamin k.  Encouraged her to continue to do this.)    Comments:   Bruising that patient previously had has resolved.  Continues to have lower let edema.  Has follow up with provider on 8/14 for this.  She will have her INR checked again at that time.  The patient was assessed for medication, and activity level changes, missed or extra doses, bruising or bleeding, with no problem findings.  INR has been consistently supra therapeutic so maintenance dose was decreased by 11% today.          Clinical Outcomes     Negatives:   Major bleeding event, Thromboembolic event, Anticoagulation-related hospital admission, Anticoagulation-related ED visit, Anticoagulation-related fatality    Comments:   Bruising that patient previously had has resolved.  Continues to have lower let edema.  Has follow up with provider on 8/14 for this.  She will have her INR checked again at that time.  The patient was assessed for medication, and activity level changes, missed or extra doses, bruising or bleeding, with no problem findings.  INR has been consistently supra therapeutic so maintenance dose was decreased by 11% today.             OBJECTIVE    Recent labs: (last 7 days)     08/05/20  1539   INR 3.50*       ASSESSMENT / PLAN  INR assessment SUPRA    Recheck INR In: 10 DAYS    INR Location Clinic      Anticoagulation Summary  As of 8/5/2020    INR goal:   2.0-3.0   TTR:   35.2 % (1 y)   INR used for dosing:   3.50! (8/5/2020)   Warfarin maintenance plan:   4 mg (2 mg x 2) every Tue; 2 mg (2 mg x 1) all other days   Full warfarin instructions:   8/5: 1 mg; Otherwise 4 mg every Tue; 2 mg all other days   Weekly warfarin total:    16 mg   Plan last modified:   Vania Morse RN (8/5/2020)   Next INR check:   8/14/2020   Priority:   High   Target end date:       Indications    Long-term (current) use of anticoagulants [Z79.01] [Z79.01]  PE (pulmonary thromboembolism) (H) [I26.99]  Recurrent deep venous thrombosis (H) [I82.409]             Anticoagulation Episode Summary     INR check location:       Preferred lab:       [facility]    Comments:         Anticoagulation Care Providers     Provider Role Specialty Phone number    Hamzah Thomas MD [facility] Internal Medicine [phone]            See the Encounter Report to view Anticoagulation Flowsheet and Dosing Calendar (Go to Encounters tab in chart review, and find the Anticoagulation Therapy Visit)    Dosage adjustment made based on physician directed care plan.    Vania Morse RN

## 2020-08-11 ENCOUNTER — TELEPHONE (OUTPATIENT)
Dept: INTERNAL MEDICINE | Facility: CLINIC | Age: 45
End: 2020-08-11

## 2020-08-11 NOTE — TELEPHONE ENCOUNTER
"Patient had an appointment to see Dr Thomas this Friday 8/14/20 due to swollen legs however, Dr Thomas will be unavailable at that time. This is a new problem for the patient with her legs swollen \"twice the size and hard as rocks\" with no pain except at night, she also reports \"orange urine\". No clinic appointments available this week and she is unable to do a video visit until 3:00 on Friday 8/14/20. Call patient back to advise.  "

## 2020-08-13 NOTE — TELEPHONE ENCOUNTER
Please check with other providers and also other clinics if any provider has openings, otherwise please advise pt to be seen  in [facility].  Pt is on warfarin

## 2020-08-13 NOTE — TELEPHONE ENCOUNTER
Called patient and relayed below message.  Patient will call scheduling later today as she is working.  Patient trying to elevate legs at work.  Advised patient to elevate her legs above her heart as much as possible and to do ankle pumps and circles at work and with legs elevated.  Patient verbalized understanding.

## 2020-08-26 ENCOUNTER — TELEPHONE (OUTPATIENT)
Dept: ANTICOAGULATION | Facility: CLINIC | Age: 45
End: 2020-08-26

## 2020-08-26 ENCOUNTER — ANTICOAGULATION THERAPY VISIT (OUTPATIENT)
Dept: ANTICOAGULATION | Facility: CLINIC | Age: 45
End: 2020-08-26

## 2020-08-26 DIAGNOSIS — I26.99 PE (PULMONARY THROMBOEMBOLISM) (H): ICD-10-CM

## 2020-08-26 DIAGNOSIS — Z79.01 LONG TERM CURRENT USE OF ANTICOAGULANT THERAPY: ICD-10-CM

## 2020-08-26 DIAGNOSIS — I82.409 RECURRENT DEEP VENOUS THROMBOSIS (H): ICD-10-CM

## 2020-08-26 DIAGNOSIS — Z79.01 LONG TERM CURRENT USE OF ANTICOAGULANT THERAPY: Primary | ICD-10-CM

## 2020-08-26 LAB
CAPILLARY BLOOD COLLECTION: NORMAL
INR PPP: 4.1 (ref 0.86–1.14)

## 2020-08-26 PROCEDURE — 85610 PROTHROMBIN TIME: CPT | Performed by: INTERNAL MEDICINE

## 2020-08-26 PROCEDURE — 36416 COLLJ CAPILLARY BLOOD SPEC: CPT | Performed by: INTERNAL MEDICINE

## 2020-08-26 PROCEDURE — 99207 ZZC NO CHARGE NURSE ONLY: CPT | Performed by: INTERNAL MEDICINE

## 2020-08-26 NOTE — TELEPHONE ENCOUNTER
This request is forwarded to Dr Faye who covers Dr Stratton patients with the letters C,D,E      ANTICOAGULATION MANAGEMENT      Nelsy Cota due for annual renewal of referral to anticoagulation monitoring. Order pended for your review and signature.      ANTICOAGULATION SUMMARY      Warfarin indication(s)     DVT  PE    Heart valve present?  NO       Current goal range   INR: 2.0-3.0     Goal appropriate for indication? Yes, INR 2-3 appropriate for hx of DVT, PE, hypercoagulable state, Afib, LVAD, or bileaflet AVR without risk factors     Current duration of therapy Indefinite/long term therapy   Time in Therapeutic Range (TTR)  (Goal > 60%) 29.5 %       Office visit with referring provider's group within last year yes on 10/1/2019       Latosha Perdomo RN

## 2020-08-26 NOTE — PROGRESS NOTES
"ANTICOAGULATION FOLLOW-UP CLINIC VISIT    Patient Name:  Nelsy Cota  Date:  2020  Contact Type:  Telephone/ Called patient, she reports bruising, she denies any other changes. Orders discussed with patient, she agrees with the plan. Lab INR appointment scheduled on 9/3/20.    SUBJECTIVE:  Patient Findings     Positives:   Change in health (Patient reports her \"taste is off\". Patient reports swelling in her feet and legs has not improved. ), Change in alcohol use (Patient reports decreased alcohol last night because she was having her INR done today.), Change in diet/appetite (Patient reports continues to have 1 salad a week with Reece lettuce.), Bruising (Patient reports has more bruising than usual )    Comments:   The patient was assessed for diet, medication, and activity level changes, missed or extra doses, bruising or bleeding, with no problem findings. Patient was supposed to have an appointment with PCP 20 for the swelling in her legs, provider cancelled. Patient will schedule another appointment. INR continues to be elevated, will decrease warfarin dose again, 12.5% (2 mg).          Clinical Outcomes     Comments:   The patient was assessed for diet, medication, and activity level changes, missed or extra doses, bruising or bleeding, with no problem findings. Patient was supposed to have an appointment with PCP 20 for the swelling in her legs, provider cancelled. Patient will schedule another appointment. INR continues to be elevated, will decrease warfarin dose again, 12.5% (2 mg).             OBJECTIVE    Recent labs: (last 7 days)     20  1251   INR 4.10*       ASSESSMENT / PLAN  INR assessment SUPRA    Recheck INR In: 8 DAYS    INR Location Outside lab      Anticoagulation Summary  As of 2020    INR goal:   2.0-3.0   TTR:   29.5 % (1 y)   INR used for dosin.10! (2020)   Warfarin maintenance plan:   2 mg (2 mg x 1) every day   Full warfarin instructions:   " 8/26: Hold; Otherwise 2 mg every day   Weekly warfarin total:   14 mg   Plan last modified:   Latosha Perdomo RN (8/26/2020)   Next INR check:   9/3/2020   Priority:   High   Target end date:       Indications    Long-term (current) use of anticoagulants [Z79.01] [Z79.01]  PE (pulmonary thromboembolism) (H) [I26.99]  Recurrent deep venous thrombosis (H) [I82.409]             Anticoagulation Episode Summary     INR check location:       Preferred lab:       Send INR reminders to:   [facility]    Comments:         Anticoagulation Care Providers     Provider Role Specialty Phone number    Hamzah Thomas MD Responsible Internal Medicine [phone]            See the Encounter Report to view Anticoagulation Flowsheet and Dosing Calendar (Go to Encounters tab in chart review, and find the Anticoagulation Therapy Visit)    Dosage adjustment made based on physician directed care plan.    Latosha Perdomo, RN

## 2020-09-03 ENCOUNTER — ANTICOAGULATION THERAPY VISIT (OUTPATIENT)
Dept: ANTICOAGULATION | Facility: CLINIC | Age: 45
End: 2020-09-03

## 2020-09-03 DIAGNOSIS — I26.99 PE (PULMONARY THROMBOEMBOLISM) (H): ICD-10-CM

## 2020-09-03 DIAGNOSIS — Z79.01 LONG TERM CURRENT USE OF ANTICOAGULANT THERAPY: ICD-10-CM

## 2020-09-03 DIAGNOSIS — I82.409 RECURRENT DEEP VENOUS THROMBOSIS (H): ICD-10-CM

## 2020-09-03 LAB
CAPILLARY BLOOD COLLECTION: NORMAL
INR PPP: 2.7 (ref 0.86–1.14)

## 2020-09-03 PROCEDURE — 99207 ZZC NO CHARGE NURSE ONLY: CPT | Performed by: INTERNAL MEDICINE

## 2020-09-03 PROCEDURE — 85610 PROTHROMBIN TIME: CPT | Performed by: INTERNAL MEDICINE

## 2020-09-03 PROCEDURE — 36416 COLLJ CAPILLARY BLOOD SPEC: CPT | Performed by: INTERNAL MEDICINE

## 2020-09-03 NOTE — PROGRESS NOTES
Left message to call [phone]. Please transfer call to Latosha at [phone].  Latosha Perdomo RN  ANTICOAGULATION FOLLOW-UP CLINIC VISIT    Patient Name:  Nelsy Cota  Date:  9/3/2020  Contact Type:  Telephone/ Called patient, she denies any changes, Orders discussed with the patient, she agrees with the plan. Lab INR appointment scheduled on 9/15/20.    SUBJECTIVE:  Patient Findings     Comments:   The patient was assessed for diet, medication, and activity level changes, missed or extra doses, bruising or bleeding, with no problem findings. Maintenance warfarin dosing was reviewed with patient and will remain on the same dose until next INR check. Patient did not have any questions or concerns.             Clinical Outcomes     Negatives:   Major bleeding event, Thromboembolic event, Anticoagulation-related hospital admission, Anticoagulation-related ED visit, Anticoagulation-related fatality    Comments:   The patient was assessed for diet, medication, and activity level changes, missed or extra doses, bruising or bleeding, with no problem findings. Maintenance warfarin dosing was reviewed with patient and will remain on the same dose until next INR check. Patient did not have any questions or concerns.                OBJECTIVE    Recent labs: (last 7 days)     20  1540   INR 2.70*       ASSESSMENT / PLAN  INR assessment THER    Recheck INR In: 10 DAYS    INR Location Clinic      Anticoagulation Summary  As of 9/3/2020    INR goal:   2.0-3.0   TTR:   29.5 % (1 y)   INR used for dosin.70 (9/3/2020)   Warfarin maintenance plan:   2 mg (2 mg x 1) every day   Full warfarin instructions:   2 mg every day   Weekly warfarin total:   14 mg   Plan last modified:   Latosha Perdomo RN (9/3/2020)   Next INR check:   9/15/2020   Priority:   High   Target end date:   Indefinite    Indications    Long-term (current) use of anticoagulants [Z79.01] [Z79.01]  PE (pulmonary thromboembolism) (H)  [I26.99]  Recurrent deep venous thrombosis (H) [I82.409]             Anticoagulation Episode Summary     INR check location:       Preferred lab:       Send INR reminders to:   [facility]    Comments:   INR Referral renewed, see 8/26/20 telephone encounter.      Anticoagulation Care Providers     Provider Role Specialty Phone number    Alyse Faye MD Referring Internal Medicine [phone]    Hamzah Thomas MD Responsible Internal Medicine [phone]            See the Encounter Report to view Anticoagulation Flowsheet and Dosing Calendar (Go to Encounters tab in chart review, and find the Anticoagulation Therapy Visit)    Dosage adjustment made based on physician directed care plan.    Latosha Perdomo RN

## 2020-09-15 ENCOUNTER — ANTICOAGULATION THERAPY VISIT (OUTPATIENT)
Dept: ANTICOAGULATION | Facility: CLINIC | Age: 45
End: 2020-09-15

## 2020-09-15 DIAGNOSIS — I82.409 RECURRENT DEEP VENOUS THROMBOSIS (H): ICD-10-CM

## 2020-09-15 DIAGNOSIS — Z79.01 LONG TERM CURRENT USE OF ANTICOAGULANT THERAPY: ICD-10-CM

## 2020-09-15 DIAGNOSIS — I26.99 PE (PULMONARY THROMBOEMBOLISM) (H): ICD-10-CM

## 2020-09-15 LAB
CAPILLARY BLOOD COLLECTION: NORMAL
INR PPP: 3.3 (ref 0.86–1.14)

## 2020-09-15 PROCEDURE — 99207 ZZC NO CHARGE NURSE ONLY: CPT | Performed by: INTERNAL MEDICINE

## 2020-09-15 PROCEDURE — 85610 PROTHROMBIN TIME: CPT | Performed by: INTERNAL MEDICINE

## 2020-09-15 PROCEDURE — 36416 COLLJ CAPILLARY BLOOD SPEC: CPT | Performed by: INTERNAL MEDICINE

## 2020-09-15 NOTE — PROGRESS NOTES
ANTICOAGULATION FOLLOW-UP CLINIC VISIT    Patient Name:  Nelsy Cota  Date:  9/15/2020  Contact Type:  Telephone/ Called patient, she reports dient change, she denies any other changes. Orders discussed with the patient, she agrees with the plan. Lab INR appointment scheduled on 9/30/20.    SUBJECTIVE:  Patient Findings     Positives:   Change in diet/appetite (Patient reports she did not have any greens this week. Patient reports she might start drinking an Ensure, SlimFast or Premier shake, patient reminded of consistencey.  )    Comments:   The patient was assessed for medication, and activity level changes, missed or extra doses, bruising or bleeding, with no problem findings. Patient denies any identifiable changes that caused the suprstherapeutic INR. Patient reminded to scheduled appointment with PCP, last appointment was 10/7/19.            Clinical Outcomes     Comments:   The patient was assessed for medication, and activity level changes, missed or extra doses, bruising or bleeding, with no problem findings. Patient denies any identifiable changes that caused the suprstherapeutic INR. Patient reminded to scheduled appointment with PCP, last appointment was 10/7/19.               OBJECTIVE    Recent labs: (last 7 days)     09/15/20  1545   INR 3.30*       ASSESSMENT / PLAN  INR assessment SUPRA    Recheck INR In: 2 WEEKS    INR Location Outside lab      Anticoagulation Summary  As of 9/15/2020    INR goal:   2.0-3.0   TTR:   30.7 % (1 y)   INR used for dosing:   3.30! (9/15/2020)   Warfarin maintenance plan:   2 mg (2 mg x 1) every day   Full warfarin instructions:   9/15: 1 mg; Otherwise 2 mg every day   Weekly warfarin total:   14 mg   Plan last modified:   Latosha Perdomo RN (9/3/2020)   Next INR check:   9/30/2020   Priority:   High   Target end date:   Indefinite    Indications    Long-term (current) use of anticoagulants [Z79.01] [Z79.01]  PE (pulmonary thromboembolism) (H) [I26.99]  Recurrent  deep venous thrombosis (H) [I82.409]             Anticoagulation Episode Summary     INR check location:       Preferred lab:       Send INR reminders to:   [facility] [place]    Comments:   INR Referral renewed, see 8/26/20 telephone encounter.      Anticoagulation Care Providers     Provider Role Specialty Phone number    Alyse Faye MD Referring Internal Medicine [phone]    Hamzah Thomas MD Responsible Internal Medicine [phone]            See the Encounter Report to view Anticoagulation Flowsheet and Dosing Calendar (Go to Encounters tab in chart review, and find the Anticoagulation Therapy Visit)    Dosage adjustment made based on physician directed care plan.    Latosha Perdomo RN

## 2020-09-24 ENCOUNTER — HOSPITAL ENCOUNTER (INPATIENT)
Facility: CLINIC | Age: 45
LOS: 2 days | Discharge: HOME | DRG: 433 | End: 2020-09-26
Attending: EMERGENCY MEDICINE | Admitting: INTERNAL MEDICINE
Payer: COMMERCIAL

## 2020-09-24 ENCOUNTER — APPOINTMENT (OUTPATIENT)
Dept: CT IMAGING | Facility: CLINIC | Age: 45
DRG: 433 | End: 2020-09-24
Attending: EMERGENCY MEDICINE
Payer: COMMERCIAL

## 2020-09-24 DIAGNOSIS — K70.31 ALCOHOLIC CIRRHOSIS OF LIVER WITH ASCITES (H): Primary | ICD-10-CM

## 2020-09-24 DIAGNOSIS — I26.99 PE (PULMONARY THROMBOEMBOLISM) (H): ICD-10-CM

## 2020-09-24 DIAGNOSIS — R10.84 ABDOMINAL PAIN, GENERALIZED: ICD-10-CM

## 2020-09-24 DIAGNOSIS — R18.8 ASCITES OF LIVER: ICD-10-CM

## 2020-09-24 LAB
ALBUMIN FLD-MCNC: 0.3 G/DL
ALBUMIN SERPL-MCNC: 2.1 G/DL (ref 3.4–5)
ALBUMIN SERPL-MCNC: 2.1 G/DL (ref 3.4–5)
ALP SERPL-CCNC: 224 U/L (ref 40–150)
ALT SERPL W P-5'-P-CCNC: 24 U/L (ref 0–50)
ANION GAP SERPL CALCULATED.3IONS-SCNC: 8 MMOL/L (ref 3–14)
APPEARANCE FLD: NORMAL
AST SERPL W P-5'-P-CCNC: 77 U/L (ref 0–45)
BASOPHILS # BLD AUTO: 0.1 10E9/L (ref 0–0.2)
BASOPHILS NFR BLD AUTO: 0.4 %
BILIRUB DIRECT SERPL-MCNC: 0.7 MG/DL (ref 0–0.2)
BILIRUB SERPL-MCNC: 1.3 MG/DL (ref 0.2–1.3)
BUN SERPL-MCNC: 8 MG/DL (ref 7–30)
CALCIUM SERPL-MCNC: 8.9 MG/DL (ref 8.5–10.1)
CHLORIDE SERPL-SCNC: 97 MMOL/L (ref 94–109)
CO2 SERPL-SCNC: 30 MMOL/L (ref 20–32)
COLOR FLD: YELLOW
CREAT SERPL-MCNC: 0.71 MG/DL (ref 0.52–1.04)
DIFFERENTIAL METHOD BLD: ABNORMAL
EOSINOPHIL # BLD AUTO: 0.2 10E9/L (ref 0–0.7)
EOSINOPHIL NFR BLD AUTO: 2 %
ERYTHROCYTE [DISTWIDTH] IN BLOOD BY AUTOMATED COUNT: 17.9 % (ref 10–15)
GFR SERPL CREATININE-BSD FRML MDRD: >90 ML/MIN/{1.73_M2}
GLUCOSE SERPL-MCNC: 75 MG/DL (ref 70–99)
GRAM STN SPEC: NORMAL
GRAM STN SPEC: NORMAL
HCT VFR BLD AUTO: 30.5 % (ref 35–47)
HGB BLD-MCNC: 11.1 G/DL (ref 11.7–15.7)
IMM GRANULOCYTES # BLD: 0.1 10E9/L (ref 0–0.4)
IMM GRANULOCYTES NFR BLD: 0.5 %
INR PPP: 3.61 (ref 0.86–1.14)
LABORATORY COMMENT REPORT: NORMAL
LACTATE BLD-SCNC: 3.5 MMOL/L (ref 0.7–2)
LIPASE SERPL-CCNC: 94 U/L (ref 73–393)
LYMPHOCYTES # BLD AUTO: 2.5 10E9/L (ref 0.8–5.3)
LYMPHOCYTES NFR BLD AUTO: 22.2 %
LYMPHOCYTES NFR FLD MANUAL: 23 %
MAGNESIUM SERPL-MCNC: 1.6 MG/DL (ref 1.6–2.3)
MCH RBC QN AUTO: 47.6 PG (ref 26.5–33)
MCHC RBC AUTO-ENTMCNC: 36.4 G/DL (ref 31.5–36.5)
MCV RBC AUTO: 131 FL (ref 78–100)
MONOCYTES # BLD AUTO: 1 10E9/L (ref 0–1.3)
MONOCYTES NFR BLD AUTO: 9 %
MONOS+MACROS NFR FLD MANUAL: 62 %
NEUTROPHILS # BLD AUTO: 7.3 10E9/L (ref 1.6–8.3)
NEUTROPHILS NFR BLD AUTO: 65.9 %
NEUTS BAND NFR FLD MANUAL: 4 %
NRBC # BLD AUTO: 0 10*3/UL
NRBC BLD AUTO-RTO: 0 /100
NT-PROBNP SERPL-MCNC: 309 PG/ML (ref 0–450)
OTHER CELLS FLD MANUAL: 11 %
PHOSPHATE SERPL-MCNC: 3.4 MG/DL (ref 2.5–4.5)
PLATELET # BLD AUTO: 428 10E9/L (ref 150–450)
POTASSIUM SERPL-SCNC: 3.3 MMOL/L (ref 3.4–5.3)
PROT FLD-MCNC: 0.8 G/DL
PROT SERPL-MCNC: 6.9 G/DL (ref 6.8–8.8)
RBC # BLD AUTO: 2.33 10E12/L (ref 3.8–5.2)
SARS-COV-2 RNA SPEC QL NAA+PROBE: NEGATIVE
SARS-COV-2 RNA SPEC QL NAA+PROBE: NORMAL
SODIUM SERPL-SCNC: 135 MMOL/L (ref 133–144)
SPECIMEN SOURCE FLD: NORMAL
SPECIMEN SOURCE: NORMAL
TROPONIN I SERPL-MCNC: <0.015 UG/L (ref 0–0.04)
WBC # BLD AUTO: 11.1 10E9/L (ref 4–11)
WBC # FLD AUTO: 48 /UL

## 2020-09-24 PROCEDURE — 99285 EMERGENCY DEPT VISIT HI MDM: CPT | Mod: 25

## 2020-09-24 PROCEDURE — 82042 OTHER SOURCE ALBUMIN QUAN EA: CPT | Performed by: EMERGENCY MEDICINE

## 2020-09-24 PROCEDURE — 25000132 ZZH RX MED GY IP 250 OP 250 PS 637: Performed by: EMERGENCY MEDICINE

## 2020-09-24 PROCEDURE — 36415 COLL VENOUS BLD VENIPUNCTURE: CPT | Performed by: EMERGENCY MEDICINE

## 2020-09-24 PROCEDURE — 12000011 ZZH R&B MS OVERFLOW

## 2020-09-24 PROCEDURE — 84157 ASSAY OF PROTEIN OTHER: CPT | Performed by: EMERGENCY MEDICINE

## 2020-09-24 PROCEDURE — 89051 BODY FLUID CELL COUNT: CPT | Performed by: EMERGENCY MEDICINE

## 2020-09-24 PROCEDURE — 93005 ELECTROCARDIOGRAM TRACING: CPT

## 2020-09-24 PROCEDURE — 83605 ASSAY OF LACTIC ACID: CPT | Performed by: EMERGENCY MEDICINE

## 2020-09-24 PROCEDURE — 96375 TX/PRO/DX INJ NEW DRUG ADDON: CPT

## 2020-09-24 PROCEDURE — 25800030 ZZH RX IP 258 OP 636: Performed by: EMERGENCY MEDICINE

## 2020-09-24 PROCEDURE — 83690 ASSAY OF LIPASE: CPT | Performed by: EMERGENCY MEDICINE

## 2020-09-24 PROCEDURE — 82040 ASSAY OF SERUM ALBUMIN: CPT | Performed by: EMERGENCY MEDICINE

## 2020-09-24 PROCEDURE — 87205 SMEAR GRAM STAIN: CPT | Performed by: EMERGENCY MEDICINE

## 2020-09-24 PROCEDURE — 85025 COMPLETE CBC W/AUTO DIFF WBC: CPT | Performed by: EMERGENCY MEDICINE

## 2020-09-24 PROCEDURE — 99223 1ST HOSP IP/OBS HIGH 75: CPT | Mod: AI | Performed by: INTERNAL MEDICINE

## 2020-09-24 PROCEDURE — 83735 ASSAY OF MAGNESIUM: CPT | Performed by: INTERNAL MEDICINE

## 2020-09-24 PROCEDURE — 80048 BASIC METABOLIC PNL TOTAL CA: CPT | Performed by: EMERGENCY MEDICINE

## 2020-09-24 PROCEDURE — 83880 ASSAY OF NATRIURETIC PEPTIDE: CPT | Performed by: EMERGENCY MEDICINE

## 2020-09-24 PROCEDURE — 80076 HEPATIC FUNCTION PANEL: CPT | Performed by: EMERGENCY MEDICINE

## 2020-09-24 PROCEDURE — U0003 INFECTIOUS AGENT DETECTION BY NUCLEIC ACID (DNA OR RNA); SEVERE ACUTE RESPIRATORY SYNDROME CORONAVIRUS 2 (SARS-COV-2) (CORONAVIRUS DISEASE [COVID-19]), AMPLIFIED PROBE TECHNIQUE, MAKING USE OF HIGH THROUGHPUT TECHNOLOGIES AS DESCRIBED BY CMS-2020-01-R: HCPCS | Performed by: EMERGENCY MEDICINE

## 2020-09-24 PROCEDURE — 96366 THER/PROPH/DIAG IV INF ADDON: CPT

## 2020-09-24 PROCEDURE — 25000128 H RX IP 250 OP 636: Performed by: EMERGENCY MEDICINE

## 2020-09-24 PROCEDURE — 84100 ASSAY OF PHOSPHORUS: CPT | Performed by: INTERNAL MEDICINE

## 2020-09-24 PROCEDURE — 87040 BLOOD CULTURE FOR BACTERIA: CPT | Performed by: EMERGENCY MEDICINE

## 2020-09-24 PROCEDURE — 85610 PROTHROMBIN TIME: CPT | Performed by: EMERGENCY MEDICINE

## 2020-09-24 PROCEDURE — C9803 HOPD COVID-19 SPEC COLLECT: HCPCS

## 2020-09-24 PROCEDURE — 36415 COLL VENOUS BLD VENIPUNCTURE: CPT | Performed by: INTERNAL MEDICINE

## 2020-09-24 PROCEDURE — 0W9G3ZX DRAINAGE OF PERITONEAL CAVITY, PERCUTANEOUS APPROACH, DIAGNOSTIC: ICD-10-PCS | Performed by: EMERGENCY MEDICINE

## 2020-09-24 PROCEDURE — 84484 ASSAY OF TROPONIN QUANT: CPT | Performed by: EMERGENCY MEDICINE

## 2020-09-24 PROCEDURE — 96365 THER/PROPH/DIAG IV INF INIT: CPT | Mod: 59

## 2020-09-24 PROCEDURE — 71275 CT ANGIOGRAPHY CHEST: CPT

## 2020-09-24 PROCEDURE — 87070 CULTURE OTHR SPECIMN AEROBIC: CPT | Performed by: EMERGENCY MEDICINE

## 2020-09-24 PROCEDURE — 25000125 ZZHC RX 250: Performed by: EMERGENCY MEDICINE

## 2020-09-24 PROCEDURE — 96361 HYDRATE IV INFUSION ADD-ON: CPT

## 2020-09-24 PROCEDURE — 25000132 ZZH RX MED GY IP 250 OP 250 PS 637: Performed by: INTERNAL MEDICINE

## 2020-09-24 RX ORDER — BISACODYL 10 MG
10 SUPPOSITORY, RECTAL RECTAL DAILY PRN
Status: DISCONTINUED | OUTPATIENT
Start: 2020-09-24 | End: 2020-09-26 | Stop reason: HOSPADM

## 2020-09-24 RX ORDER — NALOXONE HYDROCHLORIDE 0.4 MG/ML
.1-.4 INJECTION, SOLUTION INTRAMUSCULAR; INTRAVENOUS; SUBCUTANEOUS
Status: DISCONTINUED | OUTPATIENT
Start: 2020-09-24 | End: 2020-09-26 | Stop reason: HOSPADM

## 2020-09-24 RX ORDER — ONDANSETRON 2 MG/ML
4 INJECTION INTRAMUSCULAR; INTRAVENOUS EVERY 6 HOURS PRN
Status: DISCONTINUED | OUTPATIENT
Start: 2020-09-24 | End: 2020-09-26 | Stop reason: HOSPADM

## 2020-09-24 RX ORDER — SPIRONOLACTONE 25 MG/1
25 TABLET ORAL DAILY
Status: DISCONTINUED | OUTPATIENT
Start: 2020-09-25 | End: 2020-09-26 | Stop reason: HOSPADM

## 2020-09-24 RX ORDER — CEFTRIAXONE 2 G/1
2 INJECTION, POWDER, FOR SOLUTION INTRAMUSCULAR; INTRAVENOUS ONCE
Status: COMPLETED | OUTPATIENT
Start: 2020-09-24 | End: 2020-09-24

## 2020-09-24 RX ORDER — CALCIUM CARBONATE 500 MG/1
1000 TABLET, CHEWABLE ORAL EVERY 4 HOURS PRN
Status: DISCONTINUED | OUTPATIENT
Start: 2020-09-24 | End: 2020-09-26 | Stop reason: HOSPADM

## 2020-09-24 RX ORDER — AMOXICILLIN 250 MG
2 CAPSULE ORAL 2 TIMES DAILY PRN
Status: DISCONTINUED | OUTPATIENT
Start: 2020-09-24 | End: 2020-09-26 | Stop reason: HOSPADM

## 2020-09-24 RX ORDER — LIDOCAINE 40 MG/G
CREAM TOPICAL
Status: DISCONTINUED | OUTPATIENT
Start: 2020-09-24 | End: 2020-09-25

## 2020-09-24 RX ORDER — PROCHLORPERAZINE 25 MG
25 SUPPOSITORY, RECTAL RECTAL EVERY 12 HOURS PRN
Status: DISCONTINUED | OUTPATIENT
Start: 2020-09-24 | End: 2020-09-26 | Stop reason: HOSPADM

## 2020-09-24 RX ORDER — POTASSIUM CL/LIDO/0.9 % NACL 10MEQ/0.1L
10 INTRAVENOUS SOLUTION, PIGGYBACK (ML) INTRAVENOUS
Status: DISCONTINUED | OUTPATIENT
Start: 2020-09-24 | End: 2020-09-26 | Stop reason: HOSPADM

## 2020-09-24 RX ORDER — AMOXICILLIN 250 MG
1 CAPSULE ORAL 2 TIMES DAILY PRN
Status: DISCONTINUED | OUTPATIENT
Start: 2020-09-24 | End: 2020-09-26 | Stop reason: HOSPADM

## 2020-09-24 RX ORDER — NITROGLYCERIN 0.4 MG/1
0.4 TABLET SUBLINGUAL EVERY 5 MIN PRN
Status: DISCONTINUED | OUTPATIENT
Start: 2020-09-24 | End: 2020-09-26 | Stop reason: HOSPADM

## 2020-09-24 RX ORDER — WARFARIN SODIUM 2 MG/1
2 TABLET ORAL AT BEDTIME
COMMUNITY
End: 2020-10-06

## 2020-09-24 RX ORDER — POTASSIUM CHLORIDE 1.5 G/1.58G
20-40 POWDER, FOR SOLUTION ORAL
Status: DISCONTINUED | OUTPATIENT
Start: 2020-09-24 | End: 2020-09-26 | Stop reason: HOSPADM

## 2020-09-24 RX ORDER — LORAZEPAM 2 MG/ML
1-2 INJECTION INTRAMUSCULAR EVERY 30 MIN PRN
Status: DISCONTINUED | OUTPATIENT
Start: 2020-09-24 | End: 2020-09-26 | Stop reason: HOSPADM

## 2020-09-24 RX ORDER — LANOLIN ALCOHOL/MO/W.PET/CERES
100 CREAM (GRAM) TOPICAL 3 TIMES DAILY
Status: DISCONTINUED | OUTPATIENT
Start: 2020-09-26 | End: 2020-09-26 | Stop reason: HOSPADM

## 2020-09-24 RX ORDER — PROCHLORPERAZINE MALEATE 5 MG
10 TABLET ORAL EVERY 6 HOURS PRN
Status: DISCONTINUED | OUTPATIENT
Start: 2020-09-24 | End: 2020-09-26 | Stop reason: HOSPADM

## 2020-09-24 RX ORDER — FUROSEMIDE 10 MG/ML
20 INJECTION INTRAMUSCULAR; INTRAVENOUS DAILY
Status: DISCONTINUED | OUTPATIENT
Start: 2020-09-25 | End: 2020-09-26 | Stop reason: HOSPADM

## 2020-09-24 RX ORDER — LANOLIN ALCOHOL/MO/W.PET/CERES
100 CREAM (GRAM) TOPICAL DAILY
Status: DISCONTINUED | OUTPATIENT
Start: 2020-10-02 | End: 2020-09-26 | Stop reason: HOSPADM

## 2020-09-24 RX ORDER — IOPAMIDOL 755 MG/ML
500 INJECTION, SOLUTION INTRAVASCULAR ONCE
Status: COMPLETED | OUTPATIENT
Start: 2020-09-24 | End: 2020-09-24

## 2020-09-24 RX ORDER — POTASSIUM CHLORIDE 29.8 MG/ML
20 INJECTION INTRAVENOUS
Status: DISCONTINUED | OUTPATIENT
Start: 2020-09-24 | End: 2020-09-26 | Stop reason: HOSPADM

## 2020-09-24 RX ORDER — FOLIC ACID 1 MG/1
1 TABLET ORAL DAILY
Status: DISCONTINUED | OUTPATIENT
Start: 2020-09-25 | End: 2020-09-26 | Stop reason: HOSPADM

## 2020-09-24 RX ORDER — LIDOCAINE 40 MG/G
CREAM TOPICAL
Status: DISCONTINUED | OUTPATIENT
Start: 2020-09-24 | End: 2020-09-26 | Stop reason: HOSPADM

## 2020-09-24 RX ORDER — POLYETHYLENE GLYCOL 3350 17 G/17G
17 POWDER, FOR SOLUTION ORAL DAILY PRN
Status: DISCONTINUED | OUTPATIENT
Start: 2020-09-24 | End: 2020-09-26 | Stop reason: HOSPADM

## 2020-09-24 RX ORDER — LORAZEPAM 1 MG/1
1-2 TABLET ORAL EVERY 30 MIN PRN
Status: DISCONTINUED | OUTPATIENT
Start: 2020-09-24 | End: 2020-09-26 | Stop reason: HOSPADM

## 2020-09-24 RX ORDER — ONDANSETRON 2 MG/ML
4 INJECTION INTRAMUSCULAR; INTRAVENOUS EVERY 30 MIN PRN
Status: DISCONTINUED | OUTPATIENT
Start: 2020-09-24 | End: 2020-09-24

## 2020-09-24 RX ORDER — OXYCODONE HYDROCHLORIDE 5 MG/1
5 TABLET ORAL EVERY 6 HOURS PRN
Status: DISCONTINUED | OUTPATIENT
Start: 2020-09-24 | End: 2020-09-26 | Stop reason: HOSPADM

## 2020-09-24 RX ORDER — MULTIPLE VITAMINS W/ MINERALS TAB 9MG-400MCG
1 TAB ORAL DAILY
Status: DISCONTINUED | OUTPATIENT
Start: 2020-09-25 | End: 2020-09-26 | Stop reason: HOSPADM

## 2020-09-24 RX ORDER — ONDANSETRON 4 MG/1
4 TABLET, ORALLY DISINTEGRATING ORAL EVERY 6 HOURS PRN
Status: DISCONTINUED | OUTPATIENT
Start: 2020-09-24 | End: 2020-09-26 | Stop reason: HOSPADM

## 2020-09-24 RX ORDER — LANOLIN ALCOHOL/MO/W.PET/CERES
200 CREAM (GRAM) TOPICAL 3 TIMES DAILY
Status: DISCONTINUED | OUTPATIENT
Start: 2020-09-24 | End: 2020-09-26 | Stop reason: HOSPADM

## 2020-09-24 RX ORDER — POTASSIUM CHLORIDE 7.45 MG/ML
10 INJECTION INTRAVENOUS
Status: DISCONTINUED | OUTPATIENT
Start: 2020-09-24 | End: 2020-09-26 | Stop reason: HOSPADM

## 2020-09-24 RX ORDER — POTASSIUM CHLORIDE 1500 MG/1
20-40 TABLET, EXTENDED RELEASE ORAL
Status: DISCONTINUED | OUTPATIENT
Start: 2020-09-24 | End: 2020-09-26 | Stop reason: HOSPADM

## 2020-09-24 RX ORDER — FLUMAZENIL 0.1 MG/ML
0.2 INJECTION, SOLUTION INTRAVENOUS
Status: DISCONTINUED | OUTPATIENT
Start: 2020-09-24 | End: 2020-09-26 | Stop reason: HOSPADM

## 2020-09-24 RX ADMIN — ONDANSETRON 4 MG: 2 INJECTION INTRAMUSCULAR; INTRAVENOUS at 20:44

## 2020-09-24 RX ADMIN — IOPAMIDOL 63 ML: 755 INJECTION, SOLUTION INTRAVENOUS at 16:17

## 2020-09-24 RX ADMIN — LIDOCAINE HYDROCHLORIDE 30 ML: 20 SOLUTION ORAL; TOPICAL at 18:10

## 2020-09-24 RX ADMIN — THIAMINE HCL TAB 100 MG 200 MG: 100 TAB at 22:20

## 2020-09-24 RX ADMIN — CEFTRIAXONE 2 G: 2 INJECTION, POWDER, FOR SOLUTION INTRAMUSCULAR; INTRAVENOUS at 18:11

## 2020-09-24 RX ADMIN — SODIUM CHLORIDE 500 ML: 9 INJECTION, SOLUTION INTRAVENOUS at 16:36

## 2020-09-24 RX ADMIN — SODIUM CHLORIDE 82 ML: 9 INJECTION, SOLUTION INTRAVENOUS at 16:18

## 2020-09-24 ASSESSMENT — ENCOUNTER SYMPTOMS
ABDOMINAL PAIN: 1
SHORTNESS OF BREATH: 1
SORE THROAT: 0
ABDOMINAL DISTENTION: 1
FATIGUE: 1
DIARRHEA: 0
FEVER: 0
CHILLS: 0
VOMITING: 1
BLOOD IN STOOL: 0
NAUSEA: 1
COUGH: 1

## 2020-09-24 ASSESSMENT — MIFFLIN-ST. JEOR: SCORE: 1407.01

## 2020-09-24 NOTE — ED PROVIDER NOTES
History     Chief Complaint:  Chest Pain, SOB and Bloating        The history is provided by the patient.      Nelsy Cota is a 45 year old female with a history of DVT and pulmonary embolism, anticoagulated on warfarin, and hepatic steatosis who presents with chest pain, shortness of breath, abdominal pain and abdominal bloating.  Patient reports that she is felt unwell for the past few days and has had generalized malaise and 1-2 episodes of vomiting per day.  She locates her chest pain on the right side of her chest and notes it is worse with movement and deep breathing.  In regards to her shortness of breath she reports worsening dyspnea on exertion.  Typically she is able to walk up her stairs without being out of breath however this is become more difficult for her.  She notes the swelling in her legs is at her baseline today. She denies any fevers or chills.  She notes a chronic smoker's cough that is unchanged from her baseline.  In regards to her abdomen she reports it is more bloated than usual and believes that she is put on 10 to 20 pounds recently.  She notes generalized discomfort and tenderness.  She notes occasional loose stools but no blood in her stool.  No black stools.  She does note that her urine has been darker recently.     Allergies:  No Known Drug Allergies    Medications:    warfarin    Past Medical History:    DVT  Epilepsy  pulmonary embolism   Hepatic stenosis    Past Surgical History:    Right ankle fracture repair  Right ankle surgery for ligament injury and tibia injury  Close manipulation of fractured calcaneus  Cryotherapy     Family History:    Hypertension   epilepsy    Social History:  The patient presents to the ED alone  Smoking Status: Current Every Day Smoker, 1 PPD, 15 pack years   Smokeless Tobacco: Never Used  Alcohol Use: Yes  Drug Use: No  PCP: Hamzah Thomas     Review of Systems   Constitutional: Positive for fatigue. Negative for chills and fever.    HENT: Negative for sore throat.    Respiratory: Positive for cough (chronic) and shortness of breath.    Cardiovascular: Positive for chest pain (right sided).   Gastrointestinal: Positive for abdominal distention, abdominal pain, nausea and vomiting. Negative for blood in stool and diarrhea.   All other systems reviewed and are negative.    Physical Exam     Patient Vitals for the past 24 hrs:   BP Temp Temp src Pulse Resp SpO2 Weight   09/24/20 1900 99/68 -- -- 99 -- 99 % --   09/24/20 1845 97/64 -- -- 98 -- -- --   09/24/20 1815 96/64 -- -- 95 -- 97 % --   09/24/20 1800 101/68 -- -- 96 -- -- --   09/24/20 1745 -- -- -- 96 -- -- --   09/24/20 1730 107/70 -- -- 100 -- -- --   09/24/20 1715 103/72 -- -- 92 -- 100 % --   09/24/20 1700 96/69 -- -- 95 -- 100 % --   09/24/20 1645 103/71 -- -- 84 -- 100 % --   09/24/20 1630 106/57 -- -- 85 -- 100 % --   09/24/20 1600 -- -- -- 80 -- 100 % --   09/24/20 1446 117/80 97.8  F (36.6  C) Oral 102 22 99 % 82 kg (180 lb 12.4 oz)       Physical Exam  General: Patient is awake, alert and interactive when I enter the room  Head: The scalp, face, and head appear normal  Eyes: The pupils are equal, round, and reactive to light.  Scleral icterus present  ENT: External acoustic canals are normal. The oropharynx is normal without erythema. Uvula is in the midline  Neck: Normal range of motion.   CV: Tachycardic   Resp: Lungs are clear without wheezes or rales. No respiratory distress.   GI: Mild to moderate abdominal distention.  Mild diffuse abdominal tenderness.   MS: Normal tone. Joints grossly normal without effusions. No asymmetric leg swelling, calf or thigh tenderness.    Skin: There is mildly jaundiced. normal capillary refill noted  Neuro: Speech is normal and fluent. Face is symmetric. Moving all extremities.   Psych:  Normal affect.  Appropriate interactions.    Emergency Department Course     ECG:  Indication: Shortness of Breath  Time: 1503  Vent. Rate 88 bpm. OR interval  148. QRS duration 70. QT/QTc 390/471. P-R-T axis 61 39 30. Sinus rhythm. Normal ECG.  Normal sinus rhythm, QT prolongation present.  Read at 1532    Imaging:  Radiology findings were communicated with the patient and admitting MD who voiced understanding of the findings.    CT Chest (PE) Abdomen Pelvis w Contrast:  1.  No acute abnormality demonstrated in the chest. Specifically, there is no evidence of pulmonary embolism.  2.  Enlarged, severely fatty infiltrated liver is associated with a moderate to large volume of ascites and gastroesophageal varices.  As per radiology.      Laboratory:  Laboratory findings were communicated with the patient and admitting MD who voiced understanding of the findings.    CBC: WBC: 11.1 (high), HGB: 11.1 (high), PLT: 428    BMP: Potassium 3.3 (low) o/w WNL (Creatinine: 0.71)    Hepatic Panel: Bilirubin Direct 0.7 (high), Albumin 2.1 (low), Alkaline Phosphatase 224 (high), AST 77 (high), o/w WNL    1511 Troponin: <0.015       Lipase: 94    INR: 3.61 (high)     BNP: 309     1547 Lactic Acid Whole Blood: 3.5 (high)     Albumin level: 2.1 (low)     Gram Stain: Pending  Cell Count with differential Fluid: WNL  Protein Fluid: 0.8  Albumin fluid: 0.3  UA with Microscopic: Pending  Fluid Culture Aerobic Bacterial: Pending  Blood Culture x2: Pending  Asymptomatic COVID-19 Virus (Coronavirus) PCR: Pending         [facility]    -Paracentesis    Date/Time: 9/24/2020 6:01 PM  Performed by: Tarik Hubbard MD  Authorized by: Tarik Hubbard MD       PRE-PROCEDURE DETAILS     Procedure purpose:  Diagnostic    ANESTHESIA (see MAR for exact dosages):     Anesthesia method:  Local infiltration    Local anesthetic:  Lidocaine 1% w/o epi    PROCEDURE DETAILS     Needle gauge:  18    Ultrasound guidance: yes      Puncture site:  R lower quadrant    Fluid removed amount:  10cc    Fluid appearance:  Cloudy (straw)  PROCEDURE   Patient Tolerance:  Patient  tolerated the procedure well with no immediate complications      :      Interventions:  1636 NS 1L IV  1810 GI Cocktail 30mL PO  1811 Rocephin 2g IV    Emergency Department Course:  Past medical records, nursing notes, and vitals reviewed.    1511 I performed an exam of the patient as documented above.     EKG obtained in the ED, see results above.     IV was inserted and blood was drawn for laboratory testing, results above.    The patient was sent for a CT while in the emergency department, results above.     1711 I rechecked the patient and discussed the results of her workup thus far. I recommended admission at this time. I also recommended paracentesis and the patient consented. This was performed at this time.     1754 I consulted with Dr. Montes, hospitalist, regarding the patient's history and presentation here in the emergency department who accepted the patient for admission.    Findings and plan explained to the patient  who consents to admission. Discussed the patient with Dr. Montes, who will admit the patient to a medical bed for further monitoring, evaluation, and treatment.    I personally reviewed the laboratory and imaging results with the patient and answered all related questions prior to admission.     Impression & Plan     Covid-19  Nelsy Cota was evaluated during a global COVID-19 pandemic, which necessitated consideration that the patient might be at risk for infection with the SARS-CoV-2 virus that causes COVID-19.   Applicable protocols for evaluation were followed during the patient's care. COVID-19 was considered as part of the patient's evaluation. The plan for testing is: a test was obtained during this visit.    Medical Decision Making:  Patient is a 45-year-old female with past medical history of hepatic steatosis secondary to alcoholism, DVT and pulmonary embolism who is anticoagulated on warfarin who presents to the emergency department today with chest pain, shortness  of breath, abdominal pain and abdominal bloating.  Upon initial evaluation she is tachycardic but otherwise hemodynamically stable.  She is afebrile.  Broad work-up was initiated in the emergency department which ultimately revealed new onset ascites with significant hepatomegaly.  Patient did have abdominal tenderness on my exam therefore a diagnostic paracentesis was performed which revealed cloudy straw-colored fluid.  This will be sent for analysis.  I will cover the patient with an initial dose of antibiotics given her abdominal pain and new onset ascites.  Patient will be admitted awaiting final results of her fluid analysis and for further evaluation and treatment.    Diagnosis:    ICD-10-CM    1. Abdominal pain, generalized  R10.84 Blood culture     Blood culture     Cell count with differential fluid     Albumin fluid     Albumin level     Protein fluid     Gram stain     Fluid Culture Aerobic Bacterial     Asymptomatic COVID-19 Virus (Coronavirus) by PCR   2. Ascites of liver  R18.8        Disposition:  Admitted to Dr. Montes.    Scribe Disclosure:  I, Israel Greer, am serving as a scribe at 3:15 PM on 9/24/2020 to document services personally performed by Tarik Hubbard MD based on my observations and the provider's statements to me.      Tarik Hubbard MD  09/24/20 8531

## 2020-09-24 NOTE — ED TRIAGE NOTES
Patient presents with complaints of chest pain, SOB, and abdomen bloating. Patient states 20lb weight gain since Tuesday. ABC intact without need for intervention at this time.

## 2020-09-24 NOTE — ED NOTES
[facility]  ED Nurse Handoff Report    Nelsy Cota is a 45 year old female   ED Chief complaint: Chest Pain and Bloated  . ED Diagnosis:   Final diagnoses:   Abdominal pain, generalized   Ascites of liver     Allergies: No Known Allergies    Code Status: Full Code  Activity level - Baseline/Home:  Independent. Activity Level - Current:   Stand by Assist. Lift room needed: No. Bariatric: No   Needed: No   Isolation: Yes. Infection: Not Applicable  R/O Covid.     Vital Signs:   Vitals:    09/24/20 1446 09/24/20 1600 09/24/20 1630   BP: 117/80  106/57   Pulse: 102 80 85   Resp: 22     Temp: 97.8  F (36.6  C)     TempSrc: Oral     SpO2: 99% 100% 100%   Weight: 82 kg (180 lb 12.4 oz)         Cardiac Rhythm:  ,      Pain level:    Patient confused: No. Patient Falls Risk: Yes.   Elimination Status: Has voided   Patient Report - Initial Complaint: weight gain, sob. Focused Assessment: BLE edema, generalized weakness, increased SOB. Blood in urine per patinet   Tests Performed:   Labs Ordered and Resulted from Time of ED Arrival Up to the Time of Departure from the ED   CBC WITH PLATELETS DIFFERENTIAL - Abnormal; Notable for the following components:       Result Value    WBC 11.1 (*)     RBC Count 2.33 (*)     Hemoglobin 11.1 (*)     Hematocrit 30.5 (*)      (*)     MCH 47.6 (*)     RDW 17.9 (*)     All other components within normal limits   BASIC METABOLIC PANEL - Abnormal; Notable for the following components:    Potassium 3.3 (*)     All other components within normal limits   HEPATIC PANEL - Abnormal; Notable for the following components:    Bilirubin Direct 0.7 (*)     Albumin 2.1 (*)     Alkaline Phosphatase 224 (*)     AST 77 (*)     All other components within normal limits   LACTIC ACID WHOLE BLOOD - Abnormal; Notable for the following components:    Lactic Acid 3.5 (*)     All other components within normal limits   INR - Abnormal; Notable for the following components:     INR 3.61 (*)     All other components within normal limits   TROPONIN I   NT PROBNP INPATIENT   LIPASE   ALBUMIN LEVEL   UA MACROSCOPIC WITH REFLEX TO MICRO AND CULTURE   BLOOD CULTURE   BLOOD CULTURE   CELL COUNT WITH DIFFERENTIAL FLUID   ALBUMIN FLUID   PROTEIN FLUID   GRAM STAIN   FLUID CULTURE AEROBIC BACTERIAL     CT Chest (PE) Abdomen Pelvis w Contrast   Final Result   IMPRESSION:   1.  No acute abnormality demonstrated in the chest. Specifically,   there is no evidence of pulmonary embolism.   2.  Enlarged, severely fatty infiltrated liver is associated with a   moderate to large volume of ascites and gastroesophageal varices.      ANDREA CAMARA MD        . Abnormal Results: See above.   Treatments provided: .IVF, diagnostics  Family Comments: N/A  OBS brochure/video discussed/provided to patient:  N/A  ED Medications:   Medications   ondansetron (ZOFRAN) injection 4 mg (has no administration in time range)   0.9% sodium chloride BOLUS (500 mLs Intravenous New Bag 9/24/20 1636)   CT scan flush (82 mLs Intravenous Given 9/24/20 1618)   iopamidol (ISOVUE-370) solution 500 mL (63 mLs Intravenous Given 9/24/20 1617)     Drips infusing:  No  For the majority of the shift, the patient's behavior Green. Interventions performed were N/A.    Sepsis treatment initiated: No     Patient tested for COVID 19 prior to admission: YES    ED Nurse Name/Phone Number: Roderick Jaquez RN,   5:30 PM    RECEIVING UNIT ED HANDOFF REVIEW    Above ED Nurse Handoff Report was reviewed: Yes  Reviewed by: Michelle Sheth RN on September 24, 2020 at 9:27 PM

## 2020-09-25 LAB
ALBUMIN SERPL-MCNC: 1.7 G/DL (ref 3.4–5)
ALBUMIN SERPL-MCNC: 1.8 G/DL (ref 3.4–5)
ALP SERPL-CCNC: 182 U/L (ref 40–150)
ALT SERPL W P-5'-P-CCNC: 22 U/L (ref 0–50)
ANION GAP SERPL CALCULATED.3IONS-SCNC: 2 MMOL/L (ref 3–14)
AST SERPL W P-5'-P-CCNC: 85 U/L (ref 0–45)
BILIRUB SERPL-MCNC: 1.5 MG/DL (ref 0.2–1.3)
BUN SERPL-MCNC: 10 MG/DL (ref 7–30)
CALCIUM SERPL-MCNC: 7.5 MG/DL (ref 8.5–10.1)
CHLORIDE SERPL-SCNC: 97 MMOL/L (ref 94–109)
CO2 SERPL-SCNC: 33 MMOL/L (ref 20–32)
CREAT SERPL-MCNC: 0.71 MG/DL (ref 0.52–1.04)
ERYTHROCYTE [DISTWIDTH] IN BLOOD BY AUTOMATED COUNT: 18.8 % (ref 10–15)
GFR SERPL CREATININE-BSD FRML MDRD: >90 ML/MIN/{1.73_M2}
GLUCOSE SERPL-MCNC: 78 MG/DL (ref 70–99)
HCT VFR BLD AUTO: 26 % (ref 35–47)
HGB BLD-MCNC: 9.2 G/DL (ref 11.7–15.7)
INR PPP: 1.97 (ref 0.86–1.14)
INR PPP: 2.59 (ref 0.86–1.14)
INR PPP: 3.17 (ref 0.86–1.14)
INTERPRETATION ECG - MUSE: NORMAL
LACTATE BLD-SCNC: 2.7 MMOL/L (ref 0.7–2)
MCH RBC QN AUTO: 47.9 PG (ref 26.5–33)
MCHC RBC AUTO-ENTMCNC: 35.4 G/DL (ref 31.5–36.5)
MCV RBC AUTO: 135 FL (ref 78–100)
PLATELET # BLD AUTO: 331 10E9/L (ref 150–450)
POTASSIUM SERPL-SCNC: 3.6 MMOL/L (ref 3.4–5.3)
PROT SERPL-MCNC: 5.6 G/DL (ref 6.8–8.8)
RBC # BLD AUTO: 1.92 10E12/L (ref 3.8–5.2)
SODIUM SERPL-SCNC: 132 MMOL/L (ref 133–144)
WBC # BLD AUTO: 10.6 10E9/L (ref 4–11)

## 2020-09-25 PROCEDURE — 83605 ASSAY OF LACTIC ACID: CPT | Performed by: PHYSICIAN ASSISTANT

## 2020-09-25 PROCEDURE — 85610 PROTHROMBIN TIME: CPT | Performed by: PHYSICIAN ASSISTANT

## 2020-09-25 PROCEDURE — 12000011 ZZH R&B MS OVERFLOW

## 2020-09-25 PROCEDURE — 80053 COMPREHEN METABOLIC PANEL: CPT | Performed by: INTERNAL MEDICINE

## 2020-09-25 PROCEDURE — 25000128 H RX IP 250 OP 636: Performed by: PHYSICIAN ASSISTANT

## 2020-09-25 PROCEDURE — 25800030 ZZH RX IP 258 OP 636: Performed by: PHYSICIAN ASSISTANT

## 2020-09-25 PROCEDURE — 36415 COLL VENOUS BLD VENIPUNCTURE: CPT | Performed by: PHYSICIAN ASSISTANT

## 2020-09-25 PROCEDURE — 36415 COLL VENOUS BLD VENIPUNCTURE: CPT | Performed by: INTERNAL MEDICINE

## 2020-09-25 PROCEDURE — 85027 COMPLETE CBC AUTOMATED: CPT | Performed by: INTERNAL MEDICINE

## 2020-09-25 PROCEDURE — 25800030 ZZH RX IP 258 OP 636: Performed by: INTERNAL MEDICINE

## 2020-09-25 PROCEDURE — 85610 PROTHROMBIN TIME: CPT | Performed by: INTERNAL MEDICINE

## 2020-09-25 PROCEDURE — 82040 ASSAY OF SERUM ALBUMIN: CPT | Performed by: PHYSICIAN ASSISTANT

## 2020-09-25 PROCEDURE — 25000132 ZZH RX MED GY IP 250 OP 250 PS 637: Performed by: INTERNAL MEDICINE

## 2020-09-25 PROCEDURE — 25000128 H RX IP 250 OP 636: Performed by: INTERNAL MEDICINE

## 2020-09-25 PROCEDURE — 99232 SBSQ HOSP IP/OBS MODERATE 35: CPT | Performed by: PHYSICIAN ASSISTANT

## 2020-09-25 RX ADMIN — THIAMINE HCL TAB 100 MG 200 MG: 100 TAB at 07:41

## 2020-09-25 RX ADMIN — THIAMINE HCL TAB 100 MG 200 MG: 100 TAB at 14:26

## 2020-09-25 RX ADMIN — CALCIUM CARBONATE (ANTACID) CHEW TAB 500 MG 1000 MG: 500 CHEW TAB at 00:05

## 2020-09-25 RX ADMIN — SPIRONOLACTONE 25 MG: 25 TABLET ORAL at 07:42

## 2020-09-25 RX ADMIN — PHYTONADIONE 2 MG: 10 INJECTION, EMULSION INTRAMUSCULAR; INTRAVENOUS; SUBCUTANEOUS at 12:27

## 2020-09-25 RX ADMIN — PHYTONADIONE 1 MG: 2 INJECTION, EMULSION INTRAMUSCULAR; INTRAVENOUS; SUBCUTANEOUS at 21:33

## 2020-09-25 RX ADMIN — FOLIC ACID 1 MG: 1 TABLET ORAL at 07:41

## 2020-09-25 RX ADMIN — MULTIPLE VITAMINS W/ MINERALS TAB 1 TABLET: TAB at 07:41

## 2020-09-25 RX ADMIN — FUROSEMIDE 20 MG: 10 INJECTION, SOLUTION INTRAMUSCULAR; INTRAVENOUS at 09:19

## 2020-09-25 RX ADMIN — THIAMINE HCL TAB 100 MG 200 MG: 100 TAB at 20:35

## 2020-09-25 ASSESSMENT — MIFFLIN-ST. JEOR: SCORE: 1410.64

## 2020-09-25 NOTE — PLAN OF CARE
ROOM # 227    Living Situation (if not independent, order SW consult): Ind with   Facility name:  : Ismael (spouse)    Activity level at baseline: Ind  Activity level on admit: SBA      Patient registered to observation; given Patient Bill of Rights; given the opportunity to ask questions about observation status and their plan of care.  Patient has been oriented to the observation room, bathroom and call light is in place.    Discussed discharge goals and expectations with patient/family.

## 2020-09-25 NOTE — PLAN OF CARE
Temp: 98.6  F (37  C) Temp src: Oral BP: 97/62 Pulse: 86   Resp: 16 SpO2: 92 %    Orientation: Alert and Oriented x4 CIWA 0 all day   Pain status: pain about 4 out of 10 on back and shoulders, Heating pad ordered.   Activity:Independent  Peripheral edema: +3 edema in lower extremities, patient states it is better than her baseline   Resp: WDL  Lungs: Diminished   Cardiac: WDL  GI: Bloating, Diarrhea  : WDL  Skin: Jaundice   LDA: Peripheral  Infusions: is Saline locked.  Meds: Reversing INR  Labs/Imaging: INR 3.61/2.59  Diet: 2 gm NA  Consults: GI  Plan of care in Hospital: Paracentesis tomorrow  Discharge: tomorrow possibly.

## 2020-09-25 NOTE — PLAN OF CARE
Orientation: Alert and oriented x4. CIWA 0 throughout shift  VSS. 95% on RA. Afebrile.   Tele:SR. HR 90s.   LS: dyspnea on exertion. Shortness of breath. Diminished throughout  GI: Passing gas. no BM. Complaints of heartburn. Tums x1 with some relief.   : Adequate urine output.   Skin: mild jaundice. Bruising.   Activity: SBA. Up to bathroom overnight. Pt slept comfortably throughout shift.   Pain: 7/10 lower back pain. Well controlled with heating pad. Will continue to monitor.   Plan: Continue with current cares.

## 2020-09-25 NOTE — PROGRESS NOTES
SPIRITUAL HEALTH SERVICES Progress Note  [facility]    Spiritual Health Services consult order for assistance with healthcare directive.  Provided informational resources and education related to directive.  Pt would like to review documents with healthcare agent (spouse) and will request follow up as needed.    Plan: Spiritual Health Services remains available for additional emotional/spiritual support.    Trey Roldan MA  Staff   Pager: [phone]  Phone: [phone]

## 2020-09-25 NOTE — ED NOTES
Lab called and notified that MD changed covid from asymptomatic to symptomatic swab. They stated nothing else needed to be done at this time.

## 2020-09-25 NOTE — PHARMACY-ADMISSION MEDICATION HISTORY
Admission medication history interview status for this patient is complete. See [facility] admission navigator for allergy information, prior to admission medications and immunization status.     Medication history interview done via telephone during Covid-19 pandemic, indicate source(s): Patient  Medication history resources (including written lists, pill bottles, clinic record):None      Changes made to PTA medication list:  Added: none  Deleted: none  Changed: warfarin    Actions taken by pharmacist (provider contacted, etc):None     Additional medication history information:None    Medication reconciliation/reorder completed by provider prior to medication history?  n   (Y/N)          Prior to Admission medications    Medication Sig Last Dose Taking? Auth Provider   warfarin ANTICOAGULANT (COUMADIN) 2 MG tablet Take 2 mg by mouth At Bedtime 9/23/2020 at Unknown time Yes Unknown, Entered By History

## 2020-09-25 NOTE — PHARMACY-ANTICOAGULATION SERVICE
Clinical Pharmacy - Warfarin Dosing Consult     Pharmacy has been consulted to manage this patient s warfarin therapy.  Indication: DVT/ PE Treatment  Therapy Goal: INR 2-3  Warfarin Prior to Admission: Yes  Warfarin PTA Regimen: 2mg daily    INR   Date Value Ref Range Status   09/24/2020 3.61 (H) 0.86 - 1.14 Final   09/15/2020 3.30 (H) 0.86 - 1.14 Final     Comment:     This test is intended for monitoring Coumadin therapy.  Results are not   accurate in patients with prolonged INR due to factor deficiency.         Recommend warfarin 0 mg today.  Pharmacy will monitor Nelsy Cota daily and order warfarin doses to achieve specified goal.      Please contact pharmacy as soon as possible if the warfarin needs to be held for a procedure or if the warfarin goals change.

## 2020-09-25 NOTE — CONSULTS
"GASTROENTEROLOGY CONSULTATION      Nelsy Cota  [address] [facility] [id number]  45 year old female     Admission Date/Time: 9/24/2020  Primary Care Provider: Hamzah Thomas  Referring / Attending Physician:  Dr. Montes     We were asked to see the patient in consultation by Dr. Montes for evaluation of ascites.        HPI:  Nelsy Cota is a 45 year old female with medical history of DVT/PE on warfarin, alcohol dependency with ongoing abuse, epilepsy, who presented to the emergency room with worsening abdominal distention and lower extremity edema.  Patient reports that for the past 2 weeks she is noticed worsening swelling of her ankles.    Then 3 days ago she began noticing significant abdominal distention.  She describes the distention pushing up on her chest and causing pain.  She was at work a few days ago and thought that she was pregnant because of how big her abdomen was.  She reports nausea with some occasional vomiting.  No hematemesis.  No melena no hematochezia.  The patient does drink alcohol on a daily basis.  She estimates anywhere from 3-6 servings of alcohol per day.  She has done this for a number of years, \"as long as she can remember.\"    A CT scan was completed of her chest abdomen and pelvis in order to rule out a PE.  She was found to have severe decreased attenuation of the liver with hepatomegaly.  There were no focal liver lesions.  Additional notes of portal venous collateral vessels with evidence of gastroesophageal varices.  Previous abdominal ultrasound in March 2020 shows dense hepatic steatosis.  AST 85, ALT 22, alkaline phosphatase 182, total bilirubin 1.5.  INR is 3.17 however patient does use warfarin.  Platelets are stable at 331.    Paracentesis was completed.  Ascitic fluid was negative for SBP.       PAST MEDICAL HISTORY:  Patient Active Problem List    Diagnosis Date Noted     Ascites 09/24/2020     Priority: Medium     Macrocytosis " 10/07/2019     Priority: Medium     Macrocytic 10/07/2019     Priority: Medium     DVT (deep venous thrombosis) (H)      Priority: Medium     grey LE        Long-term (current) use of anticoagulants [Z79.01] 07/25/2018     Priority: Medium     Recurrent deep venous thrombosis (H) 07/24/2018     Priority: Medium     Tobacco use disorder 07/24/2018     Priority: Medium     Macrocytic anemia 07/24/2018     Priority: Medium     PE (pulmonary thromboembolism) (H) 07/19/2018     Priority: Medium     Convulsions, unspecified convulsion type (H) 12/07/2015     Priority: Medium     Health Care Home 09/19/2013     Priority: Medium     State Tier Level:  Tier 0  Status:  n/a  Care Coordinator:      See Letters for [facility] Care Plan           FAM HX-CARDIOVAS DIS NEC 12/21/2009     Priority: Medium     Papanicolaou smear of cervix with atypical squamous cells cannot exclude high grade squamous intraepithelial lesion (ASC-H) 01/24/2007     Priority: Medium          ROS: A comprehensive ten point review of systems was negative aside from those in mentioned in the HPI.       MEDICATIONS:   Prior to Admission medications    Medication Sig Start Date End Date Taking? Authorizing Provider   warfarin ANTICOAGULANT (COUMADIN) 2 MG tablet Take 2 mg by mouth At Bedtime   Yes Unknown, Entered By History        ALLERGIES: No Known Allergies     SOCIAL HISTORY:  Social History     Tobacco Use     Smoking status: Current Every Day Smoker     Packs/day: 1.00     Years: 15.00     Pack years: 15.00     Types: Cigarettes     Smokeless tobacco: Never Used     Tobacco comment: 1 ppd    Substance Use Topics     Alcohol use: Yes     Alcohol/week: 1.0 - 2.0 standard drinks     Types: 1 - 2 Standard drinks or equivalent per week     Drinks per session: 1 or 2     Comment: 12 drinks per week     Drug use: No        FAMILY HISTORY:  Family History   Problem Relation Age of Onset     Hypertension Father      Neurologic Disorder Father         epilepsy      "LULU Maternal Grandmother         AAA     Connective Tissue Disorder Paternal Aunt         SLE        PHYSICAL EXAM:     /69 (BP Location: Right arm)   Pulse 94   Temp 98.4  F (36.9  C) (Oral)   Resp 16   Ht 1.626 m (5' 4\")   Wt 78.1 kg (172 lb 1.6 oz)   SpO2 95%   BMI 29.54 kg/m       PHYSICAL EXAM:  GENERAL: NAD,   SKIN: no suspicious lesions, rashes, jaundice  HEAD: Normocephalic. Atraumatic.  NECK: Neck supple. No adenopathy.   EYES: No scleral icterus  RESPIRATORY: Good transmission. CTA bilaterally.   CARDIOVASCULAR: RRR, normal S1, S2,  No murmur appreciated  GASTROINTESTINAL: +BS, soft, non tender, non distended, no guarding/rebound  JOINT/EXTREMITIES:  no gross deformities noted, normal muscle tone  NEURO: CN 2-12 grossly intact, no focal deficits, LE edema bilaterally  PSYCH: Normal affect        ADDITIONAL COMMENTS:   I reviewed the patient's new clinical lab test results.   Recent Labs   Lab Test 09/25/20  0556 09/24/20  1511 09/15/20  1545  03/16/20  1542   WBC 10.6 11.1*  --   --  10.1   HGB 9.2* 11.1*  --   --  11.4*   * 131*  --   --  136*    428  --   --  435   INR 3.17* 3.61* 3.30*   < > 3.03*    < > = values in this interval not displayed.     Recent Labs   Lab Test 09/25/20  0556 09/24/20  1511 03/16/20  1542   POTASSIUM 3.6 3.3* 3.9   CHLORIDE 97 97 103   CO2 33* 30 27   BUN 10 8 6*   ANIONGAP 2* 8 6     Recent Labs   Lab Test 09/25/20  0556 09/24/20  1721 09/24/20  1511 03/16/20  1542  07/19/18  1802 12/07/15  1630   ALBUMIN 1.7* 2.1* 2.1* 2.5*   < > 2.2*  --    BILITOTAL 1.5*  --  1.3 1.0   < > 0.8  --    ALT 22  --  24 64*   < > 31  --    AST 85*  --  77* 227*   < > 74*  --    PROTEIN  --   --   --   --   --   --  Negative   LIPASE  --   --  94  --   --  156  --     < > = values in this interval not displayed.        IMAGING / ENDOSCOPY     CT CHEST PE ABDOMEN PELVIS WITH CONTRAST 9/24/2020 4:30 PM     FINDINGS:  ANGIOGRAM CHEST: Pulmonary arteries are normal " caliber and negative  for pulmonary emboli. Thoracic aorta is negative for dissection. No CT  evidence of right heart strain.      LUNGS AND PLEURA: Normal.     MEDIASTINUM/AXILLAE: Normal.     HEPATOBILIARY: Severe decreased, heterogeneous attenuation of the  liver. The liver is enlarged at 20.6 cm in length. No definite focal  lesions. Gallbladder unremarkable. Portal venous collateral vessels  are noted including a periumbilical vein and gastroesophageal varices.     PANCREAS: Normal.     SPLEEN: Normal.     ADRENAL GLANDS: Normal.     KIDNEYS/BLADDER: Normal.     BOWEL: Normal appendix. No bowel obstruction or free air.     LYMPH NODES: Normal.     PELVIC ORGANS: Normal.     OTHER: Moderate to large amount of ascites. Small fat-containing  umbilical hernia. Incidental note of retroaortic left renal vein.     MUSCULOSKELETAL: Bilateral L5 spondylolysis. Grade 1 anterolisthesis  of L5 on S1. No destructive bony lesions.                                                                      IMPRESSION:  1.  No acute abnormality demonstrated in the chest. Specifically,  there is no evidence of pulmonary embolism.  2.  Enlarged, severely fatty infiltrated liver is associated with a  moderate to large volume of ascites and gastroesophageal varices.     CONSULTATION ASSESSMENT AND PLAN:    Nelsy Cota is a 45 year old with medical history of DVT/PE on warfarin, alcohol dependency with ongoing abuse, epilepsy, who presented to the emergency room with worsening abdominal distention and lower extremity edema.  Patient reports that for the past 2 weeks she is noticed worsening swelling of her ankles.    1. Severe fatty liver, suspect cirrhosis: Patient drinks alcohol on a daily basis reporting ongoing abuse.  This is likely the cause of her liver disease.  LFTs are mildly elevated.  She is on warfarin so her INR is elevated as well.  Platelets are stable.  CT with evidence of gastroesophageal varices.  Patient did also  have large volume of ascites which has been drained.  No evidence of acute GI bleeding or encephalopathy. SAAG is 1.4 ( indicating portal HTN).    -- Ascitic fluid negative for SBP  -- Agree with starting Lasix and spironolactone as kidney function is stable.  Goal dosage would be Lasix 40 mg daily and spironolactone 100 mg daily.  Titrate up as kidney function allows.  -- Low sodium 2 gram diet.  -- Check viral hepatitis serology.  -- Will need outpatient hepatology follow up for additional work-up and EGD for screening of varices .     2.  Alcohol dependency: Spoke with patient about the need for abstinence from alcohol.  She reports she is motivated to quit and declines chemical dependency consult.  Monitor for alcohol withdrawal.  Replacement vitamins    I discussed the patient plan with Dr. Bell. Thank you for asking us to participate in the care of this patient.    Astrid Ross PA-C  [facility] ( [facility])     ----------------  I agree with the assessment and plan of Astrid Ross.  Patient with alcoholic liver disease.  Admitted with ascites and LE edema.  On exam she has muscle wasting, distended abd with ascites, pitting LE edema.  Alcoholic liver disease and plan nicely delineated above.  We will continue to follow.    Harley Santos MD  [facility]

## 2020-09-25 NOTE — H&P
[facility]    History and Physical - Hospitalist Service       Date of Admission:  9/24/2020    Assessment & Plan   Nelsy Cota is a 45 year old female admitted on 9/24/2020. She has a past medical history significant for DVT, pulmonary emboli, epilepsy, ongoing tobacco use disorder, alcohol dependence with ongoing abuse.  She presents to emergency room with lower extremity and abdominal swelling.  Found to have ascites due to likely cirrhosis from ongoing alcohol abuse.    1.  Ascites.  Had initial paracentesis in ER by emergency room physician.  Plan for further evaluation by interventional radiology to assess for further fluid removal.  Fluid diagnostics sent from emergency room did not show significant white cells.  No further antibiotics at this time.  Consult gastroenterology.  Plan to start diuretics with furosemide and spironolactone.  Strict intake and output monitoring.  Weigh daily.    2.  Tobacco use disorder.  I did advise smoking cessation.  Start nicotine gum if needed.    3.  Hypokalemia.  Start potassium replacement protocol.    4.  Alcohol dependence with ongoing abuse.  I did advise alcohol cessation.  Start withdrawal protocol.    5.  Previous DVT/PE.  Continue warfarin.     Diet: 2 g sodium.  DVT Prophylaxis: Warfarin  Babb Catheter: not present  Code Status: Full code.         Disposition Plan   Expected discharge: 2 - 3 days, recommended to prior living arrangement     Irvin Montes, DO  [facility]    ______________________________________________________________________    Chief Complaint   Lower extremity and abdominal swelling.    History is obtained from the patient    Due to current COVID-19 pandemic concerns, patient seen remotely by telemedicine on iPad.    History of Present Illness   Nelsy Cota is a 45 year old female who has a past medical history significant for DVT, pulmonary emboli, epilepsy, ongoing tobacco use disorder,  alcohol dependence with ongoing abuse.  She has been having swelling in her legs for the past several months.  This has gradually been getting worse.  She has noticed over the past 1 week that her stomach is also become distended.  This seems to be getting worse over the past several days.  She does feel short of breath with this.  She has been noticing nausea and occasional vomiting.  The nausea with occasional vomiting has been going on for several weeks.  She has have a chronic cough.  She does notice that her cough seems to be worse over the past 1 week.  She is gotten to the point where she can only walk approximately 100 yards before becoming very short of breath.  She does note chills.  Chills are chronic for her.  She has not noticed any fevers.  No known sick contacts.  She does continue to drink several alcoholic beverages per day.  No other acute complaints.    Review of Systems    The 10 point Review of Systems is negative other than noted in the HPI    Past Medical History    I have reviewed this patient's medical history and updated it with pertinent information if needed.   Past Medical History:   Diagnosis Date     DVT (deep venous thrombosis) (H)     grey LE      Epilepsy (H)     2004 last seizure     Pulmonary emboli (H)        Past Surgical History   I have reviewed this patient's surgical history and updated it with pertinent information if needed.  Past Surgical History:   Procedure Laterality Date     C NONSPECIFIC PROCEDURE  1997    R ankle fracture repair     C NONSPECIFIC PROCEDURE  2004    R ankle surgery for ligament injury, also tibia injury     HC CLOSED TX CALCANEAL FX W/O MANIPULATION  2004    fracture of calcaneus     HC COLP CERVIX/UPPER VAGINA  2005    cryotherapy       Social History   I have reviewed this patient's social history and updated it with pertinent information if needed.  Social History     Tobacco Use     Smoking status: Current Every Day Smoker     Packs/day: 1.00      Years: 15.00     Pack years: 15.00     Types: Cigarettes     Smokeless tobacco: Never Used     Tobacco comment: 1 ppd    Substance Use Topics     Alcohol use: Yes     Alcohol/week: 1.0 - 2.0 standard drinks     Types: 1 - 2 Standard drinks or equivalent per week     Drinks per session: 1 or 2     Comment: 12 drinks per week     Drug use: No   She states she drinks several alcoholic beverages per day.    Family History   I have reviewed this patient's family history and updated it with pertinent information if needed.  Family History   Problem Relation Age of Onset     Hypertension Father      Neurologic Disorder Father         epilepsy     C.A.D. Maternal Grandmother         AAA     Connective Tissue Disorder Paternal Aunt         SLE       Prior to Admission Medications   Prior to Admission Medications   Prescriptions Last Dose Informant Patient Reported? Taking?   warfarin ANTICOAGULANT (COUMADIN) 2 MG tablet   No No   Sig: TAKE 2 TABLETS BY MOUTH TUE AND FRI. TAKE 1 TABLET ALL OTHER DAYS OR AS DIRECTED PER INR CLINIC      Facility-Administered Medications: None     Allergies   No Known Allergies    Physical Exam   Vital Signs: Temp: 97.8  F (36.6  C) Temp src: Oral BP: 99/68 Pulse: 99   Resp: 22 SpO2: 99 % O2 Device: None (Room air)    Weight: 180 lbs 12.44 oz    Due to current pandemic COVID-19 concerns, patient seen remotely by telemedicine.    In general, she is awake, alert, and appropriately oriented.  She is speaking in full sentences.  No evidence of respiratory distress.      For the rest of physical exam, I did discuss case with emergency room physician, Dr. Hubbard, including physical exam that he had performed.    I also reviewed the physical exam section of his note that was created at 15:15 on 9/24/2020.    He notes that patient has clear lungs and mild to moderate abdominal distention.        Data   Data reviewed today: I reviewed all medications, new labs and imaging results over the last 24  hours.     Recent Labs   Lab 09/24/20  1721 09/24/20  1511   WBC  --  11.1*   HGB  --  11.1*   MCV  --  131*   PLT  --  428   INR  --  3.61*   NA  --  135   POTASSIUM  --  3.3*   CHLORIDE  --  97   CO2  --  30   BUN  --  8   CR  --  0.71   ANIONGAP  --  8   ABA  --  8.9   GLC  --  75   ALBUMIN 2.1* 2.1*   PROTTOTAL  --  6.9   BILITOTAL  --  1.3   ALKPHOS  --  224*   ALT  --  24   AST  --  77*   LIPASE  --  94   TROPI  --  <0.015

## 2020-09-25 NOTE — PROGRESS NOTES
[facility]  Hospitalist Progress Note  Yesenia Awad PA-C 09/25/2020    Reason for Stay (Diagnosis): ascites          Assessment and Plan:      Nelsy Cota is a 45 year old female admitted on 9/24/2020. She has a past medical history significant for DVT, pulmonary emboli, epilepsy, ongoing tobacco use disorder, alcohol dependence with ongoing abuse.  She presents to ED with new onset ascites likely due to etoh abuse.       1. New onset ascites likely due to etoh abuse and cirrhosis.  --S/p 10cc drainage in the ED. Initial fluid sent does not show obvious infection. No evidence of SBP.   --Will need IR tapping of ascites once INR is down  --US guided paracentesis ordered for tomorrow (spoke with Dr. Raymundo) along with labs     2. Severe fatty liver, suspect cirrhosis in the setting of ETOH abuse  --CT also shows concerns for GE varices  --Appreciate GI eval.   --Viral hepatology panel ordered  --Cont low sodium diet  --Low dose Lasix and spirolactone started  --Needs outpt Hepatology follow up.      3.  Hypokalemia. Resolved.  Mild hyponatremia--will repeat tomorrow     4.  Alcohol dependence with ongoing abuse.  NO evidence of W/D at this time.   --No hx of w/d no hx of treatment  --Parents has hx of etoh abuse as well  --Pt seems sincere about quitting      5.  Previous hx of recurrent DVTs 2017/PE 2018.   --Had issues with non-compliance in the past due to lack of insurance   --INR therapeutic today and needs reversal.   --Repeat INR at 9:00pm if INR still >1.8 will give 1mg IV Vit K.     Diet: 2 g sodium.  DVT Prophylaxis: off warfarin, can ambulate  Babb Catheter: not present  Code Status: Full code.  Dispo: likely home after paracentesis        Interval History (Subjective):      Still with abd distention and discomfort but less painful.   No fever or chills.   Guillermo diet                  Physical Exam:      Last Vital Signs:  BP 97/62 (BP Location: Right arm)   Pulse 86   Temp 98.6  " F (37  C) (Oral)   Resp 16   Ht 1.626 m (5' 4\")   Wt 78.1 kg (172 lb 1.6 oz)   SpO2 92%   BMI 29.54 kg/m        Constitutional: Awake, alert, cooperative, no apparent distress   Respiratory: Clear to auscultation bilaterally, no crackles or wheezing   Cardiovascular: Regular rate and rhythm, normal S1 and S2, and no murmur noted   Abdomen: Normal bowel sounds, distended but soft +fluid waves, non-tender   Skin: No rashes, no cyanosis, dry to touch   Neuro: Alert and oriented x3, no weakness, numbness, memory loss   Extremities: No edema, normal range of motion   Other(s):        All other systems: Negative          Medications:      All current medications were reviewed with changes reflected in problem list.         Data:      All new lab and imaging data was reviewed.   Labs:       Lab Results   Component Value Date     09/25/2020     09/24/2020     03/16/2020    Lab Results   Component Value Date    CHLORIDE 97 09/25/2020    CHLORIDE 97 09/24/2020    CHLORIDE 103 03/16/2020    Lab Results   Component Value Date    BUN 10 09/25/2020    BUN 8 09/24/2020    BUN 6 03/16/2020      Lab Results   Component Value Date    POTASSIUM 3.6 09/25/2020    POTASSIUM 3.3 09/24/2020    POTASSIUM 3.9 03/16/2020    Lab Results   Component Value Date    CO2 33 09/25/2020    CO2 30 09/24/2020    CO2 27 03/16/2020    Lab Results   Component Value Date    CR 0.71 09/25/2020    CR 0.71 09/24/2020    CR 0.55 03/16/2020        Recent Labs   Lab 09/25/20  0556 09/24/20  1511   WBC 10.6 11.1*   HGB 9.2* 11.1*   HCT 26.0* 30.5*   * 131*    428     Recent Labs   Lab 09/25/20  1523 09/25/20  0556 09/24/20  1511   INR 2.59* 3.17* 3.61*     Recent Labs   Lab 09/24/20  1511   TROPI <0.015     Imaging:   Results for orders placed or performed during the hospital encounter of 09/24/20   CT Chest (PE) Abdomen Pelvis w Contrast    Narrative    CT CHEST PE ABDOMEN PELVIS WITH CONTRAST 9/24/2020 4:30 PM    CLINICAL " HISTORY: Chest pain and shortness of breath with history of  pulmonary embolism. Generalized abdominal pain and bloating.    TECHNIQUE: CT angiogram chest and routine CT abdomen pelvis with IV  contrast. Arterial phase through the chest and venous phase through  the abdomen and pelvis. 2D and 3D MIP reconstructions were preformed  by the CT technologist. Dose reduction techniques were used.     CONTRAST: 63mL [id number]-[id number]    COMPARISON: 7/19/2018, 12/7/2015    FINDINGS:  ANGIOGRAM CHEST: Pulmonary arteries are normal caliber and negative  for pulmonary emboli. Thoracic aorta is negative for dissection. No CT  evidence of right heart strain.     LUNGS AND PLEURA: Normal.    MEDIASTINUM/AXILLAE: Normal.    HEPATOBILIARY: Severe decreased, heterogeneous attenuation of the  liver. The liver is enlarged at 20.6 cm in length. No definite focal  lesions. Gallbladder unremarkable. Portal venous collateral vessels  are noted including a periumbilical vein and gastroesophageal varices.    PANCREAS: Normal.    SPLEEN: Normal.    ADRENAL GLANDS: Normal.    KIDNEYS/BLADDER: Normal.    BOWEL: Normal appendix. No bowel obstruction or free air.    LYMPH NODES: Normal.    PELVIC ORGANS: Normal.    OTHER: Moderate to large amount of ascites. Small fat-containing  umbilical hernia. Incidental note of retroaortic left renal vein.    MUSCULOSKELETAL: Bilateral L5 spondylolysis. Grade 1 anterolisthesis  of L5 on S1. No destructive bony lesions.      Impression    IMPRESSION:  1.  No acute abnormality demonstrated in the chest. Specifically,  there is no evidence of pulmonary embolism.  2.  Enlarged, severely fatty infiltrated liver is associated with a  moderate to large volume of ascites and gastroesophageal varices.    ANDREA CAMARA MD

## 2020-09-26 ENCOUNTER — APPOINTMENT (OUTPATIENT)
Dept: ULTRASOUND IMAGING | Facility: CLINIC | Age: 45
DRG: 433 | End: 2020-09-26
Attending: PHYSICIAN ASSISTANT
Payer: COMMERCIAL

## 2020-09-26 VITALS
BODY MASS INDEX: 29.38 KG/M2 | RESPIRATION RATE: 20 BRPM | WEIGHT: 172.1 LBS | OXYGEN SATURATION: 96 % | HEART RATE: 77 BPM | DIASTOLIC BLOOD PRESSURE: 65 MMHG | TEMPERATURE: 98.3 F | SYSTOLIC BLOOD PRESSURE: 94 MMHG | HEIGHT: 64 IN

## 2020-09-26 LAB
ALBUMIN FLD-MCNC: 0.2 G/DL
APPEARANCE FLD: NORMAL
COLOR FLD: YELLOW
GRAM STN SPEC: NORMAL
INR PPP: 1.58 (ref 0.86–1.14)
LYMPHOCYTES NFR FLD MANUAL: 14 %
MONOS+MACROS NFR FLD MANUAL: 80 %
NEUTS BAND NFR FLD MANUAL: 1 %
OTHER CELLS FLD MANUAL: 5 %
PROT FLD-MCNC: 0.6 G/DL
SPECIMEN SOURCE FLD: NORMAL
SPECIMEN SOURCE: NORMAL
WBC # FLD AUTO: 73 /UL

## 2020-09-26 PROCEDURE — 85610 PROTHROMBIN TIME: CPT | Performed by: INTERNAL MEDICINE

## 2020-09-26 PROCEDURE — 84157 ASSAY OF PROTEIN OTHER: CPT | Performed by: PHYSICIAN ASSISTANT

## 2020-09-26 PROCEDURE — 88112 CYTOPATH CELL ENHANCE TECH: CPT | Mod: 26 | Performed by: PHYSICIAN ASSISTANT

## 2020-09-26 PROCEDURE — 86704 HEP B CORE ANTIBODY TOTAL: CPT | Performed by: INTERNAL MEDICINE

## 2020-09-26 PROCEDURE — 88112 CYTOPATH CELL ENHANCE TECH: CPT | Performed by: PHYSICIAN ASSISTANT

## 2020-09-26 PROCEDURE — 87205 SMEAR GRAM STAIN: CPT | Performed by: PHYSICIAN ASSISTANT

## 2020-09-26 PROCEDURE — 25000132 ZZH RX MED GY IP 250 OP 250 PS 637: Performed by: INTERNAL MEDICINE

## 2020-09-26 PROCEDURE — 86706 HEP B SURFACE ANTIBODY: CPT | Performed by: INTERNAL MEDICINE

## 2020-09-26 PROCEDURE — 88305 TISSUE EXAM BY PATHOLOGIST: CPT | Performed by: PHYSICIAN ASSISTANT

## 2020-09-26 PROCEDURE — 0W9G3ZZ DRAINAGE OF PERITONEAL CAVITY, PERCUTANEOUS APPROACH: ICD-10-PCS | Performed by: RADIOLOGY

## 2020-09-26 PROCEDURE — 49083 ABD PARACENTESIS W/IMAGING: CPT

## 2020-09-26 PROCEDURE — 99239 HOSP IP/OBS DSCHRG MGMT >30: CPT | Performed by: PHYSICIAN ASSISTANT

## 2020-09-26 PROCEDURE — 87070 CULTURE OTHR SPECIMN AEROBIC: CPT | Performed by: PHYSICIAN ASSISTANT

## 2020-09-26 PROCEDURE — 00000102 ZZHCL STATISTIC CYTO WRIGHT STAIN TC: Performed by: PHYSICIAN ASSISTANT

## 2020-09-26 PROCEDURE — 88305 TISSUE EXAM BY PATHOLOGIST: CPT | Mod: 26 | Performed by: PHYSICIAN ASSISTANT

## 2020-09-26 PROCEDURE — 82042 OTHER SOURCE ALBUMIN QUAN EA: CPT | Performed by: PHYSICIAN ASSISTANT

## 2020-09-26 PROCEDURE — 87015 SPECIMEN INFECT AGNT CONCNTJ: CPT | Performed by: PHYSICIAN ASSISTANT

## 2020-09-26 PROCEDURE — 36415 COLL VENOUS BLD VENIPUNCTURE: CPT | Performed by: INTERNAL MEDICINE

## 2020-09-26 PROCEDURE — 89051 BODY FLUID CELL COUNT: CPT | Performed by: PHYSICIAN ASSISTANT

## 2020-09-26 PROCEDURE — 86803 HEPATITIS C AB TEST: CPT | Performed by: INTERNAL MEDICINE

## 2020-09-26 PROCEDURE — 87340 HEPATITIS B SURFACE AG IA: CPT | Performed by: INTERNAL MEDICINE

## 2020-09-26 PROCEDURE — 00000155 ZZHCL STATISTIC H-CELL BLOCK W/STAIN: Performed by: PHYSICIAN ASSISTANT

## 2020-09-26 RX ORDER — LANOLIN ALCOHOL/MO/W.PET/CERES
100 CREAM (GRAM) TOPICAL DAILY
COMMUNITY
Start: 2020-10-02 | End: 2020-10-16

## 2020-09-26 RX ORDER — MULTIPLE VITAMINS W/ MINERALS TAB 9MG-400MCG
1 TAB ORAL DAILY
COMMUNITY
Start: 2020-09-27 | End: 2020-10-16

## 2020-09-26 RX ORDER — FUROSEMIDE 20 MG
10 TABLET ORAL DAILY
Qty: 30 TABLET | Refills: 0 | Status: SHIPPED | OUTPATIENT
Start: 2020-09-26 | End: 2020-10-16

## 2020-09-26 RX ORDER — SPIRONOLACTONE 25 MG/1
25 TABLET ORAL DAILY
Qty: 30 TABLET | Refills: 0 | Status: SHIPPED | OUTPATIENT
Start: 2020-09-27 | End: 2020-10-16

## 2020-09-26 RX ORDER — LANOLIN ALCOHOL/MO/W.PET/CERES
100 CREAM (GRAM) TOPICAL 3 TIMES DAILY
Qty: 15 TABLET | Refills: 0 | Status: SHIPPED | OUTPATIENT
Start: 2020-09-26 | End: 2020-10-01

## 2020-09-26 RX ORDER — FOLIC ACID 1 MG/1
1 TABLET ORAL DAILY
COMMUNITY
Start: 2020-09-27 | End: 2020-10-16

## 2020-09-26 RX ADMIN — MULTIPLE VITAMINS W/ MINERALS TAB 1 TABLET: TAB at 07:24

## 2020-09-26 RX ADMIN — FOLIC ACID 1 MG: 1 TABLET ORAL at 07:24

## 2020-09-26 RX ADMIN — THIAMINE HCL TAB 100 MG 200 MG: 100 TAB at 07:24

## 2020-09-26 NOTE — PLAN OF CARE
Patient's After Visit Summary was reviewed with patient and/or spouse.   Patient verbalized understanding of After Visit Summary, recommended follow up and was given an opportunity to ask questions.   Discharge medications sent home with patient/family: No, scripts sent to [facility]   Discharged with spouse via private vehicle     OBSERVATION patient END time: 3:15 PM

## 2020-09-26 NOTE — DISCHARGE SUMMARY
[facility]    Discharge Summary  Hospitalist    Date of Admission:  9/24/2020  Date of Discharge:  9/26/2020  Provider:  Anne Doty PA-C  Date of Service (when I last saw the patient): 09/26/20    Discharge Diagnoses   New onset ascites likely 2/2 etoh abuse and cirrhosis  Severe fatty liver   Hypokalemia   Alcohol dependence with ongoing abuse without evidence of withdrawal     Other medical issues:  Past Medical History:   Diagnosis Date     DVT (deep venous thrombosis) (H)     grey LE      Epilepsy (H)     2004 last seizure     Pulmonary emboli (H)      History of Present Illness   Nelsy Cota is a 45 year old female admitted on 9/24/2020. She has a past medical history significant for DVT, pulmonary emboli, epilepsy, ongoing tobacco use disorder, alcohol dependence with ongoing abuse.  She presents to ED with new onset ascites likely due to etoh abuse. Please see the admission history and physical for full details.     Hospital Course   Nelsy Cota was admitted on 9/24/2020.  The following problems were addressed during her hospitalization:     #New onset ascites likely due to etoh abuse and cirrhosis: presented with new onset abdominal discomfort and abdominal distention. Alkaline phosphatase and AST elevated with normal total bilirubin and ALT. CT of abdomen showed enlarged severely fatty infiltrated liver associated with moderate to large volume ascited and GE varices.  - s/p 10cc drainage in the ED. Initial fluid sent did not show obvious infection. No evidence of SBP.   - s/p IR paracentesis on 9/6 with 2.2 L removed without complication and labs sent     #Severe fatty liver, suspect cirrhosis in the setting of ETOH abuse  - CT also shows concerns for GE varices  - Viral hepatology panel ordered, pending at time of discharge  - Cont low sodium diet  - soft BP prior to discharge, start low dose Lasix and Spironolactone with instructions to hold based on BP  - Needs outpt Hepatology  "follow up, referral given at discharge   - GI saw during stay with recommendation as above     #Hypokalemia. Resolved with replacement.    #Mild hyponatremia: sodium of 132, recheck CMP as outpatient      #Lactic acidosis: appears to be related to regular alcohol use and dehydration, no signs of underlying infection, afebrile and no leukocytosis during stay. Blood cultures with NGTD at time of discharge. No need to follow value.     #Alcohol dependence with ongoing abuse.  NO evidence of W/D during stay.  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   - INR reversed for paracentesis  - INR of 1.58 on 9/26  - restart Warfarin the evening of 9/26  - recheck INR on 9/28    Pending Results   Unresulted Labs Ordered in the Past 30 Days of this Admission     Date and Time Order Name Status Description    9/26/2020 0000 Hepatitis B core antibody In process     9/26/2020 0000 Hepatitis B Surface Antibody In process     9/26/2020 0000 Hepatitis B surface antigen In process     9/26/2020 0000 Hepatitis C antibody In process     9/25/2020 1116 Cytology non gyn In process     9/25/2020 1116 Fluid Culture Aerobic Bacterial In process     9/25/2020 1116 Gram stain In process     9/24/2020 1702 Fluid Culture Aerobic Bacterial Preliminary     9/24/2020 1524 Blood culture Preliminary     9/24/2020 1524 Blood culture Preliminary         Code Status   Full Code       Primary Care Physician   Hamzah CONTE Pallegar    Exam:    BP 94/65   Pulse 77   Temp 98.3  F (36.8  C) (Oral)   Resp 20   Ht 1.626 m (5' 4\")   Wt 78.1 kg (172 lb 1.6 oz)   SpO2 96%   BMI 29.54 kg/m    GEN:  Alert, oriented x 3, appears comfortable, no overt distress  HEENT:  Normocephalic/atraumatic, no scleral icterus, no nasal discharge, mouth moist.  CV:  Regular rate and rhythm, no murmur or JVD.  S1 + S2 noted, no S3 or S4.  LUNGS:  Clear to " auscultation bilaterally without rales/rhonchi/wheezing/retractions.  Symmetric chest rise on inhalation noted.  ABD:  Active bowel sounds, soft, distended with fluid waves prior to paracentesis, non-distended.  No rebound/guarding/rigidity.  EXT:  No edema.  No cyanosis.  No acute joint synovitis noted.  SKIN:  Dry to touch, no exanthems noted in the visualized areas.  NEURO:  Symmetric muscle strength, sensation to touch grossly intact.  Coordination symmetric on general exam.  No new focal deficits appreciated.    Discharge Disposition   Discharged to home    Consultations This Hospital Stay   GASTROENTEROLOGY IP CONSULT  PHARMACY TO DOSE WARFARIN  ADVANCE DIRECTIVE IP CONSULT  PHARMACY TO DOSE PHYTONADIONE    Time Spent on this Encounter   I, Lora Doty PA-C, personally saw the patient today and spent greater than 30 minutes discharging this patient.    Discharge Orders      INR     Comprehensive metabolic panel     GASTROENTEROLOGY ADULT REF CONSULT ONLY      Reason for your hospital stay    You were admitted due to concerns for new onset abdominal distention related to accumulation of fluid (ascites) likely related to daily alcohol use. You had a small amount of fluid drained in the emergency room that does not show any signs of an underlying infection.  Due to being on warfarin you had to have your INR reversed in order to undergo a paracentesis prior to discharge to remove some of the fluid from you abdomen,which you tolerated without complications. During your stay you did have a CT scan which showed the ascites in your abdomen as well concern for gastroesophageal varices which is related to live disease.  You were evaluated by gastroenterology during your stay and had a hepatitis panel ordered which is pending at discharge and recommend you continue a low-sodium diet, start Lasix and spironolactone, and see a liver specialist in follow-up.  It is recommended you stop consuming alcohol and start  daily vitamins.  You were able to resume your warfarin at discharge and should have your INR rechecked on Monday in clinic.     Follow-up and recommended labs and tests     Follow up with primary care provider within 7 days for hospital follow- up and review results.  The following labs/tests are recommended: CMP and INR on Monday.     Activity    Your activity upon discharge: activity as tolerated     Monitor and record    blood pressure daily with starting diuretics to monitor your blood pressure doesn't drop below 90/60     Discharge Instructions    Start Lasix 10 mg and Spironolactone 25 mg daily, hold both if blood pressure less than 90/60     Full Code     Diet    Follow this diet upon discharge: Orders Placed This Encounter      Combination Diet 2 gm NA Diet     Discharge Medications   Current Discharge Medication List      START taking these medications    Details   folic acid (FOLVITE) 1 MG tablet Take 1 tablet (1 mg) by mouth daily    Associated Diagnoses: Alcoholic cirrhosis of liver with ascites (H)      furosemide (LASIX) 20 MG tablet Take 0.5 tablets (10 mg) by mouth daily  Qty: 30 tablet, Refills: 0    Associated Diagnoses: Alcoholic cirrhosis of liver with ascites (H)      multivitamin w/minerals (THERA-VIT-M) tablet Take 1 tablet by mouth daily    Associated Diagnoses: Alcoholic cirrhosis of liver with ascites (H)      spironolactone (ALDACTONE) 25 MG tablet Take 1 tablet (25 mg) by mouth daily  Qty: 30 tablet, Refills: 0    Associated Diagnoses: Alcoholic cirrhosis of liver with ascites (H)      !! thiamine (B-1) 100 MG tablet Take 1 tablet (100 mg) by mouth 3 times daily for 15 doses  Qty: 15 tablet, Refills: 0    Associated Diagnoses: Alcoholic cirrhosis of liver with ascites (H)      !! thiamine (B-1) 100 MG tablet Take 1 tablet (100 mg) by mouth daily    Associated Diagnoses: Alcoholic cirrhosis of liver with ascites (H)       !! - Potential duplicate medications found. Please discuss with  provider.      CONTINUE these medications which have NOT CHANGED    Details   warfarin ANTICOAGULANT (COUMADIN) 2 MG tablet Take 2 mg by mouth At Bedtime           Allergies   No Known Allergies     Data   Results for orders placed or performed during the hospital encounter of 09/24/20   -Paracentesis     Status: None    Narrative    Tarik Hubbard MD     9/24/2020 10:33 PM  [facility]    -Paracentesis    Date/Time: 9/24/2020 6:01 PM  Performed by: Tarik Hubbadr MD  Authorized by: Tarik Hubbard MD       PRE-PROCEDURE DETAILS     Procedure purpose:  Diagnostic    ANESTHESIA (see MAR for exact dosages):     Anesthesia method:  Local infiltration    Local anesthetic:  Lidocaine 1% w/o epi    PROCEDURE DETAILS     Needle gauge:  18    Ultrasound guidance: yes      Puncture site:  R lower quadrant    Fluid removed amount:  10cc    Fluid appearance:  Cloudy (straw)  PROCEDURE   Patient Tolerance:  Patient tolerated the procedure well with no immediate   complications     CT Chest (PE) Abdomen Pelvis w Contrast     Status: None    Narrative    CT CHEST PE ABDOMEN PELVIS WITH CONTRAST 9/24/2020 4:30 PM    CLINICAL HISTORY: Chest pain and shortness of breath with history of  pulmonary embolism. Generalized abdominal pain and bloating.    TECHNIQUE: CT angiogram chest and routine CT abdomen pelvis with IV  contrast. Arterial phase through the chest and venous phase through  the abdomen and pelvis. 2D and 3D MIP reconstructions were preformed  by the CT technologist. Dose reduction techniques were used.     CONTRAST: 63mL [id number]-[id number]    COMPARISON: 7/19/2018, 12/7/2015    FINDINGS:  ANGIOGRAM CHEST: Pulmonary arteries are normal caliber and negative  for pulmonary emboli. Thoracic aorta is negative for dissection. No CT  evidence of right heart strain.     LUNGS AND PLEURA: Normal.    MEDIASTINUM/AXILLAE: Normal.    HEPATOBILIARY: Severe decreased, heterogeneous  attenuation of the  liver. The liver is enlarged at 20.6 cm in length. No definite focal  lesions. Gallbladder unremarkable. Portal venous collateral vessels  are noted including a periumbilical vein and gastroesophageal varices.    PANCREAS: Normal.    SPLEEN: Normal.    ADRENAL GLANDS: Normal.    KIDNEYS/BLADDER: Normal.    BOWEL: Normal appendix. No bowel obstruction or free air.    LYMPH NODES: Normal.    PELVIC ORGANS: Normal.    OTHER: Moderate to large amount of ascites. Small fat-containing  umbilical hernia. Incidental note of retroaortic left renal vein.    MUSCULOSKELETAL: Bilateral L5 spondylolysis. Grade 1 anterolisthesis  of L5 on S1. No destructive bony lesions.      Impression    IMPRESSION:  1.  No acute abnormality demonstrated in the chest. Specifically,  there is no evidence of pulmonary embolism.  2.  Enlarged, severely fatty infiltrated liver is associated with a  moderate to large volume of ascites and gastroesophageal varices.    ANDREA CAMARA MD   US Paracentesis     Status: None    Narrative    US PARACENTESIS 9/26/2020 12:42 PM    CLINICAL HISTORY: Ascites.    PROCEDURE: Informed consent obtained. Time out performed. The abdomen  was prepped and draped in a sterile fashion. 10 mL of 1% lidocaine was  infused into local soft tissues. A 5 Indonesian catheter system was  introduced into the abdominal ascites under ultrasound guidance.    2.2 liters of clear fluid were removed and sent to lab if requested.    Patient tolerated procedure well.    Ultrasound imaging was obtained and placed in the patient's permanent  medical record.      Impression    IMPRESSION:  1.  Status post ultrasound-guided paracentesis.    GIANNI MELARA MD   CBC with platelets differential     Status: Abnormal   Result Value Ref Range    WBC 11.1 (H) 4.0 - 11.0 10e9/L    RBC Count 2.33 (L) 3.8 - 5.2 10e12/L    Hemoglobin 11.1 (L) 11.7 - 15.7 g/dL    Hematocrit 30.5 (L) 35.0 - 47.0 %     (H) 78 - 100 fl    MCH 47.6  (H) 26.5 - 33.0 pg    MCHC 36.4 31.5 - 36.5 g/dL    RDW 17.9 (H) 10.0 - 15.0 %    Platelet Count 428 150 - 450 10e9/L    Diff Method Automated Method     % Neutrophils 65.9 %    % Lymphocytes 22.2 %    % Monocytes 9.0 %    % Eosinophils 2.0 %    % Basophils 0.4 %    % Immature Granulocytes 0.5 %    Nucleated RBCs 0 0 /100    Absolute Neutrophil 7.3 1.6 - 8.3 10e9/L    Absolute Lymphocytes 2.5 0.8 - 5.3 10e9/L    Absolute Monocytes 1.0 0.0 - 1.3 10e9/L    Absolute Eosinophils 0.2 0.0 - 0.7 10e9/L    Absolute Basophils 0.1 0.0 - 0.2 10e9/L    Abs Immature Granulocytes 0.1 0 - 0.4 10e9/L    Absolute Nucleated RBC 0.0    Basic metabolic panel     Status: Abnormal   Result Value Ref Range    Sodium 135 133 - 144 mmol/L    Potassium 3.3 (L) 3.4 - 5.3 mmol/L    Chloride 97 94 - 109 mmol/L    Carbon Dioxide 30 20 - 32 mmol/L    Anion Gap 8 3 - 14 mmol/L    Glucose 75 70 - 99 mg/dL    Urea Nitrogen 8 7 - 30 mg/dL    Creatinine 0.71 0.52 - 1.04 mg/dL    GFR Estimate >90 >60 mL/min/[1.73_m2]    GFR Estimate If Black >90 >60 mL/min/[1.73_m2]    Calcium 8.9 8.5 - 10.1 mg/dL   Troponin I     Status: None   Result Value Ref Range    Troponin I ES <0.015 0.000 - 0.045 ug/L   Nt probnp inpatient     Status: None   Result Value Ref Range    N-Terminal Pro BNP Inpatient 309 0 - 450 pg/mL   Hepatic panel     Status: Abnormal   Result Value Ref Range    Bilirubin Direct 0.7 (H) 0.0 - 0.2 mg/dL    Bilirubin Total 1.3 0.2 - 1.3 mg/dL    Albumin 2.1 (L) 3.4 - 5.0 g/dL    Protein Total 6.9 6.8 - 8.8 g/dL    Alkaline Phosphatase 224 (H) 40 - 150 U/L    ALT 24 0 - 50 U/L    AST 77 (H) 0 - 45 U/L   Lactic acid whole blood     Status: Abnormal   Result Value Ref Range    Lactic Acid 3.5 (H) 0.7 - 2.0 mmol/L   Lipase     Status: None   Result Value Ref Range    Lipase 94 73 - 393 U/L   INR     Status: Abnormal   Result Value Ref Range    INR 3.61 (H) 0.86 - 1.14   Albumin level     Status: Abnormal   Result Value Ref Range    Albumin 2.1 (L) 3.4  - 5.0 g/dL   Asymptomatic COVID-19 Virus (Coronavirus) by PCR     Status: None    Specimen: Nasopharyngeal   Result Value Ref Range    COVID-19 Virus PCR to [facility] - Source Nasopharyngeal     COVID-19 Virus PCR to [facility] - Result       Test received-See reflex to IDDL test SARS CoV2 (COVID-19) Virus RT-PCR   SARS-CoV-2 COVID-19 Virus (Coronavirus) RT-PCR Nasopharyngeal     Status: None    Specimen: Nasopharyngeal   Result Value Ref Range    SARS-CoV-2 Virus Specimen Source Nasopharyngeal     SARS-CoV-2 PCR Result NEGATIVE     SARS-CoV-2 PCR Comment       Testing was performed using the Xpert Xpress SARS-CoV-2 Assay on the Cepheid Gene-Xpert   Instrument Systems. Additional information about this Emergency Use Authorization (EUA)   assay can be found via the Lab Guide.     Magnesium     Status: None   Result Value Ref Range    Magnesium 1.6 1.6 - 2.3 mg/dL   Phosphorus     Status: None   Result Value Ref Range    Phosphorus 3.4 2.5 - 4.5 mg/dL   INR     Status: Abnormal   Result Value Ref Range    INR 3.17 (H) 0.86 - 1.14   Comprehensive metabolic panel     Status: Abnormal   Result Value Ref Range    Sodium 132 (L) 133 - 144 mmol/L    Potassium 3.6 3.4 - 5.3 mmol/L    Chloride 97 94 - 109 mmol/L    Carbon Dioxide 33 (H) 20 - 32 mmol/L    Anion Gap 2 (L) 3 - 14 mmol/L    Glucose 78 70 - 99 mg/dL    Urea Nitrogen 10 7 - 30 mg/dL    Creatinine 0.71 0.52 - 1.04 mg/dL    GFR Estimate >90 >60 mL/min/[1.73_m2]    GFR Estimate If Black >90 >60 mL/min/[1.73_m2]    Calcium 7.5 (L) 8.5 - 10.1 mg/dL    Bilirubin Total 1.5 (H) 0.2 - 1.3 mg/dL    Albumin 1.7 (L) 3.4 - 5.0 g/dL    Protein Total 5.6 (L) 6.8 - 8.8 g/dL    Alkaline Phosphatase 182 (H) 40 - 150 U/L    ALT 22 0 - 50 U/L    AST 85 (H) 0 - 45 U/L   CBC with platelets     Status: Abnormal   Result Value Ref Range    WBC 10.6 4.0 - 11.0 10e9/L    RBC Count 1.92 (L) 3.8 - 5.2 10e12/L    Hemoglobin 9.2 (L) 11.7 - 15.7 g/dL    Hematocrit 26.0 (L) 35.0 - 47.0 %      (H) 78 - 100 fl    MCH 47.9 (H) 26.5 - 33.0 pg    MCHC 35.4 31.5 - 36.5 g/dL    RDW 18.8 (H) 10.0 - 15.0 %    Platelet Count 331 150 - 450 10e9/L   Lactic acid whole blood     Status: Abnormal   Result Value Ref Range    Lactic Acid 2.7 (H) 0.7 - 2.0 mmol/L   Albumin level     Status: Abnormal   Result Value Ref Range    Albumin 1.8 (L) 3.4 - 5.0 g/dL   INR     Status: Abnormal   Result Value Ref Range    INR 2.59 (H) 0.86 - 1.14   INR     Status: Abnormal   Result Value Ref Range    INR 1.97 (H) 0.86 - 1.14   INR     Status: Abnormal   Result Value Ref Range    INR 1.58 (H) 0.86 - 1.14   EKG 12-lead, tracing only     Status: None   Result Value Ref Range    Interpretation ECG Click View Image link to view waveform and result    Gastroenterology IP Consult: Ascites, likely alcoholic cirrhosis; Consultant may enter orders: Yes; Patient to be seen: Routine - within 24 hours; Requested Clinic/Group: [facility]; Requesting provider? Hospitalist (if different fro...     Status: None ()    Shaylee Silva MD     9/25/2020  5:43 PM  GASTROENTEROLOGY CONSULTATION      Nelsy Cota  [address] [facility] [address]  45 year old female     Admission Date/Time: 9/24/2020  Primary Care Provider: Hamzah Thomas  Referring / Attending Physician:  Dr. Montes     We were asked to see the patient in consultation by Dr. Montes   for evaluation of ascites.        HPI:  Nelsy Cota is a 45 year old female with medical   history of DVT/PE on warfarin, alcohol dependency with ongoing   abuse, epilepsy, who presented to the emergency room with   worsening abdominal distention and lower extremity edema.    Patient reports that for the past 2 weeks she is noticed   worsening swelling of her ankles.    Then 3 days ago she began noticing significant abdominal   distention.  She describes the distention pushing up on her chest   and causing pain.  She was at work a few  "days ago and thought   that she was pregnant because of how big her abdomen was.  She   reports nausea with some occasional vomiting.  No hematemesis.    No melena no hematochezia.  The patient does drink alcohol on a daily basis.  She estimates   anywhere from 3-6 servings of alcohol per day.  She has done this   for a number of years, \"as long as she can remember.\"    A CT scan was completed of her chest abdomen and pelvis in order   to rule out a PE.  She was found to have severe decreased   attenuation of the liver with hepatomegaly.  There were no focal   liver lesions.  Additional notes of portal venous collateral   vessels with evidence of gastroesophageal varices.  Previous   abdominal ultrasound in March 2020 shows dense hepatic steatosis.    AST 85, ALT 22, alkaline phosphatase 182, total bilirubin 1.5.    INR is 3.17 however patient does use warfarin.  Platelets are   stable at 331.    Paracentesis was completed.  Ascitic fluid was negative for SBP.         PAST MEDICAL HISTORY:  Patient Active Problem List    Diagnosis Date Noted     Ascites 09/24/2020     Priority: Medium     Macrocytosis 10/07/2019     Priority: Medium     Macrocytic 10/07/2019     Priority: Medium     DVT (deep venous thrombosis) (H)      Priority: Medium     grey LE        Long-term (current) use of anticoagulants [Z79.01] 07/25/2018     Priority: Medium     Recurrent deep venous thrombosis (H) 07/24/2018     Priority: Medium     Tobacco use disorder 07/24/2018     Priority: Medium     Macrocytic anemia 07/24/2018     Priority: Medium     PE (pulmonary thromboembolism) (H) 07/19/2018     Priority: Medium     Convulsions, unspecified convulsion type (H) 12/07/2015     Priority: Medium     Health Care Home 09/19/2013     Priority: Medium     State Tier Level:  Tier 0  Status:  n/a  Care Coordinator:      See Letters for [facility] Care Plan           FAM HX-CARDIOVAS DIS NEC 12/21/2009     Priority: Medium     Papanicolaou smear of cervix with " "atypical squamous cells   cannot exclude high grade squamous intraepithelial lesion (ASC-H)   01/24/2007     Priority: Medium          ROS: A comprehensive ten point review of systems was negative   aside from those in mentioned in the HPI.       MEDICATIONS:   Prior to Admission medications    Medication Sig Start Date End Date Taking? Authorizing Provider   warfarin ANTICOAGULANT (COUMADIN) 2 MG tablet Take 2 mg by mouth   At Bedtime   Yes Unknown, Entered By History        ALLERGIES: No Known Allergies     SOCIAL HISTORY:  Social History     Tobacco Use     Smoking status: Current Every Day Smoker     Packs/day: 1.00     Years: 15.00     Pack years: 15.00     Types: Cigarettes     Smokeless tobacco: Never Used     Tobacco comment: 1 ppd    Substance Use Topics     Alcohol use: Yes     Alcohol/week: 1.0 - 2.0 standard drinks     Types: 1 - 2 Standard drinks or equivalent per week     Drinks per session: 1 or 2     Comment: 12 drinks per week     Drug use: No        FAMILY HISTORY:  Family History   Problem Relation Age of Onset     Hypertension Father      Neurologic Disorder Father         epilepsy     C.A.D. Maternal Grandmother         AAA     Connective Tissue Disorder Paternal Aunt         SLE        PHYSICAL EXAM:     /69 (BP Location: Right arm)   Pulse 94   Temp 98.4  F   (36.9  C) (Oral)   Resp 16   Ht 1.626 m (5' 4\")   Wt 78.1 kg   (172 lb 1.6 oz)   SpO2 95%   BMI 29.54 kg/m       PHYSICAL EXAM:  GENERAL: NAD,   SKIN: no suspicious lesions, rashes, jaundice  HEAD: Normocephalic. Atraumatic.  NECK: Neck supple. No adenopathy.   EYES: No scleral icterus  RESPIRATORY: Good transmission. CTA bilaterally.   CARDIOVASCULAR: RRR, normal S1, S2,  No murmur appreciated  GASTROINTESTINAL: +BS, soft, non tender, non distended, no   guarding/rebound  JOINT/EXTREMITIES:  no gross deformities noted, normal muscle   tone  NEURO: CN 2-12 grossly intact, no focal deficits, LE edema   bilaterally  PSYCH: " Normal affect        ADDITIONAL COMMENTS:   I reviewed the patient's new clinical lab test results.   Recent Labs   Lab Test 09/25/20  0556 09/24/20  1511 09/15/20  1545  03/16/20  1542   WBC 10.6 11.1*  --   --  10.1   HGB 9.2* 11.1*  --   --  11.4*   * 131*  --   --  136*    428  --   --  435   INR 3.17* 3.61* 3.30*   < > 3.03*    < > = values in this interval not displayed.     Recent Labs   Lab Test 09/25/20  0556 09/24/20  1511 03/16/20  1542   POTASSIUM 3.6 3.3* 3.9   CHLORIDE 97 97 103   CO2 33* 30 27   BUN 10 8 6*   ANIONGAP 2* 8 6     Recent Labs   Lab Test 09/25/20  0556 09/24/20  1721 09/24/20  1511 03/16/20  1542  07/19/18  1802 12/07/15  1630   ALBUMIN 1.7* 2.1* 2.1* 2.5*   < > 2.2*  --    BILITOTAL 1.5*  --  1.3 1.0   < > 0.8  --    ALT 22  --  24 64*   < > 31  --    AST 85*  --  77* 227*   < > 74*  --    PROTEIN  --   --   --   --   --   --  Negative   LIPASE  --   --  94  --   --  156  --     < > = values in this interval not displayed.        IMAGING / ENDOSCOPY     CT CHEST PE ABDOMEN PELVIS WITH CONTRAST 9/24/2020 4:30 PM     FINDINGS:  ANGIOGRAM CHEST: Pulmonary arteries are normal caliber and   negative  for pulmonary emboli. Thoracic aorta is negative for dissection.   No CT  evidence of right heart strain.      LUNGS AND PLEURA: Normal.     MEDIASTINUM/AXILLAE: Normal.     HEPATOBILIARY: Severe decreased, heterogeneous attenuation of the  liver. The liver is enlarged at 20.6 cm in length. No definite   focal  lesions. Gallbladder unremarkable. Portal venous collateral   vessels  are noted including a periumbilical vein and gastroesophageal   varices.     PANCREAS: Normal.     SPLEEN: Normal.     ADRENAL GLANDS: Normal.     KIDNEYS/BLADDER: Normal.     BOWEL: Normal appendix. No bowel obstruction or free air.     LYMPH NODES: Normal.     PELVIC ORGANS: Normal.     OTHER: Moderate to large amount of ascites. Small fat-containing  umbilical hernia. Incidental note of retroaortic  left renal vein.     MUSCULOSKELETAL: Bilateral L5 spondylolysis. Grade 1   anterolisthesis  of L5 on S1. No destructive bony lesions.                                                                      IMPRESSION:  1.  No acute abnormality demonstrated in the chest. Specifically,  there is no evidence of pulmonary embolism.  2.  Enlarged, severely fatty infiltrated liver is associated with   a  moderate to large volume of ascites and gastroesophageal varices.     CONSULTATION ASSESSMENT AND PLAN:    Nelsy Cota is a 45 year old with medical history of DVT/PE   on warfarin, alcohol dependency with ongoing abuse, epilepsy, who   presented to the emergency room with worsening abdominal   distention and lower extremity edema.  Patient reports that for   the past 2 weeks she is noticed worsening swelling of her ankles.    1. Severe fatty liver, suspect cirrhosis: Patient drinks alcohol   on a daily basis reporting ongoing abuse.  This is likely the   cause of her liver disease.  LFTs are mildly elevated.  She is on   warfarin so her INR is elevated as well.  Platelets are stable.    CT with evidence of gastroesophageal varices.  Patient did also   have large volume of ascites which has been drained.  No evidence   of acute GI bleeding or encephalopathy. SAAG is 1.4 ( indicating   portal HTN).    -- Ascitic fluid negative for SBP  -- Agree with starting Lasix and spironolactone as kidney   function is stable.  Goal dosage would be Lasix 40 mg daily and   spironolactone 100 mg daily.  Titrate up as kidney function   allows.  -- Low sodium 2 gram diet.  -- Check viral hepatitis serology.  -- Will need outpatient hepatology follow up for additional   work-up and EGD for screening of varices .     2.  Alcohol dependency: Spoke with patient about the need for   abstinence from alcohol.  She reports she is motivated to quit   and declines chemical dependency consult.  Monitor for alcohol   withdrawal.  Replacement  vitamins    I discussed the patient plan with Dr. Bell. Thank you for   asking us to participate in the care of this patient.    Astrid Ross PA-C  [facility] ( [facility])     ----------------  I agree with the assessment and plan of Astrid Ross.  Patient   with alcoholic liver disease.  Admitted with ascites and LE   edema.  On exam she has muscle wasting, distended abd with   ascites, pitting LE edema.  Alcoholic liver disease and plan   nicely delineated above.  We will continue to follow.    Nice Shaylee Santos MD  [facility]                 Blood culture     Status: None (Preliminary result)    Specimen: Blood    Left Arm   Result Value Ref Range    Specimen Description Blood Left Arm     Culture Micro No growth after 2 days    Blood culture     Status: None (Preliminary result)    Specimen: Blood    Right Arm   Result Value Ref Range    Specimen Description Blood Right Arm     Culture Micro No growth after 2 days    Cell count with differential fluid     Status: None   Result Value Ref Range    Body Fluid Analysis Source Fluid     % Neutrophils Fluid 4 %    % Lymphocytes Fluid 23 %    % Mono/Macro Fluid 62 %    % Other Cells Fluid 11 %    Color Fluid Yellow     Appearance Fluid Slightly Cloudy     WBC Fluid 48 /uL   Albumin fluid     Status: None   Result Value Ref Range    Albumin Fluid Source Fluid     Albumin Fluid 0.3 g/dL   Protein fluid     Status: None   Result Value Ref Range    Protein Total Fluid Source Fluid     Protein Total Fluid 0.8 g/dL   Gram stain     Status: None    Specimen: Fluid    Paracentesis   Result Value Ref Range    Specimen Description Fluid Paracentesis     Gram Stain No organisms seen     Gram Stain Few  WBC'S seen      Fluid Culture Aerobic Bacterial     Status: None (Preliminary result)    Specimen: Fluid    Paracentesis   Result Value Ref Range    Specimen Description Fluid Paracentesis     Culture Micro Culture negative monitoring continues    Cell count with  differential fluid     Status: None   Result Value Ref Range    Body Fluid Analysis Source Peritoneal fluid     % Neutrophils Fluid 1 %    % Lymphocytes Fluid 14 %    % Mono/Macro Fluid 80 %    % Other Cells Fluid 5 %    Color Fluid Yellow     Appearance Fluid Slightly Cloudy     WBC Fluid 73 /uL   Albumin fluid     Status: None   Result Value Ref Range    Albumin Fluid Source Peritoneal fluid     Albumin Fluid 0.2 g/dL   Protein fluid     Status: None   Result Value Ref Range    Protein Total Fluid Source Peritoneal fluid     Protein Total Fluid 0.6 g/dL       Lora Doty PA-C

## 2020-09-26 NOTE — PLAN OF CARE
"[id number]    Inpatient Progress Note:    BP 96/60 (BP Location: Right arm)   Pulse 75   Temp 98  F (36.7  C) (Oral)   Resp 20   Ht 1.626 m (5' 4\")   Wt 78.1 kg (172 lb 1.6 oz)   SpO2 97%   BMI 29.54 kg/m         Orientation: WNL, AWAS 0  Pain status: Shoulder pain, patient describes as mild heating pad in place.  Activity: Ind. In room  Peripheral edema: Moderate edema in ankles  Resp: WNL  Cardiac: WNL  GI: Abdominal discomfort, distended  :  WNL  Skin: Jaundiced  LDA: PIV SL  Pertinent Labs: INR 1.58 this AM.   Diet: 2gm Na  Discharge Plan: TBD, has paracentesis today.    Will continue to monitor and provide cares.     Holli Wu RN        "

## 2020-09-26 NOTE — PLAN OF CARE
"BP 95/59 (BP Location: Right arm)   Pulse 80   Temp 98.3  F (36.8  C) (Oral)   Resp 16   Ht 1.626 m (5' 4\")   Wt 78.1 kg (172 lb 1.6 oz)   SpO2 93%   BMI 29.54 kg/m      Neuro: Alert and orientated. CIWA 0   Cardiac: WNL  Lungs: diminished   GI: bloating, abd discomfort.   : WNL  Pain: Some back and shoulder pain, heating pad is helping.  IV: SL  Meds: Phytonadione infusion tonight, INR @ 2100 was 1.97. recheck INR in AM.  Diet: 2 gm NA  Activity: ind in room  Plan: Paracentesis tomorrow.     Gaudencio العراقي RN    Will continue to monitor and provide cares.    "

## 2020-09-27 NOTE — PHARMACY-ANTICOAGULATION SERVICE
Clinical Pharmacy- Warfarin Discharge Note  This patient is currently on warfarin for the treatment of DVT/PE treatment.  INR Goal= 2-3  Expected length of therapy undetermined.    Warfarin PTA Regimen: 2mg daily      Anticoagulation Dose History     Recent Dosing and Labs Latest Ref Rng & Units 9/3/2020 9/15/2020 9/24/2020 9/25/2020 9/25/2020 9/25/2020 9/26/2020    INR 0.86 - 1.14 2.70(H) 3.30(H) 3.61(H) 3.17(H) 2.59(H) 1.97(H) 1.58(H)    INR 0.86 - 1.14 - - - - - - -          Vitamin K doses administered during the last 7 days: 3 mg on 9/25/2020    Continue home regimen with INR check on Monday, 9/28/2020

## 2020-09-28 ENCOUNTER — TELEPHONE (OUTPATIENT)
Dept: INTERNAL MEDICINE | Facility: CLINIC | Age: 45
End: 2020-09-28

## 2020-09-28 LAB
HBV CORE AB SERPL QL IA: NONREACTIVE
HBV SURFACE AB SERPL IA-ACNC: 0.86 M[IU]/ML
HBV SURFACE AG SERPL QL IA: NONREACTIVE
HCV AB SERPL QL IA: NONREACTIVE

## 2020-09-28 NOTE — TELEPHONE ENCOUNTER
IP F/U    Date: 9-26-20  Diagnosis: abdominal pain, generalized, alcoholic cirrhosis of liver with ascites  Is patient active in care coordination? No  Was patient in TCU? No

## 2020-09-28 NOTE — TELEPHONE ENCOUNTER
"Hospital/TCU/ED for chronic condition Discharge Protocol    \"Hi, my name is Bertha Fang RN, a registered nurse, and I am calling from [facility].  I am calling to follow up and see how things are going for you after your recent emergency visit/hospital/TCU stay.\"    Tell me how you are doing now that you are home?\" patient states she is doing good.      Discharge Instructions    \"Let's review your discharge instructions.  What is/are the follow-up recommendations?  Pt. Response: follow up with GI provider.    \"Has an appointment with your primary care provider been scheduled?\"   No, she states she will call back to schedule.    \"When you see the provider, I would recommend that you bring your medications with you.\"    Medications    \"Tell me what changed about your medicines when you discharged?\"    Changes to chronic meds?    0-1    \"What questions do you have about your medications?\"    None     New diagnoses of heart failure, COPD, diabetes, or MI?    No          On warfarin: \"Were you given any recommendations for follow-up with the anticoagulation clinic?\" Yes - Anticoagulation clinic appointment is already scheduled at appropriate interval    Post Discharge Medication Reconciliation Status: discharge medications reconciled and changed, per note/orders--see discharge instructions.    Was MTM referral placed (*Make sure to put transitions as reason for referral)?   No    Call Summary    \"What questions or concerns do you have about your recent visit and your follow-up care?\"     none    \"If you have questions or things don't continue to improve, we encourage you contact us through the main clinic number (give number).  Even if the clinic is not open, triage nurses are available 24/7 to help you.     We would like you to know that our clinic has extended hours (provide information).  We also have urgent care (provide details on closest location and hours/contact info)\"      \"Thank you for your time and " "take care!\"             "

## 2020-09-29 ENCOUNTER — TELEPHONE (OUTPATIENT)
Dept: ANTICOAGULATION | Facility: CLINIC | Age: 45
End: 2020-09-29

## 2020-09-29 DIAGNOSIS — I26.99 PE (PULMONARY THROMBOEMBOLISM) (H): ICD-10-CM

## 2020-09-29 DIAGNOSIS — Z79.01 LONG TERM CURRENT USE OF ANTICOAGULANT THERAPY: ICD-10-CM

## 2020-09-29 DIAGNOSIS — I82.409 RECURRENT DEEP VENOUS THROMBOSIS (H): ICD-10-CM

## 2020-09-29 LAB
BACTERIA SPEC CULT: NO GROWTH
COPATH REPORT: NORMAL
SPECIMEN SOURCE: NORMAL

## 2020-09-29 NOTE — TELEPHONE ENCOUNTER
ANTICOAGULATION  MANAGEMENT: Discharge Review    Nelsy Cota chart reviewed for anticoagulation continuity of care    Hospital Admission on 9/24/20 for Chest Pain, SOB and Bloating.    Discharge Diagnoses   New onset ascites likely 2/2 etoh abuse and cirrhosis  Severe fatty liver   Hypokalemia   Alcohol dependence with ongoing abuse without evidence of withdrawal     Discharge disposition: Home    Results:    Recent labs: (last 7 days)     09/24/20  1511 09/25/20  0556 09/25/20  1523 09/25/20  2033 09/26/20  0614   INR 3.61* 3.17* 2.59* 1.97* 1.58*     Anticoagulation inpatient management:     reversed with 3mg Vit K on9/25/20 and held warfarin due to elevated INR and paracentesis  and resumed on 9/26/20    Anticoagulation discharge instructions:     Warfarin dosing: home regimen continued   Bridging: No   INR goal change: No      Medication changes affecting anticoagulation: Yes: Patient was started on spironolactone. Per Micromedex:   Concurrent use of WARFARIN and SPIRONOLACTONE may result in decreased anticoagulant effectiveness.    Additional factors affecting anticoagulation: Yes: alcohol abuse    Plan     Agree with discharge plan for follow up on 9/28/20. Lab schedule was full, will contact patient to schedule ASAP. Patient has lab appointment scheduled on 9/30/20.    Spoke with Patient, she verifies she resumed warfarin at 2 mg daily on 9/26/20. She reports she has not had any alcohol since hospital admit. Patient reports she has been keeping a food diary to monitor her sodium intake. Patient also reports she bought a BP monitor.     Anticoagulation Calendar updated    Latosha Perdomo RN

## 2020-09-30 ENCOUNTER — ANTICOAGULATION THERAPY VISIT (OUTPATIENT)
Dept: ANTICOAGULATION | Facility: CLINIC | Age: 45
End: 2020-09-30

## 2020-09-30 DIAGNOSIS — I82.409 RECURRENT DEEP VENOUS THROMBOSIS (H): ICD-10-CM

## 2020-09-30 DIAGNOSIS — Z79.01 LONG TERM CURRENT USE OF ANTICOAGULANT THERAPY: ICD-10-CM

## 2020-09-30 DIAGNOSIS — I26.99 PE (PULMONARY THROMBOEMBOLISM) (H): ICD-10-CM

## 2020-09-30 LAB
BACTERIA SPEC CULT: NO GROWTH
BACTERIA SPEC CULT: NO GROWTH
CAPILLARY BLOOD COLLECTION: NORMAL
INR PPP: 1.1 (ref 0.86–1.14)
SPECIMEN SOURCE: NORMAL
SPECIMEN SOURCE: NORMAL

## 2020-09-30 PROCEDURE — 99207 ZZC NO CHARGE NURSE ONLY: CPT | Performed by: INTERNAL MEDICINE

## 2020-09-30 PROCEDURE — 36416 COLLJ CAPILLARY BLOOD SPEC: CPT | Performed by: INTERNAL MEDICINE

## 2020-09-30 PROCEDURE — 85610 PROTHROMBIN TIME: CPT | Performed by: INTERNAL MEDICINE

## 2020-09-30 NOTE — PROGRESS NOTES
ANTICOAGULATION FOLLOW-UP CLINIC VISIT    Patient Name:  Nelsy Cota  Date:  2020  Contact Type:  Telephone/ discussed with patient.  Advised recheck sooner than 10/6, but patient could not come in any sooner due to her work schedule.    SUBJECTIVE:  Patient Findings     Positives:   Change in medications (Vitamin K 3 mg was given to patient on  in preparation for paracentesis.  Also started on spironolactone which can decrease warfarin effectiveness) )    Comments:   The patient was assessed for diet, medication, and activity level changes, missed or extra doses, bruising or bleeding, with no problem findings.  Boost given today and tomorrow due to low INR.  Maintenance dose increased by 14%.  Patient was previously drinking alcohol more regularly, but has not had any since discharge.         Clinical Outcomes     Negatives:   Major bleeding event, Thromboembolic event, Anticoagulation-related hospital admission, Anticoagulation-related ED visit, Anticoagulation-related fatality    Comments:   The patient was assessed for diet, medication, and activity level changes, missed or extra doses, bruising or bleeding, with no problem findings.  Boost given today and tomorrow due to low INR.  Maintenance dose increased by 14%.  Patient was previously drinking alcohol more regularly, but has not had any since discharge.            OBJECTIVE    Recent labs: (last 7 days)     20  1544   INR 1.10       ASSESSMENT / PLAN  INR assessment SUB    Recheck INR In: 6 DAYS    INR Location Clinic      Anticoagulation Summary  As of 2020    INR goal:   2.0-3.0   TTR:   30.2 % (12 mo)   INR used for dosin.10! (2020)   Warfarin maintenance plan:   4 mg (2 mg x 2) every Sun; 2 mg (2 mg x 1) all other days   Full warfarin instructions:   : 4 mg; 10/1: 4 mg; Otherwise 4 mg every Sun; 2 mg all other days   Weekly warfarin total:   16 mg   Plan last modified:   Vania Morse RN (2020)   Next INR  check:   10/6/2020   Priority:   High   Target end date:   Indefinite    Indications    Long-term (current) use of anticoagulants [Z79.01] [Z79.01]  PE (pulmonary thromboembolism) (H) [I26.99]  Recurrent deep venous thrombosis (H) [I82.409]             Anticoagulation Episode Summary     INR check location:       Preferred lab:       Send INR reminders to:   ULISES SMITH    Comments:   INR Referral renewed, see 8/26/20 telephone encounter.      Anticoagulation Care Providers     Provider Role Specialty Phone number    Alyse Faye MD Referring Internal Medicine [phone]    Hamzah Thomas MD Responsible Internal Medicine [phone]            See the Encounter Report to view Anticoagulation Flowsheet and Dosing Calendar (Go to Encounters tab in chart review, and find the Anticoagulation Therapy Visit)    Dosage adjustment made based on physician directed care plan.    Vania Morse RN

## 2020-10-01 LAB
BACTERIA SPEC CULT: NO GROWTH
SPECIMEN SOURCE: NORMAL

## 2020-10-06 ENCOUNTER — ANTICOAGULATION THERAPY VISIT (OUTPATIENT)
Dept: ANTICOAGULATION | Facility: CLINIC | Age: 45
End: 2020-10-06

## 2020-10-06 DIAGNOSIS — I26.99 PE (PULMONARY THROMBOEMBOLISM) (H): ICD-10-CM

## 2020-10-06 DIAGNOSIS — Z79.01 LONG TERM CURRENT USE OF ANTICOAGULANT THERAPY: ICD-10-CM

## 2020-10-06 DIAGNOSIS — I82.409 RECURRENT DEEP VENOUS THROMBOSIS (H): ICD-10-CM

## 2020-10-06 LAB
CAPILLARY BLOOD COLLECTION: NORMAL
INR PPP: 1.2 (ref 0.86–1.14)

## 2020-10-06 PROCEDURE — 85610 PROTHROMBIN TIME: CPT | Performed by: INTERNAL MEDICINE

## 2020-10-06 PROCEDURE — 36416 COLLJ CAPILLARY BLOOD SPEC: CPT | Performed by: INTERNAL MEDICINE

## 2020-10-06 PROCEDURE — 99207 PR NO CHARGE NURSE ONLY: CPT | Performed by: INTERNAL MEDICINE

## 2020-10-06 RX ORDER — WARFARIN SODIUM 2 MG/1
TABLET ORAL
Qty: 180 TABLET | Refills: 0 | Status: SHIPPED | OUTPATIENT
Start: 2020-10-06 | End: 2020-10-16

## 2020-10-06 NOTE — PROGRESS NOTES
Discussed with ACC RN.  Patient INR not returning to therapeutic with dose change of 4mg 3x last week, probably due to cessation of ETOH use.  Suggest adjusting dose ~20% from 20mg of warfarin received last week.  Also agree with boost today.  Patient will need very close monitoring, and resumption of ETOH will need dose adjustment.    Lelia Perdomo, PharmD BCACP  Anticoagulation Clinical Pharmacist

## 2020-10-06 NOTE — PROGRESS NOTES
ANTICOAGULATION FOLLOW-UP CLINIC VISIT    Patient Name:  Nelsy Cota  Date:  10/6/2020  Contact Type:  Telephone/ discussed with patient    SUBJECTIVE:  Patient Findings     Comments:  No alcohol since discharge.  Continues to have a good appetite and limiting sodium intake.  Continues to take spironolactone and lasix, however she has not noticed an increase in urine output.  She has gained 8 lbs since discharge.  Advised her to follow up with GI sooner than 10/28 (when her next visit is scheduled) if she continues to gain weight at that pace.  The patient was assessed for diet, medication, and activity level changes, missed or extra doses, bruising or bleeding, with no problem findings.  Dosing discussed with Lelia Perdomo, Pharmacist.          Clinical Outcomes     Negatives:  Major bleeding event, Thromboembolic event, Anticoagulation-related hospital admission, Anticoagulation-related ED visit, Anticoagulation-related fatality    Comments:  No alcohol since discharge.  Continues to have a good appetite and limiting sodium intake.  Continues to take spironolactone and lasix, however she has not noticed an increase in urine output.  She has gained 8 lbs since discharge.  Advised her to follow up with GI sooner than 10/28 (when her next visit is scheduled) if she continues to gain weight at that pace.  The patient was assessed for diet, medication, and activity level changes, missed or extra doses, bruising or bleeding, with no problem findings.  Dosing discussed with Lelia Perdomo, Pharmacist.             OBJECTIVE    Recent labs: (last 7 days)     10/06/20  0806   INR 1.20*       ASSESSMENT / PLAN  INR assessment SUB    Recheck INR In: 3 DAYS    INR Location Clinic      Anticoagulation Summary  As of 10/6/2020    INR goal:  2.0-3.0   TTR:  28.6 % (12 mo)   INR used for dosin.20 (10/6/2020)   Warfarin maintenance plan:  2 mg (2 mg x 1) every Mon, Fri; 4 mg (2 mg x 2) all other days   Full warfarin  instructions:  10/6: 6 mg; Otherwise 2 mg every Mon, Fri; 4 mg all other days   Weekly warfarin total:  24 mg   Plan last modified:  Vania Morse RN (10/6/2020)   Next INR check:  10/9/2020   Priority:  High   Target end date:  Indefinite    Indications    Long-term (current) use of anticoagulants [Z79.01] [Z79.01]  PE (pulmonary thromboembolism) (H) [I26.99]  Recurrent deep venous thrombosis (H) [I82.409]             Anticoagulation Episode Summary     INR check location:      Preferred lab:      Send INR reminders to:  [facility]    Comments:  INR Referral renewed, see 8/26/20 telephone encounter.      Anticoagulation Care Providers     Provider Role Specialty Phone number    Alyse Faye MD Referring Internal Medicine [phone]    Hamzah Thomas MD Responsible Internal Medicine [phone]            See the Encounter Report to view Anticoagulation Flowsheet and Dosing Calendar (Go to Encounters tab in chart review, and find the Anticoagulation Therapy Visit)    Dosage adjustment made based on physician directed care plan.    Vania Morse RN

## 2020-10-09 ENCOUNTER — ANTICOAGULATION THERAPY VISIT (OUTPATIENT)
Dept: ANTICOAGULATION | Facility: CLINIC | Age: 45
End: 2020-10-09

## 2020-10-09 DIAGNOSIS — Z79.01 LONG TERM CURRENT USE OF ANTICOAGULANT THERAPY: ICD-10-CM

## 2020-10-09 DIAGNOSIS — I82.409 RECURRENT DEEP VENOUS THROMBOSIS (H): ICD-10-CM

## 2020-10-09 DIAGNOSIS — I26.99 PE (PULMONARY THROMBOEMBOLISM) (H): ICD-10-CM

## 2020-10-09 LAB
CAPILLARY BLOOD COLLECTION: NORMAL
INR PPP: 1.4 (ref 0.86–1.14)

## 2020-10-09 PROCEDURE — 99207 PR NO CHARGE NURSE ONLY: CPT | Performed by: INTERNAL MEDICINE

## 2020-10-09 PROCEDURE — 85610 PROTHROMBIN TIME: CPT | Performed by: INTERNAL MEDICINE

## 2020-10-09 PROCEDURE — 36416 COLLJ CAPILLARY BLOOD SPEC: CPT | Performed by: INTERNAL MEDICINE

## 2020-10-09 NOTE — PROGRESS NOTES
ANTICOAGULATION FOLLOW-UP CLINIC VISIT    Patient Name:  Nelsy Cota  Date:  10/9/2020  Contact Type:  Telephone/ discussed with patient    SUBJECTIVE:  Patient Findings     Comments:  Continues to retain fluid in her abdomen and lower legs.  Recommended that she call her GI provider to try to get an earlier appointment (scheduled at the end of October).  Also recommended a follow up with her PCP.  The patient was assessed for diet (continues low sodium diet), medication, and activity level changes, missed or extra doses, bruising or bleeding, with no problem findings.  INR continues to be subtherapeutic so maintenance dose was increased by 16.7%.          Clinical Outcomes     Negatives:  Major bleeding event, Thromboembolic event, Anticoagulation-related hospital admission, Anticoagulation-related ED visit, Anticoagulation-related fatality    Comments:  Continues to retain fluid in her abdomen and lower legs.  Recommended that she call her GI provider to try to get an earlier appointment (scheduled at the end of October).  Also recommended a follow up with her PCP.  The patient was assessed for diet (continues low sodium diet), medication, and activity level changes, missed or extra doses, bruising or bleeding, with no problem findings.  INR continues to be subtherapeutic so maintenance dose was increased by 16.7%.             OBJECTIVE    Recent labs: (last 7 days)     10/09/20  0812   INR 1.40*       ASSESSMENT / PLAN  INR assessment SUB    Recheck INR In: 4 DAYS    INR Location Clinic      Anticoagulation Summary  As of 10/9/2020    INR goal:  2.0-3.0   TTR:  27.8 % (12 mo)   INR used for dosin.40 (10/9/2020)   Warfarin maintenance plan:  4 mg (2 mg x 2) every day   Full warfarin instructions:  10/9: 6 mg; Otherwise 4 mg every day   Weekly warfarin total:  28 mg   Plan last modified:  Vania Morse RN (10/9/2020)   Next INR check:  10/13/2020   Priority:  High   Target end date:  Indefinite     Indications    Long-term (current) use of anticoagulants [Z79.01] [Z79.01]  PE (pulmonary thromboembolism) (H) [I26.99]  Recurrent deep venous thrombosis (H) [I82.409]             Anticoagulation Episode Summary     INR check location:      Preferred lab:      Send INR reminders to:  [facility]    Comments:  INR Referral renewed, see 8/26/20 telephone encounter.      Anticoagulation Care Providers     Provider Role Specialty Phone number    Alyse Faye MD Referring Internal Medicine [phone]    Hamzah Thomas MD Responsible Internal Medicine [phone]            See the Encounter Report to view Anticoagulation Flowsheet and Dosing Calendar (Go to Encounters tab in chart review, and find the Anticoagulation Therapy Visit)    Dosage adjustment made based on physician directed care plan.    Vania Morse RN

## 2020-10-13 ENCOUNTER — ANTICOAGULATION THERAPY VISIT (OUTPATIENT)
Dept: ANTICOAGULATION | Facility: CLINIC | Age: 45
End: 2020-10-13

## 2020-10-13 DIAGNOSIS — Z79.01 LONG TERM CURRENT USE OF ANTICOAGULANT THERAPY: ICD-10-CM

## 2020-10-13 DIAGNOSIS — I82.409 RECURRENT DEEP VENOUS THROMBOSIS (H): ICD-10-CM

## 2020-10-13 DIAGNOSIS — I26.99 PE (PULMONARY THROMBOEMBOLISM) (H): ICD-10-CM

## 2020-10-13 LAB
CAPILLARY BLOOD COLLECTION: NORMAL
INR PPP: 1.5 (ref 0.86–1.14)

## 2020-10-13 PROCEDURE — 85610 PROTHROMBIN TIME: CPT | Performed by: INTERNAL MEDICINE

## 2020-10-13 PROCEDURE — 99207 PR NO CHARGE NURSE ONLY: CPT | Performed by: INTERNAL MEDICINE

## 2020-10-13 PROCEDURE — 36416 COLLJ CAPILLARY BLOOD SPEC: CPT | Performed by: INTERNAL MEDICINE

## 2020-10-13 NOTE — PROGRESS NOTES
ANTICOAGULATION FOLLOW-UP CLINIC VISIT    Patient Name:  Nelsy Cota  Date:  10/13/2020  Contact Type:  Telephone/ discussed with patient    SUBJECTIVE:  Patient Findings     Comments:  The patient was assessed for diet, medication, and activity level changes, missed or extra doses, bruising or bleeding, with no problem findings.  Continues not to have alcohol.            Clinical Outcomes     Negatives:  Major bleeding event, Thromboembolic event, Anticoagulation-related hospital admission, Anticoagulation-related ED visit, Anticoagulation-related fatality    Comments:  The patient was assessed for diet, medication, and activity level changes, missed or extra doses, bruising or bleeding, with no problem findings.  Continues not to have alcohol.               OBJECTIVE    Recent labs: (last 7 days)     10/13/20  0802   INR 1.50*       ASSESSMENT / PLAN  INR assessment SUB    Recheck INR In: 1 WEEK    INR Location Clinic      Anticoagulation Summary  As of 10/13/2020    INR goal:  2.0-3.0   TTR:  27.0 % (12 mo)   INR used for dosin.50 (10/13/2020)   Warfarin maintenance plan:  4 mg (2 mg x 2) every Mon, Fri; 6 mg (2 mg x 3) all other days   Full warfarin instructions:  10/13: 10 mg; Otherwise 4 mg every Mon, Fri; 6 mg all other days   Weekly warfarin total:  38 mg   Plan last modified:  Vania Morse RN (10/13/2020)   Next INR check:  10/20/2020   Priority:  High   Target end date:  Indefinite    Indications    Long-term (current) use of anticoagulants [Z79.01] [Z79.01]  PE (pulmonary thromboembolism) (H) [I26.99]  Recurrent deep venous thrombosis (H) [I82.409]             Anticoagulation Episode Summary     INR check location:      Preferred lab:      Send INR reminders to:  [facility]    Comments:  INR Referral renewed, see 20 telephone encounter.      Anticoagulation Care Providers     Provider Role Specialty Phone number    Alyse Faye MD Referring Internal Medicine  [phone]    Hamzah Thomas MD [facility] Internal Medicine [phone]            See the Encounter Report to view Anticoagulation Flowsheet and Dosing Calendar (Go to Encounters tab in chart review, and find the Anticoagulation Therapy Visit)    Dosage adjustment made based on physician directed care plan.    Vania Morse RN

## 2020-10-13 NOTE — PROGRESS NOTES
Chart reviewed with ACC RN, increased ~14% last week, but did not adjust maintenance plan.  Updated and increased an additional 20% with boost.  Patient has currently stopped drinking, and has not previously been dosed without some level of ETOH intake.    Lelia Perdomo, PharmD BCACP  Anticoagulation Clinical Pharmacist

## 2020-10-16 ENCOUNTER — APPOINTMENT (OUTPATIENT)
Dept: CT IMAGING | Facility: CLINIC | Age: 45
DRG: 439 | End: 2020-10-16
Attending: EMERGENCY MEDICINE
Payer: COMMERCIAL

## 2020-10-16 ENCOUNTER — HOSPITAL ENCOUNTER (INPATIENT)
Facility: CLINIC | Age: 45
LOS: 2 days | Discharge: HOME | DRG: 439 | End: 2020-10-18
Attending: EMERGENCY MEDICINE | Admitting: INTERNAL MEDICINE
Payer: COMMERCIAL

## 2020-10-16 DIAGNOSIS — K85.90 ACUTE PANCREATITIS, UNSPECIFIED COMPLICATION STATUS, UNSPECIFIED PANCREATITIS TYPE: ICD-10-CM

## 2020-10-16 DIAGNOSIS — K70.31 ALCOHOLIC CIRRHOSIS OF LIVER WITH ASCITES (H): Primary | ICD-10-CM

## 2020-10-16 LAB
ALBUMIN SERPL-MCNC: 2.4 G/DL (ref 3.4–5)
ALP SERPL-CCNC: 118 U/L (ref 40–150)
ALT SERPL W P-5'-P-CCNC: 30 U/L (ref 0–50)
ANION GAP SERPL CALCULATED.3IONS-SCNC: 8 MMOL/L (ref 3–14)
AST SERPL W P-5'-P-CCNC: 93 U/L (ref 0–45)
BASOPHILS # BLD AUTO: 0.1 10E9/L (ref 0–0.2)
BASOPHILS NFR BLD AUTO: 0.6 %
BILIRUB SERPL-MCNC: 1.6 MG/DL (ref 0.2–1.3)
BUN SERPL-MCNC: 8 MG/DL (ref 7–30)
CALCIUM SERPL-MCNC: 9.6 MG/DL (ref 8.5–10.1)
CHLORIDE SERPL-SCNC: 106 MMOL/L (ref 94–109)
CO2 SERPL-SCNC: 24 MMOL/L (ref 20–32)
CREAT SERPL-MCNC: 0.75 MG/DL (ref 0.52–1.04)
DIFFERENTIAL METHOD BLD: ABNORMAL
EOSINOPHIL # BLD AUTO: 0.3 10E9/L (ref 0–0.7)
EOSINOPHIL NFR BLD AUTO: 1.6 %
ERYTHROCYTE [DISTWIDTH] IN BLOOD BY AUTOMATED COUNT: 18.5 % (ref 10–15)
ETHANOL SERPL-MCNC: <0.01 G/DL
GFR SERPL CREATININE-BSD FRML MDRD: >90 ML/MIN/{1.73_M2}
GLUCOSE SERPL-MCNC: 87 MG/DL (ref 70–99)
HCT VFR BLD AUTO: 39.5 % (ref 35–47)
HGB BLD-MCNC: 13 G/DL (ref 11.7–15.7)
IMM GRANULOCYTES # BLD: 0.1 10E9/L (ref 0–0.4)
IMM GRANULOCYTES NFR BLD: 0.4 %
INR PPP: 1.9 (ref 0.86–1.14)
LIPASE SERPL-CCNC: ABNORMAL U/L (ref 73–393)
LYMPHOCYTES # BLD AUTO: 1.8 10E9/L (ref 0.8–5.3)
LYMPHOCYTES NFR BLD AUTO: 11.3 %
MCH RBC QN AUTO: 40 PG (ref 26.5–33)
MCHC RBC AUTO-ENTMCNC: 32.9 G/DL (ref 31.5–36.5)
MCV RBC AUTO: 122 FL (ref 78–100)
MONOCYTES # BLD AUTO: 0.9 10E9/L (ref 0–1.3)
MONOCYTES NFR BLD AUTO: 5.7 %
NEUTROPHILS # BLD AUTO: 12.7 10E9/L (ref 1.6–8.3)
NEUTROPHILS NFR BLD AUTO: 80.4 %
NRBC # BLD AUTO: 0 10*3/UL
NRBC BLD AUTO-RTO: 0 /100
PLATELET # BLD AUTO: 382 10E9/L (ref 150–450)
POTASSIUM SERPL-SCNC: 3.8 MMOL/L (ref 3.4–5.3)
PROT SERPL-MCNC: 7.4 G/DL (ref 6.8–8.8)
RBC # BLD AUTO: 3.25 10E12/L (ref 3.8–5.2)
SODIUM SERPL-SCNC: 138 MMOL/L (ref 133–144)
WBC # BLD AUTO: 15.7 10E9/L (ref 4–11)

## 2020-10-16 PROCEDURE — U0003 INFECTIOUS AGENT DETECTION BY NUCLEIC ACID (DNA OR RNA); SEVERE ACUTE RESPIRATORY SYNDROME CORONAVIRUS 2 (SARS-COV-2) (CORONAVIRUS DISEASE [COVID-19]), AMPLIFIED PROBE TECHNIQUE, MAKING USE OF HIGH THROUGHPUT TECHNOLOGIES AS DESCRIBED BY CMS-2020-01-R: HCPCS | Performed by: EMERGENCY MEDICINE

## 2020-10-16 PROCEDURE — C9803 HOPD COVID-19 SPEC COLLECT: HCPCS

## 2020-10-16 PROCEDURE — 258N000003 HC RX IP 258 OP 636: Performed by: EMERGENCY MEDICINE

## 2020-10-16 PROCEDURE — 83690 ASSAY OF LIPASE: CPT | Performed by: EMERGENCY MEDICINE

## 2020-10-16 PROCEDURE — 99223 1ST HOSP IP/OBS HIGH 75: CPT | Mod: AI | Performed by: INTERNAL MEDICINE

## 2020-10-16 PROCEDURE — 99285 EMERGENCY DEPT VISIT HI MDM: CPT | Mod: 25

## 2020-10-16 PROCEDURE — 96375 TX/PRO/DX INJ NEW DRUG ADDON: CPT

## 2020-10-16 PROCEDURE — 74177 CT ABD & PELVIS W/CONTRAST: CPT

## 2020-10-16 PROCEDURE — 80053 COMPREHEN METABOLIC PANEL: CPT | Performed by: EMERGENCY MEDICINE

## 2020-10-16 PROCEDURE — 85025 COMPLETE CBC W/AUTO DIFF WBC: CPT | Performed by: EMERGENCY MEDICINE

## 2020-10-16 PROCEDURE — 96374 THER/PROPH/DIAG INJ IV PUSH: CPT | Mod: 59

## 2020-10-16 PROCEDURE — 85610 PROTHROMBIN TIME: CPT | Performed by: EMERGENCY MEDICINE

## 2020-10-16 PROCEDURE — 250N000011 HC RX IP 250 OP 636: Performed by: EMERGENCY MEDICINE

## 2020-10-16 PROCEDURE — 80320 DRUG SCREEN QUANTALCOHOLS: CPT | Performed by: EMERGENCY MEDICINE

## 2020-10-16 PROCEDURE — 120N000001 HC R&B MED SURG/OB

## 2020-10-16 RX ORDER — HYDROMORPHONE HYDROCHLORIDE 1 MG/ML
0.5 INJECTION, SOLUTION INTRAMUSCULAR; INTRAVENOUS; SUBCUTANEOUS ONCE
Status: COMPLETED | OUTPATIENT
Start: 2020-10-16 | End: 2020-10-16

## 2020-10-16 RX ORDER — FUROSEMIDE 20 MG
20 TABLET ORAL DAILY
Status: ON HOLD | COMMUNITY
End: 2020-10-18

## 2020-10-16 RX ORDER — IOPAMIDOL 755 MG/ML
500 INJECTION, SOLUTION INTRAVASCULAR ONCE
Status: COMPLETED | OUTPATIENT
Start: 2020-10-16 | End: 2020-10-16

## 2020-10-16 RX ORDER — ONDANSETRON 2 MG/ML
4 INJECTION INTRAMUSCULAR; INTRAVENOUS ONCE
Status: COMPLETED | OUTPATIENT
Start: 2020-10-16 | End: 2020-10-16

## 2020-10-16 RX ORDER — WARFARIN SODIUM 2 MG/1
TABLET ORAL DAILY
COMMUNITY
End: 2020-12-09

## 2020-10-16 RX ORDER — SPIRONOLACTONE 25 MG/1
12.5 TABLET ORAL DAILY
Status: ON HOLD | COMMUNITY
End: 2020-10-18

## 2020-10-16 RX ADMIN — SODIUM CHLORIDE 1000 ML: 9 INJECTION, SOLUTION INTRAVENOUS at 23:18

## 2020-10-16 RX ADMIN — HYDROMORPHONE HYDROCHLORIDE 0.5 MG: 1 INJECTION, SOLUTION INTRAMUSCULAR; INTRAVENOUS; SUBCUTANEOUS at 22:11

## 2020-10-16 RX ADMIN — IOPAMIDOL 86 ML: 755 INJECTION, SOLUTION INTRAVENOUS at 22:59

## 2020-10-16 RX ADMIN — ONDANSETRON 4 MG: 2 INJECTION INTRAMUSCULAR; INTRAVENOUS at 22:11

## 2020-10-16 ASSESSMENT — ENCOUNTER SYMPTOMS
ABDOMINAL DISTENTION: 1
UNEXPECTED WEIGHT CHANGE: 1
ABDOMINAL PAIN: 1
NAUSEA: 1

## 2020-10-17 ENCOUNTER — APPOINTMENT (OUTPATIENT)
Dept: ULTRASOUND IMAGING | Facility: CLINIC | Age: 45
DRG: 439 | End: 2020-10-17
Attending: INTERNAL MEDICINE
Payer: COMMERCIAL

## 2020-10-17 ENCOUNTER — APPOINTMENT (OUTPATIENT)
Dept: ULTRASOUND IMAGING | Facility: CLINIC | Age: 45
DRG: 439 | End: 2020-10-17
Attending: EMERGENCY MEDICINE
Payer: COMMERCIAL

## 2020-10-17 LAB
ALBUMIN SERPL-MCNC: 1.9 G/DL (ref 3.4–5)
ALBUMIN UR-MCNC: 10 MG/DL
ALP SERPL-CCNC: 100 U/L (ref 40–150)
ALT SERPL W P-5'-P-CCNC: 26 U/L (ref 0–50)
ANION GAP SERPL CALCULATED.3IONS-SCNC: 8 MMOL/L (ref 3–14)
APPEARANCE UR: CLEAR
AST SERPL W P-5'-P-CCNC: 70 U/L (ref 0–45)
BILIRUB SERPL-MCNC: 0.7 MG/DL (ref 0.2–1.3)
BILIRUB UR QL STRIP: NEGATIVE
BUN SERPL-MCNC: 7 MG/DL (ref 7–30)
CALCIUM SERPL-MCNC: 8.2 MG/DL (ref 8.5–10.1)
CHLORIDE SERPL-SCNC: 107 MMOL/L (ref 94–109)
CO2 SERPL-SCNC: 22 MMOL/L (ref 20–32)
COLOR UR AUTO: YELLOW
CREAT SERPL-MCNC: 0.63 MG/DL (ref 0.52–1.04)
GFR SERPL CREATININE-BSD FRML MDRD: >90 ML/MIN/{1.73_M2}
GLUCOSE SERPL-MCNC: 74 MG/DL (ref 70–99)
GLUCOSE UR STRIP-MCNC: NEGATIVE MG/DL
HGB UR QL STRIP: NEGATIVE
INR PPP: 2.11 (ref 0.86–1.14)
KETONES UR STRIP-MCNC: NEGATIVE MG/DL
LABORATORY COMMENT REPORT: NORMAL
LACTATE BLD-SCNC: 0.6 MMOL/L (ref 0.7–2)
LEUKOCYTE ESTERASE UR QL STRIP: NEGATIVE
LIPASE SERPL-CCNC: 2170 U/L (ref 73–393)
MAGNESIUM SERPL-MCNC: 1.6 MG/DL (ref 1.6–2.3)
MUCOUS THREADS #/AREA URNS LPF: PRESENT /LPF
NITRATE UR QL: NEGATIVE
PH UR STRIP: 6 PH (ref 5–7)
POTASSIUM SERPL-SCNC: 3.6 MMOL/L (ref 3.4–5.3)
PROT SERPL-MCNC: 6.1 G/DL (ref 6.8–8.8)
RBC #/AREA URNS AUTO: <1 /HPF (ref 0–2)
SARS-COV-2 RNA SPEC QL NAA+PROBE: NEGATIVE
SARS-COV-2 RNA SPEC QL NAA+PROBE: NORMAL
SODIUM SERPL-SCNC: 137 MMOL/L (ref 133–144)
SOURCE: ABNORMAL
SP GR UR STRIP: 1.01 (ref 1–1.03)
SPECIMEN SOURCE: NORMAL
SPECIMEN SOURCE: NORMAL
SQUAMOUS #/AREA URNS AUTO: 2 /HPF (ref 0–1)
UROBILINOGEN UR STRIP-MCNC: NORMAL MG/DL (ref 0–2)
WBC #/AREA URNS AUTO: 2 /HPF (ref 0–5)

## 2020-10-17 PROCEDURE — 99233 SBSQ HOSP IP/OBS HIGH 50: CPT | Performed by: INTERNAL MEDICINE

## 2020-10-17 PROCEDURE — 250N000013 HC RX MED GY IP 250 OP 250 PS 637: Performed by: INTERNAL MEDICINE

## 2020-10-17 PROCEDURE — 250N000013 HC RX MED GY IP 250 OP 250 PS 637

## 2020-10-17 PROCEDURE — 76705 ECHO EXAM OF ABDOMEN: CPT

## 2020-10-17 PROCEDURE — 81001 URINALYSIS AUTO W/SCOPE: CPT | Performed by: INTERNAL MEDICINE

## 2020-10-17 PROCEDURE — 83605 ASSAY OF LACTIC ACID: CPT | Performed by: INTERNAL MEDICINE

## 2020-10-17 PROCEDURE — 250N000011 HC RX IP 250 OP 636: Performed by: INTERNAL MEDICINE

## 2020-10-17 PROCEDURE — 258N000002 HC RX IP 258 OP 250: Performed by: INTERNAL MEDICINE

## 2020-10-17 PROCEDURE — 85610 PROTHROMBIN TIME: CPT | Performed by: INTERNAL MEDICINE

## 2020-10-17 PROCEDURE — 83690 ASSAY OF LIPASE: CPT | Performed by: INTERNAL MEDICINE

## 2020-10-17 PROCEDURE — 120N000001 HC R&B MED SURG/OB

## 2020-10-17 PROCEDURE — 80053 COMPREHEN METABOLIC PANEL: CPT | Performed by: INTERNAL MEDICINE

## 2020-10-17 PROCEDURE — 36415 COLL VENOUS BLD VENIPUNCTURE: CPT | Performed by: INTERNAL MEDICINE

## 2020-10-17 PROCEDURE — 99221 1ST HOSP IP/OBS SF/LOW 40: CPT | Performed by: SURGERY

## 2020-10-17 PROCEDURE — 93976 VASCULAR STUDY: CPT

## 2020-10-17 PROCEDURE — 83735 ASSAY OF MAGNESIUM: CPT | Performed by: INTERNAL MEDICINE

## 2020-10-17 RX ORDER — MAGNESIUM SULFATE HEPTAHYDRATE 40 MG/ML
4 INJECTION, SOLUTION INTRAVENOUS EVERY 4 HOURS PRN
Status: DISCONTINUED | OUTPATIENT
Start: 2020-10-17 | End: 2020-10-18 | Stop reason: HOSPADM

## 2020-10-17 RX ORDER — PROCHLORPERAZINE 25 MG
25 SUPPOSITORY, RECTAL RECTAL EVERY 12 HOURS PRN
Status: DISCONTINUED | OUTPATIENT
Start: 2020-10-17 | End: 2020-10-18 | Stop reason: HOSPADM

## 2020-10-17 RX ORDER — ACETAMINOPHEN 325 MG/1
650 TABLET ORAL EVERY 4 HOURS PRN
Status: DISCONTINUED | OUTPATIENT
Start: 2020-10-17 | End: 2020-10-18 | Stop reason: HOSPADM

## 2020-10-17 RX ORDER — LIDOCAINE 40 MG/G
CREAM TOPICAL
Status: DISCONTINUED | OUTPATIENT
Start: 2020-10-17 | End: 2020-10-18 | Stop reason: HOSPADM

## 2020-10-17 RX ORDER — POLYETHYLENE GLYCOL 3350 17 G/17G
17 POWDER, FOR SOLUTION ORAL DAILY PRN
Status: DISCONTINUED | OUTPATIENT
Start: 2020-10-17 | End: 2020-10-18 | Stop reason: HOSPADM

## 2020-10-17 RX ORDER — CEFTRIAXONE 2 G/1
2 INJECTION, POWDER, FOR SOLUTION INTRAMUSCULAR; INTRAVENOUS EVERY 24 HOURS
Status: DISCONTINUED | OUTPATIENT
Start: 2020-10-17 | End: 2020-10-17

## 2020-10-17 RX ORDER — POTASSIUM CHLORIDE 29.8 MG/ML
20 INJECTION INTRAVENOUS
Status: DISCONTINUED | OUTPATIENT
Start: 2020-10-17 | End: 2020-10-18 | Stop reason: HOSPADM

## 2020-10-17 RX ORDER — PROCHLORPERAZINE MALEATE 5 MG
10 TABLET ORAL EVERY 6 HOURS PRN
Status: DISCONTINUED | OUTPATIENT
Start: 2020-10-17 | End: 2020-10-18 | Stop reason: HOSPADM

## 2020-10-17 RX ORDER — SPIRONOLACTONE 25 MG/1
50 TABLET ORAL DAILY
Status: DISCONTINUED | OUTPATIENT
Start: 2020-10-17 | End: 2020-10-18 | Stop reason: HOSPADM

## 2020-10-17 RX ORDER — POTASSIUM CHLORIDE 1.5 G/1.58G
20-40 POWDER, FOR SOLUTION ORAL
Status: DISCONTINUED | OUTPATIENT
Start: 2020-10-17 | End: 2020-10-18 | Stop reason: HOSPADM

## 2020-10-17 RX ORDER — NALOXONE HYDROCHLORIDE 0.4 MG/ML
.1-.4 INJECTION, SOLUTION INTRAMUSCULAR; INTRAVENOUS; SUBCUTANEOUS
Status: DISCONTINUED | OUTPATIENT
Start: 2020-10-17 | End: 2020-10-18 | Stop reason: HOSPADM

## 2020-10-17 RX ORDER — POTASSIUM CHLORIDE 1500 MG/1
20-40 TABLET, EXTENDED RELEASE ORAL
Status: DISCONTINUED | OUTPATIENT
Start: 2020-10-17 | End: 2020-10-18 | Stop reason: HOSPADM

## 2020-10-17 RX ORDER — SODIUM CHLORIDE 450 MG/100ML
INJECTION, SOLUTION INTRAVENOUS CONTINUOUS
Status: DISCONTINUED | OUTPATIENT
Start: 2020-10-17 | End: 2020-10-17

## 2020-10-17 RX ORDER — FUROSEMIDE 20 MG
20 TABLET ORAL DAILY
Status: DISCONTINUED | OUTPATIENT
Start: 2020-10-17 | End: 2020-10-18 | Stop reason: HOSPADM

## 2020-10-17 RX ORDER — WARFARIN SODIUM 5 MG/1
5 TABLET ORAL
Status: COMPLETED | OUTPATIENT
Start: 2020-10-17 | End: 2020-10-17

## 2020-10-17 RX ORDER — MAGNESIUM SULFATE HEPTAHYDRATE 40 MG/ML
2 INJECTION, SOLUTION INTRAVENOUS DAILY PRN
Status: DISCONTINUED | OUTPATIENT
Start: 2020-10-17 | End: 2020-10-18 | Stop reason: HOSPADM

## 2020-10-17 RX ORDER — AMOXICILLIN 250 MG
1 CAPSULE ORAL 2 TIMES DAILY PRN
Status: DISCONTINUED | OUTPATIENT
Start: 2020-10-17 | End: 2020-10-18 | Stop reason: HOSPADM

## 2020-10-17 RX ORDER — PANTOPRAZOLE SODIUM 40 MG/1
40 TABLET, DELAYED RELEASE ORAL
Status: DISCONTINUED | OUTPATIENT
Start: 2020-10-17 | End: 2020-10-18 | Stop reason: HOSPADM

## 2020-10-17 RX ORDER — AMOXICILLIN 250 MG
2 CAPSULE ORAL 2 TIMES DAILY PRN
Status: DISCONTINUED | OUTPATIENT
Start: 2020-10-17 | End: 2020-10-18 | Stop reason: HOSPADM

## 2020-10-17 RX ORDER — ONDANSETRON 4 MG/1
4 TABLET, ORALLY DISINTEGRATING ORAL EVERY 6 HOURS PRN
Status: DISCONTINUED | OUTPATIENT
Start: 2020-10-17 | End: 2020-10-18 | Stop reason: HOSPADM

## 2020-10-17 RX ORDER — ONDANSETRON 2 MG/ML
4 INJECTION INTRAMUSCULAR; INTRAVENOUS EVERY 6 HOURS PRN
Status: DISCONTINUED | OUTPATIENT
Start: 2020-10-17 | End: 2020-10-18 | Stop reason: HOSPADM

## 2020-10-17 RX ORDER — POTASSIUM CL/LIDO/0.9 % NACL 10MEQ/0.1L
10 INTRAVENOUS SOLUTION, PIGGYBACK (ML) INTRAVENOUS
Status: DISCONTINUED | OUTPATIENT
Start: 2020-10-17 | End: 2020-10-18 | Stop reason: HOSPADM

## 2020-10-17 RX ORDER — POTASSIUM CHLORIDE 7.45 MG/ML
10 INJECTION INTRAVENOUS
Status: DISCONTINUED | OUTPATIENT
Start: 2020-10-17 | End: 2020-10-18 | Stop reason: HOSPADM

## 2020-10-17 RX ADMIN — ACETAMINOPHEN 650 MG: 325 TABLET, FILM COATED ORAL at 07:04

## 2020-10-17 RX ADMIN — CEFTRIAXONE 2 G: 2 INJECTION, POWDER, FOR SOLUTION INTRAMUSCULAR; INTRAVENOUS at 05:15

## 2020-10-17 RX ADMIN — SPIRONOLACTONE 50 MG: 25 TABLET, FILM COATED ORAL at 09:30

## 2020-10-17 RX ADMIN — WARFARIN SODIUM 5 MG: 5 TABLET ORAL at 18:35

## 2020-10-17 RX ADMIN — SODIUM CHLORIDE: 4.5 INJECTION, SOLUTION INTRAVENOUS at 11:13

## 2020-10-17 RX ADMIN — SODIUM CHLORIDE: 4.5 INJECTION, SOLUTION INTRAVENOUS at 00:50

## 2020-10-17 RX ADMIN — MAGNESIUM SULFATE HEPTAHYDRATE 2 G: 40 INJECTION, SOLUTION INTRAVENOUS at 09:31

## 2020-10-17 RX ADMIN — PANTOPRAZOLE SODIUM 40 MG: 40 TABLET, DELAYED RELEASE ORAL at 06:03

## 2020-10-17 RX ADMIN — FUROSEMIDE 20 MG: 20 TABLET ORAL at 09:30

## 2020-10-17 ASSESSMENT — ACTIVITIES OF DAILY LIVING (ADL)
ADLS_ACUITY_SCORE: 12

## 2020-10-17 NOTE — CONSULTS
Gastroenterology Consultation      Nelsy Cota MRN# [id number]   YOB: 1975 Age: 45 year old   Date of Admission: 10/16/2020     Reason for consult: I was asked by Dr Martinez to evaluate this patient for abdominal pain.               History of Present Illness:   This patient is a 45 year old female who presents with abdominal pain.  She was diagnosed with alcoholic cirrhosis last month and has been sober since.  She has had intermittent abdominal pain and vomiting the past year.  On a low salt diet the past month, the vomiting has been better.  She did have more vomiting than usual yesterday, but later in the day had severe pain in the upper abdomen radiating into her chest and back.  This was associated with some SOB and heartburn symptoms.  The chest/abdominal discomfort was similar to last admissions discomfort, but more severe and the radiation into the back was new.  Symptoms abated after a paracentesis.  She has been steadily gaining fluid and weight despite being on diuretics.  She has not had a fever.  Stools are fine, without blood.  Urine output is less.  No confusion.  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  Doppler ultrasound was not able to visualize this well.  She is on coumadin.  She does smoke.  There is no family history of liver disease.  Lipase is very elevated, though CT shows no evidence of panreatitis.              Past Medical History:     Past Medical History:   Diagnosis Date     DVT (deep venous thrombosis) (H)     grey LE      Epilepsy (H)     2004 last seizure     Pulmonary emboli (H)              Past Surgical History:     Past Surgical History:   Procedure Laterality Date     HC CLOSED TX CALCANEAL FX W/O MANIPULATION  2004    fracture of calcaneus     HC COLP CERVIX/UPPER VAGINA  2005    cryotherapy     [facility] NONSPECIFIC PROCEDURE  1997    R ankle fracture repair     [facility] NONSPECIFIC PROCEDURE  2004    R ankle surgery for ligament injury, also  tibia injury               Social History:     Social History     Socioeconomic History     Marital status:      Spouse name: Not on file     Number of children: 0     Years of education: Not on file     Highest education level: Not on file   Occupational History     Occupation: [employer]     Employer: [employer]   Social Needs     Financial resource strain: Not on file     Food insecurity     Worry: Not on file     Inability: Not on file     Transportation needs     Medical: Not on file     Non-medical: Not on file   Tobacco Use     Smoking status: Current Every Day Smoker     Packs/day: 1.00     Years: 15.00     Pack years: 15.00     Types: Cigarettes     Smokeless tobacco: Never Used     Tobacco comment: 1 ppd    Substance and Sexual Activity     Alcohol use: Yes     Alcohol/week: 1.0 - 2.0 standard drinks     Types: 1 - 2 Standard drinks or equivalent per week     Drinks per session: 1 or 2     Comment: 12 drinks per week     Drug use: No     Sexual activity: Yes     Partners: Male     Birth control/protection: Condom   Lifestyle     Physical activity     Days per week: Not on file     Minutes per session: Not on file     Stress: Not on file   Relationships     Social connections     Talks on phone: Not on file     Gets together: Not on file     Attends Yazdanism service: Not on file     Active member of club or organization: Not on file     Attends meetings of clubs or organizations: Not on file     Relationship status: Not on file     Intimate partner violence     Fear of current or ex partner: Not on file     Emotionally abused: Not on file     Physically abused: Not on file     Forced sexual activity: Not on file   Other Topics Concern     Parent/sibling w/ CABG, MI or angioplasty before 65F 55M? Not Asked   Social History Narrative     Not on file             Family History:     Family History   Problem Relation Age of Onset     Hypertension Father      Neurologic Disorder Father          epilepsy     C.A.D. Maternal Grandmother         AAA     Connective Tissue Disorder Paternal Aunt         SLE             Allergies:    No Known Allergies          Medications:     No current outpatient medications on file.             Review of Systems:   10-point review of systems negative except as noted in HPI.            Physical Exam:   Vitals were reviewed  /76 (BP Location: Left arm)   Pulse 82   Temp 98.1  F (36.7  C) (Oral)   Resp 16   Wt 77.4 kg (170 lb 11.2 oz)   SpO2 96%   BMI 29.30 kg/m    Constitutional:   No distress     Heent:   NC/AT, sclera anicteric     Neck:   No adenopathy, thyroid not palpable   Chest: few spider angiomas  Respiratory:   CTA anteriorly     Cardiovascular:   RRR, no murmur     Gastrointestinal:   Active BS, soft, mild tenderness epigastrium and RUQ     Extremities:   No clubbing, cyanosis   Neuro:   Grossly nonfocal, no asterixis     Skin:   No rashes or ecchymoses   Psychiatry:        No evidence of anxiety or depression         Data:     ROUTINE IP LABS  BMP  Last Comprehensive Metabolic Panel:  Sodium   Date Value Ref Range Status   10/17/2020 137 133 - 144 mmol/L Final     Potassium   Date Value Ref Range Status   10/17/2020 3.6 3.4 - 5.3 mmol/L Final     Chloride   Date Value Ref Range Status   10/17/2020 107 94 - 109 mmol/L Final     Carbon Dioxide   Date Value Ref Range Status   10/17/2020 22 20 - 32 mmol/L Final     Anion Gap   Date Value Ref Range Status   10/17/2020 8 3 - 14 mmol/L Final     Glucose   Date Value Ref Range Status   10/17/2020 74 70 - 99 mg/dL Final     Urea Nitrogen   Date Value Ref Range Status   10/17/2020 7 7 - 30 mg/dL Final     Creatinine   Date Value Ref Range Status   10/17/2020 0.63 0.52 - 1.04 mg/dL Final     GFR Estimate   Date Value Ref Range Status   10/17/2020 >90 >60 mL/min/[1.73_m2] Final     Comment:     Non  GFR Calc  Starting 12/18/2018, serum creatinine based estimated GFR (eGFR) will be    calculated using the Chronic Kidney Disease Epidemiology Collaboration   (CKD-EPI) equation.       Calcium   Date Value Ref Range Status   10/17/2020 8.2 (L) 8.5 - 10.1 mg/dL Final       CBC  Lab Results   Component Value Date    WBC 15.7 10/16/2020     Lab Results   Component Value Date    RBC 3.25 10/16/2020     Lab Results   Component Value Date    HGB 13.0 10/16/2020     Lab Results   Component Value Date    HCT 39.5 10/16/2020     No components found for: MCT  Lab Results   Component Value Date     10/16/2020     Lab Results   Component Value Date    MCH 40.0 10/16/2020     Lab Results   Component Value Date    MCHC 32.9 10/16/2020     Lab Results   Component Value Date    RDW 18.5 10/16/2020     Lab Results   Component Value Date     10/16/2020       INR  Lab Results   Component Value Date    INR 2.11 10/17/2020       PANCREAS  Recent Labs   Lab 10/17/20  0653   LIPASE 2,170*     LFT  Recent Labs   Lab 10/17/20  0653   PROTTOTAL 6.1*   ALBUMIN 1.9*   BILITOTAL 0.7   ALKPHOS 100   AST 70*   ALT 26                Assessment and Plan:   Pain: the lipase is significantly elevated, but the pancreas is normal on CT.  Not sure how to reconcile this.  The pain may be from pancreatitis, but similar pain resolved with a paracentesis.  The lipase is significantly decreased, and it is possible she passed a CBD stone.  I would like her to get another paracentesis to see if this will resolve the pain.  Her INR might need to be below 2.11 to have this done.  Her kidney function is good and she should be on increased diuretics.  It appears she was discharged on 10 mg lasix and 25 mg aldactone.  I will double both.  She already has a liver clinic appointment October 28.  I will discuss the superior mesenteric vein abnormality with radiology today.  It is less likely to get a clot while therapeutic on coumadin.     Temo Greer MD  [facility]  Office:  [phone]

## 2020-10-17 NOTE — ED PROVIDER NOTES
History     Chief Complaint:  Abdominal Pain       The history is provided by the patient.     Nelsy Cota is a 45 year old female currently taking Warfarin for PE and recurrent DVT with a history of ascites and macrocytic anemia among others who presents for evaluation of abdominal pain and distention. The patient reports that she has gained 20 pounds in the past week and her abdomen has become increasingly distended. This morning, she was also feeling nauseous despite only eating and english muffin with low-sodium peanut butter and drinking water last night. She also reports feeling nauseous prior to her arrival in the ED, with a pain radiating from her centralized chest through to her back. Here, she denies any family history of pancreas of liver problems.     Of note, she was admitted from 09/24 to 09/26 for ascites and had a paracentesis in the ED on 09/24 done of her abdomen with a total of 10cc's drained from her right lower quadrant. A CT Chest PE Abdomen Pelvis was also obtained on 09/24 in the ED with results below. She states that she has recently started to take her blood pressure each morning, and reduced her sodium intake to 2000mg per day. She has not drank since 09/23.    CT Chest PE Abdomen Pelvis w Contrast from 09/24/2020:  IMPRESSION:  1.  No acute abnormality demonstrated in the chest. Specifically,  there is no evidence of pulmonary embolism.  2.  Enlarged, severely fatty infiltrated liver is associated with a  moderate to large volume of ascites and gastroesophageal varices.  Reading per radiology.     Allergies:  No Known Drug Allergies    Medications:   Lasix  Spironolactone  Warfarin    Medical History:   Recurrent DVT - bilateral lower extremity  Epilepsy  PE  Ascites  Macrocytosis  Tobacco use disorder  Macrocytic anemia  Convulsions    Surgical History   Calcaneal fx w/o manipulation  COLP cervix/upper vagina  R ankle fracture repair  R ankle ligament and tibia injury  surgery  Cataract removal - bilateral     Family History:   Father -  Hypertension, epilepsy    Social History:  The patient was unaccompanied to the ED.  Smoking Status: Current - 1.0 packs/day  Smokeless Tobacco: Never  Alcohol Use: States she has quit since hospitalization  Drug Use: No   Marital Status:   PCP: Hamzah Thomas        Review of Systems   Constitutional: Positive for unexpected weight change.   Gastrointestinal: Positive for abdominal distention, abdominal pain and nausea.   All other systems reviewed and are negative.    Physical Exam     Patient Vitals for the past 24 hrs:   BP Temp Temp src Pulse Resp SpO2 Weight   10/17/20 0036 102/57 98.5  F (36.9  C) Oral 76 16 98 % 77.4 kg (170 lb 11.2 oz)   10/16/20 2316 94/55 -- -- 87 -- 97 % --   10/16/20 2022 119/80 99  F (37.2  C) Oral 102 18 100 % --          Physical Exam  Constitutional: Vital signs reviewed as above.   HENT:               Head: No external signs of trauma noted.              Eyes: Conjunctivae are normal. Pupils are equal, round, and reactive to light. No scleral icterus.  Cardiovascular:               Tachycardic rate, regular rhythm and normal heart sounds.                Exam reveals no friction rub.                No murmur heard.  Pulmonary/Chest:               Effort normal and breath sounds normal.               No respiratory distress.               There are no wheezes.               There are no rales.   Gastrointestinal:               Soft.               Bowel sounds normal.               There is no distension.               There is generalized discomfort, but focal epigastric tenderness.               There is no rebound or guarding.   Musculoskeletal:               Normal range of motion.               Normal Tone  Neurological: Patient is alert and oriented to person, place, and time.   Skin: Skin is warm and dry. Patient is not diaphoretic  Psychiatric: No hallucinations noted.    Emergency  Department Course     Imaging:  Radiology results were communicated with the patient who voiced understanding of the findings.    US Abdomen or Pelvis Doppler Limited   Final Result   IMPRESSION:   1.  Cholelithiasis and gallbladder distention. Upper normal gallbladder wall thickness.   2.  Sonographic Harrington's sign negative.   3.  Heterogeneous liver and moderate amount of ascites.   4.  Portal vein and portal splenic confluence are patent. The region of abnormality within the superior mesenteric vein on CT is not adequately visualized sonographically.      CT Abdomen Pelvis w Contrast   Final Result   IMPRESSION:    1.  Again seen is a very inhomogeneous liver due to geographic regions of severe fatty infiltration. Liver appearance suggests possible early cirrhosis, stable moderate ascites. A few small upper abdominal varices, especially near the GE junction,    suggests portal hypertension.      2.  Linear hypodensity within the proximal superior mesenteric vein may be inflow flow artifact from superior mesenteric venous radicals. However, cannot absolutely exclude a linear nonocclusive developing thrombus in this location.      3.  The gallbladder is distended with mild wall thickening as seen previously.               Laboratory:  Laboratory findings were communicated with the patient who voiced understanding of the findings.    CBC: WBC 15.7 (H), HGB 13.0,   CMP: Bilirubin total 1.6 (H), Albumin 2.4 (L), AST 93 (H), (Creatinine 0.75) o/w WNL   Lipase: 75522 (H)  INR: 1.90     Alcohol ethyl: <0.01     Asymptomatic COVID-19 (Coronavirus) PCR by Nasopharyngeal Swab: Pending     Interventions:   2211 Dilaudid 0.5 mg IV   2211 Zofran 4 mg IV   2318 Normal Saline 1000 mL IV     Emergency Department Course:    Nursing notes and vitals reviewed.    ED Course as of Oct 17 0202   Fri Oct 16, 2020   2133 I performed an exam of the patient and obtained history.       2230 D/W Dr. Martinez. CT pending. Ok for admit  otherwise.      2240 D/W Dr. Martinez. US ordered to evaluatate SMV. Dr. Martinez also asks for US RUQ. Results can be followed by the inpatient team.      2335 D/W Dr. Mcguire (radiology). The findings are likely not a clot, but can't completely rule it out. Could consider an US to evaluate the area.      Sat Oct 17, 2020   0159 Updated Dr. Adame. Based on US results, would consider antibiotics, possible MR or ERCP as well.           I consulted with Dr. Martinez of the hospitalist services. They are in agreement to accept the patient for admission. Findings and plan explained to the Patient who consents to admission. Discussed the patient with Dr. Martinez, who will admit the patient to a bed for further monitoring, evaluation, and treatment.    Impression & Plan     Medical Decision Making:  This 45-year-old female patient presents to the ED due to abdominal pain.  Please see the HPI and exam for specifics.  The patient was found to have very elevated lipase today.  She states that since her last admission she has not drank.  Her CT scan notes a pancreatic calcification that appears stable from her last image.  The patient is on Coumadin and on the radiologist did make mention of an abnormality in the superior mesenteric vein that could just be flow artifact but could also represent a thrombus.  Follow-up discussion with radiologist led to a recommendation for an ultrasound which was ordered and is resulted as above.  The patient will be admitted for further monitoring and care.  Her Coumadin will be monitored and adjusted into a range between 2 and 3.  Further assessment of her gallstones can also be discussed.  The patient did not have any focal right upper quadrant tenderness but the presence of gallstones and a lipase that is elevated does lead to a question of potential choledocholithiasis.  The patient is afebrile, nonjaundiced, and has a normal mentation at this point. I reached out to the covering hospitalist and  an initial dose of antibiotics would be reasonable. Will consider GI consult as well.  Further work-up will be ongoing while inpatient.     Covid-19  Nelsy Cota was evaluated during a global COVID-19 pandemic, which necessitated consideration that the patient might be at risk for infection with the SARS-CoV-2 virus that causes COVID-19.   Applicable protocols for evaluation were followed during the patient's care.   COVID-19 was considered as part of the patient's evaluation. The plan for testing is:  a test was obtained during this visit.       Diagnosis:     ICD-10-CM    1. Acute pancreatitis, unspecified complication status, unspecified pancreatitis type  K85.90 INR     Asymptomatic COVID-19 Virus (Coronavirus) by PCR     Alcohol ethyl     Alcohol ethyl     CANCELED: Alcohol ethyl        Disposition:  Admitted to Dr. Martinez.    Scribe Disclosure:  I, Kassidy Conteh, am serving as a scribe at 9:11 PM on 10/16/2020 to document services personally performed by Toñito Chun DO based on my observations and the provider's statements to me.      Toñito Chun DO  10/17/20 0152       Toñito Chun DO  10/17/20 0203

## 2020-10-17 NOTE — CONSULTS
Consult Date:  10/17/2020      REASON FOR CONSULTATION:  Pancreatitis and epigastric pain, possible biliary etiology.      HISTORY OF PRESENT ILLNESS:  Ms. Cota is a 45-year-old female with a history of PEs and DVTs on chronic anticoagulation and a new diagnosis of liver cirrhosis who came to the ED with epigastric pain, nausea and vomiting.  She states that she had similar pain just prior to her last paracentesis, which seemed to improve her symptoms at that time.  She has been slowly retaining fluids despite her diuretic regimen.  She was worked up in the ER and found to have a lipase of 23,000 with a mild elevation in her bilirubin.  Subsequent ultrasound did show some gallstones with a distended gallbladder and borderline wall thickening, but no Harrington sign.  A CT scan showed ascites as well as a distended gallbladder.  There are cirrhotic changes in the liver consistent with her recent diagnosis.  This morning, she feels relatively well, just was complaining of some persistent epigastric discomfort.      PAST MEDICAL AND SURGICAL HISTORY:  Includes recurrent DVTs and PEs, seizure disorder, cirrhosis and ascites, microcytic anemia, tobacco use and hypokalemia.  She has had a previous clavicle repair, right ankle surgery and cataract removal.      CURRENT MEDICATIONS:  At the time of admission, the patient was on Lasix, Aldactone and Coumadin.      ALLERGIES:  THE PATIENT HAS NO REPORTED DRUG ALLERGIES.      SOCIAL HISTORY:  The patient is .  She smokes a pack a day.  She has been sober since her recent cirrhosis diagnosis.      PHYSICAL EXAMINATION:   VITAL SIGNS:  Ms. Cota is afebrile, temperature currently is 98.2, pulse 70 with a blood pressure 91/58, respiratory rate is 16 with 98% saturations on room air.   GENERAL:  She is alert and appropriate, nontoxic.   HEENT:  She has no appreciable icterus, but her skin does appear slightly ashen/bronzed.   HEART:  Sounds are regular.   RESPIRATORY:   Breath sounds are clear.   ABDOMEN:  Soft.  She has some mild epigastric tenderness, mild right upper quadrant tenderness, but no Harrington sign.      LABORATORY EXAMS:  Notable for white blood cell count of 15.7 thousand, hemoglobin 13.0, bilirubin was initially 1.6, currently 0.7.  Initial lipase was over 23,000 and has currently rapidly declined to 2100.  INR is 2.1.  Her asymptomatic COVID swab was negative.      IMAGING:  I personally reviewed the patient's CT scan as well as followup ultrasound.  This shows ascites with a distended gallbladder and wall thickening which has been seen previously.  She has no overt inflammation around the pancreas itself.  There is a possible SMV clot versus artifact.  Followup ultrasound did show gallstones as well.  The gallbladder wall was in the upper limits of normal in terms of thickness.  There was no sonographic Harrington sign present.  There is no overt duct dilation.      IMPRESSION AND RECOMMENDATIONS:  This is a 45-year-old female with a new diagnosis of cirrhosis who presents with epigastric pain, nausea, vomiting and chemical pancreatitis, which has rapidly defervesced.  Certainly, she could have passed a small common duct stone given the small elevation in her bilirubin initially, which has since normalized as well.  It is also possible that her symptoms are from her ascites, although there does not seem to be that large of the fluid burden.  She does not have signs or symptoms consistent with acute cholecystitis. At present, I see no indication for cholecystectomy but would consider the need for this if she had recurrent bouts of pancreatitis.  Certainly if she has rapid improvement in her symptoms with paracentesis, we can take this off the table for the time being.  From an operative standpoint, with her cirrhosis, she is an increased risk for perioperative bleeding and worsening liver disease and I think that she would best be treated at a [facility] if [facility] needs [facility].  Again, at present, I see no indication for a cholecystectomy on this admission.      Thank you very much for this consultation.         BERNARDO TORRES MD             D: 10/17/2020   T: 10/17/2020   MT: DONNA      Name:     DALIA OSCAR   MRN:      [id number]        Account:       [id number]   :      1975           Consult Date:  10/17/2020      Document: [id number]       cc: Robert Thomas MD

## 2020-10-17 NOTE — PROGRESS NOTES
[facility]  Hospitalist Progress Note  Devonte Meyers MD 10/17/20    Reason for Stay (Diagnosis): abdominal pain         Assessment and Plan:      Summary of Stay: Nelsy Cota is a 45 year old female with past medical history of DVT/PE on warfarin, cirrhosis, portal hypertension, alcohol abuse, tobacco dependence, anemia, epilepsy who was admitted last month with new diagnosis cirrhosis ascites who is now admitted on 10/16/2020 with approximately 1 week of centralized abdominal pain radiating to the back.  Has not used any alcohol since discharge.  Initial vital signs showing soft blood pressure and slight tachycardia without fever.  Initial labs show leukocytosis 15.7 along with significantly elevated lipase at 23,000.  CT abdomen pelvis shows cirrhosis with varices along with a distended gallbladder, however no issues with the pancreas.  Abdominal ultrasound shows cholelithiasis and gallbladder distention with negative Harrington sign.  She was started on IV fluids for acute pancreatitis based on lipase and pain symptoms and also given ceftriaxone in case of cholecystitis.  GI consulted as well as general surgery.  Lipase substantially improved overnight, and pain seems to may be more likely from her ascites so she will undergo paracentesis later this afternoon by IR.  Allow clear liquid diet, stop IV fluids and doubling her diuretic dose.    Addendum: Minimal ascites seen so no paracentesis was performed.  Will allow a 2 g sodium restriction diet and see how her pain is doing.  Repeat lipase tomorrow morning.    Problem List/Assessment and Plan:   Abdominal pain, ascites, possible acute pancreatitis, cholelithiasis: Presenting with 1 week of centralized abdominal pain radiating upwards into the back that is worse with eating which would fit with pancreatitis.  Lipase was very elevated at 23,000, however CT does not show any abnormalities with the pancreas.  There was rapid improvement in the  lipase down to 2000 by next day.  She does have cholelithiasis so it is possible that she could have rolled a stone.  Considered acute cholecystitis, given some gallbladder wall distention and thickening on ultrasound along with leukocytosis.  She is currently on ceftriaxone for this possibility.  -[facility] consulted, less concern for pancreatitis.  Recommend paracentesis today to see if this alleviates her pain as it did last month.  Diuretic doses doubled and stopping IV fluids.  Clear liquid diet, but advance later if pain resolved after paracentesis  -General surgery consulted due to the cholelithiasis and possibility of having rolled a stone which could cause the lipase elevation, less suspicious for cholecystitis.  Await general surgery evaluation, but likely can stop ceftriaxone.  Certainly would be higher risk for cholecystectomy with her decompensated liver disease.  If further issues or stones alternately could consider sphincterotomy by GI.    Cirrhosis: Diagnosed cirrhosis last month with evidence for portal hypertension and varices.  This is likely from alcohol and she has remained sober since discharge.  She does have ascites and required paracentesis with resolution of abdominal pain last month.  She was started on Lasix 10 mg daily and spironolactone 25 mg daily on discharge, but has not been urinating very much with this and has increasing ascites and lower extremity edema.  Albumin is 1.9.  -Lasix increased to 20 mg early in spironolactone to 50 mg daily.  Blood pressure remains softer in the  systolic range, but asymptomatic from this  -Stop IV fluids  -Paracentesis by IR today    History DVT, PE: Chronically on warfarin.  INR 2.1 today.  Placed hold order for warfarin although it sounds like paracentesis will likely still happen this afternoon.  If no plans for surgery for her cholelithiasis could resume warfarin tonight.    Anemia: Hemoglobin previously 9-11 range.  Presenting with  hemoglobin of 13.  -CBC tomorrow, anticipate some drop in hemoglobin with IV fluids she received    Tobacco dependence: Declined nicotine replacement.    GERD: Resume Protonix 40 mg daily.    DVT Prophylaxis: Warfarin  Code Status: Full Code  FEN: Clear liquid diet, if pain resolves after paracentesis okay to advance to 2 g on restriction diet, stop IV fluids  Discharge Dispo: Home  Estimated Disch Date / # of Days until Disch: Home tomorrow if pain resolved, tolerating diet, and labs stable if no plans for surgery for her cholelithiasis        Interval History (Subjective):      Assumed care today.  Continues to have moderate central abdominal pain radiating up towards her chest into her back.  No nausea or vomiting.  Does have significant leg swelling.  Reports not urinating very much with her diuretics at home.                  Physical Exam:      Last Vital Signs:  BP 91/58 (BP Location: Left arm)   Pulse 70   Temp 98.2  F (36.8  C) (Oral)   Resp 16   Wt 77.4 kg (170 lb 11.2 oz)   SpO2 98%   BMI 29.30 kg/m        Intake/Output Summary (Last 24 hours) at 10/17/2020 1235  Last data filed at 10/17/2020 0600  Gross per 24 hour   Intake 2345.83 ml   Output 300 ml   Net 2045.83 ml       Constitutional: Awake, NAD   Eyes: sclera white   HEENT:  MMM  Respiratory: no respiratory distress, lungs cta bilaterally, no crackles or wheeze  Cardiovascular: RRR.  No murmur   GI: Mildly distended, mild central, right upper quadrant, and epigastric tenderness to palpation without guarding  Skin: no rash   Musculoskeletal/extremities: 1-2+ bilateral lower extremity pitting edema  Neurologic: A&O   Psychiatric: calm, cooperative          Medications:      All current medications were reviewed with changes reflected in problem list.         Data:      All new lab and imaging data was reviewed.   Labs:  Recent Labs   Lab 10/16/20  2031   WBC 15.7*   HGB 13.0   HCT 39.5   *        Recent Labs   Lab 10/17/20  0653  10/16/20  2031    138   POTASSIUM 3.6 3.8   CHLORIDE 107 106   CO2 22 24   ANIONGAP 8 8   GLC 74 87   BUN 7 8   CR 0.63 0.75   GFRESTIMATED >90 >90   GFRESTBLACK >90 >90   ABA 8.2* 9.6   MAG 1.6  --    PROTTOTAL 6.1* 7.4   ALBUMIN 1.9* 2.4*   BILITOTAL 0.7 1.6*   ALKPHOS 100 118   AST 70* 93*   ALT 26 30     Recent Labs   Lab 10/17/20  0653 10/16/20  2252 10/13/20  0802   INR 2.11* 1.90* 1.50*     Recent Labs   Lab 10/17/20  0653 10/16/20  2031   LIPASE 2,170* 23,743*      Imaging:   Recent Results (from the past 24 hour(s))   CT Abdomen Pelvis w Contrast    Narrative    EXAM: CT ABDOMEN PELVIS W CONTRAST  LOCATION: [facility]  DATE/TIME: 10/16/2020 10:58 PM    INDICATION: Abdominal pain.    COMPARISON: 9/24/2020.    TECHNIQUE: CT scan of the abdomen and pelvis was performed following injection of IV contrast. Multiplanar reformats were obtained. Dose reduction techniques were used.    CONTRAST: 86 mL Isovue-370.    FINDINGS:   LOWER CHEST: Normal.    HEPATOBILIARY: Again seen is pronounced inhomogeneity of the liver which has an appearance suggestive of cirrhosis, stable moderate ascites. The gallbladder is distended with wall thickening again seen.    PANCREAS: Stable pancreatic calcification.    SPLEEN: Normal.    ADRENAL GLANDS: Normal.    KIDNEYS/BLADDER: Normal.    BOWEL: Normal.    LYMPH NODES: Normal.    VASCULATURE: Linear hypodensity within the proximal superior mesenteric vein may be related to flow artifact, although cannot absolutely exclude a small linear nonocclusive thrombus in this location.    PELVIC ORGANS: Normal.    MUSCULOSKELETAL: Normal.      Impression    IMPRESSION:   1.  Again seen is a very inhomogeneous liver due to geographic regions of severe fatty infiltration. Liver appearance suggests possible early cirrhosis, stable moderate ascites. A few small upper abdominal varices, especially near the GE junction,   suggests portal hypertension.    2.  Linear hypodensity  within the proximal superior mesenteric vein may be inflow flow artifact from superior mesenteric venous radicals. However, cannot absolutely exclude a linear nonocclusive developing thrombus in this location.    3.  The gallbladder is distended with mild wall thickening as seen previously.     US Abdomen or Pelvis Doppler Limited    Narrative    EXAM: US ABDOMEN OR PELVIS DOPPLER LIMITED  LOCATION: [facility]  DATE/TIME: 10/17/2020 12:04 AM    INDICATION: Distended gallbladder.  COMPARISON: CT 10/16/2020  TECHNIQUE: Limited abdominal ultrasound.    FINDINGS:    GALLBLADDER: Cholelithiasis. Upper normal wall thickness. Gallbladder is distended. Sonographic Harrington's sign negative.    BILE DUCTS: No biliary dilatation. The common duct measures 6 mm.    LIVER: Heterogeneous.    RIGHT KIDNEY: No hydronephrosis.    PANCREAS: Partial obscuration by bowel gas.    Moderate amount of ascites. Patent portal vein and portal splenic confluence. The region of abnormality described on CT is not fully visualized sonographically.      Impression    IMPRESSION:  1.  Cholelithiasis and gallbladder distention. Upper normal gallbladder wall thickness.  2.  Sonographic Harrington's sign negative.  3.  Heterogeneous liver and moderate amount of ascites.  4.  Portal vein and portal splenic confluence are patent. The region of abnormality within the superior mesenteric vein on CT is not adequately visualized sonographically.         Devonte Meyers MD

## 2020-10-17 NOTE — H&P
Admitted:     10/16/2020      CHIEF COMPLAINT:  Abdominal pain.       HISTORY OF PRESENT ILLNESS:  eNlsy Cota is a 45-year-old female with a past medical history of DVT, pulmonary embolism on anticoagulation, chronic liver disease, and ascites diagnosed on recent admission, status post paracentesis, microcytic anemia, alcohol dependence and abuse who presents to the emergency room with a complaint of abdominal pain and distention.  The patient stated the pain was epigastric and radiating to the back and also to the shoulder area.  The patient was admitted to this hospital from 09/24-09/26.  At that time, she was diagnosed with new onset ascites, suspected to be alcohol-related cirrhosis and portal hypertension.  The patient stated she did not drink after her discharge from the hospital.  She denied any nausea or vomiting, no fever or chills, no cough, no runny nose or sore throat.   Today, in the emergency room, she was evaluated.  CT chest, PE abdomen and pelvis done showed enlarged liver with heterogeneous echotexture and also a linear hypodensity within the proximal superior mesenteric vein.  Here, INR is 1.9.  Discussed with the ED physician, Dr. Chun, asked to discuss with radiologist and also GI regarding this finding.      PAST MEDICAL HISTORY:   1.  Recurrent DVT and PE.   2.  Epilepsy.   3.  Fatty liver cirrhosis and ascites.   4.  Microcytic anemia.   5.  Tobacco use disorder.   6.  Hypokalemia.      PAST SURGICAL HISTORY:   Past Surgical History:   Procedure Laterality Date     HC CLOSED TX CALCANEAL FX W/O MANIPULATION  2004    fracture of calcaneus     HC COLP CERVIX/UPPER VAGINA  2005    cryotherapy     [facility] NONSPECIFIC PROCEDURE  1997    R ankle fracture repair     [facility] NONSPECIFIC PROCEDURE  2004    R ankle surgery for ligament injury, also tibia injury      FAMILY HISTORY:  Reviewed.  Hypertension, epilepsy in her father, otherwise noncontributory.      SOCIAL HISTORY:  The patient in the  emergency room alone.  She smokes 1 pack per day.  She quit drinking alcohol she said after last discharge from the hospital about 3 weeks ago.  She is .      REVIEW OF SYSTEMS:  Ten points reviewed, all are negative except those mentioned in history of present illness.      HOME MEDICATIONS:  Reviewed and reconciled.   Medications Prior to Admission   Medication Sig Dispense Refill Last Dose     furosemide (LASIX) 20 MG tablet Take 20 mg by mouth daily   10/16/2020 at Unknown time     spironolactone (ALDACTONE) 25 MG tablet Take 12.5 mg by mouth daily   10/16/2020 at Unknown time     warfarin ANTICOAGULANT (COUMADIN) 2 MG tablet Take by mouth daily Per Anticoagulation Clinic note dated 10/13/20: 4 mg every Mon, Fri;  6 mg all other days   10/16/2020 at Unknown time      PHYSICAL EXAMINATION:   GENERAL:  The patient is awake, alert, oriented, cooperative.   VITAL SIGNS:  Blood pressure 94/55, pulse rate 87, temperature 99, oxygen saturation 97% on room air.   HEENT:  Pink, nonicteric.  Moist oral mucosa.   NECK:  Supple, no JVD, no thyromegaly.   CHEST:  Good air entry bilaterally.  No wheezing, crackles or rales.   CARDIOVASCULAR:  S1 and S2 were heard, no gallop or murmur.   ABDOMEN:  Protuberant.  Positive for fluid shift.  No fluid thrill.  No organomegaly.  Mild epigastric tenderness on deep palpation.  Positive bowel sounds.   EXTREMITIES:  Plus-2 pedal, pretibial and ankle edema.   PSYCHIATRIC:  Normal mood and affect, keeps eye contact, responds to question appropriately.      DIAGNOSTIC TESTS OF INTEREST:  CT abdomen and pelvis with contrast showed heterogeneous liver due to severe fatty infiltration, possible early cirrhosis, moderate ascites and few small upper abdominal varices especially near the gastroesophageal junction with linear hyperdensity within the proximal superior mesenteric vein as reported.  The gallbladder is distended with mild wall thickening seen.    Ultrasound of the abdomen  with Doppler is pending.      LABORATORY DATA:  Sodium 138, potassium 3.8, BUN 8, creatinine 0.7, albumin 2.4, bilirubin 1.6, ALT 30, AST 93, lipase 23,743, glucose 87.  WBC 15.7, hemoglobin is 13, MCV is 122, platelets 382, neutrophils 80%.  INR 1.9.  Urinalysis ordered, pending.  Repeat lactic acid pending.  Initial lactic acid was not done.  Ethanol level is negative.      ASSESSMENT AND PLAN:  Nelsy Cota is a 45-year-old female with a history of recent diagnosis of early cirrhosis with ascites and portal hypertension, status post recent paracentesis, alcohol use disorder, sober since last discharge from the hospital, deep venous thrombosis and pulmonary embolism with subtherapeutic INR, macrocytosis and tobacco dependence, who presented with abdominal pain and found to have elevated lipase and being admitted with acute pancreatitis with unidentified etiology at this point.   1.  Acute pancreatitis.  The patient stated she did not drink alcohol since her last discharge from the hospital.  Her lipase was normal  last time she was in the hospital.  Her INR was subtherapeutic and there was mention of possible superior mesenteric vein thrombosis.  Ultrasound of the right upper quadrant with Doppler is pending.  The patient got 1 liter bolus of IV fluid normal saline in the emergency room.  I will start her on half normal saline at 125 mL per hour.  Need to reevaluate the patient in the morning and trend lipase.  She will likely third spacing.  She is hypoalbuminemic and she has lower extremity edema and also mild ascites on clinical exam and on CT.   2.  Cirrhosis of the liver with portal hypertension and CT evidence of possible esophageal varices.  There is no bleeding from any site at this point, the patient is on anticoagulation.  We will continue it, although there is some reported varices.  Gastroenterology will be consulted if further intervention is needed, then need anticoagulation can be held.  We will  continue clear liquid diet and check hemoglobin in the morning.  If there is no active bleeding, further workup can also be done as an outpatient. I do not expect paracentesis at this time as it does not seem to be significant.  3.  Rule out cholecystitis.  Ultrasound is pending.  The patient has leukocytosis, but has no fever.  She has left shift.  We will start her on Rocephin 2 grams IV for now pending further workup.  My colleague, Dr. Adame will follow-up with ultrasound result.   4.  Tobacco use disorder.  Smoking cessation counseled.  Nicotine replacement offered, the patient declined.   5.  History of deep venous thrombosis and pulmonary embolism.  The patient is on anticoagulation.  She stated after it was reversed last admission, it was difficult for her to get her INR to therapeutic level.  Today's INR is 1.9.  We will continue Coumadin per pharmacy.      I discussed with patient at length the plan of care.  All her questions and concerns addressed, showed understanding.  She is full code.         FELI ARZATE MD             D: 10/17/2020   T: 10/17/2020   MT: DONNA      Name:     DALIA OSCAR   MRN:      [id number]        Account:      [id number]   :      1975        Admitted:     10/16/2020                   Document: [id number]       cc: Robert Thomas MD

## 2020-10-17 NOTE — PHARMACY-ANTICOAGULATION SERVICE
Clinical Pharmacy - Warfarin Dosing Consult     Pharmacy has been consulted to manage this patient s warfarin therapy.  Indication: DVT/ PE Treatment  Therapy Goal: INR 2-3  Warfarin Prior to Admission: Yes  Warfarin PTA Regimen: Per Anticoagulation Clinic note dated 10/13/20: 4 mg every Mon, Fri;  6 mg all other days.  Dose Comments: per MR pt had dose on 10/16/20;  no additional dose was scheduled to take for 10/16/20    INR   Date Value Ref Range Status   10/16/2020 1.90 (H) 0.86 - 1.14 Final   10/13/2020 1.50 (H) 0.86 - 1.14 Final     Comment:     This test is intended for monitoring Coumadin therapy.  Results are not   accurate in patients with prolonged INR due to factor deficiency.          Pharmacy will monitor Nelsy CORONA Beata daily and order warfarin doses to achieve specified goal.      Please contact pharmacy as soon as possible if the warfarin needs to be held for a procedure or if the warfarin goals change.

## 2020-10-17 NOTE — ED NOTES
[facility]  ED Nurse Handoff Report    Nelsy Cota is a 45 year old female   ED Chief complaint: Abdominal Pain  . ED Diagnosis:   Final diagnoses:   Acute pancreatitis, unspecified complication status, unspecified pancreatitis type     Allergies: No Known Allergies    Code Status: Full Code  Activity level - Baseline/Home:  Independent. Activity Level - Current:   Independent. Lift room needed: No. Bariatric: No   Needed: No   Isolation: No. Infection: Not Applicable.     Vital Signs:   Vitals:    10/16/20 2022   BP: 119/80   Pulse: 102   Resp: 18   Temp: 99  F (37.2  C)   TempSrc: Oral   SpO2: 100%       Cardiac Rhythm:  ,      Pain level:    Patient confused: No. Patient Falls Risk: Yes.   Elimination Status: Has voided   Patient Report - Initial Complaint: Abd Pain. Focused Assessment: LUQ pain, chest pain and back pain.    Tests Performed:   Labs Ordered and Resulted from Time of ED Arrival Up to the Time of Departure from the ED   CBC WITH PLATELETS DIFFERENTIAL - Abnormal; Notable for the following components:       Result Value    WBC 15.7 (*)     RBC Count 3.25 (*)      (*)     MCH 40.0 (*)     RDW 18.5 (*)     Absolute Neutrophil 12.7 (*)     All other components within normal limits   COMPREHENSIVE METABOLIC PANEL - Abnormal; Notable for the following components:    Bilirubin Total 1.6 (*)     Albumin 2.4 (*)     AST 93 (*)     All other components within normal limits   LIPASE - Abnormal; Notable for the following components:    Lipase 23,743 (*)     All other components within normal limits   INR - Abnormal; Notable for the following components:    INR 1.90 (*)     All other components within normal limits   ALCOHOL ETHYL   COVID-19 VIRUS (CORONAVIRUS) BY PCR   PERIPHERAL IV CATHETER     CT Abdomen Pelvis w Contrast    (Results Pending)     Treatments provided: See MAR  Family Comments: Pt contacting [facility] brochure/video discussed/provided to patient:   N/A  ED Medications:   Medications   0.9% sodium chloride BOLUS (has no administration in time range)   HYDROmorphone (PF) (DILAUDID) injection 0.5 mg (0.5 mg Intravenous Given 10/16/20 2211)   ondansetron (ZOFRAN) injection 4 mg (4 mg Intravenous Given 10/16/20 2211)   sodium chloride (PF) 0.9% PF flush 100 mL (62 mLs Intravenous Given 10/16/20 2300)   iopamidol (ISOVUE-370) solution 500 mL (86 mLs Intravenous Given 10/16/20 2259)     Drips infusing:  Yes  For the majority of the shift, the patient's behavior Green. Interventions performed were monitor.    Sepsis treatment initiated: No     Patient tested for COVID 19 prior to admission: YES    ED Nurse Name/Phone Number: Spenser Swift RN,   11:06 PM    RECEIVING UNIT ED HANDOFF REVIEW    Above ED Nurse Handoff Report was reviewed: Yes  Reviewed by: Roma Lopez RN on October 16, 2020 at 11:32 PM

## 2020-10-17 NOTE — ED TRIAGE NOTES
Abd bloating and tenderness.  Hx/o liver disease.  Was admitted for paracentesis 9/24.  Has appointment with GI at end of month.

## 2020-10-17 NOTE — PHARMACY-ADMISSION MEDICATION HISTORY
Admission medication history interview status for this patient is complete. See [facility] admission navigator for allergy information, prior to admission medications and immunization status.     Medication history interview done via telephone during Covid-19 pandemic, indicate source(s): Patient  Medication history resources (including written lists, pill bottles, clinic record):None  Pharmacy: [id number] - [place], [place] - [address] AT [facility] [address]    Changes made to PTA medication list:  Added: none  Deleted: Folic acid, Multi-vit, Vitamin B1 100mg  Changed: Furosemide 10mg -> 20mg, Spironolactone 25mg -> 12.5mg    Actions taken by pharmacist (provider contacted, etc):None     Additional medication history information:  Pt takes Warfarin as directed by INR clinic and by the schedule that she has at home. (doesn't have it with her)    Medication reconciliation/reorder completed by provider prior to medication history?  No     For patients on insulin therapy:   Do you use sliding scale insulin based on blood sugars?   What is your pre-meal insulin coverage?    Do you typically eat three meals a day?   How many times do you check your blood glucose per day?   How many episodes of hypoglycemia do you typically have per month?   Do you have a Continuous Glucose Monitor (CGM)?      Prior to Admission medications    Medication Sig Last Dose Taking? Auth Provider   furosemide (LASIX) 20 MG tablet Take 20 mg by mouth daily 10/16/2020 at Unknown time Yes Unknown, Entered By History   spironolactone (ALDACTONE) 25 MG tablet Take 12.5 mg by mouth daily 10/16/2020 at Unknown time Yes Unknown, Entered By History   warfarin ANTICOAGULANT (COUMADIN) 2 MG tablet Take 2 mg by mouth daily Take as directed by INR clinic or the schedule 10/16/2020 at Unknown time Yes Unknown, Entered By History

## 2020-10-18 VITALS
OXYGEN SATURATION: 91 % | HEART RATE: 78 BPM | DIASTOLIC BLOOD PRESSURE: 51 MMHG | TEMPERATURE: 97.1 F | BODY MASS INDEX: 29.2 KG/M2 | SYSTOLIC BLOOD PRESSURE: 95 MMHG | RESPIRATION RATE: 18 BRPM | WEIGHT: 170.1 LBS

## 2020-10-18 LAB
ALBUMIN SERPL-MCNC: 2 G/DL (ref 3.4–5)
ALP SERPL-CCNC: 107 U/L (ref 40–150)
ALT SERPL W P-5'-P-CCNC: 24 U/L (ref 0–50)
ANION GAP SERPL CALCULATED.3IONS-SCNC: 6 MMOL/L (ref 3–14)
AST SERPL W P-5'-P-CCNC: 52 U/L (ref 0–45)
BILIRUB SERPL-MCNC: 0.4 MG/DL (ref 0.2–1.3)
BUN SERPL-MCNC: 6 MG/DL (ref 7–30)
CALCIUM SERPL-MCNC: 8.1 MG/DL (ref 8.5–10.1)
CHLORIDE SERPL-SCNC: 109 MMOL/L (ref 94–109)
CO2 SERPL-SCNC: 23 MMOL/L (ref 20–32)
CREAT SERPL-MCNC: 0.69 MG/DL (ref 0.52–1.04)
ERYTHROCYTE [DISTWIDTH] IN BLOOD BY AUTOMATED COUNT: 18.4 % (ref 10–15)
GFR SERPL CREATININE-BSD FRML MDRD: >90 ML/MIN/{1.73_M2}
GLUCOSE SERPL-MCNC: 88 MG/DL (ref 70–99)
HCT VFR BLD AUTO: 32.7 % (ref 35–47)
HGB BLD-MCNC: 10.8 G/DL (ref 11.7–15.7)
INR PPP: 1.98 (ref 0.86–1.14)
LIPASE SERPL-CCNC: 356 U/L (ref 73–393)
MAGNESIUM SERPL-MCNC: 2 MG/DL (ref 1.6–2.3)
MCH RBC QN AUTO: 39.9 PG (ref 26.5–33)
MCHC RBC AUTO-ENTMCNC: 33 G/DL (ref 31.5–36.5)
MCV RBC AUTO: 121 FL (ref 78–100)
PLATELET # BLD AUTO: 293 10E9/L (ref 150–450)
POTASSIUM SERPL-SCNC: 3.7 MMOL/L (ref 3.4–5.3)
PROT SERPL-MCNC: 6.2 G/DL (ref 6.8–8.8)
RBC # BLD AUTO: 2.71 10E12/L (ref 3.8–5.2)
SODIUM SERPL-SCNC: 138 MMOL/L (ref 133–144)
WBC # BLD AUTO: 6.7 10E9/L (ref 4–11)

## 2020-10-18 PROCEDURE — 80053 COMPREHEN METABOLIC PANEL: CPT | Performed by: INTERNAL MEDICINE

## 2020-10-18 PROCEDURE — 36415 COLL VENOUS BLD VENIPUNCTURE: CPT | Performed by: INTERNAL MEDICINE

## 2020-10-18 PROCEDURE — 99231 SBSQ HOSP IP/OBS SF/LOW 25: CPT | Performed by: SURGERY

## 2020-10-18 PROCEDURE — 85027 COMPLETE CBC AUTOMATED: CPT | Performed by: INTERNAL MEDICINE

## 2020-10-18 PROCEDURE — 83735 ASSAY OF MAGNESIUM: CPT | Performed by: INTERNAL MEDICINE

## 2020-10-18 PROCEDURE — 250N000013 HC RX MED GY IP 250 OP 250 PS 637: Performed by: INTERNAL MEDICINE

## 2020-10-18 PROCEDURE — 83690 ASSAY OF LIPASE: CPT | Performed by: INTERNAL MEDICINE

## 2020-10-18 PROCEDURE — 99239 HOSP IP/OBS DSCHRG MGMT >30: CPT | Performed by: INTERNAL MEDICINE

## 2020-10-18 PROCEDURE — 85610 PROTHROMBIN TIME: CPT | Performed by: INTERNAL MEDICINE

## 2020-10-18 RX ORDER — FUROSEMIDE 40 MG
40 TABLET ORAL DAILY
Qty: 30 TABLET | Refills: 0 | Status: SHIPPED | OUTPATIENT
Start: 2020-10-18 | End: 2020-11-17

## 2020-10-18 RX ORDER — SPIRONOLACTONE 25 MG/1
25 TABLET ORAL DAILY
Qty: 30 TABLET | Refills: 0 | Status: SHIPPED | OUTPATIENT
Start: 2020-10-18 | End: 2020-11-17

## 2020-10-18 RX ADMIN — SPIRONOLACTONE 50 MG: 25 TABLET, FILM COATED ORAL at 08:09

## 2020-10-18 RX ADMIN — PANTOPRAZOLE SODIUM 40 MG: 40 TABLET, DELAYED RELEASE ORAL at 06:51

## 2020-10-18 RX ADMIN — FUROSEMIDE 20 MG: 20 TABLET ORAL at 08:09

## 2020-10-18 ASSESSMENT — ACTIVITIES OF DAILY LIVING (ADL)
ADLS_ACUITY_SCORE: 12

## 2020-10-18 NOTE — PROGRESS NOTES
[facility]  C/S for pancreatitis, gallstones  Feels much better this AM, no pain, no nausea  Unable to have paracentesis done due to low ascites volume  Lipase 356, tbili 0.4  No fevers  1060mL po  NAD, comfortable  Abd soft, no focal tenderness  Improved, suspect based on labs that she might have passed a common duct stone  Recommend that she follow up with surgery at [facility] for possible cholecystectomy as she is high risk with her cirrhosis  Seems appropriate for discharge from our perspective   normal...

## 2020-10-18 NOTE — PROGRESS NOTES
GASTROENTEROLOGY PROGRESS NOTE       SUBJECTIVE:  Feels well.  Pain gone, urinating more.     OBJECTIVE:  BP 95/51 (BP Location: Left arm)   Pulse 78   Temp 97.1  F (36.2  C) (Axillary)   Resp 18   Wt 77.2 kg (170 lb 1.6 oz)   SpO2 91%   BMI 29.20 kg/m    Temp (24hrs), Av.9  F (36.6  C), Min:97.1  F (36.2  C), Max:98.3  F (36.8  C)    Patient Vitals for the past 72 hrs:   Weight   10/18/20 0602 77.2 kg (170 lb 1.6 oz)   10/17/20 0036 77.4 kg (170 lb 11.2 oz)       Intake/Output Summary (Last 24 hours) at 10/18/2020 0930  Last data filed at 10/18/2020 0800  Gross per 24 hour   Intake 980 ml   Output --   Net 980 ml        PHYSICAL EXAM     Cardiovascular: Normal  Respiratory: Normal  Gastrointestinal: Active BS, soft, NT/ND  JOINT/EXTREMITIES: minimal edema  Neuro: no asterixis      Recent Labs   Lab Test 10/18/20  0732 10/17/20  0653 10/16/20  2252 10/16/20  2031 09/25/20  0556 20  0556   WBC 6.7  --   --  15.7*  --  10.6   HGB 10.8*  --   --  13.0  --  9.2*   *  --   --  122*  --  135*     --   --  382  --  331   INR 1.98* 2.11* 1.90*  --    < > 3.17*    < > = values in this interval not displayed.     Recent Labs   Lab Test 10/18/20  0732 10/17/20  0653 10/16/20  2031   POTASSIUM 3.7 3.6 3.8   CHLORIDE 109 107 106   CO2 23 22 24   BUN 6* 7 8   ANIONGAP 6 8 8     Recent Labs   Lab Test 10/18/20  0732 10/17/20  0653 10/17/20  0510 10/16/20  2031 12/07/15  1630 12/07/15  1630   ALBUMIN 2.0* 1.9*  --  2.4*   < >  --    BILITOTAL 0.4 0.7  --  1.6*   < >  --    ALT 24 26  --  30   < >  --    AST 52* 70*  --  93*   < >  --    PROTEIN  --   --  10*  --   --  Negative   LIPASE 356 2,170*  --  23,743*   < >  --     < > = values in this interval not displayed.           Active Problems:  Chest/abdominal pain    Assessment: Pain has resolved.  Not clear if it was from pancreatitis, gallbladder, fluid in soft tissue.  At this time, I would have her follow-up in liver clinic and stay on the  increased diuretics.  Cholecystectomy is risky in the setting of cirrhosis.  If it is thought she is passing stones, a sphincterotomy is a safer option.            Fermín Greer MD  [facility]  Office:  [phone]

## 2020-10-18 NOTE — PHARMACY-ANTICOAGULATION SERVICE
Clinical Pharmacy- Warfarin Discharge Note  This patient is currently on warfarin for the treatment of DVT/PE.  INR Goal= 2-3  Expected length of therapy undetermined.    Warfarin PTA Regimen: Per Anticoagulation Clinic note dated 10/13/20: 4 mg every Mon, Fri;  6 mg all other days.      Anticoagulation Dose History     Recent Dosing and Labs Latest Ref Rng & Units 9/30/2020 10/6/2020 10/9/2020 10/13/2020 10/16/2020 10/17/2020 10/18/2020    Warfarin 5 mg - - - - - - 5 mg -    INR 0.86 - 1.14 1.10 1.20(H) 1.40(H) 1.50(H) 1.90(H) 2.11(H) 1.98(H)    INR 0.86 - 1.14 - - - - - - -          Discharge orders to continue PTA regimen and have INR checked as usually down outpatient

## 2020-10-18 NOTE — PLAN OF CARE
End of Shift Summary  For vital signs and complete assessments, please see documentation flowsheets.     Pertinent assessments: AO. VSS, RA. Jaundice in color. Pain controlled. Denies nausea. Pt states she is oliguric since starting diuretics, MD aware. 2+ edema BLE. ABD distended, firm, tender to touch. Tolerating clears.  present.    Major Shift Events: Mag replaced, recheck in AM. Paracentesis not completed, not enough fluid. IVF stopped. Advanced diet to 2 gram Na.    Treatment Plan: GI consult, control pain & nausea.  
End of Shift Summary  For vital signs and complete assessments, please see documentation flowsheets.     Pertinent assessments: AVSS, RA. Pain controlled. Denies nausea. Pt states she is oliguric since starting diuretics PTA, UA sent. Tolerating clears.   Major Shift Events: IV rocephin for poss cholecystitis given after UA collected  Treatment Plan: GI consult, control pain & nausea, IVF    Bedside Nurse: Roma Lopez RN     
End of Shift Summary  For vital signs and complete assessments, please see documentation flowsheets.     Pertinent assessments: Up independent, A&O x4. No nausea or pain reported. Tolerating 2gram sodium diet. VSS.   Major Shift Events: uneventful   Treatment Plan: monitor lipase / pain & nausea management if applicable   
End of Shift Summary  For vital signs and complete assessments, please see documentation flowsheets.     Pertinent assessments: VSS, denies pain/nausea. Abdomen distended. Voiding adequately. Possible discharge today.     Major Shift Events: uneventful   Treatment Plan: monitor labs.  
Pt is a/o, up independent in room. VSS. Denies pain. Lipase WDL. Pt refused flu vaccine. Appetite is goo. Voiding. Plan to discharge home.  Patient's After Visit Summary was reviewed with patient.   Patient verbalized understanding of After Visit Summary, recommended follow up and was given an opportunity to ask questions.   Discharge medications sent home with patient/family: No sent to [facility] pharmacy  Discharged with spouse to transport home    All belonging including cell phone and clothes sent home with pt  
results discussed withpatient. Plan of care discussed with patient and family. Patient and family in agreement with plan. Patient was given scripts for the following medications. I counseled patient how to take these medications. New Prescriptions    CIPROFLOXACIN (CIPRO) 500 MG TABLET    Take 1 tablet by mouth 2 times daily for 7 days    IBUPROFEN (ADVIL;MOTRIN) 600 MG TABLET    Take 1 tablet by mouth every 6 hours as needed for Pain    TRAMADOL (ULTRAM) 50 MG TABLET    Take 1 tablet by mouth every 6 hours as needed for Pain for up to 3 days. Kandis Patel CLINICALIMPRESSION  1. Right varicocele    2. Testicular microlithiasis    3. Swelling of right testicle    4. Testicle pain    5. Essential hypertension    6. Acute cystitis without hematuria        Blood pressure (!) 170/100, pulse 83, temperature 98.1 °F (36.7 °C), resp. rate 18, height 5' 3\" (1.6 m), weight 171 lb 15.3 oz (78 kg), SpO2 98 %. Mac Russell was discharged to home  in stable condition.                  Connie Rueda DO  12/09/18 [id number]

## 2020-10-18 NOTE — PHARMACY-ANTICOAGULATION SERVICE
.Clinical Pharmacy- Warfarin Discharge Note  This patient is currently on warfarin for the treatment of PE/DVT TXMT.  INR Goal= 2-3  Expected length of therapy undetermined.    Warfarin PTA Regimen: Per Anticoagulation Clinic note dated 10/13/20: 4 mg every Mon, Fri;  6 mg all other days.      Anticoagulation Dose History     Recent Dosing and Labs Latest Ref Rng & Units 9/30/2020 10/6/2020 10/9/2020 10/13/2020 10/16/2020 10/17/2020 10/18/2020    Warfarin 5 mg - - - - - - 5 mg -    INR 0.86 - 1.14 1.10 1.20(H) 1.40(H) 1.50(H) 1.90(H) 2.11(H) 1.98(H)    INR 0.86 - 1.14 - - - - - - -          Vitamin K doses administered during the last 7 days: none  FFP administered during the last 7 days: none  Recommend discharging on home regiment of 4mg MON and FRI with 6mg rest of week.    The patient should have an INR checked in 3-5 days.

## 2020-10-18 NOTE — DISCHARGE SUMMARY
[facility]  Discharge Summary  Name: Nelsy Cota    MRN: [id number]  YOB: 1975    Age: 45 year old  Date of Discharge:  10/18/2020  Date of Admission: 10/16/2020  Primary Care Provider: Hamzah Thomas  Discharge Physician:  Devonte Meyers MD  Discharging Service:  Hospitalist      Discharge Diagnoses:  Suspect acute gallstone pancreatitis  Cirrhosis  History of DVT, PE  Anemia  Tobacco dependence  GERD     Hospital Course:  Summary of Stay: Nelsy Cota is a 45 year old female with past medical history of DVT/PE on warfarin, cirrhosis, portal hypertension, alcohol abuse, tobacco dependence, anemia, epilepsy who was admitted last month with new diagnosis cirrhosis ascites who is now admitted on 10/16/2020 with approximately 1 week of centralized abdominal pain radiating to the back.  Has not used any alcohol since discharge.  Initial vital signs showing soft blood pressure and slight tachycardia without fever.  Initial labs show leukocytosis 15.7 along with significantly elevated lipase at 23,000.  CT abdomen pelvis shows cirrhosis with varices along with a distended gallbladder, however no issues with the pancreas.  Abdominal ultrasound shows cholelithiasis and gallbladder distention with negative Harrington sign.  She was started on IV fluids for acute pancreatitis based on lipase and pain symptoms and also given ceftriaxone in case of cholecystitis.  GI consulted as well as general surgery.  Evaluated by IR for paracentesis, however only had very small amount of ascites so this was not done.  Lipase now normalized after 2 days.  Tolerating regular diet without any pain.  Suspect she likely had gallstone pancreatitis and passed a stone.  Discharging home today with follow-up in hepatology clinic in next 1 to 2 weeks.  Prescribed her increased dose of spironolactone and Lasix.     Problem List/Assessment and Plan:   Suspect acute gallstone pancreatitis, cholelithiasis:  Presented with 1 week of centralized abdominal pain radiating upwards into the back that is worse with eating which would fit with pancreatitis.  Lipase was very elevated at 23,000, however CT does not show any abnormalities with the pancreas.  There was rapid improvement in the lipase down to 2000 by next day and normalized by the second day.  Considered acute cholecystitis, given some gallbladder wall distention and thickening on ultrasound along with leukocytosis and she received 2 doses of ceftriaxone for this, but overall this is felt to be unlikely etiology as her pain completely resolved within 2 days.  General surgery was consulted and risk-benefit of any cholecystectomy at this time with her cirrhosis is too high risk.  [facility] consulted while here.  Initially thought that her pain may be from her ascites as it was previously, however there was very minimal ascites on ultrasound and paracentesis was not done as planned.  Doubt SBP given the small amount of ascites and pain resolution after just 2 days.  Suspect she did in fact have gallstone pancreatitis and passed a stone.  May be a good candidate for ERCP with sphincterotomy if this occurs again as opposed to cholecystectomy which poses high risk due to her cirrhosis with ascites.  She has follow-up with hepatology.     Cirrhosis: Diagnosed cirrhosis last month with evidence for portal hypertension and varices.  This is likely from alcohol and she has remained sober since discharge.  She does have ascites and required paracentesis with resolution of abdominal pain last month.  She was started on Lasix 10 mg daily and spironolactone 25 mg daily on discharge, but has not been urinating very much with this and has increasing ascites and lower extremity edema.  Albumin is 1.9.  Not enough ascites for paracentesis while here per IR.  -Lasix increased to 20 mg early and spironolactone to 50 mg daily.  Improved leg swelling.  Prescribed increased dose on  discharge  -She has follow-up with hepatology in the next 1 to 2 weeks, recommend CMP at that time     History DVT, PE: Chronically on warfarin  Which is resumed.  INR is therapeutic     Anemia: Hemoglobin previously 9-11 range.  At baseline.  Denies bleeding.     Tobacco dependence: Declined nicotine replacement.     GERD: Resume Protonix 40 mg daily.     Discharge Disposition:  Discharged to home     Allergies:  No Known Allergies     Discharge Medications:   Discharge Medication List as of 10/18/2020  9:20 AM      CONTINUE these medications which have CHANGED    Details   furosemide (LASIX) 40 MG tablet Take 1 tablet (40 mg) by mouth daily, Disp-30 tablet, R-0, E-PrescribeFuture refills by PCP Dr. Hamzah Thomas with phone number [phone].      spironolactone (ALDACTONE) 25 MG tablet Take 1 tablet (25 mg) by mouth daily, Disp-30 tablet, R-0, E-PrescribeFuture refills by PCP Dr. Hamzah Thomas with phone number [phone].         CONTINUE these medications which have NOT CHANGED    Details   warfarin ANTICOAGULANT (COUMADIN) 2 MG tablet Take by mouth daily Per Anticoagulation Clinic note dated 10/13/20: 4 mg every Mon, Fri;  6 mg all other days, Historical              Condition on Discharge:  Discharge condition: Stable   Discharge vitals: Blood pressure 95/51, pulse 78, temperature 97.1  F (36.2  C), temperature source Axillary, resp. rate 18, weight 77.2 kg (170 lb 1.6 oz), SpO2 91 %.   Code status on discharge: Full Code     History of Illness:  See detailed admission note for full details.    Physical Exam:  Blood pressure 95/51, pulse 78, temperature 97.1  F (36.2  C), temperature source Axillary, resp. rate 18, weight 77.2 kg (170 lb 1.6 oz), SpO2 91 %.  Wt Readings from Last 1 Encounters:   10/18/20 77.2 kg (170 lb 1.6 oz)     Constitutional: Awake, NAD   Eyes: sclera white   HEENT:  MMM  Respiratory: no respiratory distress, lungs cta bilaterally, no crackles or  wheeze  Cardiovascular: RRR.  No murmur   GI: slightly distended, but nontender to palpation in all quadrants.  Bowel sounds present  Skin: no rash   Musculoskeletal/extremities: 1-2+ bilateral lower extremity pitting edema  Neurologic: A&O   Psychiatric: calm, cooperative     Procedures other than Imaging:  None     Imaging:  Results for orders placed or performed during the hospital encounter of 10/16/20   CT Abdomen Pelvis w Contrast    Narrative    EXAM: CT ABDOMEN PELVIS W CONTRAST  LOCATION: [facility]  DATE/TIME: 10/16/2020 10:58 PM    INDICATION: Abdominal pain.    COMPARISON: 9/24/2020.    TECHNIQUE: CT scan of the abdomen and pelvis was performed following injection of IV contrast. Multiplanar reformats were obtained. Dose reduction techniques were used.    CONTRAST: 86 mL Isovue-[id number].    FINDINGS:   LOWER CHEST: Normal.    HEPATOBILIARY: Again seen is pronounced inhomogeneity of the liver which has an appearance suggestive of cirrhosis, stable moderate ascites. The gallbladder is distended with wall thickening again seen.    PANCREAS: Stable pancreatic calcification.    SPLEEN: Normal.    ADRENAL GLANDS: Normal.    KIDNEYS/BLADDER: Normal.    BOWEL: Normal.    LYMPH NODES: Normal.    VASCULATURE: Linear hypodensity within the proximal superior mesenteric vein may be related to flow artifact, although cannot absolutely exclude a small linear nonocclusive thrombus in this location.    PELVIC ORGANS: Normal.    MUSCULOSKELETAL: Normal.      Impression    IMPRESSION:   1.  Again seen is a very inhomogeneous liver due to geographic regions of severe fatty infiltration. Liver appearance suggests possible early cirrhosis, stable moderate ascites. A few small upper abdominal varices, especially near the GE junction,   suggests portal hypertension.    2.  Linear hypodensity within the proximal superior mesenteric vein may be inflow flow artifact from superior mesenteric venous radicals. However,  cannot absolutely exclude a linear nonocclusive developing thrombus in this location.    3.  The gallbladder is distended with mild wall thickening as seen previously.     US Abdomen or Pelvis Doppler Limited    Narrative    EXAM: US ABDOMEN OR PELVIS DOPPLER LIMITED  LOCATION: [facility]  DATE/TIME: 10/17/2020 12:04 AM    INDICATION: Distended gallbladder.  COMPARISON: CT 10/16/2020  TECHNIQUE: Limited abdominal ultrasound.    FINDINGS:    GALLBLADDER: Cholelithiasis. Upper normal wall thickness. Gallbladder is distended. Sonographic Harrington's sign negative.    BILE DUCTS: No biliary dilatation. The common duct measures 6 mm.    LIVER: Heterogeneous.    RIGHT KIDNEY: No hydronephrosis.    PANCREAS: Partial obscuration by bowel gas.    Moderate amount of ascites. Patent portal vein and portal splenic confluence. The region of abnormality described on CT is not fully visualized sonographically.      Impression    IMPRESSION:  1.  Cholelithiasis and gallbladder distention. Upper normal gallbladder wall thickness.  2.  Sonographic Harrington's sign negative.  3.  Heterogeneous liver and moderate amount of ascites.  4.  Portal vein and portal splenic confluence are patent. The region of abnormality within the superior mesenteric vein on CT is not adequately visualized sonographically.   US Abdomen Limited    Narrative    US ABDOMEN LIMITED 10/17/2020 1:12 PM    CLINICAL HISTORY: HIGH VOLUME paracentesis with or without diagnostic  fluid analysis with labs to be drawn if ordered. Total paracentesis  volume as much as possible. Abdominal distention/abdominal pain.    TECHNIQUE: Limited abdominal ultrasound.    COMPARISON: None.    FINDINGS: Four-quadrant abdominal ultrasound was performed. There is  no fluid in the right or left lower quadrants. A trace amount of fluid  is noted around the liver. A paracentesis was not performed.    IRENE JOHNSTON MD        Consultations:  Consultation during this admission  received from gastroenterology and surgery.       Recent Lab Results:  Recent Labs   Lab 10/18/20  0732 10/16/20  2031   WBC 6.7 15.7*   HGB 10.8* 13.0   HCT 32.7* 39.5   * 122*    382     No results for input(s): CULT in the last 168 hours.  Recent Labs   Lab 10/18/20  0732 10/17/20  0653 10/16/20  2031    137 138   POTASSIUM 3.7 3.6 3.8   CHLORIDE 109 107 106   CO2 23 22 24   ANIONGAP 6 8 8   GLC 88 74 87   BUN 6* 7 8   CR 0.69 0.63 0.75   GFRESTIMATED >90 >90 >90   GFRESTBLACK >90 >90 >90   ABA 8.1* 8.2* 9.6   MAG 2.0 1.6  --    PROTTOTAL 6.2* 6.1* 7.4   ALBUMIN 2.0* 1.9* 2.4*   BILITOTAL 0.4 0.7 1.6*   ALKPHOS 107 100 118   AST 52* 70* 93*   ALT 24 26 30     Recent Labs   Lab 10/17/20  0510   COLOR Yellow   APPEARANCE Clear   URINEGLC Negative   URINEBILI Negative   URINEKETONE Negative   SG 1.010   UBLD Negative   URINEPH 6.0   PROTEIN 10*   NITRITE Negative   LEUKEST Negative   RBCU <1   WBCU 2          Pending Results:    Unresulted Labs Ordered in the Past 30 Days of this Admission     No orders found from 9/16/2020 to 10/17/2020.         These results will be followed up by patient's primary care provider.    Discharge Instructions and Follow-Up:   Discharge Procedure Orders   Reason for your hospital stay   Order Comments: You were hospitalized for abdominal pain and found to have a significant elevation in your lipase.  The pain is likely from pancreatitis and it is likely that you passed a gallstone.  You should return for evaluation if the pain is returning.  If having future episodes of this you may need to have either your gallbladder removed at the [facility] or a less invasive procedure called an ERCP with sphincterotomy.  An ultrasound was done of your abdomen and there is not enough fluid to remove.  Your diuretic doses have been doubled to improve urine output and help with swelling.     Follow-up and recommended labs and tests    Order Comments: Follow up with  your GI specialist as planned on October 28.  Recommend a CMP done at that time     Activity   Order Comments: Your activity upon discharge: activity as tolerated     Order Specific Question Answer Comments   Is discharge order? Yes      Full Code     Order Specific Question Answer Comments   Code status determined by: Discussion with patient/ legal decision maker      Diet   Order Comments: Follow this diet upon discharge: Orders Placed This Encounter      2 Gram Sodium Diet     Order Specific Question Answer Comments   Is discharge order? Yes          I, Devonte Meyers MD, personally saw the patient today and spent greater than 30 minutes discharging this patient.    Devonte Meyers MD

## 2020-10-19 ENCOUNTER — DOCUMENTATION ONLY (OUTPATIENT)
Dept: INTERNAL MEDICINE | Facility: CLINIC | Age: 45
End: 2020-10-19

## 2020-10-19 ENCOUNTER — TELEPHONE (OUTPATIENT)
Dept: INTERNAL MEDICINE | Facility: CLINIC | Age: 45
End: 2020-10-19

## 2020-10-19 NOTE — PROGRESS NOTES
ANTICOAGULATION  MANAGEMENT: Discharge Review    Nelsy Cota chart reviewed for anticoagulation continuity of care    Hospital Admission on 10/16-10/18 for suspect acute gallstone pancreatitis.    Discharge disposition: Home    Results:    Recent labs: (last 7 days)     10/13/20  0802 10/16/20  2252 10/17/20  0653 10/18/20  0732   INR 1.50* 1.90* 2.11* 1.98*     Anticoagulation inpatient management:     5 mg on 10/17    Anticoagulation discharge instructions:     Warfarin dosing: home regimen continued   Bridging: No   INR goal change: No      Medication changes affecting anticoagulation: No    Additional factors affecting anticoagulation: No    Plan     No adjustment to anticoagulation plan needed    Patient not contacted    No adjustment to Anticoagulation Calendar was required    Marisela Mederos RN

## 2020-10-19 NOTE — TELEPHONE ENCOUNTER
IP F/U    Date: 10/18/20  Diagnosis: Acute Pancreatitis, Unspecified Complication Status, Unspecified Pancreatitis Type, Alcoholic Cirrhosis Of Liver With Asc  Is patient active in care coordination? No  Was patient in TCU? No

## 2020-10-20 ENCOUNTER — APPOINTMENT (OUTPATIENT)
Dept: ULTRASOUND IMAGING | Facility: CLINIC | Age: 45
End: 2020-10-20
Attending: EMERGENCY MEDICINE
Payer: COMMERCIAL

## 2020-10-20 ENCOUNTER — HOSPITAL ENCOUNTER (EMERGENCY)
Facility: CLINIC | Age: 45
Discharge: HOME | End: 2020-10-20
Attending: EMERGENCY MEDICINE | Admitting: EMERGENCY MEDICINE
Payer: COMMERCIAL

## 2020-10-20 ENCOUNTER — ANTICOAGULATION THERAPY VISIT (OUTPATIENT)
Dept: ANTICOAGULATION | Facility: CLINIC | Age: 45
End: 2020-10-20

## 2020-10-20 ENCOUNTER — APPOINTMENT (OUTPATIENT)
Dept: CT IMAGING | Facility: CLINIC | Age: 45
End: 2020-10-20
Attending: EMERGENCY MEDICINE
Payer: COMMERCIAL

## 2020-10-20 VITALS
RESPIRATION RATE: 16 BRPM | SYSTOLIC BLOOD PRESSURE: 100 MMHG | DIASTOLIC BLOOD PRESSURE: 79 MMHG | OXYGEN SATURATION: 99 % | HEART RATE: 56 BPM | TEMPERATURE: 97.8 F

## 2020-10-20 DIAGNOSIS — I26.99 PE (PULMONARY THROMBOEMBOLISM) (H): ICD-10-CM

## 2020-10-20 DIAGNOSIS — K70.31 ALCOHOLIC CIRRHOSIS OF LIVER WITH ASCITES (H): ICD-10-CM

## 2020-10-20 DIAGNOSIS — Z79.01 LONG TERM CURRENT USE OF ANTICOAGULANT THERAPY: ICD-10-CM

## 2020-10-20 DIAGNOSIS — I82.409 RECURRENT DEEP VENOUS THROMBOSIS (H): ICD-10-CM

## 2020-10-20 DIAGNOSIS — R10.11 ABDOMINAL PAIN, RIGHT UPPER QUADRANT: ICD-10-CM

## 2020-10-20 DIAGNOSIS — R79.1 SUBTHERAPEUTIC INTERNATIONAL NORMALIZED RATIO (INR): ICD-10-CM

## 2020-10-20 LAB
ALBUMIN SERPL-MCNC: 2.4 G/DL (ref 3.4–5)
ALP SERPL-CCNC: 115 U/L (ref 40–150)
ALT SERPL W P-5'-P-CCNC: 25 U/L (ref 0–50)
ANION GAP SERPL CALCULATED.3IONS-SCNC: 8 MMOL/L (ref 3–14)
AST SERPL W P-5'-P-CCNC: 50 U/L (ref 0–45)
B-HCG FREE SERPL-ACNC: <5 IU/L
BASOPHILS # BLD AUTO: 0.1 10E9/L (ref 0–0.2)
BASOPHILS NFR BLD AUTO: 0.8 %
BILIRUB SERPL-MCNC: 0.8 MG/DL (ref 0.2–1.3)
BUN SERPL-MCNC: 5 MG/DL (ref 7–30)
CALCIUM SERPL-MCNC: 8.9 MG/DL (ref 8.5–10.1)
CHLORIDE SERPL-SCNC: 107 MMOL/L (ref 94–109)
CO2 SERPL-SCNC: 23 MMOL/L (ref 20–32)
CREAT SERPL-MCNC: 0.68 MG/DL (ref 0.52–1.04)
DIFFERENTIAL METHOD BLD: ABNORMAL
EOSINOPHIL # BLD AUTO: 0.2 10E9/L (ref 0–0.7)
EOSINOPHIL NFR BLD AUTO: 2.5 %
ERYTHROCYTE [DISTWIDTH] IN BLOOD BY AUTOMATED COUNT: 18.3 % (ref 10–15)
GFR SERPL CREATININE-BSD FRML MDRD: >90 ML/MIN/{1.73_M2}
GLUCOSE SERPL-MCNC: 96 MG/DL (ref 70–99)
HCT VFR BLD AUTO: 38.8 % (ref 35–47)
HGB BLD-MCNC: 12.7 G/DL (ref 11.7–15.7)
IMM GRANULOCYTES # BLD: 0.1 10E9/L (ref 0–0.4)
IMM GRANULOCYTES NFR BLD: 0.6 %
INR PPP: 1.27 (ref 0.86–1.14)
INR PPP: 1.7 (ref 0.86–1.14)
LIPASE SERPL-CCNC: 197 U/L (ref 73–393)
LYMPHOCYTES # BLD AUTO: 1.8 10E9/L (ref 0.8–5.3)
LYMPHOCYTES NFR BLD AUTO: 22.2 %
MCH RBC QN AUTO: 38.5 PG (ref 26.5–33)
MCHC RBC AUTO-ENTMCNC: 32.7 G/DL (ref 31.5–36.5)
MCV RBC AUTO: 118 FL (ref 78–100)
MONOCYTES # BLD AUTO: 0.8 10E9/L (ref 0–1.3)
MONOCYTES NFR BLD AUTO: 9.9 %
NEUTROPHILS # BLD AUTO: 5.3 10E9/L (ref 1.6–8.3)
NEUTROPHILS NFR BLD AUTO: 64 %
NRBC # BLD AUTO: 0 10*3/UL
NRBC BLD AUTO-RTO: 0 /100
PLATELET # BLD AUTO: 321 10E9/L (ref 150–450)
POTASSIUM SERPL-SCNC: 3.8 MMOL/L (ref 3.4–5.3)
PROT SERPL-MCNC: 7.4 G/DL (ref 6.8–8.8)
RBC # BLD AUTO: 3.3 10E12/L (ref 3.8–5.2)
SODIUM SERPL-SCNC: 138 MMOL/L (ref 133–144)
WBC # BLD AUTO: 8.3 10E9/L (ref 4–11)

## 2020-10-20 PROCEDURE — 85025 COMPLETE CBC W/AUTO DIFF WBC: CPT | Performed by: EMERGENCY MEDICINE

## 2020-10-20 PROCEDURE — 76705 ECHO EXAM OF ABDOMEN: CPT

## 2020-10-20 PROCEDURE — 36415 COLL VENOUS BLD VENIPUNCTURE: CPT | Performed by: PHYSICIAN ASSISTANT

## 2020-10-20 PROCEDURE — 250N000011 HC RX IP 250 OP 636: Performed by: EMERGENCY MEDICINE

## 2020-10-20 PROCEDURE — 36415 COLL VENOUS BLD VENIPUNCTURE: CPT | Performed by: EMERGENCY MEDICINE

## 2020-10-20 PROCEDURE — 250N000009 HC RX 250: Performed by: EMERGENCY MEDICINE

## 2020-10-20 PROCEDURE — 80053 COMPREHEN METABOLIC PANEL: CPT | Performed by: PHYSICIAN ASSISTANT

## 2020-10-20 PROCEDURE — 258N000003 HC RX IP 258 OP 636: Performed by: EMERGENCY MEDICINE

## 2020-10-20 PROCEDURE — 96361 HYDRATE IV INFUSION ADD-ON: CPT

## 2020-10-20 PROCEDURE — 83690 ASSAY OF LIPASE: CPT | Performed by: EMERGENCY MEDICINE

## 2020-10-20 PROCEDURE — 96374 THER/PROPH/DIAG INJ IV PUSH: CPT | Mod: 59

## 2020-10-20 PROCEDURE — 99207 PR NO CHARGE NURSE ONLY: CPT

## 2020-10-20 PROCEDURE — 80053 COMPREHEN METABOLIC PANEL: CPT | Performed by: EMERGENCY MEDICINE

## 2020-10-20 PROCEDURE — 84702 CHORIONIC GONADOTROPIN TEST: CPT

## 2020-10-20 PROCEDURE — 74177 CT ABD & PELVIS W/CONTRAST: CPT

## 2020-10-20 PROCEDURE — 85610 PROTHROMBIN TIME: CPT | Performed by: PHYSICIAN ASSISTANT

## 2020-10-20 PROCEDURE — 96375 TX/PRO/DX INJ NEW DRUG ADDON: CPT

## 2020-10-20 PROCEDURE — 99285 EMERGENCY DEPT VISIT HI MDM: CPT | Mod: 25

## 2020-10-20 PROCEDURE — 85610 PROTHROMBIN TIME: CPT | Performed by: EMERGENCY MEDICINE

## 2020-10-20 RX ORDER — ONDANSETRON 2 MG/ML
8 INJECTION INTRAMUSCULAR; INTRAVENOUS ONCE
Status: COMPLETED | OUTPATIENT
Start: 2020-10-20 | End: 2020-10-20

## 2020-10-20 RX ORDER — HYDROMORPHONE HYDROCHLORIDE 1 MG/ML
0.5 INJECTION, SOLUTION INTRAMUSCULAR; INTRAVENOUS; SUBCUTANEOUS
Status: DISCONTINUED | OUTPATIENT
Start: 2020-10-20 | End: 2020-10-20 | Stop reason: HOSPADM

## 2020-10-20 RX ORDER — ONDANSETRON 4 MG/1
4 TABLET, ORALLY DISINTEGRATING ORAL EVERY 6 HOURS PRN
Qty: 10 TABLET | Refills: 0 | Status: SHIPPED | OUTPATIENT
Start: 2020-10-20 | End: 2020-10-23

## 2020-10-20 RX ORDER — FAMOTIDINE 20 MG/1
20 TABLET, FILM COATED ORAL 2 TIMES DAILY
Qty: 60 TABLET | Refills: 0 | Status: SHIPPED | OUTPATIENT
Start: 2020-10-20 | End: 2020-11-19

## 2020-10-20 RX ORDER — IOPAMIDOL 755 MG/ML
500 INJECTION, SOLUTION INTRAVASCULAR ONCE
Status: COMPLETED | OUTPATIENT
Start: 2020-10-20 | End: 2020-10-20

## 2020-10-20 RX ORDER — OXYCODONE HYDROCHLORIDE 5 MG/1
5 TABLET ORAL EVERY 6 HOURS PRN
Qty: 6 TABLET | Refills: 0 | Status: SHIPPED | OUTPATIENT
Start: 2020-10-20 | End: 2020-10-28

## 2020-10-20 RX ADMIN — SODIUM CHLORIDE 500 ML: 9 INJECTION, SOLUTION INTRAVENOUS at 09:09

## 2020-10-20 RX ADMIN — IOPAMIDOL 85 ML: 755 INJECTION, SOLUTION INTRAVENOUS at 11:57

## 2020-10-20 RX ADMIN — HYDROMORPHONE HYDROCHLORIDE 0.5 MG: 1 INJECTION, SOLUTION INTRAMUSCULAR; INTRAVENOUS; SUBCUTANEOUS at 09:12

## 2020-10-20 RX ADMIN — SODIUM CHLORIDE 61 ML: 9 INJECTION, SOLUTION INTRAVENOUS at 11:57

## 2020-10-20 RX ADMIN — ONDANSETRON 8 MG: 2 INJECTION INTRAMUSCULAR; INTRAVENOUS at 09:12

## 2020-10-20 ASSESSMENT — ENCOUNTER SYMPTOMS
ABDOMINAL PAIN: 1
DYSURIA: 0
NAUSEA: 1
VOMITING: 0

## 2020-10-20 NOTE — DISCHARGE INSTRUCTIONS
When you follow-up with your GI doctor, please tell them about the upper abdominal pain you have been having.  I am concerned that this may represent an ulcer.    Your INR was low today.  If the INR clinic does not call you, I would take 8 mg of Coumadin today and tomorrow then resume your normal schedule.  This will need to be rechecked through your clinic.    Discharge Instructions  Abdominal Pain    Abdominal pain (belly pain) can be caused by many things. Your evaluation today does not show the exact cause for your pain. Your provider today has decided that it is unlikely your pain is due to a life threatening problem, or a problem requiring surgery or hospital admission. Sometimes those problems cannot be found right away, so it is very important that you follow up as directed.  Sometimes only the changes which occur over time allow the cause of your pain to be found.    Generally, every Emergency Department visit should have a follow-up clinic visit with either a primary or a specialty clinic/provider. Please follow-up as instructed by your emergency provider today. With abdominal pain, we often recommend very close follow-up, such as the following day.    ADULTS:  Return to the Emergency Department right away if:    You get an oral temperature above 102oF or as directed by your provider.  You have blood in your stools. This may be bright red or appear as black, tarry stools.    You keep vomiting (throwing up) or cannot drink liquids.  You see blood when you vomit.   You cannot have a bowel movement or you cannot pass gas.  Your stomach gets bloated or bigger.  Your skin or the whites of your eyes look yellow.  You faint.  You have bloody, frequent or painful urination (peeing).  You have new symptoms or anything that worries you.    CHILDREN:  Return to the Emergency Department right away if your child has any of the above-listed symptoms or the following:    Pushes your hand away or screams/cries when  his/her belly is touched.  You notice your child is very fussy or weak.  Your child is very tired and is too tired to eat or drink.  Your child is dehydrated.  Signs of dehydration can be:  Significant change in the amount of wet diapers/urine.  Your infant or child starts to have dry mouth and lips, or no saliva (spit) or tears.    PREGNANT WOMEN:  Return to the Emergency Department right away if you have any of the above-listed symptoms or the following:    You have bleeding, leaking fluid or passing tissue from the vagina.  You have worse pain or cramping, or pain in your shoulder or back.  You have vomiting that will not stop.  You have a temperature of 100oF or more.  Your baby is not moving as much as usual.  You faint.  You get a bad headache with or without eye problems and abdominal pain.  You have a seizure.  You have unusual discharge from your vagina and abdominal pain.    Abdominal pain is pretty common during pregnancy.  Your pain may or may not be related to your pregnancy. You should follow-up closely with your OB provider so they can evaluate you and your baby.  Until you follow-up with your regular provider, do the following:     Avoid sex and do not put anything in your vagina.  Drink clear fluids.  Only take medications approved by your provider.    MORE INFORMATION:    Appendicitis:  A possible cause of abdominal pain in any person who still has their appendix is acute appendicitis. Appendicitis is often hard to diagnose.  Testing does not always rule out early appendicitis or other causes of abdominal pain. Close follow-up with your provider and re-evaluations may be needed to figure out the reason for your abdominal pain.    Follow-up:  It is very important that you make an appointment with your clinic and go to the appointment.  If you do not follow-up with your primary provider, it may result in missing an important development which could result in permanent injury or disability and/or  "lasting pain.  If there is any problem keeping your appointment, call your provider or return to the Emergency Department.    Medications:  Take your medications as directed by your provider today.  Before using over-the-counter medications, ask your provider and make sure to take the medications as directed.  If you have any questions about medications, ask your provider.    Diet:  Resume your normal diet as much as possible, but do not eat fried, fatty or spicy foods while you have pain.  Do not drink alcohol or have caffeine.  Do not smoke tobacco.    Probiotics: If you have been given an antibiotic, you may want to also take a probiotic pill or eat yogurt with live cultures. Probiotics have \"good bacteria\" to help your intestines stay healthy. Studies have shown that probiotics help prevent diarrhea (loose stools) and other intestine problems (including C. diff infection) when you take antibiotics. You can buy these without a prescription in the pharmacy section of the store.     If you were given a prescription for medicine here today, be sure to read all of the information (including the package insert) that comes with your prescription.  This will include important information about the medicine, its side effects, and any warnings that you need to know about.  The pharmacist who fills the prescription can provide more information and answer questions you may have about the medicine.  If you have questions or concerns that the pharmacist cannot address, please call or return to the Emergency Department.       Remember that you can always come back to the Emergency Department if you are not able to see your regular provider in the amount of time listed above, if you get any new symptoms, or if there is anything that worries you.    "

## 2020-10-20 NOTE — ED PROVIDER NOTES
History     Chief Complaint:  Abdominal Pain    HPI   Nelsy Cota, a current every day smoker, is an anticoagulated 45 year old female with a history of pancreatitis and ascites who presents with abdominal pain. The patient reports she was doing well 2 days ago after discharge from [facility] for pancreatitis believed to be secondary to gallstone pancreatitis.  This morning around 0200 she gradually developed recurrent abdominal pain near her belly button radiating up into her epigastric area. She describes her pain as a sharp pain and states is constant. She notes she was given Dilaudid in the hospital but switched to Tylenol once discharged. The pain feels similar to her recent pancreatitis. There are no alleviating or aggravating factors to her pain. She endorses nausea but no vomiting. She denies fever, dysuria, vaginal bleeding, and vaginal discharge. She denies any recent abdominal surgeries. Of note, she has been sober since 9/23.     Allergies:  No known drug allergies      Medications:    Lasix   Spironolactone   Warfarin     Past Medical History:    Acute pancreatitis  Ascites  Macrocytosis  Macrocytic  DVT  Tobacco use disorder  PE  Convulsions  Epilepsy     Past Surgical History:    HC closed TX calcaneal FX w/o manipulation  HC colp cervix/upper vaginal  Right ankle fracture repair  Right ankle surgery for ligament injury, also tibia injury     Family History:    Hypertension   Epilepsy     Social History:  Smoking status- current every day smoker  Alcohol use- yes - sober since 9/23/20  Drug use- no   Marital Status:       Review of Systems   Gastrointestinal: Positive for abdominal pain and nausea. Negative for vomiting.   Genitourinary: Negative for dysuria, vaginal bleeding and vaginal discharge.   All other systems reviewed and are negative.    Physical Exam     Patient Vitals for the past 24 hrs:   BP Temp Temp src Pulse Resp SpO2   10/20/20 1000 116/73 -- -- 76 -- 95 %    10/20/20 0955 -- -- -- -- -- 94 %   10/20/20 0945 116/82 -- -- 71 -- --   10/20/20 0930 119/79 -- -- 76 -- 95 %   10/20/20 0915 129/84 -- -- 73 -- 100 %   10/20/20 0900 132/82 -- -- -- -- 99 %   10/20/20 0837 133/84 97.8  F (36.6  C) Oral 58 16 100 %     Physical Exam    Constitutional:  Pleasant,  female  Eyes:    Conjunctiva normal  Neck:    Supple, no meningismus.     CV:     Regular rate and rhythm.      No murmurs, rubs or gallops.     No lower extremity edema.  PULM:    Clear to auscultation bilateral.       No respiratory distress.      Good air exchange.  ABD:    Soft, non-distended.       Moderate-severe tenderness throughout the abdomen.     Bowel sounds normal.     No pulsatile masses.       No rebound, guarding or rigidity.     No CVA tenderness.   MSK:     No gross deformity to all four extremities.   LYMPH:   No cervical lymphadenopathy.  NEURO:   Alert.       Speech is clear; no tremor  Skin:    Warm, dry and intact.    Psych:    Mood is good and affect is appropriate.    Emergency Department Course     Imaging:  Radiology findings were communicated with the patient who voiced understanding of the findings.    US Abdomen limited (RUQ)  1. Small nonshadowing gallstone versus polyp.  2. Gallbladder distention without gallbladder wall thickening.  3. Mild prominence of the common bile duct.  4. Hepatic diffuse coarsened increased echotexture.  5. Mild ascites.  Reading per radiology    CT Abdomen/Pelvis w Contrast:  1. Marked hepatic heterogeneous density and enlargement. This could be  related to a combination of hepatic fatty infiltration and/or  hepatitis. The appearance is essentially unchanged.  2. Mild to moderate peritoneal fluid or ascites.  3. 0.4 cm calcification in the pancreatic head, unchanged.  Possibilities include distal common bile duct stone. However, this is  anterior to the expected position of the common bile duct.  4. Marked distention of the gallbladder, unchanged.  5.  Suspected superior mesenteric vein thrombus, unchanged.  6. Bilateral complex ovarian cysts surrounded by peritoneal fluid.  As read by Radiology.    Laboratory:  Laboratory findings were communicated with the patient who voiced understanding of the findings.    CBC: RBC Count 3.30 (L),  (H), MCH 38.5 (H), RDW 18.3 (H), o/w WNL (WBC 8.3, HGB 12.7, )  CMP: Urea Nitrogen: 5 (L), Albumin: 2.4 (L), AST: 50 (H), o/w WNL (Creatinine: 0.68)    INR: 1.27 (H)    Lipase: 197    ISTAT HCG Quantitative Pregnancy POCT: <5.0      Interventions:  0909 NS 1L IV Bolus     0912 Dilaudid 0.5 mg IV     0912 Zofran 8 mg IV     Emergency Department Course:  Past medical records, nursing notes, and vitals reviewed.    0842 I performed an exam of the patient as documented above.     0917 IV was inserted and blood was drawn for laboratory testing, results above.    1030 The patient was sent for a US abdomen while in the emergency department, results above.     1233 I rechecked the patient and discussed the results of the ED workup thus far.     Findings and plan explained to the Patient. Patient discharged home with instructions regarding supportive care, medications, and reasons to return. The importance of close follow-up was reviewed.     Impression & Plan       Medical Decision Makin-year-old female with recent admission for gallstone pancreatitis presented to the ED with recurrent epigastric and right upper quadrant pain.  Laboratory studies have ruled out pancreatitis.  RUQ ultrasound reveals no evidence to suggest cholecystitis or choledocholithiasis.  There is a single gallstone visualized and not consistent with the level of her pain.  CT scan was undertaken and unremarkable.  She later revealed this pain has been present for 6 to 9 months and may represent peptic ulcer disease or alcoholic induced gastritis.  Patient initiated on H2 blocker.  She may benefit from upper endoscopy and is scheduled to follow-up  with [facility] in 8 days.  Return to the ED for any worsening symptoms.    Diagnosis:    ICD-10-CM   1. Abdominal pain, right upper quadrant  R10.11   2. Subtherapeutic international normalized ratio (INR)  R79.1     Disposition:  Discharged to home.    Discharge Medications:  New Prescriptions    FAMOTIDINE (PEPCID) 20 MG TABLET    Take 1 tablet (20 mg) by mouth 2 times daily    ONDANSETRON (ZOFRAN ODT) 4 MG ODT TAB    Take 1 tablet (4 mg) by mouth every 6 hours as needed for nausea    OXYCODONE (ROXICODONE) 5 MG TABLET    Take 1 tablet (5 mg) by mouth every 6 hours as needed for pain     Scribe Disclosure:  IJolly, am serving as a scribe at 8:42 AM on 10/20/2020 to document services personally performed by Franco Arnett MD based on my observations and the provider's statements to me.        Franco Arnett MD  10/20/20 1523

## 2020-10-20 NOTE — LETTER
October 20, 2020      To Whom It May Concern:      Nelsy Cota was seen in our Emergency Department today, 10/20/20.  I expect her condition to improve over the next 2-3 days.  She may return to work on 10/21/2020.    Sincerely,        Carmella Siegel RN

## 2020-10-20 NOTE — ED TRIAGE NOTES
Patient comes in for evaluation of abdominal pain which started at 0200 today. Patient had a hospitalization last week for ETOH cirrhosis, pancreatitis and cholecystitis. States the pain is back and feels very similar. ABCs intact.

## 2020-10-20 NOTE — PROGRESS NOTES
ANTICOAGULATION FOLLOW-UP CLINIC VISIT    Patient Name:  Nelsy Cota  Date:  10/20/2020  Contact Type:  Telephone/ discussed with patient    SUBJECTIVE:  Patient Findings     Positives:  Missed doses (dose was missed on Friday.  Patient was admitted at around 8:00 PM on Friday and she normally takes her warfarin around 9:00.), Hospital admission (admitted for pancreatitis and gall stones.  ED visit again today for abdominal pain.  She reports that she is feeling better now.)    Comments:  The patient was assessed for diet, medication, and activity level changes, extra doses, bruising or bleeding, with no problem findings.  Dosing discussed with Lelia Perdomo Pharmacist.          Clinical Outcomes     Negatives:  Major bleeding event, Thromboembolic event, Anticoagulation-related hospital admission, Anticoagulation-related ED visit, Anticoagulation-related fatality    Comments:  The patient was assessed for diet, medication, and activity level changes, extra doses, bruising or bleeding, with no problem findings.  Dosing discussed with Lelia Perdomo Pharmacist.             OBJECTIVE    Recent labs: (last 7 days)     10/20/20  0917   INR 1.27*       ASSESSMENT / PLAN  INR assessment SUB    Recheck INR In: 3 DAYS    INR Location Clinic      Anticoagulation Summary  As of 10/20/2020    INR goal:  2.0-3.0   TTR:  27.5 % (11.7 mo)   INR used for dosin.27 (10/20/2020)   Warfarin maintenance plan:  8 mg (2 mg x 4) every Mon, Fri; 6 mg (2 mg x 3) all other days   Full warfarin instructions:  10/20: 10 mg; Otherwise 8 mg every Mon, Fri; 6 mg all other days   Weekly warfarin total:  46 mg   Plan last modified:  Vania Morse RN (10/20/2020)   Next INR check:  10/23/2020   Priority:  High   Target end date:  Indefinite    Indications    Long-term (current) use of anticoagulants [Z79.01] [Z79.01]  PE (pulmonary thromboembolism) (H) [I26.99]  Recurrent deep venous thrombosis (H) [I82.409]              Anticoagulation Episode Summary     INR check location:      Preferred lab:      Send INR reminders to:  IMANAG BURNSTOMER SMITH    Comments:  INR Referral renewed, see 8/26/20 telephone encounter.      Anticoagulation Care Providers     Provider Role Specialty Phone number    Alyse Faye MD Referring Internal Medicine [phone]    Hamzah Thomas MD Responsible Internal Medicine [phone]            See the Encounter Report to view Anticoagulation Flowsheet and Dosing Calendar (Go to Encounters tab in chart review, and find the Anticoagulation Therapy Visit)    Dosage adjustment made based on physician directed care plan.    Vania Morse RN

## 2020-10-20 NOTE — PROGRESS NOTES
Dose reviewed with ACC RN, agree with plan for ~20% dose increase, based on 4 day total to get to INR in therapeutic range.  Also advise boost.  Need to be alert for INR discrepancy of POCT to venous based on INR results in clinic and ED today.  If starts to have supratherapeutic INR on POCT, suggest checking venous level before aggressive holds.    Lelia Perdomo, PharmD BCACP  Anticoagulation Clinical Pharmacist

## 2020-10-20 NOTE — ED AVS SNAPSHOT
[facility] Emergency Dept  [address]  [facility] [address]  Phone: [phone]  Fax: [phone]                                    Nelsy Cota   MRN: [id number]    Department: [facility] Emergency Dept   Date of Visit: 10/20/2020           After Visit Summary Signature Page    I have received my discharge instructions, and my questions have been answered. I have discussed any challenges I see with this plan with the nurse or doctor.    ..........................................................................................................................................  Patient/Patient Representative Signature      ..........................................................................................................................................  Patient Representative Print Name and Relationship to Patient    ..................................................               ................................................  Date                                   Time    ..........................................................................................................................................  Reviewed by Signature/Title    ...................................................              ..............................................  Date                                               Time          22EPIC Rev 08/18

## 2020-10-21 LAB
ALBUMIN SERPL-MCNC: 2.6 G/DL (ref 3.4–5)
ALP SERPL-CCNC: 115 U/L (ref 40–150)
ALT SERPL W P-5'-P-CCNC: 26 U/L (ref 0–50)
ANION GAP SERPL CALCULATED.3IONS-SCNC: 9 MMOL/L (ref 3–14)
AST SERPL W P-5'-P-CCNC: 50 U/L (ref 0–45)
BILIRUB SERPL-MCNC: 0.7 MG/DL (ref 0.2–1.3)
BUN SERPL-MCNC: 7 MG/DL (ref 7–30)
CALCIUM SERPL-MCNC: 9.7 MG/DL (ref 8.5–10.1)
CHLORIDE SERPL-SCNC: 107 MMOL/L (ref 94–109)
CO2 SERPL-SCNC: 23 MMOL/L (ref 20–32)
CREAT SERPL-MCNC: 0.71 MG/DL (ref 0.52–1.04)
GFR SERPL CREATININE-BSD FRML MDRD: >90 ML/MIN/{1.73_M2}
GLUCOSE SERPL-MCNC: 73 MG/DL (ref 70–99)
POTASSIUM SERPL-SCNC: 3.8 MMOL/L (ref 3.4–5.3)
PROT SERPL-MCNC: 7.2 G/DL (ref 6.8–8.8)
SODIUM SERPL-SCNC: 139 MMOL/L (ref 133–144)

## 2020-10-22 NOTE — TELEPHONE ENCOUNTER
"Patient needing a hosp f/u appointment next week.  No appointments available with any provider that would work for patient.  Patient needs an early am or late in the day appointment as she has no PTO left and can't leave work.  Please advise where patient can be worked in.  Patient not wanting a virtual appointment.        Hospital/TCU/ED for chronic condition Discharge Protocol    \"Hi, my name is Kaylynn Garner RN, a registered nurse, and I am calling from [facility].  I am calling to follow up and see how things are going for you after your recent emergency visit/hospital/TCU stay.\"    Tell me how you are doing now that you are home?\" im doing pretty good.        Discharge Instructions    \"Let's review your discharge instructions.  What is/are the follow-up recommendations?  Pt. Response: follow up with primary care provider with in one week    \"Has an appointment with your primary care provider been scheduled?\"   no appointments available in the next week that would work for pt.  pt would like an early am appt or an appt after 5.      \"When you see the provider, I would recommend that you bring your medications with you.\"    Medications    \"Tell me what changed about your medicines when you discharged?\"    Changes to chronic meds?    0-1    \"What questions do you have about your medications?\"    None     New diagnoses of heart failure, COPD, diabetes, or MI?    No          On warfarin: \"Were you given any recommendations for follow-up with the anticoagulation clinic?\" Yes - Anticoagulation clinic appointment is already scheduled at appropriate interval    Post Discharge Medication Reconciliation Status: discharge medications reconciled and changed, per note/orders.    Was MTM referral placed (*Make sure to put transitions as reason for referral)?   No    Call Summary    \"What questions or concerns do you have about your recent visit and your follow-up care?\"     none    \"If you have questions or things " "don't continue to improve, we encourage you contact us through the main clinic number (give number).  Even if the clinic is not open, triage nurses are available 24/7 to help you.     We would like you to know that our clinic has extended hours (provide information).  We also have urgent care (provide details on closest location and hours/contact info)\"      \"Thank you for your time and take care!\"           "

## 2020-10-23 NOTE — TELEPHONE ENCOUNTER
I do not have any clinic openings in the next week, I am working virtually next week,  Other providers in our clinic  have clinic visit openings next week. Please schedule with any other providers or other [place] clinics which can accommodate pts timings.

## 2020-10-23 NOTE — TELEPHONE ENCOUNTER
Called patient and assisted in scheduling an appointment with an open provider for next week.    Next 5 appointments (look out 90 days)    Oct 28, 2020  5:40 PM  SHORT with Jorge Rosado MD  [facility] ([facility]) [address]  [facility] [id number] [phone]

## 2020-10-27 ENCOUNTER — ANTICOAGULATION THERAPY VISIT (OUTPATIENT)
Dept: ANTICOAGULATION | Facility: CLINIC | Age: 45
End: 2020-10-27

## 2020-10-27 DIAGNOSIS — Z79.01 LONG TERM CURRENT USE OF ANTICOAGULANT THERAPY: ICD-10-CM

## 2020-10-27 DIAGNOSIS — I26.99 PE (PULMONARY THROMBOEMBOLISM) (H): ICD-10-CM

## 2020-10-27 DIAGNOSIS — I82.409 RECURRENT DEEP VENOUS THROMBOSIS (H): ICD-10-CM

## 2020-10-27 LAB
CAPILLARY BLOOD COLLECTION: NORMAL
INR PPP: 1.8 (ref 0.86–1.14)

## 2020-10-27 PROCEDURE — 36416 COLLJ CAPILLARY BLOOD SPEC: CPT | Performed by: INTERNAL MEDICINE

## 2020-10-27 PROCEDURE — 85610 PROTHROMBIN TIME: CPT | Performed by: INTERNAL MEDICINE

## 2020-10-27 PROCEDURE — 99207 PR NO CHARGE NURSE ONLY: CPT

## 2020-10-27 NOTE — PROGRESS NOTES
ANTICOAGULATION FOLLOW-UP CLINIC VISIT    Patient Name:  Nelsy Cota  Date:  10/27/2020  Contact Type:  Telephone/ discussed with patient    SUBJECTIVE:  Patient Findings     Positives:  Missed doses (Missed dose on Friday 10/23.  Also took less medication on Sat and .)    Comments:  Patient was out of town over the weekend so she missed an INR check on 10/23 and took a lower dose than would have been recommended.  Boost given today since patient had lower doses recently and INR may dip lower due to that.  Then will resume the maintenance dose set last week.  The patient was assessed for diet, medication, and activity level changes, extra doses, bruising or bleeding, with no problem findings.          Clinical Outcomes     Negatives:  Major bleeding event, Thromboembolic event, Anticoagulation-related hospital admission, Anticoagulation-related ED visit, Anticoagulation-related fatality    Comments:  Patient was out of town over the weekend so she missed an INR check on 10/23 and took a lower dose than would have been recommended.  Boost given today since patient had lower doses recently and INR may dip lower due to that.  Then will resume the maintenance dose set last week.  The patient was assessed for diet, medication, and activity level changes, extra doses, bruising or bleeding, with no problem findings.             OBJECTIVE    Recent labs: (last 7 days)     10/27/20  0745   INR 1.80*       ASSESSMENT / PLAN  INR assessment SUB    Recheck INR In: 1 WEEK    INR Location Clinic      Anticoagulation Summary  As of 10/27/2020    INR goal:  2.0-3.0   TTR:  27.5 % (11.7 mo)   INR used for dosin.80 (10/27/2020)   Warfarin maintenance plan:  8 mg (2 mg x 4) every Mon, Fri; 6 mg (2 mg x 3) all other days   Full warfarin instructions:  10/27: 10 mg; Otherwise 8 mg every Mon, Fri; 6 mg all other days   Weekly warfarin total:  46 mg   Plan last modified:  Vania Morse RN (10/20/2020)   Next INR check:   11/3/2020   Priority:  High   Target end date:  Indefinite    Indications    Long-term (current) use of anticoagulants [Z79.01] [Z79.01]  PE (pulmonary thromboembolism) (H) [I26.99]  Recurrent deep venous thrombosis (H) [I82.409]             Anticoagulation Episode Summary     INR check location:      Preferred lab:      Send INR reminders to:  ULISES SMITH    Comments:  INR Referral renewed, see 8/26/20 telephone encounter.      Anticoagulation Care Providers     Provider Role Specialty Phone number    Alyse Faye MD Referring Internal Medicine [phone]    Hamzah Thomas MD Responsible Internal Medicine [phone]            See the Encounter Report to view Anticoagulation Flowsheet and Dosing Calendar (Go to Encounters tab in chart review, and find the Anticoagulation Therapy Visit)    Dosage adjustment made based on physician directed care plan.    Vania Morse RN

## 2020-10-28 ENCOUNTER — OFFICE VISIT (OUTPATIENT)
Dept: INTERNAL MEDICINE | Facility: CLINIC | Age: 45
End: 2020-10-28
Payer: COMMERCIAL

## 2020-10-28 ENCOUNTER — TRANSFERRED RECORDS (OUTPATIENT)
Dept: HEALTH INFORMATION MANAGEMENT | Facility: CLINIC | Age: 45
End: 2020-10-28

## 2020-10-28 VITALS
TEMPERATURE: 98.5 F | HEIGHT: 64 IN | DIASTOLIC BLOOD PRESSURE: 62 MMHG | BODY MASS INDEX: 26.29 KG/M2 | OXYGEN SATURATION: 98 % | SYSTOLIC BLOOD PRESSURE: 100 MMHG | HEART RATE: 110 BPM | RESPIRATION RATE: 14 BRPM | WEIGHT: 154 LBS

## 2020-10-28 DIAGNOSIS — N83.292 COMPLEX CYST OF BOTH OVARIES: ICD-10-CM

## 2020-10-28 DIAGNOSIS — Z09 HOSPITAL DISCHARGE FOLLOW-UP: Primary | ICD-10-CM

## 2020-10-28 DIAGNOSIS — K29.50 CHRONIC GASTRITIS, PRESENCE OF BLEEDING UNSPECIFIED, UNSPECIFIED GASTRITIS TYPE: ICD-10-CM

## 2020-10-28 DIAGNOSIS — K70.31 ASCITES DUE TO ALCOHOLIC CIRRHOSIS (H): ICD-10-CM

## 2020-10-28 DIAGNOSIS — N83.291 COMPLEX CYST OF BOTH OVARIES: ICD-10-CM

## 2020-10-28 PROCEDURE — 99214 OFFICE O/P EST MOD 30 MIN: CPT | Performed by: INTERNAL MEDICINE

## 2020-10-28 ASSESSMENT — ENCOUNTER SYMPTOMS
ABDOMINAL PAIN: 0
HEARTBURN: 0
PALPITATIONS: 0
VOMITING: 0
NAUSEA: 0
SHORTNESS OF BREATH: 0

## 2020-10-28 ASSESSMENT — MIFFLIN-ST. JEOR: SCORE: 1328.54

## 2020-10-28 NOTE — PATIENT INSTRUCTIONS
Patient Education     Cirrhosis    The liver is found on the right side of your belly (abdomen). It is just below the rib cage. The liver has many important jobs. It removes toxins from the blood. It also helps your blood clot to stop bleeding. Cirrhosis happens when the liver is scarred or injured. This damage is permanent. It can cause your liver to stop working (liver failure).   The most common causes of cirrhosis are long-term heavy alcohol use and having hepatitis B or C. Other causes include nonalcoholic steatohepatitis (SORTO or fatty liver disease), hemochromatosis, toxins, certain medicines, and certain viruses.  Common symptoms of cirrhosis include:    Tiredness or weakness    Loss of appetite    Nausea and vomiting    Easy bleeding and bruising    Swelling of the belly (abdomen)    Weight loss    Yellowing of the eyes or skin (jaundice)    Itching    Confusion  Treatment helps ease symptoms and prevent more liver damage. You may also get treatment to fight the hepatitis virus. Quitting alcohol will help slow down the disease getting worse. It may also prevent more complications. If cirrhosis gets worse and becomes life threatening, you may need a liver transplant.   Home care    Don't take medicines that can make liver damage worse. Your healthcare provider will tell you if any of the medicines you now take need to be changed. Talk with your provider or pharmacist before taking any medicine not prescribed. These include dietary supplements and herbs. Some of these may make liver damage worse.    Talk with your healthcare provider about medicines that have acetaminophen or NSAIDs such as ibuprofen and naproxen. These can also harm your liver.     Stop drinking alcohol. If you find it hard to stop drinking, seek professional help. Consider joining Alcoholics Anonymous or another type of treatment program for support.    If you use IV drugs, you are at high risk for hepatitis B and C. Seek help to  stop.     Be sure to ask your healthcare provider about recommended vaccines. These include vaccines for viruses that can cause liver disease.  Follow-up care  Follow up with your healthcare provider, or as advised.  For more information and to learn about support groups for people with liver disease, contact:    [employer], www.liverfoundation.org, 115.186.8805    [employer], www.hepfi.org, 260.903.7430  When to seek medical advice  Call your healthcare provider right away if you have any of the following:    Rapid weight gain with increased size of your belly (abdomen) or leg swelling    Yellow color of your skin or eyes (jaundice) gets worse    Excess bleeding from cuts or injuries  [employer] last reviewed this educational content on 6/1/2017 2000-2020 [employer]. [address] [place] [address], [place] [address]. All rights reserved. This information is not intended as a substitute for professional medical care. Always follow your healthcare professional's instructions.

## 2020-10-28 NOTE — PROGRESS NOTES
Subjective     Nelsy Cota is a 45 year old female who presents to clinic today for the following health issues:    HPI         ED/UC Followup:    Facility:  [facility] ED  Date of visit: 10/20/2020  Reason for visit: abdominal pain  Current Status: improved     Patient went to ER on 10/20/2020 with symptoms of abdominal pain and nausea.  Medical history is significant for cirrhosis, pancreatitis.  Prior to that patient was in ER on 10/16/2020 and was in the hospital for 2 days.  Patient was sent home on Lasix, Aldactone.    Vitals are within normal limit.  Ultrasound of the abdomen showed a small nonshadowing gallstone versus polyp gallbladder distention without gallbladder wall thickening, hepatic diffuse coarsened increased echotexture, mild ascites.  CT of the abdomen pelvis with contrast showed changes in the liver related to hepatic fatty liver or hepatitis.  Mild to moderate peritoneal fluid, 0.5 cm calcification in the pancreatic head which is unchanged.  Also had bilateral complex ovarian cyst surrounded by peritoneal fluid.  Labs showed low white count, elevated MCV, hemoglobin around 12.  Mildly elevated AST.  Lipase was 197.    Due to chronic nature of the pain patient was treated for possible peptic ulcer disease versus alcohol induced gastritis and was sent home on H2 blockers.  Also advised possible endoscopy and GI referral.  She was also given lasix and feels better with the medicine losing weight.    Quit drinking since 23rd October.  Feels a lot better with the diuretics.  Upper endoscopy scheduled with GI.  Patient also had imaging done few years ago for ovarian cyst was advised to monitor.    Review of Systems   Respiratory: Negative for shortness of breath.    Cardiovascular: Negative for chest pain and palpitations.   Gastrointestinal: Negative for abdominal pain, heartburn, nausea and vomiting.          Objective    /62   Pulse 110   Temp 98.5  F (36.9  C) (Oral)   Resp 14  "  Ht 1.626 m (5' 4\")   Wt 69.9 kg (154 lb)   LMP  (LMP Unknown)   SpO2 98%   Breastfeeding No   BMI 26.43 kg/m    Body mass index is 26.43 kg/m .  Physical Exam  Vitals signs reviewed.   Constitutional:       Appearance: Normal appearance.   HENT:      Head: Normocephalic and atraumatic.   Cardiovascular:      Rate and Rhythm: Normal rate.      Heart sounds: No murmur.   Pulmonary:      Effort: Pulmonary effort is normal.      Breath sounds: No wheezing.   Abdominal:      General: There is distension.      Palpations: Abdomen is soft.      Tenderness: There is no abdominal tenderness.   Musculoskeletal:      Comments: 1+ edema noted in bilateral lower extremity.   Neurological:      General: No focal deficit present.      Mental Status: She is alert.   Psychiatric:         Mood and Affect: Mood normal.            Assessment & Plan     Nelsy was seen today for er f/u.    Diagnoses and all orders for this visit:    Hospital discharge follow-up    Chronic gastritis, presence of bleeding unspecified, unspecified gastritis type    Ascites due to alcoholic cirrhosis (H)  -     Basic metabolic panel  (Ca, Cl, CO2, Creat, Gluc, K, Na, BUN)    Complex cyst of both ovaries  -     US Pelvic Complete with Transvaginal; Future    Continue Lasix and Aldactone.  Will check the kidney function.  Advised to follow-up with GI.  Ultrasound of the ovaries placed to follow-up on the complex ovarian cyst.    Return if symptoms worsen or fail to improve.    Jorge Rosado MD  [facility]      "

## 2020-11-03 ENCOUNTER — ANTICOAGULATION THERAPY VISIT (OUTPATIENT)
Dept: ANTICOAGULATION | Facility: CLINIC | Age: 45
End: 2020-11-03

## 2020-11-03 ENCOUNTER — TELEPHONE (OUTPATIENT)
Dept: INTERNAL MEDICINE | Facility: CLINIC | Age: 45
End: 2020-11-03

## 2020-11-03 DIAGNOSIS — Z79.01 LONG TERM CURRENT USE OF ANTICOAGULANT THERAPY: ICD-10-CM

## 2020-11-03 DIAGNOSIS — I82.409 RECURRENT DEEP VENOUS THROMBOSIS (H): ICD-10-CM

## 2020-11-03 DIAGNOSIS — I26.99 PE (PULMONARY THROMBOEMBOLISM) (H): Primary | ICD-10-CM

## 2020-11-03 DIAGNOSIS — I26.99 PE (PULMONARY THROMBOEMBOLISM) (H): ICD-10-CM

## 2020-11-03 DIAGNOSIS — K70.31 ASCITES DUE TO ALCOHOLIC CIRRHOSIS (H): ICD-10-CM

## 2020-11-03 LAB
ANION GAP SERPL CALCULATED.3IONS-SCNC: 5 MMOL/L (ref 3–14)
BUN SERPL-MCNC: 12 MG/DL (ref 7–30)
CALCIUM SERPL-MCNC: 10.3 MG/DL (ref 8.5–10.1)
CHLORIDE SERPL-SCNC: 104 MMOL/L (ref 94–109)
CO2 SERPL-SCNC: 26 MMOL/L (ref 20–32)
CREAT SERPL-MCNC: 0.75 MG/DL (ref 0.52–1.04)
GFR SERPL CREATININE-BSD FRML MDRD: >90 ML/MIN/{1.73_M2}
GLUCOSE SERPL-MCNC: 96 MG/DL (ref 70–99)
INR PPP: 1.4 (ref 0.86–1.14)
POTASSIUM SERPL-SCNC: 4.3 MMOL/L (ref 3.4–5.3)
SODIUM SERPL-SCNC: 135 MMOL/L (ref 133–144)

## 2020-11-03 PROCEDURE — 99207 PR NO CHARGE NURSE ONLY: CPT | Performed by: INTERNAL MEDICINE

## 2020-11-03 PROCEDURE — 85610 PROTHROMBIN TIME: CPT | Performed by: INTERNAL MEDICINE

## 2020-11-03 PROCEDURE — 80048 BASIC METABOLIC PNL TOTAL CA: CPT | Performed by: INTERNAL MEDICINE

## 2020-11-03 NOTE — PROGRESS NOTES
"ANTICOAGULATION FOLLOW-UP CLINIC VISIT    Patient Name:  Nelsy Cota  Date:  11/3/2020  Contact Type:  Telephone/ Called patient, she reports a medication and diet changes, she denies missing any warfarin doses. Orders discussed with the patient, she agrees with plan and read back the dosing instruction. Lab INR appointment scheduled on 11/10/20.    SUBJECTIVE:  Patient Findings     Positives:  Change in alcohol use (Had a \"few\" beers during the [employer] game), Change in medications (Patient began taking Centrum 10/30/20 per recommendation of GI), Change in diet/appetite (Discussed consistency of green veggie intake, had a salad last night.)    Comments:  The patient was assessed for diet and activity level changes, missed or extra doses, bruising or bleeding, with no problem findings. INR continues to be subtherapeutic, patient denies missing any warfarin doses, warfarin boost given today and maintenance dose increased by 4.3% (2 mg) today.          Clinical Outcomes     Comments:  The patient was assessed for diet and activity level changes, missed or extra doses, bruising or bleeding, with no problem findings. INR continues to be subtherapeutic, patient denies missing any warfarin doses, warfarin boost given today and maintenance dose increased by 4.3% (2 mg) today.             OBJECTIVE    Recent labs: (last 7 days)     20  0748   INR 1.40*       ASSESSMENT / PLAN  INR assessment SUB    Recheck INR In: 1 WEEK    INR Location Outside lab      Anticoagulation Summary  As of 11/3/2020    INR goal:  2.0-3.0   TTR:  27.2 % (11.7 mo)   INR used for dosin.40 (11/3/2020)   Warfarin maintenance plan:  8 mg (2 mg x 4) every Mon, Wed, Fri; 6 mg (2 mg x 3) all other days   Full warfarin instructions:  11/3: 10 mg; Otherwise 8 mg every Mon, Wed, Fri; 6 mg all other days   Weekly warfarin total:  48 mg   Plan last modified:  Latosha Perdomo RN (11/3/2020)   Next INR check:  11/10/2020   Priority:  High "   Target end date:  Indefinite    Indications    Long-term (current) use of anticoagulants [Z79.01] [Z79.01]  PE (pulmonary thromboembolism) (H) [I26.99]  Recurrent deep venous thrombosis (H) [I82.409]             Anticoagulation Episode Summary     INR check location:      Preferred lab:      Send INR reminders to:  [facility]    Comments:  INR Referral renewed, see 8/26/20 telephone encounter.      Anticoagulation Care Providers     Provider Role Specialty Phone number    Alyse Faye MD Referring Internal Medicine [phone]    Hamzah Thomas MD Responsible Internal Medicine [phone]            See the Encounter Report to view Anticoagulation Flowsheet and Dosing Calendar (Go to Encounters tab in chart review, and find the Anticoagulation Therapy Visit)    Dosage adjustment made based on physician directed care plan.    Latosha Perdomo RN

## 2020-11-03 NOTE — TELEPHONE ENCOUNTER
[facility] calling for hold and bridge orders for endoscopy procedure on 11/25/2020  Ok to call and lm [phone]    They are requesting a 4 day Coumadin hold

## 2020-11-04 NOTE — TELEPHONE ENCOUNTER
Message sent to [facility] Pharmacist for review.  Latosha Perdomo RN'

## 2020-11-04 NOTE — TELEPHONE ENCOUNTER
I have not seen this pt since more than 1yr.  Ok to hold warfarin 4 days prior to endoscopy and bridge with lovenox per pts weight. Last wt 154 lb( 70 kg) . lovenox 1mg /kg bid , forwarded to INR

## 2020-11-05 NOTE — TELEPHONE ENCOUNTER
[facility]-PROCEDURAL ANTICOAGULATION  MANAGEMENT    Assessment     Warfarin interruption plan for colonoscopy on 2020.    DVT and PE      Risk stratification for thromboembolism: moderate (2018 American Society of Hematology guideline)      PE & DVT 2018     Recurrent DVT 2019    VTE: 2016 Anticoagulation Forum clinical guidance recommends, no bridge therapy for most VTE patients interrupting warfarin with possible exceptions for VTE within previous month, prior hx recurrent VTE during anticoagulation therapy interruption, or undergoing a procedure with high for VTE  (joint replacement surgery or major abdominal cancer resection)    Plan       Pre-Procedure:    Hold warfarin until after procedure startin2020     Lovenox 70 mg subq Q 12 hrs (1 mg/kg Q 12 hr for CrCl 90ml/minn)     Start Lovenox: 2020    Last dose of Lovenox prior to procedure: 2020 AM       Post-Procedure:    Resume home warfarin dose if okay with provider doing procedure on night of procedure, 2020 PM: 12mg    Resume Lovenox ~ 24-48 hrs post procedure when okay with provider doing procedure. Continue until INR >= 2.3    Recheck INR 5-7 days after resuming warfarin   ?   Lelia Perdomo [facility]    Subjective/Objective:      Nelsy Cota, a 45 year old female    Reason for Anticoagulation: DVT and PE    Goal INR Range: 2.0-3.0    Patient bridged in past: Yes: with VTE     Pertinent History: N/A    Wt Readings from Last 3 Encounters:   10/28/20 69.9 kg (154 lb)   10/18/20 77.2 kg (170 lb 1.6 oz)   20 78.1 kg (172 lb 1.6 oz)        Ideal body weight: 54.7 kg (120 lb 9.5 oz)  Adjusted ideal body weight: 60.8 kg (133 lb 15.3 oz)     Lab Results   Component Value Date    INR 1.40 (H) 2020    INR 1.80 (H) 10/27/2020    INR 1.27 (H) 10/20/2020     Lab Results   Component Value Date    HGB 12.7 10/20/2020    HCT 38.8 10/20/2020     10/20/2020     Lab Results   Component Value Date    CR 0.75  11/03/2020    CR 0.68 10/20/2020    CR 0.71 10/20/2020     Estimated Creatinine Clearance: 90.9 mL/min (based on SCr of 0.75 mg/dL).

## 2020-11-09 ENCOUNTER — TELEPHONE (OUTPATIENT)
Dept: INTERNAL MEDICINE | Facility: CLINIC | Age: 45
End: 2020-11-09

## 2020-11-09 NOTE — TELEPHONE ENCOUNTER
[facility] calling for Hold and bridge orders  Procedure 12/1/2020 they are requesting a 4 day hold.  Please advise. Ok to call and arnold [phone]

## 2020-11-10 ENCOUNTER — ANTICOAGULATION THERAPY VISIT (OUTPATIENT)
Dept: ANTICOAGULATION | Facility: CLINIC | Age: 45
End: 2020-11-10

## 2020-11-10 DIAGNOSIS — I26.99 PE (PULMONARY THROMBOEMBOLISM) (H): ICD-10-CM

## 2020-11-10 DIAGNOSIS — I82.409 RECURRENT DEEP VENOUS THROMBOSIS (H): ICD-10-CM

## 2020-11-10 DIAGNOSIS — Z79.01 LONG TERM CURRENT USE OF ANTICOAGULANT THERAPY: ICD-10-CM

## 2020-11-10 LAB
CAPILLARY BLOOD COLLECTION: NORMAL
INR PPP: 1.9 (ref 0.86–1.14)

## 2020-11-10 PROCEDURE — 99207 PR NO CHARGE NURSE ONLY: CPT

## 2020-11-10 PROCEDURE — 36416 COLLJ CAPILLARY BLOOD SPEC: CPT | Performed by: INTERNAL MEDICINE

## 2020-11-10 PROCEDURE — 85610 PROTHROMBIN TIME: CPT | Performed by: INTERNAL MEDICINE

## 2020-11-10 NOTE — TELEPHONE ENCOUNTER
Procedure was switched to 12/1.  Still advised plan as per below.  Patient will begin holding on 11/27 (4 days before procedure), start lovenox on 11/29 (have one dose in the AM on 11/30), then resume on 12/1 with lovenox and warfarin if ok with GI.  Reviewed this with patient and she verbalized understanding.  Vania Morse RN

## 2020-11-10 NOTE — TELEPHONE ENCOUNTER
This was already addressed by INR nurse on 11/03/20 phone encounter , pt has INR lab appt tomorrow,  Forwarded to INR clinic .

## 2020-11-10 NOTE — PROGRESS NOTES
ANTICOAGULATION FOLLOW-UP CLINIC VISIT    Patient Name:  Nelsy Cota  Date:  11/10/2020  Contact Type:  Telephone/ discussed with patient    SUBJECTIVE:  Patient Findings     Positives:  Upcoming invasive procedure (endoscopy scheduled for .  Will need lovenox bridging (see encounter dated 11/3))    Comments:  The patient was assessed for diet, medication, and activity level changes, missed or extra doses, bruising or bleeding, with no problem findings.          Clinical Outcomes     Negatives:  Major bleeding event, Thromboembolic event, Anticoagulation-related hospital admission, Anticoagulation-related ED visit, Anticoagulation-related fatality    Comments:  The patient was assessed for diet, medication, and activity level changes, missed or extra doses, bruising or bleeding, with no problem findings.             OBJECTIVE    Recent labs: (last 7 days)     11/10/20  0808   INR 1.90*       ASSESSMENT / PLAN  INR assessment SUB    Recheck INR In: 1 WEEK    INR Location Clinic      Anticoagulation Summary  As of 11/10/2020    INR goal:  2.0-3.0   TTR:  25.2 % (11.7 mo)   INR used for dosin.90 (11/10/2020)   Warfarin maintenance plan:  6 mg (2 mg x 3) every Sun, Thu; 8 mg (2 mg x 4) all other days   Full warfarin instructions:  : Hold; : Hold; : Hold; : Hold; Otherwise 6 mg every Sun, Thu; 8 mg all other days   Weekly warfarin total:  52 mg   Plan last modified:  Vania Morse RN (11/10/2020)   Next INR check:  2020   Priority:  High   Target end date:  Indefinite    Indications    Long-term (current) use of anticoagulants [Z79.01] [Z79.01]  PE (pulmonary thromboembolism) (H) [I26.99]  Recurrent deep venous thrombosis (H) [I82.409]             Anticoagulation Episode Summary     INR check location:      Preferred lab:      Send INR reminders to:  ULISES SMITH    Comments:  INR Referral renewed, see 20 telephone encounter.      Anticoagulation Care Providers      Provider Role Specialty Phone number    Alyse Faye MD Referring Internal Medicine [phone]    Hamzah Thomas MD Responsible Internal Medicine [phone]            See the Encounter Report to view Anticoagulation Flowsheet and Dosing Calendar (Go to Encounters tab in chart review, and find the Anticoagulation Therapy Visit)    Dosage adjustment made based on physician directed care plan.    Vania Morse RN

## 2020-11-17 ENCOUNTER — ANTICOAGULATION THERAPY VISIT (OUTPATIENT)
Dept: ANTICOAGULATION | Facility: CLINIC | Age: 45
End: 2020-11-17

## 2020-11-17 DIAGNOSIS — Z79.01 LONG TERM CURRENT USE OF ANTICOAGULANT THERAPY: ICD-10-CM

## 2020-11-17 DIAGNOSIS — I82.409 RECURRENT DEEP VENOUS THROMBOSIS (H): ICD-10-CM

## 2020-11-17 DIAGNOSIS — I26.99 PE (PULMONARY THROMBOEMBOLISM) (H): ICD-10-CM

## 2020-11-17 DIAGNOSIS — K70.31 ALCOHOLIC CIRRHOSIS OF LIVER WITH ASCITES (H): ICD-10-CM

## 2020-11-17 LAB
CAPILLARY BLOOD COLLECTION: NORMAL
INR PPP: 3.4 (ref 0.86–1.14)

## 2020-11-17 PROCEDURE — 36416 COLLJ CAPILLARY BLOOD SPEC: CPT | Performed by: INTERNAL MEDICINE

## 2020-11-17 PROCEDURE — 99207 PR NO CHARGE NURSE ONLY: CPT

## 2020-11-17 PROCEDURE — 85610 PROTHROMBIN TIME: CPT | Performed by: INTERNAL MEDICINE

## 2020-11-17 RX ORDER — FUROSEMIDE 40 MG
40 TABLET ORAL DAILY
Qty: 60 TABLET | Refills: 0 | Status: SHIPPED | OUTPATIENT
Start: 2020-11-17 | End: 2020-12-09

## 2020-11-17 RX ORDER — SPIRONOLACTONE 25 MG/1
25 TABLET ORAL DAILY
Qty: 30 TABLET | Refills: 0 | Status: SHIPPED | OUTPATIENT
Start: 2020-11-17 | End: 2020-12-16

## 2020-11-17 NOTE — TELEPHONE ENCOUNTER
Patient calling lasix and spironolactone have run out.  She continues to have fluid retention.  Last rxs written on 10/18 state that PCP will need to do further refills.  Verified dosing with patient.  She is taking Lasix 80 mg daily and spironolactone 25 mg.  Last office visit 10/28/20.  Vania Morse RN

## 2020-11-17 NOTE — TELEPHONE ENCOUNTER
I have not seen this pt since more than 1 yr., pt was seen by Dr Rosado on 10/28/20 , pl forward .

## 2020-11-17 NOTE — PROGRESS NOTES
ANTICOAGULATION FOLLOW-UP CLINIC VISIT    Patient Name:  Nelsy Cota  Date:  11/17/2020  Contact Type:  Telephone/ discussed with patient    SUBJECTIVE:  Patient Findings     Positives:  Change in alcohol use (2 beers last night during the football game.), Upcoming invasive procedure (endoscopy scheduled on 12/1.  Will need lovenox bridging (see encounter dated 11/3)), Change in diet/appetite (Did not eat any salads this week.  Encouraged her to try to have a few salads this week to help lower INR)    Comments:  Discussed dosing with Lelia Perdomo Pharmacist.  Will decrease dose for today and keep maintenance dose at this time since INR most likely came up due to temporary diet and alcohol use changes.  The patient was assessed for medication, and activity level changes, missed or extra doses, bruising or bleeding, with no problem findings.          Clinical Outcomes     Negatives:  Major bleeding event, Thromboembolic event, Anticoagulation-related hospital admission, Anticoagulation-related ED visit, Anticoagulation-related fatality    Comments:  Discussed dosing with Galdino Gomez.  Will decrease dose for today and keep maintenance dose at this time since INR most likely came up due to temporary diet and alcohol use changes.  The patient was assessed for medication, and activity level changes, missed or extra doses, bruising or bleeding, with no problem findings.             OBJECTIVE    Recent labs: (last 7 days)     11/17/20  0757   INR 3.40*       ASSESSMENT / PLAN  INR assessment SUPRA    Recheck INR In: 1 WEEK    INR Location Clinic      Anticoagulation Summary  As of 11/17/2020    INR goal:  2.0-3.0   TTR:  24.9 % (11.7 mo)   INR used for dosing:  3.40 (11/17/2020)   Warfarin maintenance plan:  6 mg (2 mg x 3) every Sun, Thu; 8 mg (2 mg x 4) all other days   Full warfarin instructions:  11/17: 4 mg; 11/27: Hold; 11/28: Hold; 11/29: Hold; 11/30: Hold; Otherwise 6 mg every Sun, Thu; 8  mg all other days   Weekly warfarin total:  52 mg   Plan last modified:  Vania Morse RN (11/10/2020)   Next INR check:  11/24/2020   Priority:  High   Target end date:  Indefinite    Indications    Long-term (current) use of anticoagulants [Z79.01] [Z79.01]  PE (pulmonary thromboembolism) (H) [I26.99]  Recurrent deep venous thrombosis (H) [I82.409]             Anticoagulation Episode Summary     INR check location:      Preferred lab:      Send INR reminders to:  [facility]    Comments:  INR Referral renewed, see 8/26/20 telephone encounter.      Anticoagulation Care Providers     Provider Role Specialty Phone number    Alyse Faye MD Referring Internal Medicine [phone]    Hamzah Thomas MD Responsible Internal Medicine [phone]            See the Encounter Report to view Anticoagulation Flowsheet and Dosing Calendar (Go to Encounters tab in chart review, and find the Anticoagulation Therapy Visit)    Dosage adjustment made based on physician directed care plan.    Vania Morse RN

## 2020-11-24 ENCOUNTER — ANTICOAGULATION THERAPY VISIT (OUTPATIENT)
Dept: ANTICOAGULATION | Facility: CLINIC | Age: 45
End: 2020-11-24

## 2020-11-24 DIAGNOSIS — I26.99 PE (PULMONARY THROMBOEMBOLISM) (H): ICD-10-CM

## 2020-11-24 DIAGNOSIS — Z79.01 LONG TERM CURRENT USE OF ANTICOAGULANT THERAPY: ICD-10-CM

## 2020-11-24 DIAGNOSIS — I82.409 RECURRENT DEEP VENOUS THROMBOSIS (H): ICD-10-CM

## 2020-11-24 LAB
CAPILLARY BLOOD COLLECTION: NORMAL
INR PPP: 2 (ref 0.86–1.14)

## 2020-11-24 PROCEDURE — 85610 PROTHROMBIN TIME: CPT | Performed by: INTERNAL MEDICINE

## 2020-11-24 PROCEDURE — 36416 COLLJ CAPILLARY BLOOD SPEC: CPT | Performed by: INTERNAL MEDICINE

## 2020-11-24 PROCEDURE — 99207 PR NO CHARGE NURSE ONLY: CPT

## 2020-11-24 NOTE — PROGRESS NOTES
ANTICOAGULATION FOLLOW-UP CLINIC VISIT    Patient Name:  Nelsy Cota  Date:  2020  Contact Type:  Telephone/ discussed with patient    SUBJECTIVE:  Patient Findings     Positives:  Upcoming invasive procedure (endoscopy on .  Will be using lovenox bridging.), Change in diet/appetite (increased her vitamin K intake this week to help lower INR (had 2 salads))    Comments:  No alcohol this week.  Reviewed lovenox bridging instructions day to day and patient wrote these down.  Advised her that she will need to find out from GI if it is ok to restart on her warfarin and lovenox when she is discharged.  The patient was assessed for medication, and activity level changes, missed or extra doses, bruising or bleeding, with no problem findings.            Clinical Outcomes     Negatives:  Major bleeding event, Thromboembolic event, Anticoagulation-related hospital admission, Anticoagulation-related ED visit, Anticoagulation-related fatality    Comments:  No alcohol this week.  Reviewed lovenox bridging instructions day to day and patient wrote these down.  Advised her that she will need to find out from GI if it is ok to restart on her warfarin and lovenox when she is discharged.  The patient was assessed for medication, and activity level changes, missed or extra doses, bruising or bleeding, with no problem findings.               OBJECTIVE    Recent labs: (last 7 days)     20  0752   INR 2.00*       ASSESSMENT / PLAN  INR assessment THER    Recheck INR In: 2 WEEKS    INR Location Clinic      Anticoagulation Summary  As of 2020    INR goal:  2.0-3.0   TTR:  26.3 % (11.7 mo)   INR used for dosin.00 (2020)   Warfarin maintenance plan:  6 mg (2 mg x 3) every Sun, Thu; 8 mg (2 mg x 4) all other days   Full warfarin instructions:  : Hold; : Hold; : Hold; : Hold; Otherwise 6 mg every Sun, Thu; 8 mg all other days   Weekly warfarin total:  52 mg   Plan last modified:  Lito  Vania FREEMAN RN (11/10/2020)   Next INR check:  12/7/2020   Priority:  High   Target end date:  Indefinite    Indications    Long-term (current) use of anticoagulants [Z79.01] [Z79.01]  PE (pulmonary thromboembolism) (H) [I26.99]  Recurrent deep venous thrombosis (H) [I82.409]             Anticoagulation Episode Summary     INR check location:      Preferred lab:      [facility]    Comments:  INR Referral renewed, see 8/26/20 telephone encounter.      Anticoagulation Care Providers     Provider Role Specialty Phone number    Alyse Faye MD Referring Internal Medicine [phone]    Hamzah Thomas MD Responsible Internal Medicine [phone]            See the Encounter Report to view Anticoagulation Flowsheet and Dosing Calendar (Go to Encounters tab in chart review, and find the Anticoagulation Therapy Visit)    Dosage adjustment made based on physician directed care plan.    Vania Morse RN

## 2020-12-01 ENCOUNTER — TRANSFERRED RECORDS (OUTPATIENT)
Dept: HEALTH INFORMATION MANAGEMENT | Facility: CLINIC | Age: 45
End: 2020-12-01

## 2020-12-09 ENCOUNTER — TELEPHONE (OUTPATIENT)
Dept: INTERNAL MEDICINE | Facility: CLINIC | Age: 45
End: 2020-12-09

## 2020-12-09 ENCOUNTER — TELEPHONE (OUTPATIENT)
Dept: ANTICOAGULATION | Facility: CLINIC | Age: 45
End: 2020-12-09

## 2020-12-09 DIAGNOSIS — I82.409 RECURRENT DEEP VENOUS THROMBOSIS (H): ICD-10-CM

## 2020-12-09 DIAGNOSIS — Z79.01 LONG TERM CURRENT USE OF ANTICOAGULANT THERAPY: ICD-10-CM

## 2020-12-09 DIAGNOSIS — K70.31 ALCOHOLIC CIRRHOSIS OF LIVER WITH ASCITES (H): ICD-10-CM

## 2020-12-09 DIAGNOSIS — I26.99 PE (PULMONARY THROMBOEMBOLISM) (H): ICD-10-CM

## 2020-12-09 RX ORDER — WARFARIN SODIUM 2 MG/1
TABLET ORAL
Qty: 110 TABLET | Refills: 1 | Status: SHIPPED | OUTPATIENT
Start: 2020-12-09 | End: 2021-02-10

## 2020-12-09 RX ORDER — FUROSEMIDE 40 MG
40 TABLET ORAL DAILY
Qty: 60 TABLET | Refills: 3 | Status: SHIPPED | OUTPATIENT
Start: 2020-12-09 | End: 2021-10-08

## 2020-12-09 NOTE — TELEPHONE ENCOUNTER
"Patient calling for refill of furosemide.    Requested Prescriptions   Pending Prescriptions Disp Refills     furosemide (LASIX) 40 MG tablet 60 tablet 0     Sig: Take 1 tablet (40 mg) by mouth daily Patient is taking 2 tablets daily.       Diuretics (Including Combos) Protocol Passed - 12/9/2020  1:29 PM        Passed - Blood pressure under 140/90 in past 12 months     BP Readings from Last 3 Encounters:   10/28/20 100/62   10/20/20 100/79   10/18/20 95/51                 Passed - Recent (12 mo) or future (30 days) visit within the authorizing provider's specialty     Patient has had an office visit with the authorizing provider or a provider within the authorizing providers department within the previous 12 mos or has a future within next 30 days. See \"Patient Info\" tab in inbasket, or \"Choose Columns\" in Meds & Orders section of the refill encounter.              Passed - Medication is active on med list        Passed - Patient is age 18 or older        Passed - No active pregancy on record        Passed - Normal serum creatinine on file in past 12 months     Recent Labs   Lab Test 11/03/20  0748   CR 0.75              Passed - Normal serum potassium on file in past 12 months     Recent Labs   Lab Test 11/03/20  0748   POTASSIUM 4.3                    Passed - Normal serum sodium on file in past 12 months     Recent Labs   Lab Test 11/03/20  0748                 Passed - No positive pregnancy test in past 12 months           Last office visit 10/28/20.  Prescription approved per [facility] Refill Protocol.  Vania Morse RN        "

## 2020-12-09 NOTE — TELEPHONE ENCOUNTER
Patient calling to set up a lab appointment for INR check.  States that she did not resume the lovenox bridging as she was instructed not to.  Patient took 6 mg on Mon and Tuesday this week.  Advised to take 8 mg tonight and assisted to schedule lab appointment for 12/10.  Will see what INR is before advising a boost for her if needed.  Vania Morse RN

## 2020-12-10 ENCOUNTER — ANTICOAGULATION THERAPY VISIT (OUTPATIENT)
Dept: NURSING | Facility: CLINIC | Age: 45
End: 2020-12-10

## 2020-12-10 DIAGNOSIS — I82.409 RECURRENT DEEP VENOUS THROMBOSIS (H): ICD-10-CM

## 2020-12-10 DIAGNOSIS — Z79.01 LONG TERM CURRENT USE OF ANTICOAGULANT THERAPY: ICD-10-CM

## 2020-12-10 DIAGNOSIS — I26.99 PE (PULMONARY THROMBOEMBOLISM) (H): ICD-10-CM

## 2020-12-10 LAB
CAPILLARY BLOOD COLLECTION: NORMAL
INR PPP: 2.5 (ref 0.86–1.14)

## 2020-12-10 PROCEDURE — 36416 COLLJ CAPILLARY BLOOD SPEC: CPT | Performed by: INTERNAL MEDICINE

## 2020-12-10 PROCEDURE — 85610 PROTHROMBIN TIME: CPT | Performed by: INTERNAL MEDICINE

## 2020-12-10 PROCEDURE — 99207 PR NO CHARGE NURSE ONLY: CPT

## 2020-12-10 NOTE — PROGRESS NOTES
Anticoagulation Management    Unable to reach Nelsy today.    Today's INR result of 2.5 is therapeutic (goal INR of 2.0-3.0).  Result received from: Clinic Lab    Follow up required to confirm warfarin dose taken and assess for changes    Left VM to call David with transfer to Sarah at: [phone]      Anticoagulation clinic to follow up    Sarah Holbrook RN    ANTICOAGULATION FOLLOW-UP CLINIC VISIT    Patient Name:  Nelsy Cota  Date:  12/10/2020  Contact Type:  Telephone--spoke to pt    SUBJECTIVE:  Patient Findings     Positives:  Change in medications (Per  phone encounter, pt did NOT resume Lovenox per GI recommendation), Bruising (1 bruise on abdomen from Lovenox injections)        Clinical Outcomes     Negatives:  Major bleeding event, Thromboembolic event, Anticoagulation-related hospital admission, Anticoagulation-related ED visit, Anticoagulation-related fatality           OBJECTIVE    Recent labs: (last 7 days)     12/10/20  0755   INR 2.50*       ASSESSMENT / PLAN  INR assessment THER    Recheck INR In: 3 WEEKS    INR Location Clinic      Anticoagulation Summary  As of 12/10/2020    INR goal:  2.0-3.0   TTR:  30.8 % (11.7 mo)   INR used for dosin.50 (12/10/2020)   Warfarin maintenance plan:  6 mg (2 mg x 3) every Sun, Thu; 8 mg (2 mg x 4) all other days   Full warfarin instructions:  6 mg every Sun, Thu; 8 mg all other days   Weekly warfarin total:  52 mg   No change documented:  Sarah Holbrook RN   Plan last modified:  Vania Morse RN (11/10/2020)   Next INR check:  2020   Priority:  Maintenance   Target end date:  Indefinite    Indications    Long-term (current) use of anticoagulants [Z79.01] [Z79.01]  PE (pulmonary thromboembolism) (H) [I26.99]  Recurrent deep venous thrombosis (H) [I82.409]             Anticoagulation Episode Summary     INR check location:      Preferred lab:      Send INR reminders to:  ULISES SMITH    Comments:  INR Referral renewed, see  8/26/20 telephone encounter.      Anticoagulation Care Providers     Provider Role Specialty Phone number    Alyse Faye MD Referring Internal Medicine [phone]    Hamzah Thomas MD Responsible Internal Medicine [phone]            See the Encounter Report to view Anticoagulation Flowsheet and Dosing Calendar (Go to Encounters tab in chart review, and find the Anticoagulation Therapy Visit)        Sarah Holbrook RN

## 2020-12-29 ENCOUNTER — ANTICOAGULATION THERAPY VISIT (OUTPATIENT)
Dept: ANTICOAGULATION | Facility: CLINIC | Age: 45
End: 2020-12-29

## 2020-12-29 DIAGNOSIS — I26.99 PE (PULMONARY THROMBOEMBOLISM) (H): ICD-10-CM

## 2020-12-29 DIAGNOSIS — I82.409 RECURRENT DEEP VENOUS THROMBOSIS (H): ICD-10-CM

## 2020-12-29 DIAGNOSIS — Z79.01 LONG TERM CURRENT USE OF ANTICOAGULANT THERAPY: ICD-10-CM

## 2020-12-29 LAB
CAPILLARY BLOOD COLLECTION: NORMAL
INR PPP: 4.3 (ref 0.86–1.14)

## 2020-12-29 PROCEDURE — 36416 COLLJ CAPILLARY BLOOD SPEC: CPT | Performed by: INTERNAL MEDICINE

## 2020-12-29 PROCEDURE — 85610 PROTHROMBIN TIME: CPT | Performed by: INTERNAL MEDICINE

## 2020-12-29 PROCEDURE — 99207 PR NO CHARGE NURSE ONLY: CPT

## 2020-12-29 NOTE — PROGRESS NOTES
Left message to call [phone]. Please transfer call to Latosha at [phone].  FAUSTINO VillalobosN

## 2020-12-29 NOTE — PROGRESS NOTES
ANTICOAGULATION FOLLOW-UP CLINIC VISIT    Patient Name:  Nelsy Cota  Date:  2020  Contact Type:  Telephone/ Called patient, she reports diet change, she denies any other changes. Orders discussed with the patient, she agrees with the plan. Lab INR appointment scheduled on 21.    SUBJECTIVE:  Patient Findings     Positives:  Change in alcohol use (Patient reports no alcohol.), Change in diet/appetite (Patient reports decreased green veggies this past week. )    Comments:  The patient was assessed for medication and activity level changes, missed or extra doses, bruising or bleeding, with no problem findings.           Clinical Outcomes     Comments:  The patient was assessed for medication and activity level changes, missed or extra doses, bruising or bleeding, with no problem findings.              OBJECTIVE    Recent labs: (last 7 days)     20  0803   INR 4.30*       ASSESSMENT / PLAN  INR assessment SUPRA    Recheck INR In: 8 DAYS    INR Location Outside lab      Anticoagulation Summary  As of 2020    INR goal:  2.0-3.0   TTR:  28.1 % (11.7 mo)   INR used for dosin.30 (2020)   Warfarin maintenance plan:  6 mg (2 mg x 3) every Sun, Thu; 8 mg (2 mg x 4) all other days   Full warfarin instructions:  : Hold; : 6 mg; Otherwise 6 mg every Sun, Thu; 8 mg all other days   Weekly warfarin total:  52 mg   Plan last modified:  Vania Morse RN (11/10/2020)   Next INR check:  2021   Priority:  Maintenance   Target end date:  Indefinite    Indications    Long-term (current) use of anticoagulants [Z79.01] [Z79.01]  PE (pulmonary thromboembolism) (H) [I26.99]  Recurrent deep venous thrombosis (H) [I82.409]             Anticoagulation Episode Summary     INR check location:      Preferred lab:      Send INR reminders to:  ULISES SMITH    Comments:  INR Referral renewed, see 20 telephone encounter.      Anticoagulation Care Providers     Provider Role  Specialty Phone number    Alyse Faye MD Referring Internal Medicine [phone]    Hamzah Thomas MD Responsible Internal Medicine [phone]            See the Encounter Report to view Anticoagulation Flowsheet and Dosing Calendar (Go to Encounters tab in chart review, and find the Anticoagulation Therapy Visit)    Dosage adjustment made based on physician directed care plan.    Latosha Perdomo RN

## 2020-12-29 NOTE — PROGRESS NOTES
Patient left voicemail returning a call. Please call when able.  Thank you.  Anna Jean RN  Anticoagulation Nurse - [facility]

## 2021-01-06 ENCOUNTER — ANTICOAGULATION THERAPY VISIT (OUTPATIENT)
Dept: ANTICOAGULATION | Facility: CLINIC | Age: 46
End: 2021-01-06

## 2021-01-06 DIAGNOSIS — I26.99 PE (PULMONARY THROMBOEMBOLISM) (H): ICD-10-CM

## 2021-01-06 DIAGNOSIS — I82.409 RECURRENT DEEP VENOUS THROMBOSIS (H): ICD-10-CM

## 2021-01-06 DIAGNOSIS — Z79.01 LONG TERM CURRENT USE OF ANTICOAGULANT THERAPY: ICD-10-CM

## 2021-01-06 LAB — INR PPP: 3.5 (ref 0.86–1.14)

## 2021-01-06 PROCEDURE — 36416 COLLJ CAPILLARY BLOOD SPEC: CPT | Performed by: INTERNAL MEDICINE

## 2021-01-06 PROCEDURE — 85610 PROTHROMBIN TIME: CPT | Performed by: INTERNAL MEDICINE

## 2021-01-06 PROCEDURE — 99207 PR NO CHARGE NURSE ONLY: CPT

## 2021-01-06 NOTE — PROGRESS NOTES
ANTICOAGULATION FOLLOW-UP CLINIC VISIT    Patient Name:  Nelsy Cota  Date:  1/6/2021  Contact Type:  Telephone/ discussed with patient    SUBJECTIVE:  Patient Findings     Positives:  Change in diet/appetite (Ate 2 salads this week.  Encouraged her to continue to eat her greens consistently since dose of warfarin was adjusted today.)    Comments:  The patient was assessed for medication, and activity level changes, missed or extra doses, bruising or bleeding, with no problem findings.  Continues not to drink alcohol.  Maintenance dose was lowered by 7.7% this week.            Clinical Outcomes     Negatives:  Major bleeding event, Thromboembolic event, Anticoagulation-related hospital admission, Anticoagulation-related ED visit, Anticoagulation-related fatality    Comments:  The patient was assessed for medication, and activity level changes, missed or extra doses, bruising or bleeding, with no problem findings.  Continues not to drink alcohol.  Maintenance dose was lowered by 7.7% this week.               OBJECTIVE    Recent labs: (last 7 days)     01/06/21  0752   INR 3.50*       ASSESSMENT / PLAN  INR assessment SUPRA    Recheck INR In: 1 WEEK    INR Location Clinic      Anticoagulation Summary  As of 1/6/2021    INR goal:  2.0-3.0   TTR:  28.1 % (11.7 mo)   INR used for dosing:  3.50 (1/6/2021)   Warfarin maintenance plan:  8 mg (2 mg x 4) every Mon, Wed, Fri; 6 mg (2 mg x 3) all other days   Full warfarin instructions:  1/6: 6 mg; Otherwise 8 mg every Mon, Wed, Fri; 6 mg all other days   Weekly warfarin total:  48 mg   Plan last modified:  Vania Morse RN (1/6/2021)   Next INR check:  1/13/2021   Priority:  Maintenance   Target end date:  Indefinite    Indications    Long-term (current) use of anticoagulants [Z79.01] [Z79.01]  PE (pulmonary thromboembolism) (H) [I26.99]  Recurrent deep venous thrombosis (H) [I82.409]             Anticoagulation Episode Summary     INR check location:      Preferred  lab:      Send INR reminders to:  ULISES [facility]    Comments:  INR Referral renewed, see 8/26/20 telephone encounter.      Anticoagulation Care Providers     Provider Role Specialty Phone number    Alyse Faye MD Referring Internal Medicine [phone]    Hamzah Thomas MD Responsible Internal Medicine [phone]            See the Encounter Report to view Anticoagulation Flowsheet and Dosing Calendar (Go to Encounters tab in chart review, and find the Anticoagulation Therapy Visit)    Dosage adjustment made based on physician directed care plan.    Vania Morse RN

## 2021-01-12 DIAGNOSIS — K70.31 ALCOHOLIC CIRRHOSIS OF LIVER WITH ASCITES (H): ICD-10-CM

## 2021-01-12 RX ORDER — SPIRONOLACTONE 25 MG/1
25 TABLET ORAL DAILY
Qty: 30 TABLET | Refills: 0 | Status: CANCELLED | OUTPATIENT
Start: 2021-01-12

## 2021-01-12 NOTE — TELEPHONE ENCOUNTER
Routing refill request to provider for review/approval because:  Last refill had note for pt to follow up with PCP for further refills    Pt has lab appt tomorrow, but don't see any labs ordered

## 2021-01-13 ENCOUNTER — ANTICOAGULATION THERAPY VISIT (OUTPATIENT)
Dept: ANTICOAGULATION | Facility: CLINIC | Age: 46
End: 2021-01-13

## 2021-01-13 DIAGNOSIS — I82.409 RECURRENT DEEP VENOUS THROMBOSIS (H): ICD-10-CM

## 2021-01-13 DIAGNOSIS — Z79.01 LONG TERM CURRENT USE OF ANTICOAGULANT THERAPY: ICD-10-CM

## 2021-01-13 DIAGNOSIS — I26.99 PE (PULMONARY THROMBOEMBOLISM) (H): ICD-10-CM

## 2021-01-13 LAB
CAPILLARY BLOOD COLLECTION: NORMAL
INR PPP: 1.8 (ref 0.86–1.14)

## 2021-01-13 PROCEDURE — 36416 COLLJ CAPILLARY BLOOD SPEC: CPT | Performed by: INTERNAL MEDICINE

## 2021-01-13 PROCEDURE — 99207 PR NO CHARGE NURSE ONLY: CPT

## 2021-01-13 PROCEDURE — 85610 PROTHROMBIN TIME: CPT | Performed by: INTERNAL MEDICINE

## 2021-01-13 NOTE — PROGRESS NOTES
ANTICOAGULATION FOLLOW-UP CLINIC VISIT    Patient Name:  Nelsy Cota  Date:  2021  Contact Type:  Telephone/ discussed with patient    SUBJECTIVE:  Patient Findings     Comments:  Continues with 2 salads weekly.  INR dropped significantly last week when maintenance dose was adjusted by 7.7%.  Will increase maintenance dose 4.2% with this check.  The patient was assessed for diet, medication, and activity level changes, missed or extra doses, bruising or bleeding, with no problem findings.          Clinical Outcomes     Negatives:  Major bleeding event, Thromboembolic event, Anticoagulation-related hospital admission, Anticoagulation-related ED visit, Anticoagulation-related fatality    Comments:  Continues with 2 salads weekly.  INR dropped significantly last week when maintenance dose was adjusted by 7.7%.  Will increase maintenance dose 4.2% with this check.  The patient was assessed for diet, medication, and activity level changes, missed or extra doses, bruising or bleeding, with no problem findings.             OBJECTIVE    Recent labs: (last 7 days)     21  0757   INR 1.80*       ASSESSMENT / PLAN  INR assessment SUB    Recheck INR In: 1 WEEK    INR Location Clinic      Anticoagulation Summary  As of 2021    INR goal:  2.0-3.0   TTR:  29.3 % (11.7 mo)   INR used for dosin.80 (2021)   Warfarin maintenance plan:  6 mg (2 mg x 3) every Sun, Tue, Thu; 8 mg (2 mg x 4) all other days   Full warfarin instructions:  6 mg every Sun, Tue, Thu; 8 mg all other days   Weekly warfarin total:  50 mg   Plan last modified:  Vania Morse RN (2021)   Next INR check:  2021   Priority:  Maintenance   Target end date:  Indefinite    Indications    Long-term (current) use of anticoagulants [Z79.01] [Z79.01]  PE (pulmonary thromboembolism) (H) [I26.99]  Recurrent deep venous thrombosis (H) [I82.409]             Anticoagulation Episode Summary     INR check location:      Preferred lab:       Send INR reminders to:  ULISES [facility]    Comments:  INR Referral renewed, see 8/26/20 telephone encounter.      Anticoagulation Care Providers     Provider Role Specialty Phone number    Alyse Faye MD Referring Internal Medicine [phone]    Hamzah Thomas MD Responsible Internal Medicine [phone]            See the Encounter Report to view Anticoagulation Flowsheet and Dosing Calendar (Go to Encounters tab in chart review, and find the Anticoagulation Therapy Visit)    Dosage adjustment made based on physician directed care plan.    Vania Morse RN

## 2021-01-14 NOTE — TELEPHONE ENCOUNTER
I have not seen this pt since more than 1 yr,  Pt is following up with [facility]  for alcoholic cirrhosis and she should call her GI for refill.  I think labs are from GI. I have not ordered any labs.  Please inform pt and forward refills to her GI.

## 2021-01-15 ENCOUNTER — HEALTH MAINTENANCE LETTER (OUTPATIENT)
Age: 46
End: 2021-01-15

## 2021-01-15 DIAGNOSIS — K70.31 ALCOHOLIC CIRRHOSIS OF LIVER WITH ASCITES (H): ICD-10-CM

## 2021-01-15 RX ORDER — FUROSEMIDE 40 MG
40 TABLET ORAL DAILY
Qty: 60 TABLET | Refills: 3 | OUTPATIENT
Start: 2021-01-15

## 2021-01-20 ENCOUNTER — ANTICOAGULATION THERAPY VISIT (OUTPATIENT)
Dept: ANTICOAGULATION | Facility: CLINIC | Age: 46
End: 2021-01-20

## 2021-01-20 DIAGNOSIS — Z79.01 LONG TERM CURRENT USE OF ANTICOAGULANT THERAPY: ICD-10-CM

## 2021-01-20 DIAGNOSIS — I26.99 PE (PULMONARY THROMBOEMBOLISM) (H): ICD-10-CM

## 2021-01-20 DIAGNOSIS — I82.409 RECURRENT DEEP VENOUS THROMBOSIS (H): ICD-10-CM

## 2021-01-20 LAB
CAPILLARY BLOOD COLLECTION: NORMAL
INR PPP: 2.3 (ref 0.86–1.14)

## 2021-01-20 PROCEDURE — 85610 PROTHROMBIN TIME: CPT | Performed by: INTERNAL MEDICINE

## 2021-01-20 PROCEDURE — 36416 COLLJ CAPILLARY BLOOD SPEC: CPT | Performed by: INTERNAL MEDICINE

## 2021-01-20 PROCEDURE — 99207 PR NO CHARGE NURSE ONLY: CPT

## 2021-01-20 NOTE — PROGRESS NOTES
ANTICOAGULATION FOLLOW-UP CLINIC VISIT    Patient Name:  Nelsy Cota  Date:  2021  Contact Type:  Telephone/ discussed with patient    SUBJECTIVE:  Patient Findings     Comments:  Continues eating 2 salads weekly.  Will continue same warfarin dose at this time since INR is in range with this check.  Patient will also try to continue her same diet.  She has run out of her furosemide and spironolactone.  Advised patient that the pharmacy should have refills of the furosemide.  She needs to schedule a follow up with her MD to get refills for the spironolactone.  The patient was assessed for diet, medication, and activity level changes, missed or extra doses, bruising or bleeding, with no problem findings.          Clinical Outcomes     Negatives:  Major bleeding event, Thromboembolic event, Anticoagulation-related hospital admission, Anticoagulation-related ED visit, Anticoagulation-related fatality    Comments:  Continues eating 2 salads weekly.  Will continue same warfarin dose at this time since INR is in range with this check.  Patient will also try to continue her same diet.  She has run out of her furosemide and spironolactone.  Advised patient that the pharmacy should have refills of the furosemide.  She needs to schedule a follow up with her MD to get refills for the spironolactone.  The patient was assessed for diet, medication, and activity level changes, missed or extra doses, bruising or bleeding, with no problem findings.             OBJECTIVE    Recent labs: (last 7 days)     21  0757   INR 2.30*       ASSESSMENT / PLAN  INR assessment THER    Recheck INR In: 1 WEEK    INR Location Clinic      Anticoagulation Summary  As of 2021    INR goal:  2.0-3.0   TTR:  30.5 % (11.7 mo)   INR used for dosin.30 (2021)   Warfarin maintenance plan:  6 mg (2 mg x 3) every Sun, Tue, Thu; 8 mg (2 mg x 4) all other days   Full warfarin instructions:  6 mg every Sun, Tue, Thu; 8 mg all other  days   Weekly warfarin total:  50 mg   No change documented:  Vania Morse RN   Plan last modified:  Vania Morse RN (1/13/2021)   Next INR check:  1/27/2021   Priority:  Maintenance   Target end date:  Indefinite    Indications    Long-term (current) use of anticoagulants [Z79.01] [Z79.01]  PE (pulmonary thromboembolism) (H) [I26.99]  Recurrent deep venous thrombosis (H) [I82.409]             Anticoagulation Episode Summary     INR check location:      Preferred lab:      Send INR reminders to:  [facility]    Comments:  INR Referral renewed, see 8/26/20 telephone encounter.      Anticoagulation Care Providers     Provider Role Specialty Phone number    Alyse Faye MD Referring Internal Medicine [phone]    Hamzah Thomas MD Responsible Internal Medicine [phone]            See the Encounter Report to view Anticoagulation Flowsheet and Dosing Calendar (Go to Encounters tab in chart review, and find the Anticoagulation Therapy Visit)    Dosage adjustment made based on physician directed care plan.    Vania Morse RN

## 2021-01-23 ENCOUNTER — HEALTH MAINTENANCE LETTER (OUTPATIENT)
Age: 46
End: 2021-01-23

## 2021-01-27 ENCOUNTER — ANTICOAGULATION THERAPY VISIT (OUTPATIENT)
Dept: ANTICOAGULATION | Facility: CLINIC | Age: 46
End: 2021-01-27

## 2021-01-27 DIAGNOSIS — I26.99 PE (PULMONARY THROMBOEMBOLISM) (H): ICD-10-CM

## 2021-01-27 DIAGNOSIS — Z79.01 LONG TERM CURRENT USE OF ANTICOAGULANT THERAPY: ICD-10-CM

## 2021-01-27 DIAGNOSIS — I82.409 RECURRENT DEEP VENOUS THROMBOSIS (H): ICD-10-CM

## 2021-01-27 LAB
CAPILLARY BLOOD COLLECTION: NORMAL
INR PPP: 1.9 (ref 0.86–1.14)

## 2021-01-27 PROCEDURE — 99207 PR NO CHARGE NURSE ONLY: CPT

## 2021-01-27 PROCEDURE — 36416 COLLJ CAPILLARY BLOOD SPEC: CPT | Performed by: INTERNAL MEDICINE

## 2021-01-27 PROCEDURE — 85610 PROTHROMBIN TIME: CPT | Performed by: INTERNAL MEDICINE

## 2021-01-27 NOTE — PROGRESS NOTES
ANTICOAGULATION FOLLOW-UP CLINIC VISIT    Patient Name:  Nelsy Cota  Date:  2021  Contact Type:  Telephone/ discussed with patient    SUBJECTIVE:  Patient Findings     Comments:  The patient was assessed for diet, medication, and activity level changes, missed or extra doses, bruising or bleeding, with no problem findings.  Patient denies any identifiable changes that caused the sub therapeutic INR. Small maintenance dose adjustment (4%) made since INR dropped again today.          Clinical Outcomes     Negatives:  Major bleeding event, Thromboembolic event, Anticoagulation-related hospital admission, Anticoagulation-related ED visit, Anticoagulation-related fatality    Comments:  The patient was assessed for diet, medication, and activity level changes, missed or extra doses, bruising or bleeding, with no problem findings.  Patient denies any identifiable changes that caused the sub therapeutic INR. Small maintenance dose adjustment (4%) made since INR dropped again today.             OBJECTIVE    Recent labs: (last 7 days)     21  0757   INR 1.90*       ASSESSMENT / PLAN  INR assessment SUB    Recheck INR In: 2 WEEKS    INR Location Clinic      Anticoagulation Summary  As of 2021    INR goal:  2.0-3.0   TTR:  31.9 % (11.7 mo)   INR used for dosin.90 (2021)   Warfarin maintenance plan:  6 mg (2 mg x 3) every Sun, Thu; 8 mg (2 mg x 4) all other days   Full warfarin instructions:  6 mg every Sun, Thu; 8 mg all other days   Weekly warfarin total:  52 mg   Plan last modified:  Vania Morse RN (2021)   Next INR check:  2/10/2021   Priority:  Maintenance   Target end date:  Indefinite    Indications    Long-term (current) use of anticoagulants [Z79.01] [Z79.01]  PE (pulmonary thromboembolism) (H) [I26.99]  Recurrent deep venous thrombosis (H) [I82.409]             Anticoagulation Episode Summary     INR check location:      Preferred lab:      Send INR reminders to:  ULISES  [facility]    Comments:  INR Referral renewed, see 8/26/20 telephone encounter.      Anticoagulation Care Providers     Provider Role Specialty Phone number    Alyse Faye MD Referring Internal Medicine [phone]    Hamzah Thomas MD Responsible Internal Medicine [phone]            See the Encounter Report to view Anticoagulation Flowsheet and Dosing Calendar (Go to Encounters tab in chart review, and find the Anticoagulation Therapy Visit)    Dosage adjustment made based on physician directed care plan.    Vania Morse RN

## 2021-02-10 ENCOUNTER — ANTICOAGULATION THERAPY VISIT (OUTPATIENT)
Dept: ANTICOAGULATION | Facility: CLINIC | Age: 46
End: 2021-02-10

## 2021-02-10 DIAGNOSIS — I26.99 PE (PULMONARY THROMBOEMBOLISM) (H): ICD-10-CM

## 2021-02-10 DIAGNOSIS — Z79.01 LONG TERM CURRENT USE OF ANTICOAGULANT THERAPY: ICD-10-CM

## 2021-02-10 DIAGNOSIS — I82.409 RECURRENT DEEP VENOUS THROMBOSIS (H): ICD-10-CM

## 2021-02-10 LAB
CAPILLARY BLOOD COLLECTION: NORMAL
INR PPP: 1.6 (ref 0.86–1.14)

## 2021-02-10 PROCEDURE — 85610 PROTHROMBIN TIME: CPT | Performed by: INTERNAL MEDICINE

## 2021-02-10 PROCEDURE — 99207 PR NO CHARGE NURSE ONLY: CPT

## 2021-02-10 PROCEDURE — 36416 COLLJ CAPILLARY BLOOD SPEC: CPT | Performed by: INTERNAL MEDICINE

## 2021-02-10 RX ORDER — WARFARIN SODIUM 2 MG/1
TABLET ORAL
Qty: 120 TABLET | Refills: 0 | Status: SHIPPED | OUTPATIENT
Start: 2021-02-10 | End: 2021-03-11

## 2021-02-10 NOTE — PROGRESS NOTES
ANTICOAGULATION FOLLOW-UP CLINIC VISIT    Patient Name:  Nelsy Cota  Date:  2/10/2021  Contact Type:  Telephone/ discussed with patient    SUBJECTIVE:  Patient Findings     Positives:  Change in diet/appetite (cut back and only had 1 salad this week (was having 2 salads))    Comments:  Nausea the last few days, but no fluid retention.  Patient believes the nausea could be stress related as she has been working on closing on her dad's estate (should be done by the end of the month).  The patient was assessed for medication, and activity level changes, missed or extra doses, bruising or bleeding, with no problem findings. Patient denies any identifiable changes that caused the sub therapeutic INR. INR has been consistently sub therapeutic so maintenance dose was increased by 8%.            Clinical Outcomes     Negatives:  Major bleeding event, Thromboembolic event, Anticoagulation-related hospital admission, Anticoagulation-related ED visit, Anticoagulation-related fatality    Comments:  Nausea the last few days, but no fluid retention.  Patient believes the nausea could be stress related as she has been working on closing on her dad's estate (should be done by the end of the month).  The patient was assessed for medication, and activity level changes, missed or extra doses, bruising or bleeding, with no problem findings. Patient denies any identifiable changes that caused the sub therapeutic INR. INR has been consistently sub therapeutic so maintenance dose was increased by 8%.               OBJECTIVE    Recent labs: (last 7 days)     02/10/21  0756   INR 1.60*       ASSESSMENT / PLAN  INR assessment SUB    Recheck INR In: 1 WEEK    INR Location Clinic      Anticoagulation Summary  As of 2/10/2021    INR goal:  2.0-3.0   TTR:  31.9 % (11.7 mo)   INR used for dosin.60 (2/10/2021)   Warfarin maintenance plan:  8 mg (2 mg x 4) every day   Full warfarin instructions:  2/10: 10 mg; Otherwise 8 mg every day    Weekly warfarin total:  56 mg   Plan last modified:  Vania Morse RN (2/10/2021)   Next INR check:  2/17/2021   Priority:  Maintenance   Target end date:  Indefinite    Indications    Long-term (current) use of anticoagulants [Z79.01] [Z79.01]  PE (pulmonary thromboembolism) (H) [I26.99]  Recurrent deep venous thrombosis (H) [I82.409]             Anticoagulation Episode Summary     INR check location:      Preferred lab:      Send INR reminders to:  [facility]    Comments:  INR Referral renewed, see 8/26/20 telephone encounter.      Anticoagulation Care Providers     Provider Role Specialty Phone number    Alyse Faye MD Referring Internal Medicine [phone]    Hamzah Thomas MD Responsible Internal Medicine [phone]            See the Encounter Report to view Anticoagulation Flowsheet and Dosing Calendar (Go to Encounters tab in chart review, and find the Anticoagulation Therapy Visit)    Dosage adjustment made based on physician directed care plan.    Vania Morse RN

## 2021-02-17 ENCOUNTER — ANTICOAGULATION THERAPY VISIT (OUTPATIENT)
Dept: ANTICOAGULATION | Facility: CLINIC | Age: 46
End: 2021-02-17

## 2021-02-17 DIAGNOSIS — I26.99 PE (PULMONARY THROMBOEMBOLISM) (H): ICD-10-CM

## 2021-02-17 DIAGNOSIS — I82.409 RECURRENT DEEP VENOUS THROMBOSIS (H): ICD-10-CM

## 2021-02-17 DIAGNOSIS — Z79.01 LONG TERM CURRENT USE OF ANTICOAGULANT THERAPY: ICD-10-CM

## 2021-02-17 LAB
CAPILLARY BLOOD COLLECTION: NORMAL
INR PPP: 2 (ref 0.86–1.14)

## 2021-02-17 PROCEDURE — 99207 PR NO CHARGE NURSE ONLY: CPT

## 2021-02-17 PROCEDURE — 36416 COLLJ CAPILLARY BLOOD SPEC: CPT | Performed by: INTERNAL MEDICINE

## 2021-02-17 PROCEDURE — 85610 PROTHROMBIN TIME: CPT | Performed by: INTERNAL MEDICINE

## 2021-02-17 NOTE — PROGRESS NOTES
ANTICOAGULATION FOLLOW-UP CLINIC VISIT    Patient Name:  Nelsy Cota  Date:  2021  Contact Type:  Telephone/ discussed with patient    SUBJECTIVE:  Patient Findings     Comments:  The patient was assessed for diet, medication, and activity level changes, missed or extra doses, bruising or bleeding, with no problem findings.  Boost dose was given last week and maintenance dose was increased.  INR is in range today, but still on the lower end of the range.  Adjusted maintenance dose to include the boost that was done last week so patient will continue on the same amount of warfarin per week.        Clinical Outcomes     Negatives:  Major bleeding event, Thromboembolic event, Anticoagulation-related hospital admission, Anticoagulation-related ED visit, Anticoagulation-related fatality    Comments:  The patient was assessed for diet, medication, and activity level changes, missed or extra doses, bruising or bleeding, with no problem findings.  Boost dose was given last week and maintenance dose was increased.  INR is in range today, but still on the lower end of the range.  Adjusted maintenance dose to include the boost that was done last week so patient will continue on the same amount of warfarin per week.           OBJECTIVE    Recent labs: (last 7 days)     21  0759   INR 2.00*       ASSESSMENT / PLAN  INR assessment THER    Recheck INR In: 2 WEEKS    INR Location Clinic      Anticoagulation Summary  As of 2021    INR goal:  2.0-3.0   TTR:  31.9 % (11.7 mo)   INR used for dosin.00 (2021)   Warfarin maintenance plan:  10 mg (2 mg x 5) every Wed; 8 mg (2 mg x 4) all other days   Full warfarin instructions:  10 mg every Wed; 8 mg all other days   Weekly warfarin total:  58 mg   Plan last modified:  Vania Morse RN (2021)   Next INR check:  3/3/2021   Priority:  Maintenance   Target end date:  Indefinite    Indications    Long-term (current) use of anticoagulants [Z79.01]  [Z79.01]  PE (pulmonary thromboembolism) (H) [I26.99]  Recurrent deep venous thrombosis (H) [I82.409]             Anticoagulation Episode Summary     INR check location:      Preferred lab:      Send INR reminders to:  [facility]    Comments:  INR Referral renewed, see 8/26/20 telephone encounter.      Anticoagulation Care Providers     Provider Role Specialty Phone number    Alyse Faye MD Referring Internal Medicine [phone]    Hamzah Thomas MD Responsible Internal Medicine [phone]            See the Encounter Report to view Anticoagulation Flowsheet and Dosing Calendar (Go to Encounters tab in chart review, and find the Anticoagulation Therapy Visit)    Dosage adjustment made based on physician directed care plan.    Vania Morse RN

## 2021-03-03 ENCOUNTER — ANTICOAGULATION THERAPY VISIT (OUTPATIENT)
Dept: ANTICOAGULATION | Facility: CLINIC | Age: 46
End: 2021-03-03

## 2021-03-03 DIAGNOSIS — I82.409 RECURRENT DEEP VENOUS THROMBOSIS (H): ICD-10-CM

## 2021-03-03 DIAGNOSIS — Z79.01 LONG TERM CURRENT USE OF ANTICOAGULANT THERAPY: ICD-10-CM

## 2021-03-03 DIAGNOSIS — I26.99 PE (PULMONARY THROMBOEMBOLISM) (H): ICD-10-CM

## 2021-03-03 LAB
CAPILLARY BLOOD COLLECTION: NORMAL
INR PPP: 2.7 (ref 0.86–1.14)

## 2021-03-03 PROCEDURE — 99207 PR NO CHARGE NURSE ONLY: CPT

## 2021-03-03 PROCEDURE — 85610 PROTHROMBIN TIME: CPT | Performed by: INTERNAL MEDICINE

## 2021-03-03 PROCEDURE — 36416 COLLJ CAPILLARY BLOOD SPEC: CPT | Performed by: INTERNAL MEDICINE

## 2021-03-03 NOTE — PROGRESS NOTES
ANTICOAGULATION FOLLOW-UP CLINIC VISIT    Patient Name:  Nelsy Cota  Date:  3/3/2021  Contact Type:  Telephone/ discussed with patient    SUBJECTIVE:  Patient Findings     Comments:  The patient was assessed for diet, medication, and activity level changes, missed or extra doses, bruising or bleeding, with no problem findings.  Continues not to drink alcohol.          Clinical Outcomes     Negatives:  Major bleeding event, Thromboembolic event, Anticoagulation-related hospital admission, Anticoagulation-related ED visit, Anticoagulation-related fatality    Comments:  The patient was assessed for diet, medication, and activity level changes, missed or extra doses, bruising or bleeding, with no problem findings.  Continues not to drink alcohol.             OBJECTIVE    Recent labs: (last 7 days)     21  0807   INR 2.70*       ASSESSMENT / PLAN  INR assessment THER    Recheck INR In: 3 WEEKS    INR Location Clinic      Anticoagulation Summary  As of 3/3/2021    INR goal:  2.0-3.0   TTR:  35.9 % (11.7 mo)   INR used for dosin.70 (3/3/2021)   Warfarin maintenance plan:  10 mg (2 mg x 5) every Wed; 8 mg (2 mg x 4) all other days   Full warfarin instructions:  10 mg every Wed; 8 mg all other days   Weekly warfarin total:  58 mg   No change documented:  Vania Morse RN   Plan last modified:  Vania Morse RN (2021)   Next INR check:  3/23/2021   Priority:  Maintenance   Target end date:  Indefinite    Indications    Long-term (current) use of anticoagulants [Z79.01] [Z79.01]  PE (pulmonary thromboembolism) (H) [I26.99]  Recurrent deep venous thrombosis (H) [I82.409]             Anticoagulation Episode Summary     INR check location:      Preferred lab:      Send INR reminders to:  [facility]    Comments:  INR Referral renewed, see 20 telephone encounter.      Anticoagulation Care Providers     Provider Role Specialty Phone number    Alyse Faye MD Referring Internal  Medicine [phone]    Hamzah Thomas MD Responsible Internal Medicine [phone]            See the Encounter Report to view Anticoagulation Flowsheet and Dosing Calendar (Go to Encounters tab in chart review, and find the Anticoagulation Therapy Visit)    Dosage adjustment made based on physician directed care plan.    Vania Morse RN

## 2021-03-09 DIAGNOSIS — Z79.01 LONG TERM CURRENT USE OF ANTICOAGULANT THERAPY: ICD-10-CM

## 2021-03-09 DIAGNOSIS — I82.409 RECURRENT DEEP VENOUS THROMBOSIS (H): ICD-10-CM

## 2021-03-09 DIAGNOSIS — Z79.01 LONG TERM CURRENT USE OF ANTICOAGULANTS WITH INR GOAL OF 2.0-3.0: Primary | ICD-10-CM

## 2021-03-09 DIAGNOSIS — I26.99 PE (PULMONARY THROMBOEMBOLISM) (H): ICD-10-CM

## 2021-03-11 RX ORDER — WARFARIN SODIUM 2 MG/1
TABLET ORAL
Qty: 120 TABLET | Refills: 1 | Status: SHIPPED | OUTPATIENT
Start: 2021-03-11 | End: 2021-05-05

## 2021-03-11 NOTE — TELEPHONE ENCOUNTER
Pending Prescriptions:                       Disp   Refills    warfarin ANTICOAGULANT (COUMADIN) 2 MG tab*120 ta*0        Sig: TAKE 4 TABLETS BY MOUTH DAILY OR PER INR CLINIC

## 2021-03-11 NOTE — TELEPHONE ENCOUNTER
Last INR: 03/03/21=2.7  Next INR: 03/23/21  INR referral and OV up-to-date: Scheduled to see PCP on 03/23/21, referral up-to-date        Prescription approved per [facility] Refill Protocol.    Sarah Holbrook RN

## 2021-03-18 ASSESSMENT — ENCOUNTER SYMPTOMS
DIARRHEA: 0
HEMATOCHEZIA: 0
FEVER: 0
HEADACHES: 0
CHILLS: 0
FREQUENCY: 0
CONSTIPATION: 0
ABDOMINAL PAIN: 0
BREAST MASS: 0
COUGH: 0
DIZZINESS: 0
EYE PAIN: 0
NERVOUS/ANXIOUS: 0
HEMATURIA: 0

## 2021-03-23 ENCOUNTER — OFFICE VISIT (OUTPATIENT)
Dept: INTERNAL MEDICINE | Facility: CLINIC | Age: 46
End: 2021-03-23
Payer: COMMERCIAL

## 2021-03-23 ENCOUNTER — ANTICOAGULATION THERAPY VISIT (OUTPATIENT)
Dept: ANTICOAGULATION | Facility: CLINIC | Age: 46
End: 2021-03-23

## 2021-03-23 ENCOUNTER — TRANSFERRED RECORDS (OUTPATIENT)
Dept: HEALTH INFORMATION MANAGEMENT | Facility: CLINIC | Age: 46
End: 2021-03-23

## 2021-03-23 VITALS
RESPIRATION RATE: 15 BRPM | HEIGHT: 64 IN | SYSTOLIC BLOOD PRESSURE: 136 MMHG | HEART RATE: 83 BPM | OXYGEN SATURATION: 98 % | WEIGHT: 161.1 LBS | TEMPERATURE: 98.1 F | BODY MASS INDEX: 27.5 KG/M2 | DIASTOLIC BLOOD PRESSURE: 80 MMHG

## 2021-03-23 DIAGNOSIS — Z00.00 ROUTINE HISTORY AND PHYSICAL EXAMINATION OF ADULT: Primary | ICD-10-CM

## 2021-03-23 DIAGNOSIS — I26.99 PE (PULMONARY THROMBOEMBOLISM) (H): ICD-10-CM

## 2021-03-23 DIAGNOSIS — Z79.01 LONG TERM CURRENT USE OF ANTICOAGULANT THERAPY: ICD-10-CM

## 2021-03-23 DIAGNOSIS — F17.200 TOBACCO USE DISORDER: ICD-10-CM

## 2021-03-23 DIAGNOSIS — K70.31 ALCOHOLIC CIRRHOSIS OF LIVER WITH ASCITES (H): ICD-10-CM

## 2021-03-23 DIAGNOSIS — I82.409 RECURRENT DEEP VENOUS THROMBOSIS (H): ICD-10-CM

## 2021-03-23 PROBLEM — K70.9 ALCOHOLIC LIVER DISEASE (H): Status: ACTIVE | Noted: 2021-03-23

## 2021-03-23 LAB
CAPILLARY BLOOD COLLECTION: NORMAL
INR PPP: 2.5 (ref 0.86–1.14)

## 2021-03-23 PROCEDURE — 87624 HPV HI-RISK TYP POOLED RSLT: CPT | Performed by: INTERNAL MEDICINE

## 2021-03-23 PROCEDURE — 85610 PROTHROMBIN TIME: CPT | Performed by: INTERNAL MEDICINE

## 2021-03-23 PROCEDURE — G0145 SCR C/V CYTO,THINLAYER,RESCR: HCPCS | Performed by: INTERNAL MEDICINE

## 2021-03-23 PROCEDURE — 99396 PREV VISIT EST AGE 40-64: CPT | Performed by: INTERNAL MEDICINE

## 2021-03-23 PROCEDURE — 36416 COLLJ CAPILLARY BLOOD SPEC: CPT | Performed by: INTERNAL MEDICINE

## 2021-03-23 PROCEDURE — 99207 PR NO CHARGE NURSE ONLY: CPT

## 2021-03-23 RX ORDER — SPIRONOLACTONE 25 MG/1
25 TABLET ORAL DAILY
Qty: 45 TABLET | Refills: 0 | Status: SHIPPED | OUTPATIENT
Start: 2021-03-23

## 2021-03-23 SDOH — HEALTH STABILITY: MENTAL HEALTH: HOW OFTEN DO YOU HAVE 6 OR MORE DRINKS ON ONE OCCASION?: NOT ASKED

## 2021-03-23 SDOH — HEALTH STABILITY: MENTAL HEALTH: HOW OFTEN DO YOU HAVE A DRINK CONTAINING ALCOHOL?: NOT ASKED

## 2021-03-23 ASSESSMENT — ENCOUNTER SYMPTOMS
CHILLS: 0
EYE PAIN: 0
NERVOUS/ANXIOUS: 0
HEMATOCHEZIA: 0
DIZZINESS: 0
FEVER: 0
HEMATURIA: 0
BREAST MASS: 0
COUGH: 0
CONSTIPATION: 0
ABDOMINAL PAIN: 0
HEADACHES: 0
DIARRHEA: 0
FREQUENCY: 0

## 2021-03-23 ASSESSMENT — MIFFLIN-ST. JEOR: SCORE: 1360.74

## 2021-03-23 NOTE — NURSING NOTE
"/80   Pulse 83   Temp 98.1  F (36.7  C) (Oral)   Resp 15   Ht 1.626 m (5' 4\")   Wt 73.1 kg (161 lb 1.6 oz)   LMP 02/28/2021   SpO2 98%   BMI 27.65 kg/m    Patient in for Female Px.  Dalia Ferguson, MORIS    "

## 2021-03-23 NOTE — PROGRESS NOTES
SUBJECTIVE:   CC: Nelsy Cota is an 45 year old woman who presents for preventive health visit.       Patient has been advised of split billing requirements and indicates understanding: Yes  Healthy Habits:     Getting at least 3 servings of Calcium per day:  NO    Bi-annual eye exam:  NO    Dental care twice a year:  NO    Sleep apnea or symptoms of sleep apnea:  None    Diet:  Low salt    Frequency of exercise:  2-3 days/week    Duration of exercise:  15-30 minutes    Taking medications regularly:  Yes    Medication side effects:  None    PHQ-2 Total Score: 0    Additional concerns today:  No         Today's PHQ-2 Score:   PHQ-2 ( 1999 [employer]) 3/18/2021   Q1: Little interest or pleasure in doing things 0   Q2: Feeling down, depressed or hopeless 0   PHQ-2 Score 0   Q1: Little interest or pleasure in doing things Not at all   Q2: Feeling down, depressed or hopeless Not at all   PHQ-2 Score 0       Abuse: Current or Past (Physical, Sexual or Emotional) - No  Do you feel safe in your environment? Yes      Patient Active Problem List   Diagnosis     Papanicolaou smear of cervix with atypical squamous cells cannot exclude high grade squamous intraepithelial lesion (ASC-H)     FAM HX-CARDIOVAS DIS [facility]     Health Care Home     Convulsions, unspecified convulsion type (H)     PE (pulmonary thromboembolism) (H)     Recurrent deep venous thrombosis (H)     Tobacco use disorder     Macrocytic anemia     Long-term (current) use of anticoagulants [Z79.01]     Macrocytosis     DVT (deep venous thrombosis) (H)     Macrocytic     Ascites     Acute pancreatitis, unspecified complication status, unspecified pancreatitis type     Alcoholic liver disease (H)       Past Medical History:   Diagnosis Date     DVT (deep venous thrombosis) (H)     grey LE      Epilepsy (H)     2004 last seizure     Pulmonary emboli (H)        Past Surgical History:   Procedure Laterality Date     HC CLOSED TX CALCANEAL FX W/O MANIPULATION  2004     fracture of calcaneus     HC COLP CERVIX/UPPER VAGINA  2005    cryotherapy     [facility] NONSPECIFIC PROCEDURE  1997    R ankle fracture repair     [facility] NONSPECIFIC PROCEDURE  2004    R ankle surgery for ligament injury, also tibia injury       Current Outpatient Medications   Medication Sig Dispense Refill     furosemide (LASIX) 40 MG tablet Take 1 tablet (40 mg) by mouth daily Patient is taking 2 tablets daily. (Patient taking differently: Take 40 mg by mouth daily Through Gastroenterologist) 60 tablet 3     spironolactone (ALDACTONE) 25 MG tablet Take 1 tablet (25 mg) by mouth daily (Patient taking differently: Take 25 mg by mouth daily Through Gastroenterologist) 45 tablet 0     warfarin ANTICOAGULANT (COUMADIN) 2 MG tablet TAKE 4 TABLETS BY MOUTH DAILY OR PER INR CLINIC 120 tablet 1     Family History   Problem Relation Age of Onset     Hypertension Father      Neurologic Disorder Father         epilepsy     C.A.D. Maternal Grandmother         AAA     Connective Tissue Disorder Paternal Aunt         SLE         Social History     Tobacco Use     Smoking status: Current Every Day Smoker     Packs/day: 1.00     Years: 15.00     Pack years: 15.00     Types: Cigarettes     Smokeless tobacco: Never Used     Tobacco comment: 1 ppd    Substance Use Topics     Alcohol use: Yes     Alcohol/week: 1.0 - 2.0 standard drinks     Types: 1 - 2 Standard drinks or equivalent per week     Drinks per session: 1 or 2     Comment: 12 drinks per week     If you drink alcohol do you typically have >3 drinks per day or >7 drinks per week? No    Alcohol Use 3/18/2021   Prescreen: >3 drinks/day or >7 drinks/week? No   Prescreen: >3 drinks/day or >7 drinks/week? -   AUDIT SCORE  -       Any new diagnosis of family breast, ovarian, or bowel cancer? Yes     Reviewed orders with patient.  Reviewed health maintenance and updated orders accordingly - Yes       History of abnormal Pap smear: YES - updated in Problem List and Health Maintenance  "accordingly  PAP / HPV 1/12/2007   PAP ASC-H(A)     Reviewed and updated as needed this visit by clinical staff                 Reviewed and updated as needed this visit by Provider                    Review of Systems   Constitutional: Negative for chills and fever.   HENT: Negative for congestion and ear pain.    Eyes: Negative for pain.   Respiratory: Negative for cough.    Cardiovascular: Negative for chest pain.   Gastrointestinal: Negative for abdominal pain, constipation, diarrhea and hematochezia.   Breasts:  Negative for tenderness, breast mass and discharge.   Genitourinary: Negative for frequency, genital sores, hematuria, pelvic pain, vaginal bleeding and vaginal discharge.   Neurological: Negative for dizziness and headaches.   Psychiatric/Behavioral: The patient is not nervous/anxious.          OBJECTIVE:   /80   Pulse 83   Temp 98.1  F (36.7  C) (Oral)   Resp 15   Ht 1.626 m (5' 4\")   Wt 73.1 kg (161 lb 1.6 oz)   LMP 02/28/2021   SpO2 98%   BMI 27.65 kg/m    Physical Exam  GENERAL: healthy, alert and no distress  EYES: Eyes grossly normal to inspection, PERRL and conjunctivae and sclerae normal  HENT: ear canals and TM's normal, nose and mouth without ulcers or lesions  NECK: no adenopathy, no asymmetry, masses, or scars and thyroid normal to palpation  RESP: lungs clear to auscultation - no rales, rhonchi or wheezes  BREAST: normal without masses, tenderness or nipple discharge and no palpable axillary masses or adenopathy  CV: regular rate and rhythm,     ABDOMEN: soft, nontender,  Hepatomegaly noted ,  bowel sounds normal   (female): normal female external genitalia, normal urethral meatus, vaginal mucosa pink, moist, well rugated, and normal cervix/adnexa  without masses , pap obtained   MS: no gross musculoskeletal defects noted, no edema  NEURO: Normal strength and tone, mentation intact and speech normal  PSYCH: mentation appears normal, affect " "normal/bright      ASSESSMENT/PLAN:       (Z00.00) Routine history and physical examination of adult  (primary encounter diagnosis)  Plan: CBC with platelets, Comprehensive metabolic         panel, Lipid panel reflex to direct LDL         Fasting, MA Screening Digital Bilateral               (I26.99) PE (pulmonary thromboembolism) (H)  Plan: on warfarin       (F17.200) Tobacco use disorder  Plan: counseled on smoking cessation       (K70.31) Alcoholic cirrhosis of liver with ascites (H)  Plan: pt follows up with [facility], has been out of aldactone for 2 months, refilled spironolactone (ALDACTONE) 25 MG tablet daily until seen by GI, has appt in 05/21. Also on lasix 40 mg daily.            Patient has been advised of split billing requirements and indicates understanding: Yes  COUNSELING:  Reviewed preventive health counseling, as reflected in patient instructions       Regular exercise       Healthy diet/nutrition    Estimated body mass index is 27.65 kg/m  as calculated from the following:    Height as of this encounter: 1.626 m (5' 4\").    Weight as of this encounter: 73.1 kg (161 lb 1.6 oz).    Weight management plan: Discussed healthy diet and exercise guidelines    She reports that she has been smoking cigarettes. She has a 15.00 pack-year smoking history. She has never used smokeless tobacco.  Tobacco Cessation Action Plan:   Information offered: Patient not interested at this time      Counseling Resources:  ATP IV Guidelines  Pooled Cohorts Equation Calculator  Breast Cancer Risk Calculator  BRCA-Related Cancer Risk Assessment: FHS-7 Tool  FRAX Risk Assessment  ICSI Preventive Guidelines  Dietary Guidelines for Americans, 2010  USDA's MyPlate  ASA Prophylaxis  Lung CA Screening    Hamzah Thomas MD  [facility]  "

## 2021-03-26 LAB
COPATH REPORT: NORMAL
PAP: NORMAL

## 2021-03-29 ENCOUNTER — PATIENT OUTREACH (OUTPATIENT)
Dept: INTERNAL MEDICINE | Facility: CLINIC | Age: 46
End: 2021-03-29

## 2021-03-29 DIAGNOSIS — R87.611 PAPANICOLAOU SMEAR OF CERVIX WITH ATYPICAL SQUAMOUS CELLS CANNOT EXCLUDE HIGH GRADE SQUAMOUS INTRAEPITHELIAL LESION (ASC-H): ICD-10-CM

## 2021-03-29 PROBLEM — R87.810 CERVICAL HIGH RISK HPV (HUMAN PAPILLOMAVIRUS) TEST POSITIVE: Status: ACTIVE | Noted: 2021-03-23

## 2021-03-29 LAB
FINAL DIAGNOSIS: ABNORMAL
HPV HR 12 DNA CVX QL NAA+PROBE: NEGATIVE
HPV16 DNA SPEC QL NAA+PROBE: POSITIVE
HPV18 DNA SPEC QL NAA+PROBE: NEGATIVE
SPECIMEN DESCRIPTION: ABNORMAL
SPECIMEN SOURCE CVX/VAG CYTO: ABNORMAL

## 2021-03-29 NOTE — TELEPHONE ENCOUNTER
2005 Cryotherapy  1/12/07 ASC-H pap  1/24/07 [place] Bx: benign  [gap in pap follow up]  3/23/21 NIL pap, + HR HPV 16. Plan colp bef 6/23/21

## 2021-03-30 ENCOUNTER — HOSPITAL ENCOUNTER (OUTPATIENT)
Dept: MAMMOGRAPHY | Facility: CLINIC | Age: 46
Discharge: HOME | End: 2021-03-30
Attending: INTERNAL MEDICINE | Admitting: INTERNAL MEDICINE
Payer: COMMERCIAL

## 2021-03-30 DIAGNOSIS — Z00.00 ROUTINE HISTORY AND PHYSICAL EXAMINATION OF ADULT: ICD-10-CM

## 2021-03-30 PROCEDURE — 77067 SCR MAMMO BI INCL CAD: CPT

## 2021-04-05 ENCOUNTER — HOSPITAL ENCOUNTER (OUTPATIENT)
Dept: ULTRASOUND IMAGING | Facility: CLINIC | Age: 46
End: 2021-04-05
Attending: INTERNAL MEDICINE
Payer: COMMERCIAL

## 2021-04-05 ENCOUNTER — HOSPITAL ENCOUNTER (OUTPATIENT)
Dept: MAMMOGRAPHY | Facility: CLINIC | Age: 46
End: 2021-04-05
Attending: INTERNAL MEDICINE
Payer: COMMERCIAL

## 2021-04-05 DIAGNOSIS — R92.8 ABNORMAL MAMMOGRAM: ICD-10-CM

## 2021-04-05 PROCEDURE — G0279 TOMOSYNTHESIS, MAMMO: HCPCS

## 2021-04-05 PROCEDURE — 76642 ULTRASOUND BREAST LIMITED: CPT | Mod: RT

## 2021-04-09 ENCOUNTER — OFFICE VISIT (OUTPATIENT)
Dept: OBGYN | Facility: CLINIC | Age: 46
End: 2021-04-09
Payer: COMMERCIAL

## 2021-04-09 VITALS — DIASTOLIC BLOOD PRESSURE: 74 MMHG | SYSTOLIC BLOOD PRESSURE: 106 MMHG | HEART RATE: 73 BPM

## 2021-04-09 DIAGNOSIS — R87.810 CERVICAL HIGH RISK HPV (HUMAN PAPILLOMAVIRUS) TEST POSITIVE: Primary | ICD-10-CM

## 2021-04-09 DIAGNOSIS — R87.611 PAPANICOLAOU SMEAR OF CERVIX WITH ATYPICAL SQUAMOUS CELLS CANNOT EXCLUDE HIGH GRADE SQUAMOUS INTRAEPITHELIAL LESION (ASC-H): ICD-10-CM

## 2021-04-09 LAB — HCG, QUAL URINE: NEGATIVE

## 2021-04-09 PROCEDURE — 84703 CHORIONIC GONADOTROPIN ASSAY: CPT | Performed by: OBSTETRICS & GYNECOLOGY

## 2021-04-09 PROCEDURE — 88305 TISSUE EXAM BY PATHOLOGIST: CPT | Performed by: PATHOLOGY

## 2021-04-09 PROCEDURE — 57456 ENDOCERV CURETTAGE W/SCOPE: CPT | Performed by: OBSTETRICS & GYNECOLOGY

## 2021-04-09 NOTE — PROGRESS NOTES
Nelsy Cota is a 46 year old female P0 (hx of elective pregnancy termination) who presents for abnormal pap smear evaluation referred by Hamzah Thomas MD.     Pap smear hx is as follows:   2005 Cryotherapy  1/12/07 ASC-H pap  1/24/07 [place] Bx: benign  [gap in pap follow up]  3/23/21 NIL pap, + HR HPV 16. Plan colp bef 6/23/21 03/30/21 Pt notified  4/9/20 [place] -    This will be her repeat colposcopy.    Patient's last menstrual period was 03/30/2021 (approximate).   UPT today is negative      Patient does smoke    Type of contraception: none  Age at first sexual intercourse: 13  Number of sexual partners (lifetime): ~20  Past GYN history:  No STD history  Prior cervical/vaginal disease: HPV related changes.  Prior cervical treatment: no treatment.      PROCEDURE:  Before the procedure, it was ensured that the patient was educated regarding the nature of her findings to date, the implications, and what was to be done. She has been made aware of the role of HPV, the natural history of infection, ways to minimize her future risk, the effect of HPV on the cervix, and treatment options available should they be indicated.  The details of the colposcopic procedure were reviewed.  All questions were answered before proceeding, and informed consent was therefore obtained.    Speculum placed in vagina and excellent visualization of cervix   acheived, cervix swabbed x 3 with acetic acid solution.    FINDINGS:  EXAM:  Blood pressure 106/74, pulse 73, last menstrual period 03/30/2021, not currently breastfeeding.  BMI= There is no height or weight on file to calculate BMI.  Patient's last menstrual period was 03/30/2021 (approximate).  General - pleasant female in no acute distress.  Neurological - alert and oriented X 3  Psychiatric - normal mood and affect    Pelvic-  Cervix: no visible lesions  Please refer to images section for details.    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    PLAN: specimens labelled and sent to Pathology, will base further treatment on Pathology findings, post biopsy instructions given to patient, call to discuss Pathology results and  Smoking cessation discussed    A copy of the chart will be sent to the referring provider.    Jadiel Tai MD  [facility]

## 2021-04-12 LAB — COPATH REPORT: NORMAL

## 2021-04-13 ENCOUNTER — PATIENT OUTREACH (OUTPATIENT)
Dept: OBGYN | Facility: CLINIC | Age: 46
End: 2021-04-13

## 2021-04-13 NOTE — RESULT ENCOUNTER NOTE
Inform patient that her colposcopy ECC returned benign.   She is to return in 1 year for cotesting Pap smear.     Jadiel Tai MD

## 2021-04-15 DIAGNOSIS — I26.99 PE (PULMONARY THROMBOEMBOLISM) (H): Primary | ICD-10-CM

## 2021-04-15 DIAGNOSIS — I82.493 DEEP VEIN THROMBOSIS (DVT) OF OTHER VEIN OF BOTH LOWER EXTREMITIES, UNSPECIFIED CHRONICITY (H): ICD-10-CM

## 2021-04-15 DIAGNOSIS — Z79.01 LONG TERM CURRENT USE OF ANTICOAGULANT THERAPY: ICD-10-CM

## 2021-04-20 ENCOUNTER — ANTICOAGULATION THERAPY VISIT (OUTPATIENT)
Dept: ANTICOAGULATION | Facility: CLINIC | Age: 46
End: 2021-04-20

## 2021-04-20 DIAGNOSIS — I82.409 RECURRENT DEEP VENOUS THROMBOSIS (H): ICD-10-CM

## 2021-04-20 DIAGNOSIS — I26.99 PE (PULMONARY THROMBOEMBOLISM) (H): ICD-10-CM

## 2021-04-20 DIAGNOSIS — Z79.01 LONG TERM CURRENT USE OF ANTICOAGULANT THERAPY: ICD-10-CM

## 2021-04-20 DIAGNOSIS — Z00.00 ROUTINE HISTORY AND PHYSICAL EXAMINATION OF ADULT: ICD-10-CM

## 2021-04-20 DIAGNOSIS — I82.493 DEEP VEIN THROMBOSIS (DVT) OF OTHER VEIN OF BOTH LOWER EXTREMITIES, UNSPECIFIED CHRONICITY (H): ICD-10-CM

## 2021-04-20 LAB
ALBUMIN SERPL-MCNC: 3.5 G/DL (ref 3.4–5)
ALP SERPL-CCNC: 81 U/L (ref 40–150)
ALT SERPL W P-5'-P-CCNC: 37 U/L (ref 0–50)
ANION GAP SERPL CALCULATED.3IONS-SCNC: 4 MMOL/L (ref 3–14)
AST SERPL W P-5'-P-CCNC: 29 U/L (ref 0–45)
BILIRUB SERPL-MCNC: 0.5 MG/DL (ref 0.2–1.3)
BUN SERPL-MCNC: 15 MG/DL (ref 7–30)
CALCIUM SERPL-MCNC: 9.7 MG/DL (ref 8.5–10.1)
CAPILLARY BLOOD COLLECTION: NORMAL
CHLORIDE SERPL-SCNC: 104 MMOL/L (ref 94–109)
CHOLEST SERPL-MCNC: 223 MG/DL
CO2 SERPL-SCNC: 26 MMOL/L (ref 20–32)
CREAT SERPL-MCNC: 0.73 MG/DL (ref 0.52–1.04)
ERYTHROCYTE [DISTWIDTH] IN BLOOD BY AUTOMATED COUNT: 13.5 % (ref 10–15)
GFR SERPL CREATININE-BSD FRML MDRD: >90 ML/MIN/{1.73_M2}
GLUCOSE SERPL-MCNC: 85 MG/DL (ref 70–99)
HCT VFR BLD AUTO: 43.9 % (ref 35–47)
HDLC SERPL-MCNC: 76 MG/DL
HGB BLD-MCNC: 15.1 G/DL (ref 11.7–15.7)
INR PPP: 2 (ref 0.86–1.14)
LDLC SERPL CALC-MCNC: 125 MG/DL
MCH RBC QN AUTO: 31.9 PG (ref 26.5–33)
MCHC RBC AUTO-ENTMCNC: 34.4 G/DL (ref 31.5–36.5)
MCV RBC AUTO: 93 FL (ref 78–100)
NONHDLC SERPL-MCNC: 147 MG/DL
PLATELET # BLD AUTO: 222 10E9/L (ref 150–450)
POTASSIUM SERPL-SCNC: 4.1 MMOL/L (ref 3.4–5.3)
PROT SERPL-MCNC: 7.9 G/DL (ref 6.8–8.8)
RBC # BLD AUTO: 4.74 10E12/L (ref 3.8–5.2)
SODIUM SERPL-SCNC: 134 MMOL/L (ref 133–144)
TRIGL SERPL-MCNC: 112 MG/DL
WBC # BLD AUTO: 8.4 10E9/L (ref 4–11)

## 2021-04-20 PROCEDURE — 36416 COLLJ CAPILLARY BLOOD SPEC: CPT | Performed by: INTERNAL MEDICINE

## 2021-04-20 PROCEDURE — 80053 COMPREHEN METABOLIC PANEL: CPT | Performed by: INTERNAL MEDICINE

## 2021-04-20 PROCEDURE — 85610 PROTHROMBIN TIME: CPT | Performed by: INTERNAL MEDICINE

## 2021-04-20 PROCEDURE — 99207 PR NO CHARGE NURSE ONLY: CPT

## 2021-04-20 PROCEDURE — 85027 COMPLETE CBC AUTOMATED: CPT | Performed by: INTERNAL MEDICINE

## 2021-04-20 PROCEDURE — 80061 LIPID PANEL: CPT | Performed by: INTERNAL MEDICINE

## 2021-04-20 NOTE — PROGRESS NOTES
ANTICOAGULATION FOLLOW-UP CLINIC VISIT    Patient Name:  Nelsy Cota  Date:  2021  Contact Type:  Telephone/ discussed with patient    SUBJECTIVE:  Patient Findings     Comments:  The patient was assessed for diet, medication, and activity level changes, missed or extra doses, bruising or bleeding, with no problem findings.          Clinical Outcomes     Negatives:  Major bleeding event, Thromboembolic event, Anticoagulation-related hospital admission, Anticoagulation-related ED visit, Anticoagulation-related fatality    Comments:  The patient was assessed for diet, medication, and activity level changes, missed or extra doses, bruising or bleeding, with no problem findings.             OBJECTIVE    Recent labs: (last 7 days)     21  0753   INR 2.00*       ASSESSMENT / PLAN  INR assessment THER    Recheck INR In: 4 WEEKS    INR Location Clinic      Anticoagulation Summary  As of 2021    INR goal:  2.0-3.0   TTR:  49.5 % (11.7 mo)   INR used for dosin.00 (2021)   Warfarin maintenance plan:  10 mg (2 mg x 5) every Wed; 8 mg (2 mg x 4) all other days   Full warfarin instructions:  10 mg every Wed; 8 mg all other days   Weekly warfarin total:  58 mg   No change documented:  Vania Morse RN   Plan last modified:  Vania Morse RN (2021)   Next INR check:  2021   Priority:  Maintenance   Target end date:  Indefinite    Indications    Long-term (current) use of anticoagulants [Z79.01] [Z79.01]  PE (pulmonary thromboembolism) (H) [I26.99]  Recurrent deep venous thrombosis (H) [I82.409]             Anticoagulation Episode Summary     INR check location:      Preferred lab:      Send INR reminders to:  [facility]    Comments:  INR Referral renewed, see 20 telephone encounter.      Anticoagulation Care Providers     Provider Role Specialty Phone number    Alyse Faye MD Referring Internal Medicine [phone]    Hamzah Thomas MD  [facility] Internal Medicine [phone]            See the Encounter Report to view Anticoagulation Flowsheet and Dosing Calendar (Go to Encounters tab in chart review, and find the Anticoagulation Therapy Visit)    Dosage adjustment made based on physician directed care plan.    Vania Morse RN

## 2021-05-04 DIAGNOSIS — Z79.01 LONG TERM CURRENT USE OF ANTICOAGULANTS WITH INR GOAL OF 2.0-3.0: ICD-10-CM

## 2021-05-04 DIAGNOSIS — I82.409 RECURRENT DEEP VENOUS THROMBOSIS (H): ICD-10-CM

## 2021-05-04 DIAGNOSIS — I26.99 PE (PULMONARY THROMBOEMBOLISM) (H): ICD-10-CM

## 2021-05-05 RX ORDER — WARFARIN SODIUM 2 MG/1
TABLET ORAL
Qty: 125 TABLET | Refills: 1 | Status: SHIPPED | OUTPATIENT
Start: 2021-05-05 | End: 2021-06-29

## 2021-05-05 NOTE — TELEPHONE ENCOUNTER
"Requested Prescriptions   Pending Prescriptions Disp Refills     warfarin ANTICOAGULANT (COUMADIN) 2 MG tablet [Pharmacy Med Name: WARFARIN SOD 2MG TABLETS (PURPLE)] 120 tablet 1     Sig: TAKE 4 TABLETS BY MOUTH DAILY OR PER INR CLINIC       Vitamin K Antagonists Failed - 5/4/2021  4:51 PM        Failed - INR is within goal in the past 6 weeks     Confirm INR is within goal in the past 6 weeks.     Recent Labs   Lab Test 04/20/21  0753   INR 2.00*                       Passed - Recent (12 mo) or future (30 days) visit within the authorizing provider's specialty     Patient has had an office visit with the authorizing provider or a provider within the authorizing providers department within the previous 12 mos or has a future within next 30 days. See \"Patient Info\" tab in inbasket, or \"Choose Columns\" in Meds & Orders section of the refill encounter.              Passed - Medication is active on med list        Passed - Patient is 18 years of age or older        Passed - Patient is not pregnant        Passed - No positive pregnancy on file in past 12 months           Last office visit 3/23/21.  Warfarin dosing is managed by INR Clinic.  Prescription approved per [facility] Refill Protocol.  Vania Morse RN    "

## 2021-05-05 NOTE — TELEPHONE ENCOUNTER
Pending Prescriptions:                       Disp   Refills    warfarin ANTICOAGULANT (COUMADIN) 2 MG tab*120 ta*1        Sig: TAKE 4 TABLETS BY MOUTH DAILY OR PER INR CLINIC

## 2021-05-18 ENCOUNTER — ANTICOAGULATION THERAPY VISIT (OUTPATIENT)
Dept: NURSING | Facility: CLINIC | Age: 46
End: 2021-05-18

## 2021-05-18 DIAGNOSIS — I82.409 RECURRENT DEEP VENOUS THROMBOSIS (H): ICD-10-CM

## 2021-05-18 DIAGNOSIS — I82.493 DEEP VEIN THROMBOSIS (DVT) OF OTHER VEIN OF BOTH LOWER EXTREMITIES, UNSPECIFIED CHRONICITY (H): ICD-10-CM

## 2021-05-18 DIAGNOSIS — I26.99 PE (PULMONARY THROMBOEMBOLISM) (H): ICD-10-CM

## 2021-05-18 DIAGNOSIS — Z79.01 LONG TERM CURRENT USE OF ANTICOAGULANT THERAPY: ICD-10-CM

## 2021-05-18 LAB
CAPILLARY BLOOD COLLECTION: NORMAL
INR PPP: 1.8 (ref 0.86–1.14)

## 2021-05-18 PROCEDURE — 36416 COLLJ CAPILLARY BLOOD SPEC: CPT | Performed by: INTERNAL MEDICINE

## 2021-05-18 PROCEDURE — 85610 PROTHROMBIN TIME: CPT | Performed by: INTERNAL MEDICINE

## 2021-05-18 PROCEDURE — 99207 PR NO CHARGE NURSE ONLY: CPT

## 2021-05-18 NOTE — PROGRESS NOTES
ANTICOAGULATION FOLLOW-UP CLINIC VISIT    Patient Name:  Nelsy Cota  Date:  2021  Contact Type:  Telephone    SUBJECTIVE:  Patient Findings     Positives:  Change in activity (Riding stationary bike 3-4 times per week, her activity/exercise has increased since winter.), Change in medications (Taking a protein supplement in pill form, pt states it does NOT contain vitamin K.), Change in diet/appetite (NO change, eating 1 green salad per week.), Bruising (NO bruising, pt states she has not had any bruises for about 1 year.)    Comments:  Patient is between jobs so will not have medical insurance until September. Patient did agree to come back for INR in 2 weeks but then may need longer intervals due to paying out of pocket.  Warfarin maintenance dose was increased ~7% due to sub-therapeutic INR secondary to increased activity.          Clinical Outcomes     Negatives:  Major bleeding event, Thromboembolic event, Anticoagulation-related hospital admission, Anticoagulation-related ED visit, Anticoagulation-related fatality    Comments:  Patient is between jobs so will not have medical insurance until September. Patient did agree to come back for INR in 2 weeks but then may need longer intervals due to paying out of pocket.  Warfarin maintenance dose was increased ~7% due to sub-therapeutic INR secondary to increased activity.             OBJECTIVE    Recent labs: (last 7 days)     21  0745   INR 1.80*       ASSESSMENT / PLAN  INR assessment SUB    Recheck INR In: 2 WEEKS    INR Location Clinic      Anticoagulation Summary  As of 2021    INR goal:  2.0-3.0   TTR:  43.3 % (11.7 mo)   INR used for dosin.80 (2021)   Warfarin maintenance plan:  10 mg (2 mg x 5) every Tue, Thu, Sat; 8 mg (2 mg x 4) all other days   Full warfarin instructions:  10 mg every Tue, Thu, Sat; 8 mg all other days   Weekly warfarin total:  62 mg   Plan last modified:  Sarah Holbrook RN (2021)   Next INR check:   6/2/2021   Priority:  High   Target end date:  Indefinite    Indications    Long-term (current) use of anticoagulants [Z79.01] [Z79.01]  PE (pulmonary thromboembolism) (H) [I26.99]  Recurrent deep venous thrombosis (H) [I82.409]             Anticoagulation Episode Summary     INR check location:      Preferred lab:      Send INR reminders to:  [facility]    Comments:  INR Referral renewed, see 8/26/20 telephone encounter.      Anticoagulation Care Providers     Provider Role Specialty Phone number    Alyse Faye MD Referring Internal Medicine [phone]    Hamzah Thomas MD Responsible Internal Medicine [phone]            See the Encounter Report to view Anticoagulation Flowsheet and Dosing Calendar (Go to Encounters tab in chart review, and find the Anticoagulation Therapy Visit)        Sarah Holbrook RN

## 2021-06-01 ENCOUNTER — ANTICOAGULATION THERAPY VISIT (OUTPATIENT)
Dept: ANTICOAGULATION | Facility: CLINIC | Age: 46
End: 2021-06-01

## 2021-06-01 DIAGNOSIS — I82.409 RECURRENT DEEP VENOUS THROMBOSIS (H): ICD-10-CM

## 2021-06-01 DIAGNOSIS — I26.99 PE (PULMONARY THROMBOEMBOLISM) (H): ICD-10-CM

## 2021-06-01 DIAGNOSIS — Z79.01 LONG TERM CURRENT USE OF ANTICOAGULANT THERAPY: ICD-10-CM

## 2021-06-01 DIAGNOSIS — I82.493 DEEP VEIN THROMBOSIS (DVT) OF OTHER VEIN OF BOTH LOWER EXTREMITIES, UNSPECIFIED CHRONICITY (H): ICD-10-CM

## 2021-06-01 LAB
CAPILLARY BLOOD COLLECTION: NORMAL
INR PPP: 2.4 (ref 0.86–1.14)

## 2021-06-01 PROCEDURE — 36416 COLLJ CAPILLARY BLOOD SPEC: CPT | Performed by: INTERNAL MEDICINE

## 2021-06-01 PROCEDURE — 85610 PROTHROMBIN TIME: CPT | Performed by: INTERNAL MEDICINE

## 2021-06-01 PROCEDURE — 99207 PR NO CHARGE NURSE ONLY: CPT

## 2021-06-01 NOTE — PROGRESS NOTES
ANTICOAGULATION FOLLOW-UP CLINIC VISIT    Patient Name:  Nelsy Cota  Date:  2021  Contact Type:  Telephone/ discussed with patient    SUBJECTIVE:  Patient Findings     Comments:  The patient was assessed for diet, medication, and activity level changes, missed or extra doses, bruising or bleeding, with no problem findings.          Clinical Outcomes     Negatives:  Major bleeding event, Thromboembolic event, Anticoagulation-related hospital admission, Anticoagulation-related ED visit, Anticoagulation-related fatality    Comments:  The patient was assessed for diet, medication, and activity level changes, missed or extra doses, bruising or bleeding, with no problem findings.             OBJECTIVE    Recent labs: (last 7 days)     21  0742   INR 2.40*       ASSESSMENT / PLAN  INR assessment THER    Recheck INR In: 4 WEEKS    INR Location Clinic      Anticoagulation Summary  As of 2021    INR goal:  2.0-3.0   TTR:  44.3 % (11.7 mo)   INR used for dosin.40 (2021)   Warfarin maintenance plan:  10 mg (2 mg x 5) every Tue, Thu, Sat; 8 mg (2 mg x 4) all other days   Full warfarin instructions:  10 mg every Tue, Thu, Sat; 8 mg all other days   Weekly warfarin total:  62 mg   No change documented:  Vania Morse RN   Plan last modified:  Sarah Holbrook, RN (2021)   Next INR check:  2021   Priority:  High   Target end date:  Indefinite    Indications    Long-term (current) use of anticoagulants [Z79.01] [Z79.01]  PE (pulmonary thromboembolism) (H) [I26.99]  Recurrent deep venous thrombosis (H) [I82.409]             Anticoagulation Episode Summary     INR check location:      Preferred lab:      Send INR reminders to:  [facility]    Comments:  INR Referral renewed, see 20 telephone encounter.      Anticoagulation Care Providers     Provider Role Specialty Phone number    Alyse Faye MD Referring Internal Medicine [phone]    Hamzah Thomas MD  [facility] Internal Medicine [phone]            See the Encounter Report to view Anticoagulation Flowsheet and Dosing Calendar (Go to Encounters tab in chart review, and find the Anticoagulation Therapy Visit)    Dosage adjustment made based on physician directed care plan.    Vania Morse RN

## 2021-06-23 NOTE — PROGRESS NOTES
ANTICOAGULATION INITIAL CLINIC VISIT    Patient Name:  Nelsy Cota  Date:  7/31/2018  Referred by: Dr Kasandra Solano  Contact Type:  Face to Face    SUBJECTIVE:  Coumadin education was completed today.  Topics covered include:  -Introduction to coumadin  -Proper Administration  -INR Testing  -Sign/Symptoms of Bleeding  -Signs/Symptoms of Clot Formation or Stroke  -Dietary Intake of Vitamin K  -Drug Interactions  -Anticoagulation Identification (bracelet, necklace or wallet card)  -Future Surgery  -Effects of Alcohol, Tobacco, and Exercise on Coumadin    Coumadin Education Booklet and Coumadin Identification Wallet Card were given to the patient.       Patient Findings     Positives Initiation of therapy, Intentional hold of therapy (Warfarin held 7/25, 7/26, 7/27, 7/28 and 7/29/18 d/t elevated INR), No Problem Findings          OBJECTIVE    INR Protime   Date Value Ref Range Status   07/31/2018 2.2 (A) 0.86 - 1.14 Final       ASSESSMENT / PLAN  INR assessment THER    Recheck INR In: 3 DAYS    INR Location Clinic      Anticoagulation Summary as of 7/31/2018     INR goal 2.0-3.0   Today's INR 2.2   Warfarin maintenance plan No maintenance plan   Full warfarin instructions 7/31: 2.5 mg; 8/1: 2.5 mg; 8/2: 2.5 mg; Otherwise No maintenance plan   Next INR check 8/3/2018   Target end date     Indications   Long-term (current) use of anticoagulants [Z79.01] [Z79.01]  PE (pulmonary thromboembolism) (H) [I26.99]  Recurrent deep venous thrombosis (H) [I82.409]         Anticoagulation Episode Summary     INR check location     Preferred lab     Send INR reminders to [facility]    Comments       Anticoagulation Care Providers     Provider Role Specialty Phone number    Hamzah Thomas MD [facility] Internal Medicine [phone]            See the Encounter Report to view Anticoagulation Flowsheet and Dosing Calendar (Go to Encounters tab in chart review, and find the Anticoagulation Therapy Visit)    Dosage  adjustment made based on physician directed care plan.    Latosha Perdomo RN           No edema to RLE

## 2021-06-29 ENCOUNTER — ANTICOAGULATION THERAPY VISIT (OUTPATIENT)
Dept: NURSING | Facility: CLINIC | Age: 46
End: 2021-06-29

## 2021-06-29 DIAGNOSIS — I82.409 RECURRENT DEEP VENOUS THROMBOSIS (H): ICD-10-CM

## 2021-06-29 DIAGNOSIS — I26.99 PE (PULMONARY THROMBOEMBOLISM) (H): ICD-10-CM

## 2021-06-29 DIAGNOSIS — Z79.01 LONG TERM CURRENT USE OF ANTICOAGULANT THERAPY: ICD-10-CM

## 2021-06-29 DIAGNOSIS — I82.493 DEEP VEIN THROMBOSIS (DVT) OF OTHER VEIN OF BOTH LOWER EXTREMITIES, UNSPECIFIED CHRONICITY (H): ICD-10-CM

## 2021-06-29 DIAGNOSIS — Z79.01 LONG TERM CURRENT USE OF ANTICOAGULANTS WITH INR GOAL OF 2.0-3.0: ICD-10-CM

## 2021-06-29 LAB
CAPILLARY BLOOD COLLECTION: NORMAL
INR PPP: 1.5 (ref 0.86–1.14)

## 2021-06-29 PROCEDURE — 85610 PROTHROMBIN TIME: CPT | Performed by: INTERNAL MEDICINE

## 2021-06-29 PROCEDURE — 36416 COLLJ CAPILLARY BLOOD SPEC: CPT | Performed by: INTERNAL MEDICINE

## 2021-06-29 RX ORDER — WARFARIN SODIUM 2 MG/1
TABLET ORAL
Qty: 140 TABLET | Refills: 3 | Status: SHIPPED | OUTPATIENT
Start: 2021-06-29 | End: 2021-11-30

## 2021-06-29 NOTE — PROGRESS NOTES
ANTICOAGULATION MANAGEMENT     Nelsy Cota 46 year old female is on warfarin with subtherapeutic INR result. (Goal INR 2.0-3.0)    Recent labs: (last 7 days)     06/29/21  0735   INR 1.50*       ASSESSMENT     Source(s): Patient/Caregiver Call       Warfarin doses taken: Warfarin taken as instructed    Diet: No new diet changes identified    New illness, injury, or hospitalization: No    Medication/supplement changes: Pt states she has NOT taken Spironolactone since early May, 2021 due to difficulty getting it refilled by GI doctor.     Concurrent use of WARFARIN and SPIRONOLACTONE may result in decreased anticoagulant effectiveness.    Signs or symptoms of bleeding or clotting: No    Previous INR: Therapeutic last visit; previously outside of goal range    Additional findings: None     PLAN     Recommended plan for no diet, medication or health factor changes affecting INR     Dosing Instructions: Booster dose then continue your current warfarin dose with next INR in 1 week   ; however, patient declined and stated she would come back in 2 weeks.    Summary  As of 6/29/2021    Full warfarin instructions:  6/29: 15 mg; Otherwise 10 mg every Tue, Thu, Sat; 8 mg all other days   Next INR check:  7/13/2021             Telephone call with Nelsy whom verbalizes understanding and agrees to plan    Lab visit scheduled    Education provided: None required    Plan made per [facility] anticoagulation protocol    Sarah Holbrook, RN  Anticoagulation Clinic  6/29/2021    _______________________________________________________________________     Anticoagulation Episode Summary     Current INR goal:  2.0-3.0   TTR:  39.9 % (11.7 mo)   Target end date:  Indefinite   Send INR reminders to:  [facility]    Indications    Long-term (current) use of anticoagulants [Z79.01] [Z79.01]  PE (pulmonary thromboembolism) (H) [I26.99]  Recurrent deep venous thrombosis (H) [I82.409]           Comments:  INR Referral renewed, see  8/26/20 telephone encounter.         Anticoagulation Care Providers     Provider Role Specialty Phone number    Alyse Faye MD Referring Internal Medicine [phone]    Hamzah Thomas MD Responsible Internal Medicine [phone]

## 2021-06-30 ENCOUNTER — DOCUMENTATION ONLY (OUTPATIENT)
Dept: ANTICOAGULATION | Facility: CLINIC | Age: 46
End: 2021-06-30

## 2021-06-30 DIAGNOSIS — I82.409 RECURRENT DEEP VENOUS THROMBOSIS (H): ICD-10-CM

## 2021-06-30 DIAGNOSIS — I26.99 PE (PULMONARY THROMBOEMBOLISM) (H): Primary | ICD-10-CM

## 2021-06-30 DIAGNOSIS — Z79.01 LONG TERM CURRENT USE OF ANTICOAGULANT THERAPY: ICD-10-CM

## 2021-06-30 NOTE — PROGRESS NOTES
ANTICOAGULATION MANAGEMENT      Nelsy Cota due for annual renewal of referral to anticoagulation monitoring. Order pended for your review and signature.      ANTICOAGULATION SUMMARY      Warfarin indication(s)     DVT  PE    Heart valve present?  NO       Current goal range   INR: 2.0-3.0     Goal appropriate for indication? Yes, INR 2-3 appropriate for hx of DVT, PE, hypercoagulable state, Afib, LVAD, or bileaflet AVR without risk factors     Current duration of therapy Indefinite/long term therapy   Time in Therapeutic Range (TTR)  (Goal > 60%) 39.9%       Office visit with referring provider's group within last year yes on 3/23/21       Vania Morse RN

## 2021-07-07 DIAGNOSIS — I82.409 RECURRENT DEEP VENOUS THROMBOSIS (H): ICD-10-CM

## 2021-07-07 DIAGNOSIS — I26.99 PE (PULMONARY THROMBOEMBOLISM) (H): Primary | ICD-10-CM

## 2021-07-07 DIAGNOSIS — Z79.01 LONG TERM CURRENT USE OF ANTICOAGULANT THERAPY: ICD-10-CM

## 2021-07-13 ENCOUNTER — ANTICOAGULATION THERAPY VISIT (OUTPATIENT)
Dept: ANTICOAGULATION | Facility: CLINIC | Age: 46
End: 2021-07-13

## 2021-07-13 ENCOUNTER — LAB (OUTPATIENT)
Dept: LAB | Facility: CLINIC | Age: 46
End: 2021-07-13

## 2021-07-13 DIAGNOSIS — I82.409 RECURRENT DEEP VENOUS THROMBOSIS (H): ICD-10-CM

## 2021-07-13 DIAGNOSIS — Z79.01 LONG TERM CURRENT USE OF ANTICOAGULANT THERAPY: ICD-10-CM

## 2021-07-13 DIAGNOSIS — I26.99 PE (PULMONARY THROMBOEMBOLISM) (H): ICD-10-CM

## 2021-07-13 DIAGNOSIS — Z79.01 LONG TERM CURRENT USE OF ANTICOAGULANT THERAPY: Primary | ICD-10-CM

## 2021-07-13 LAB — INR BLD: 3.1 (ref 0.9–1.1)

## 2021-07-13 PROCEDURE — 36416 COLLJ CAPILLARY BLOOD SPEC: CPT

## 2021-07-13 PROCEDURE — 85610 PROTHROMBIN TIME: CPT

## 2021-07-13 NOTE — PROGRESS NOTES
ANTICOAGULATION MANAGEMENT     Nelsy Cota 46 year old female is on warfarin with supratherapeutic INR result. (Goal INR 2.0-3.0)    Recent labs: (last 7 days)     07/13/21  0754   INR 3.1*       ASSESSMENT     Source(s): Patient/Caregiver Call       Warfarin doses taken: Warfarin taken as instructed    Diet: Decreased greens/vitamin K in diet; plans to resume previous intake.  [facility] week [facility]    New illness, injury, or hospitalization: No    Medication/supplement changes: None noted    Signs or symptoms of bleeding or clotting: No    Previous INR: Subtherapeutic    Additional findings: None     PLAN     Recommended plan for temporary change(s) affecting INR     Dosing Instructions: Continue your current warfarin dose with next INR in 2 weeks       Summary  As of 7/13/2021    Full warfarin instructions:  10 mg every Tue, Thu, Sat; 8 mg all other days   Next INR check:               Telephone call with Nelsy who verbalizes understanding and agrees to plan    Lab visit scheduled    Education provided: Monitoring for bleeding signs and symptoms and Contact [phone]  with any changes, questions or concerns.     Plan made per [facility] anticoagulation protocol    Vania Morse RN  Anticoagulation Clinic  7/13/2021    _______________________________________________________________________     Anticoagulation Episode Summary     Current INR goal:  2.0-3.0   TTR:  41.1 % (11.7 mo)   Target end date:  Indefinite   Send INR reminders to:  [facility]    Indications    Long-term (current) use of anticoagulants [Z79.01] [Z79.01]  PE (pulmonary thromboembolism) (H) [I26.99]  Recurrent deep venous thrombosis (H) [I82.409]           Comments:  INR Referral renewed, see 8/26/20 telephone encounter.         Anticoagulation Care Providers     Provider Role Specialty Phone number    Hamzah Thomas MD Referring Internal Medicine [phone]     Alyse Faye MD [facility] Internal Medicine [phone]

## 2021-07-28 ENCOUNTER — ANTICOAGULATION THERAPY VISIT (OUTPATIENT)
Dept: ANTICOAGULATION | Facility: CLINIC | Age: 46
End: 2021-07-28

## 2021-07-28 ENCOUNTER — LAB (OUTPATIENT)
Dept: LAB | Facility: CLINIC | Age: 46
End: 2021-07-28

## 2021-07-28 DIAGNOSIS — I82.409 RECURRENT DEEP VENOUS THROMBOSIS (H): ICD-10-CM

## 2021-07-28 DIAGNOSIS — I26.99 PE (PULMONARY THROMBOEMBOLISM) (H): ICD-10-CM

## 2021-07-28 DIAGNOSIS — Z79.01 LONG TERM CURRENT USE OF ANTICOAGULANT THERAPY: ICD-10-CM

## 2021-07-28 DIAGNOSIS — Z79.01 LONG TERM CURRENT USE OF ANTICOAGULANT THERAPY: Primary | ICD-10-CM

## 2021-07-28 LAB — INR BLD: 2.6 (ref 0.9–1.1)

## 2021-07-28 PROCEDURE — 85610 PROTHROMBIN TIME: CPT

## 2021-07-28 PROCEDURE — 36416 COLLJ CAPILLARY BLOOD SPEC: CPT

## 2021-07-28 NOTE — PROGRESS NOTES
ANTICOAGULATION MANAGEMENT     Nelsy Cota 46 year old female is on warfarin with therapeutic INR result. (Goal INR 2.0-3.0)    Recent labs: (last 7 days)     07/28/21  0757   INR 2.6*       ASSESSMENT     Source(s): Chart Review and Patient/Caregiver Call       Warfarin doses taken: More warfarin taken than planned which may be affecting INR.  Patient states that she has been taking 8 mg Tu, Th, Sa and 10 mg all other days.  Adjusted maintenance dose to reflect what patient has been taking since INR is therapeutic at today's visit.  This is a 3.2% increase from the previous maintenance dose that was set.    Diet: No new diet changes identified    New illness, injury, or hospitalization: No    Medication/supplement changes: None noted    Signs or symptoms of bleeding or clotting: No    Previous INR: Supratherapeutic    Additional findings: None     PLAN     Recommended plan for ongoing change(s) affecting INR     Dosing Instructions: Continue your current warfarin dose with next INR in 3 weeks       Summary  As of 7/28/2021    Full warfarin instructions:  8 mg every Tue, Thu, Sat; 10 mg all other days   Next INR check:  8/19/2021             Telephone call with Nelsy who verbalizes understanding and agrees to plan    Lab visit scheduled    Education provided: Please call back if any changes to your diet, medications or how you've been taking warfarin and Target INR goal and significance of current INR result    Plan made per [facility] anticoagulation protocol    Vania Morse RN  Anticoagulation Clinic  7/28/2021    _______________________________________________________________________     Anticoagulation Episode Summary     Current INR goal:  2.0-3.0   TTR:  44.5 % (11.7 mo)   Target end date:  Indefinite   Send INR reminders to:  [facility]    Indications    Long-term (current) use of anticoagulants [Z79.01] [Z79.01]  PE (pulmonary thromboembolism) (H) [I26.99]  Recurrent deep venous thrombosis  (H) [I82.409]           Comments:  INR Referral renewed, see 8/26/20 telephone encounter.         Anticoagulation Care Providers     Provider Role Specialty Phone number    Hamzah Thomas MD Referring Internal Medicine [phone]    Alyse Faye MD Referring Internal Medicine [phone]

## 2021-08-19 ENCOUNTER — ANTICOAGULATION THERAPY VISIT (OUTPATIENT)
Dept: ANTICOAGULATION | Facility: CLINIC | Age: 46
End: 2021-08-19

## 2021-08-19 ENCOUNTER — LAB (OUTPATIENT)
Dept: LAB | Facility: CLINIC | Age: 46
End: 2021-08-19
Payer: COMMERCIAL

## 2021-08-19 DIAGNOSIS — Z79.01 LONG TERM CURRENT USE OF ANTICOAGULANT THERAPY: ICD-10-CM

## 2021-08-19 DIAGNOSIS — Z79.01 LONG TERM CURRENT USE OF ANTICOAGULANT THERAPY: Primary | ICD-10-CM

## 2021-08-19 DIAGNOSIS — I26.99 PE (PULMONARY THROMBOEMBOLISM) (H): ICD-10-CM

## 2021-08-19 DIAGNOSIS — I82.409 RECURRENT DEEP VENOUS THROMBOSIS (H): ICD-10-CM

## 2021-08-19 LAB — INR BLD: 5.9 (ref 0.9–1.1)

## 2021-08-19 PROCEDURE — 85610 PROTHROMBIN TIME: CPT

## 2021-08-19 PROCEDURE — 36416 COLLJ CAPILLARY BLOOD SPEC: CPT

## 2021-08-19 NOTE — PROGRESS NOTES
Per protocol, reporting to PCP a supratherapeutic INR of  5.9  on this patient. INR was managed by the [facility].   Latosha Perdomo RN, BSN  Anticoagulation Clinic

## 2021-08-19 NOTE — PROGRESS NOTES
"ANTICOAGULATION MANAGEMENT     Nelsy Cota 46 year old female is on warfarin with supratherapeutic INR result. (Goal INR 2.0-3.0)    Recent labs: (last 7 days)     08/19/21  0748   INR 5.9*       ASSESSMENT     Source(s): Chart Review and Patient/Caregiver Call       Warfarin doses taken: Warfarin taken as instructed    Diet: Patient reports has not had any alcohol since 8/15/21    New illness, injury, or hospitalization: Yes: Patient reports she feel like she has fluid in her stomach again. Patient is going to make an appointment with provider.    Medication/supplement changes: None noted    Signs or symptoms of bleeding or clotting: Yes: Patient reports more than \"normal\" bruising on her arms.    Previous INR: Therapeutic last visit; previously outside of goal range    Additional findings: None     PLAN     Recommended plan for no diet, medication or health factor changes affecting INR     Dosing Instructions: Hold warfarin 8/19/21 and recheck INR 8/20/21 with next INR in 1 day       Summary  As of 8/19/2021    Full warfarin instructions:  8/19: Hold; Otherwise 8 mg every Tue, Thu, Sat; 10 mg all other days   Next INR check:  8/20/2021             Telephone call with Nelsy who verbalizes understanding and agrees to plan    Lab visit scheduled    Education provided: Please call back if any changes to your diet, medications or how you've been taking warfarin and Contact [phone]  with any changes, questions or concerns.     Plan made per [facility] anticoagulation protocol    Latosha Perdomo RN  Anticoagulation Clinic  8/19/2021    _______________________________________________________________________     Anticoagulation Episode Summary     Current INR goal:  2.0-3.0   TTR:  45.3 % (11.7 mo)   Target end date:  Indefinite   Send INR reminders to:  [facility]    Indications    Long-term (current) use of anticoagulants [Z79.01] [Z79.01]  PE (pulmonary thromboembolism) (H) [I26.99]  Recurrent deep " venous thrombosis (H) [I82.409]           Comments:  INR Referral renewed, see 8/26/20 telephone encounter.         Anticoagulation Care Providers     Provider Role Specialty Phone number    Hamzah Thomas MD Referring Internal Medicine [phone]    Alyse Faye MD Referring Internal Medicine [phone]

## 2021-08-20 ENCOUNTER — ANTICOAGULATION THERAPY VISIT (OUTPATIENT)
Dept: ANTICOAGULATION | Facility: CLINIC | Age: 46
End: 2021-08-20

## 2021-08-20 ENCOUNTER — LAB (OUTPATIENT)
Dept: LAB | Facility: CLINIC | Age: 46
End: 2021-08-20
Payer: COMMERCIAL

## 2021-08-20 ENCOUNTER — TELEPHONE (OUTPATIENT)
Dept: INTERNAL MEDICINE | Facility: CLINIC | Age: 46
End: 2021-08-20

## 2021-08-20 DIAGNOSIS — Z79.01 LONG TERM CURRENT USE OF ANTICOAGULANT THERAPY: ICD-10-CM

## 2021-08-20 DIAGNOSIS — I26.99 PE (PULMONARY THROMBOEMBOLISM) (H): ICD-10-CM

## 2021-08-20 DIAGNOSIS — I82.409 RECURRENT DEEP VENOUS THROMBOSIS (H): ICD-10-CM

## 2021-08-20 DIAGNOSIS — Z79.01 LONG TERM CURRENT USE OF ANTICOAGULANT THERAPY: Primary | ICD-10-CM

## 2021-08-20 LAB — INR BLD: 5.8 (ref 0.9–1.1)

## 2021-08-20 PROCEDURE — 85610 PROTHROMBIN TIME: CPT

## 2021-08-20 PROCEDURE — 36416 COLLJ CAPILLARY BLOOD SPEC: CPT

## 2021-08-20 NOTE — PROGRESS NOTES
Message routed to PCP as an FYI due to critical lab result.    ANTICOAGULATION MANAGEMENT     Nelsy Cota 46 year old female is on warfarin with supratherapeutic INR result. (Goal INR 2.0-3.0)    Recent labs: (last 7 days)     08/20/21  0754   INR 5.8*       ASSESSMENT     Source(s): Chart Review and Patient/Caregiver Call       Warfarin doses taken: Warfarin taken as instructed (Held)    Diet: Change in alcohol intake may be affecting INR. Patient states that she had 4 beers on Sunday (8/15) this was the first time that she had alcohol in a long time.  She does not plan to have any more alcohol as she knows that her body cannot tolerate it.     New illness, injury, or hospitalization: Yes: still feels that she may have some fluid retention in her stomach, however it does not feel like it did in October 2020 when she had ascites.  She plans to schedule follow up with GI.    Medication/supplement changes: None noted    Signs or symptoms of bleeding or clotting: Yes: bruising increased.  Denies bleeding problems.    Previous INR: Supratherapeutic    Additional findings: None     PLAN     Recommended plan for temporary change(s) affecting INR     Dosing Instructions: Hold 2 doses then continue your current warfarin dose with next INR in 3 days       Summary  As of 8/20/2021    Full warfarin instructions:  8/20: Hold; 8/21: Hold; Otherwise 8 mg every Tue, Thu, Sat; 10 mg all other days   Next INR check:  8/23/2021             Telephone call with Nelsy who agrees to plan and repeated back plan correctly    Lab visit scheduled    Education provided: Please call back if any changes to your diet, medications or how you've been taking warfarin and Monitoring for bleeding signs and symptoms    Plan made with [facility] Pharmacist Lelia Morse, FAUSTINO  Anticoagulation Clinic  8/20/2021    _______________________________________________________________________     Anticoagulation Episode Summary     Current  INR goal:  2.0-3.0   TTR:  45.3 % (11.7 mo)   Target end date:  Indefinite   Send INR reminders to:  [facility]    Indications    Long-term (current) use of anticoagulants [Z79.01] [Z79.01]  PE (pulmonary thromboembolism) (H) [I26.99]  Recurrent deep venous thrombosis (H) [I82.409]           Comments:  INR Referral renewed, see 8/26/20 telephone encounter.         Anticoagulation Care Providers     Provider Role Specialty Phone number    Hamzah Thomas MD Referring Internal Medicine [phone]    Alyse Faye MD Referring Internal Medicine [phone]

## 2021-08-23 ENCOUNTER — LAB (OUTPATIENT)
Dept: LAB | Facility: CLINIC | Age: 46
End: 2021-08-23
Payer: COMMERCIAL

## 2021-08-23 ENCOUNTER — ANTICOAGULATION THERAPY VISIT (OUTPATIENT)
Dept: ANTICOAGULATION | Facility: CLINIC | Age: 46
End: 2021-08-23

## 2021-08-23 DIAGNOSIS — I26.99 PE (PULMONARY THROMBOEMBOLISM) (H): ICD-10-CM

## 2021-08-23 DIAGNOSIS — Z79.01 LONG TERM CURRENT USE OF ANTICOAGULANT THERAPY: Primary | ICD-10-CM

## 2021-08-23 DIAGNOSIS — I82.409 RECURRENT DEEP VENOUS THROMBOSIS (H): ICD-10-CM

## 2021-08-23 DIAGNOSIS — Z79.01 LONG TERM CURRENT USE OF ANTICOAGULANT THERAPY: ICD-10-CM

## 2021-08-23 LAB — INR BLD: 2.4 (ref 0.9–1.1)

## 2021-08-23 PROCEDURE — 85610 PROTHROMBIN TIME: CPT

## 2021-08-23 PROCEDURE — 36416 COLLJ CAPILLARY BLOOD SPEC: CPT

## 2021-08-23 NOTE — PROGRESS NOTES
"ANTICOAGULATION MANAGEMENT     Nelsy Cota 46 year old female is on warfarin with therapeutic INR result. (Goal INR 2.0-3.0)    Recent labs: (last 7 days)     08/23/21  0756   INR 2.4*       ASSESSMENT     Source(s): Chart Review and Patient/Caregiver Call       Warfarin doses taken: Warfarin taken as instructed (held 3 days) Started maintenance dose yesterday - Sunday    Diet: No new diet changes identified Had a salad on Sat.    New illness, injury, or hospitalization: No. Abdomen remains    Medication/supplement changes: None noted    Signs or symptoms of bleeding or clotting: No    Previous INR: Supratherapeutic    Additional findings: None     PLAN     Recommended plan for no diet, medication or health factor changes affecting INR     Dosing Instructions: Continue your current warfarin dose with next INR in 4 days       Summary  As of 8/23/2021    Full warfarin instructions:  8 mg every Tue, Thu, Sat; 10 mg all other days   Next INR check:  8/27/2021             Telephone call with Nelsy who agrees to plan and repeated back plan correctly    Lab visit scheduled    Education provided: Please call back if any changes to your diet, medications or how you've been taking warfarin    Plan made with [facility] Pharmacist Lelia Perdomo   \"resume normal dose and recheck end of week\"    Denisa Natarajan RN  Anticoagulation Clinic  8/23/2021    _______________________________________________________________________     Anticoagulation Episode Summary     Current INR goal:  2.0-3.0   TTR:  45.4 % (11.7 mo)   Target end date:  Indefinite   Send INR reminders to:  [facility]    Indications    Long-term (current) use of anticoagulants [Z79.01] [Z79.01]  PE (pulmonary thromboembolism) (H) [I26.99]  Recurrent deep venous thrombosis (H) [I82.409]           Comments:  INR Referral renewed, see 8/26/20 telephone encounter.         Anticoagulation Care Providers     Provider Role Specialty Phone number    " Hamzah Thomas MD Referring Internal Medicine [phone]    Alyse Faye MD Referring Internal Medicine [phone]

## 2021-08-27 ENCOUNTER — ANTICOAGULATION THERAPY VISIT (OUTPATIENT)
Dept: INTERNAL MEDICINE | Facility: CLINIC | Age: 46
End: 2021-08-27

## 2021-08-27 ENCOUNTER — LAB (OUTPATIENT)
Dept: LAB | Facility: CLINIC | Age: 46
End: 2021-08-27
Payer: COMMERCIAL

## 2021-08-27 DIAGNOSIS — Z79.01 LONG TERM CURRENT USE OF ANTICOAGULANT THERAPY: ICD-10-CM

## 2021-08-27 DIAGNOSIS — I26.99 PE (PULMONARY THROMBOEMBOLISM) (H): ICD-10-CM

## 2021-08-27 DIAGNOSIS — Z79.01 LONG TERM CURRENT USE OF ANTICOAGULANT THERAPY: Primary | ICD-10-CM

## 2021-08-27 DIAGNOSIS — I82.409 RECURRENT DEEP VENOUS THROMBOSIS (H): ICD-10-CM

## 2021-08-27 LAB — INR BLD: 2.2 (ref 0.9–1.1)

## 2021-08-27 PROCEDURE — 36416 COLLJ CAPILLARY BLOOD SPEC: CPT

## 2021-08-27 PROCEDURE — 85610 PROTHROMBIN TIME: CPT

## 2021-08-27 NOTE — PROGRESS NOTES
Anticoagulation Management     Attempted to reach Nelsy to discuss today's INR result, no answer at this time.      Left  requesting callback at patient's earliest convenience.      Anticoagulation clinic to follow up     Sherlyn Wells RN

## 2021-08-27 NOTE — PROGRESS NOTES
"ANTICOAGULATION MANAGEMENT     Nelsy Cota 46 year old female is on warfarin with therapeutic INR result. (Goal INR 2.0-3.0)    Recent labs: (last 7 days)     08/27/21  0750   INR 2.2*       ASSESSMENT     Source(s): Chart Review and Patient/Caregiver Call       Warfarin doses taken: Warfarin recently held for elevated INR which may be affecting INR    Diet: No new diet changes identified    New illness, injury, or hospitalization: No    Medication/supplement changes: None noted    Signs or symptoms of bleeding or clotting: No, patient reports everything is back to \"normal\"    Previous INR: Therapeutic last visit; previously outside of goal range    Additional findings: None     PLAN     Recommended plan for no diet, medication or health factor changes affecting INR     Dosing Instructions: Continue your current warfarin dose with next INR in 1 week       Summary  As of 8/27/2021    Full warfarin instructions:  8 mg every Tue, Thu, Sat; 10 mg all other days   Next INR check:  9/3/2021             Telephone call with Nelsy who verbalizes understanding and agrees to plan     Lab visit scheduled    Education provided: Please call back if any changes to your diet, medications or how you've been taking warfarin and Contact [phone]  with any changes, questions or concerns.     Plan made per [facility] anticoagulation protocol    Latosha Perdomo RN  Anticoagulation Clinic  8/27/2021    _______________________________________________________________________     Anticoagulation Episode Summary     Current INR goal:  2.0-3.0   TTR:  46.5 % (11.7 mo)   Target end date:  Indefinite   Send INR reminders to:  [facility]    Indications    Long-term (current) use of anticoagulants [Z79.01] [Z79.01]  PE (pulmonary thromboembolism) (H) [I26.99]  Recurrent deep venous thrombosis (H) [I82.409]           Comments:  INR Referral renewed, see 8/26/20 telephone encounter.         Anticoagulation Care Providers     " Provider Role Specialty Phone number    Hamzah Thomas MD Referring Internal Medicine [phone]    Alyse Faye MD Referring Internal Medicine [phone]

## 2021-09-03 ENCOUNTER — LAB (OUTPATIENT)
Dept: LAB | Facility: CLINIC | Age: 46
End: 2021-09-03
Payer: COMMERCIAL

## 2021-09-03 ENCOUNTER — ANTICOAGULATION THERAPY VISIT (OUTPATIENT)
Dept: ANTICOAGULATION | Facility: CLINIC | Age: 46
End: 2021-09-03

## 2021-09-03 DIAGNOSIS — I26.99 PE (PULMONARY THROMBOEMBOLISM) (H): ICD-10-CM

## 2021-09-03 DIAGNOSIS — I82.409 RECURRENT DEEP VENOUS THROMBOSIS (H): ICD-10-CM

## 2021-09-03 DIAGNOSIS — Z79.01 LONG TERM CURRENT USE OF ANTICOAGULANT THERAPY: ICD-10-CM

## 2021-09-03 DIAGNOSIS — Z79.01 LONG TERM CURRENT USE OF ANTICOAGULANT THERAPY: Primary | ICD-10-CM

## 2021-09-03 LAB — INR BLD: 2.1 (ref 0.9–1.1)

## 2021-09-03 PROCEDURE — 85610 PROTHROMBIN TIME: CPT

## 2021-09-03 PROCEDURE — 36416 COLLJ CAPILLARY BLOOD SPEC: CPT

## 2021-09-03 NOTE — PROGRESS NOTES
Anticoagulation Management    Unable to reach Nelsy today x 2    Today's INR result of 2.1 is therapeutic (goal INR of 2.0-3.0).  Result received from: Clinic Lab    Follow up required to confirm warfarin dose taken and assess for changes    Left VM to continue SAME DOSE and to call David with transfer to [facility] OR route to:   [facility]      Anticoagulation clinic to follow up    Sarah Holbrook RN

## 2021-09-04 ENCOUNTER — HEALTH MAINTENANCE LETTER (OUTPATIENT)
Age: 46
End: 2021-09-04

## 2021-09-07 NOTE — PROGRESS NOTES
Left VM to call [place] with transfer to [facility] OR route to:   [facility] Holbrook RN

## 2021-09-08 NOTE — PROGRESS NOTES
ANTICOAGULATION MANAGEMENT     Nelsy Cota 46 year old female is on warfarin with therapeutic INR result. (Goal INR 2.0-3.0)    Recent labs: (last 7 days)     09/03/21  0803   INR 2.1*       ASSESSMENT     Source(s): Chart Review and Patient/Caregiver Call       Warfarin doses taken: Warfarin taken as instructed    Diet: No new diet changes identified    New illness, injury, or hospitalization: No    Medication/supplement changes: None noted    Signs or symptoms of bleeding or clotting: No    Previous INR: Therapeutic last 2(+) visits    Additional findings: None     PLAN     Recommended plan for no diet, medication or health factor changes affecting INR     Dosing Instructions: Continue your current warfarin dose with next INR in 2 weeks       Summary  As of 9/3/2021    Full warfarin instructions:  8 mg every Tue, Thu, Sat; 10 mg all other days   Next INR check:  9/16/2021             Telephone call with Nelsy who verbalizes understanding and agrees to plan    Lab visit scheduled    Education provided: Please call back if any changes to your diet, medications or how you've been taking warfarin and Contact [phone]  with any changes, questions or concerns.     Plan made per [facility] anticoagulation protocol    Latosha Perdomo RN  Anticoagulation Clinic  9/8/2021    _______________________________________________________________________     Anticoagulation Episode Summary     Current INR goal:  2.0-3.0   TTR:  47.5 % (11.6 mo)   Target end date:  Indefinite   Send INR reminders to:  [facility]    Indications    Long-term (current) use of anticoagulants [Z79.01] [Z79.01]  PE (pulmonary thromboembolism) (H) [I26.99]  Recurrent deep venous thrombosis (H) [I82.409]           Comments:  INR Referral renewed, see 8/26/20 telephone encounter.         Anticoagulation Care Providers     Provider Role Specialty Phone number    Hamzah Thomas MD Referring Internal Medicine [phone]     Alyse Faye MD [facility] Internal Medicine [phone]

## 2021-09-16 ENCOUNTER — ANTICOAGULATION THERAPY VISIT (OUTPATIENT)
Dept: ANTICOAGULATION | Facility: CLINIC | Age: 46
End: 2021-09-16

## 2021-09-16 ENCOUNTER — LAB (OUTPATIENT)
Dept: LAB | Facility: CLINIC | Age: 46
End: 2021-09-16
Payer: COMMERCIAL

## 2021-09-16 DIAGNOSIS — Z79.01 LONG TERM CURRENT USE OF ANTICOAGULANT THERAPY: ICD-10-CM

## 2021-09-16 DIAGNOSIS — I82.409 RECURRENT DEEP VENOUS THROMBOSIS (H): ICD-10-CM

## 2021-09-16 DIAGNOSIS — Z79.01 LONG TERM CURRENT USE OF ANTICOAGULANT THERAPY: Primary | ICD-10-CM

## 2021-09-16 DIAGNOSIS — I26.99 PE (PULMONARY THROMBOEMBOLISM) (H): ICD-10-CM

## 2021-09-16 LAB — INR BLD: 4.1 (ref 0.9–1.1)

## 2021-09-16 PROCEDURE — 36416 COLLJ CAPILLARY BLOOD SPEC: CPT

## 2021-09-16 PROCEDURE — 85610 PROTHROMBIN TIME: CPT

## 2021-09-16 NOTE — PROGRESS NOTES
ANTICOAGULATION MANAGEMENT     Nelsy Cota 46 year old female is on warfarin with supratherapeutic INR result. (Goal INR 2.0-3.0)    Recent labs: (last 7 days)     09/16/21  0759   INR 4.1*       ASSESSMENT     Source(s): Chart Review and Patient/Caregiver Call       Warfarin doses taken: Warfarin taken as instructed    Diet: No new diet changes identified    New illness, injury, or hospitalization: Yes: Patient reports she has been having leg cramps, these will wake her up during the night and will have to get up and walk around to feel better    Medication/supplement changes: None noted    Signs or symptoms of bleeding or clotting: No    Previous INR: Therapeutic last 2(+) visits    Additional findings: None     PLAN     Recommended plan for no diet, medication or health factor changes affecting INR     Dosing Instructions: Hold dose then continue your current warfarin dose with next INR in 8 days       Summary  As of 9/16/2021    Full warfarin instructions:  9/16: Hold; Otherwise 8 mg every Tue, Thu, Sat; 10 mg all other days   Next INR check:  9/24/2021             Telephone call with Nelsy who verbalizes understanding and agrees to plan    Lab visit scheduled    Education provided: Please call back if any changes to your diet, medications or how you've been taking warfarin, Monitoring for bleeding signs and symptoms and Contact [phone]  with any changes, questions or concerns.     Plan made per [facility] anticoagulation protocol    Latosha Perdomo RN  Anticoagulation Clinic  9/16/2021    _______________________________________________________________________     Anticoagulation Episode Summary     Current INR goal:  2.0-3.0   TTR:  48.0 % (11.7 mo)   Target end date:  Indefinite   [facility]    Indications    Long-term (current) use of anticoagulants [Z79.01] [Z79.01]  PE (pulmonary thromboembolism) (H) [I26.99]  Recurrent deep venous thrombosis (H) [I82.409]            Comments:  INR Referral renewed, see 8/26/20 telephone encounter.         Anticoagulation Care Providers     Provider Role Specialty Phone number    Hamzah Thomas MD Referring Internal Medicine [phone]    Alyse Faye MD Referring Internal Medicine [phone]

## 2021-09-24 ENCOUNTER — LAB (OUTPATIENT)
Dept: LAB | Facility: CLINIC | Age: 46
End: 2021-09-24
Payer: COMMERCIAL

## 2021-09-24 ENCOUNTER — ANTICOAGULATION THERAPY VISIT (OUTPATIENT)
Dept: ANTICOAGULATION | Facility: CLINIC | Age: 46
End: 2021-09-24

## 2021-09-24 DIAGNOSIS — I82.409 RECURRENT DEEP VENOUS THROMBOSIS (H): ICD-10-CM

## 2021-09-24 DIAGNOSIS — I26.99 PE (PULMONARY THROMBOEMBOLISM) (H): ICD-10-CM

## 2021-09-24 DIAGNOSIS — Z79.01 LONG TERM CURRENT USE OF ANTICOAGULANT THERAPY: Primary | ICD-10-CM

## 2021-09-24 DIAGNOSIS — Z79.01 LONG TERM CURRENT USE OF ANTICOAGULANT THERAPY: ICD-10-CM

## 2021-09-24 LAB — INR BLD: 4.8 (ref 0.9–1.1)

## 2021-09-24 PROCEDURE — 85610 PROTHROMBIN TIME: CPT

## 2021-09-24 PROCEDURE — 36416 COLLJ CAPILLARY BLOOD SPEC: CPT

## 2021-09-24 NOTE — PROGRESS NOTES
ANTICOAGULATION MANAGEMENT     Nelsy Cota 46 year old female is on warfarin with supratherapeutic INR result. (Goal INR 2.0-3.0)    Recent labs: (last 7 days)     09/24/21  0754   INR 4.8*       ASSESSMENT     Source(s): Chart Review and Patient/Caregiver Call       Warfarin doses taken: Warfarin taken as instructed    Diet: Decreased greens/vitamin K in diet; plans to resume previous intake.  Patient plans to resume her usual diet to help lower INR.    New illness, injury, or hospitalization: Yes: patient has been retaining fluid in her legs and abdominal area.  Encouraged patient to follow up with GI.    Medication/supplement changes: None noted    Signs or symptoms of bleeding or clotting: No    Previous INR: Supratherapeutic    Additional findings: none     PLAN     Recommended plan for ongoing change(s) affecting INR     Dosing Instructions: Hold dose then Decrease your warfarin dose (6% change) with next INR in 6 days       Summary  As of 9/24/2021    Full warfarin instructions:  9/24: Hold; Otherwise 10 mg every Mon, Wed; 8 mg all other days   Next INR check:  9/29/2021             Telephone call with Nelsy who agrees to plan and repeated back plan correctly    Lab visit scheduled    Education provided: Please call back if any changes to your diet, medications or how you've been taking warfarin    Plan made per [facility] anticoagulation protocol    Vania Morse RN  Anticoagulation Clinic  9/24/2021    _______________________________________________________________________     Anticoagulation Episode Summary     Current INR goal:  2.0-3.0   TTR:  48.1 % (11.7 mo)   Target end date:  Indefinite   Send INR reminders to:  [facility]    Indications    Long-term (current) use of anticoagulants [Z79.01] [Z79.01]  PE (pulmonary thromboembolism) (H) [I26.99]  Recurrent deep venous thrombosis (H) [I82.409]           Comments:  INR Referral renewed, see 8/26/20 telephone encounter.          Anticoagulation Care Providers     Provider Role Specialty Phone number    Hamzah Thomas MD Referring Internal Medicine [phone]    Alyse Faye MD Referring Internal Medicine [phone]

## 2021-09-29 ENCOUNTER — LAB (OUTPATIENT)
Dept: LAB | Facility: CLINIC | Age: 46
End: 2021-09-29
Payer: COMMERCIAL

## 2021-09-29 ENCOUNTER — ANTICOAGULATION THERAPY VISIT (OUTPATIENT)
Dept: ANTICOAGULATION | Facility: CLINIC | Age: 46
End: 2021-09-29

## 2021-09-29 DIAGNOSIS — Z79.01 LONG TERM CURRENT USE OF ANTICOAGULANT THERAPY: ICD-10-CM

## 2021-09-29 DIAGNOSIS — I26.99 PE (PULMONARY THROMBOEMBOLISM) (H): ICD-10-CM

## 2021-09-29 DIAGNOSIS — I82.409 RECURRENT DEEP VENOUS THROMBOSIS (H): ICD-10-CM

## 2021-09-29 DIAGNOSIS — Z79.01 LONG TERM CURRENT USE OF ANTICOAGULANT THERAPY: Primary | ICD-10-CM

## 2021-09-29 LAB — INR BLD: 1.4 (ref 0.9–1.1)

## 2021-09-29 PROCEDURE — 36416 COLLJ CAPILLARY BLOOD SPEC: CPT

## 2021-09-29 PROCEDURE — 85610 PROTHROMBIN TIME: CPT

## 2021-09-29 NOTE — PROGRESS NOTES
ANTICOAGULATION MANAGEMENT     Nelsy Cota 46 year old female is on warfarin with subtherapeutic INR result. (Goal INR 2.0-3.0)    Recent labs: (last 7 days)     09/29/21  0751   INR 1.4*       ASSESSMENT     Source(s): Chart Review and Patient/Caregiver Call       Warfarin doses taken: Warfarin taken as instructed    Diet: No new diet changes identified.  Was able to eat salad on Monday this week.  She plans to continue with eating salad once weekly.    New illness, injury, or hospitalization: No.  Continues to retain about the same amount of fluid in her abdomen.  She is working on scheduling an appointment with GI.    Medication/supplement changes: None noted    Signs or symptoms of bleeding or clotting: No    Previous INR: Supratherapeutic    Additional findings: Minimal maintenance dose adjustment done since INR fluctuated so much after the dose was adjusted last week.     PLAN     Recommended plan for ongoing change(s) affecting INR     Dosing Instructions: Booster dose then Increase your warfarin dose (3% change) with next INR in 1 week       Summary  As of 9/29/2021    Full warfarin instructions:  9/29: 12 mg; Otherwise 10 mg every Mon, Wed, Fri; 8 mg all other days   Next INR check:  10/7/2021             Telephone call with Nelsy who verbalizes understanding and agrees to plan    Lab visit scheduled    Education provided: Please call back if any changes to your diet, medications or how you've been taking warfarin    Plan made per [facility] anticoagulation protocol    Vania Morse RN  Anticoagulation Clinic  9/29/2021    _______________________________________________________________________     Anticoagulation Episode Summary     Current INR goal:  2.0-3.0   TTR:  47.8 % (11.9 mo)   Target end date:  Indefinite   Send INR reminders to:  [facility]    Indications    Long-term (current) use of anticoagulants [Z79.01] [Z79.01]  PE (pulmonary thromboembolism) (H) [I26.99]  Recurrent deep  venous thrombosis (H) [I82.409]           Comments:  INR Referral renewed, see 8/26/20 telephone encounter.         Anticoagulation Care Providers     Provider Role Specialty Phone number    Hamzah Thomas MD Referring Internal Medicine [phone]    Alyse Faye MD Referring Internal Medicine [phone]

## 2021-10-07 ENCOUNTER — LAB (OUTPATIENT)
Dept: LAB | Facility: CLINIC | Age: 46
End: 2021-10-07
Payer: COMMERCIAL

## 2021-10-07 ENCOUNTER — ANTICOAGULATION THERAPY VISIT (OUTPATIENT)
Dept: ANTICOAGULATION | Facility: CLINIC | Age: 46
End: 2021-10-07

## 2021-10-07 DIAGNOSIS — I26.99 PE (PULMONARY THROMBOEMBOLISM) (H): ICD-10-CM

## 2021-10-07 DIAGNOSIS — Z79.01 LONG TERM CURRENT USE OF ANTICOAGULANT THERAPY: Primary | ICD-10-CM

## 2021-10-07 DIAGNOSIS — I82.409 RECURRENT DEEP VENOUS THROMBOSIS (H): ICD-10-CM

## 2021-10-07 DIAGNOSIS — Z79.01 LONG TERM CURRENT USE OF ANTICOAGULANT THERAPY: ICD-10-CM

## 2021-10-07 LAB — INR BLD: 2.8 (ref 0.9–1.1)

## 2021-10-07 PROCEDURE — 36416 COLLJ CAPILLARY BLOOD SPEC: CPT

## 2021-10-07 PROCEDURE — 85610 PROTHROMBIN TIME: CPT

## 2021-10-07 NOTE — PROGRESS NOTES
ANTICOAGULATION MANAGEMENT     Nelsy Cota 46 year old female is on warfarin with therapeutic INR result. (Goal INR 2.0-3.0)    Recent labs: (last 7 days)     10/07/21  0756   INR 2.8*       ASSESSMENT     Source(s): Chart Review and Patient/Caregiver Call       Warfarin doses taken: Less warfarin taken than planned which may be affecting INR. Took 8 mg instead of 10 mg on Friday.    Diet: No new diet changes identified Eats 1 salad a week     New illness, injury, or hospitalization: No    Medication/supplement changes: None noted    Signs or symptoms of bleeding or clotting: No    Previous INR: Subtherapeutic    Additional findings: None     PLAN     Recommended plan for no diet, medication or health factor changes affecting INR     Dosing Instructions: Continue your current warfarin dose with next INR in 1 week       Summary  As of 10/7/2021    Full warfarin instructions:  10 mg every Mon, Wed, Fri; 8 mg all other days   Next INR check:  10/14/2021             Telephone call with Nelsy who verbalizes understanding and agrees to plan    Lab visit scheduled    Education provided: Importance of taking warfarin as instructed    Plan made per [facility] anticoagulation protocol    Denisa Natarajan RN  Anticoagulation Clinic  10/7/2021    _______________________________________________________________________     Anticoagulation Episode Summary     Current INR goal:  2.0-3.0   TTR:  48.9 % (11.9 mo)   Target end date:  Indefinite   [facility]    Indications    Long-term (current) use of anticoagulants [Z79.01] [Z79.01]  PE (pulmonary thromboembolism) (H) [I26.99]  Recurrent deep venous thrombosis (H) [I82.409]           Comments:  INR Referral renewed, see 8/26/20 telephone encounter.         Anticoagulation Care Providers     Provider Role Specialty Phone number    Hamzah Thomas MD Referring Internal Medicine [phone]    Alyse Faye MD Referring  Internal Medicine [phone]

## 2021-10-14 ENCOUNTER — LAB (OUTPATIENT)
Dept: LAB | Facility: CLINIC | Age: 46
End: 2021-10-14
Payer: COMMERCIAL

## 2021-10-14 ENCOUNTER — ANTICOAGULATION THERAPY VISIT (OUTPATIENT)
Dept: ANTICOAGULATION | Facility: CLINIC | Age: 46
End: 2021-10-14

## 2021-10-14 DIAGNOSIS — I26.99 PE (PULMONARY THROMBOEMBOLISM) (H): ICD-10-CM

## 2021-10-14 DIAGNOSIS — I82.409 RECURRENT DEEP VENOUS THROMBOSIS (H): ICD-10-CM

## 2021-10-14 DIAGNOSIS — Z79.01 LONG TERM CURRENT USE OF ANTICOAGULANT THERAPY: ICD-10-CM

## 2021-10-14 DIAGNOSIS — Z79.01 LONG TERM CURRENT USE OF ANTICOAGULANT THERAPY: Primary | ICD-10-CM

## 2021-10-14 LAB — INR BLD: 2.4 (ref 0.9–1.1)

## 2021-10-14 PROCEDURE — 36416 COLLJ CAPILLARY BLOOD SPEC: CPT

## 2021-10-14 PROCEDURE — 85610 PROTHROMBIN TIME: CPT

## 2021-10-14 NOTE — PROGRESS NOTES
ANTICOAGULATION MANAGEMENT     Nelsy Cota 46 year old female is on warfarin with therapeutic INR result. (Goal INR 2.0-3.0)    Recent labs: (last 7 days)     10/14/21  0805   INR 2.4*       ASSESSMENT     Source(s): Chart Review and Patient/Caregiver Call       Warfarin doses taken: Warfarin taken as instructed    Diet: No new diet changes identified    New illness, injury, or hospitalization: No    Medication/supplement changes: None noted    Signs or symptoms of bleeding or clotting: No    Previous INR: Therapeutic last visit; previously outside of goal range    Additional findings: None     PLAN     Recommended plan for no diet, medication or health factor changes affecting INR     Dosing Instructions: Continue your current warfarin dose with next INR in 1 week       Summary  As of 10/14/2021    Full warfarin instructions:  10 mg every Mon, Wed, Fri; 8 mg all other days   Next INR check:  10/21/2021             Telephone call with Nelsy who verbalizes understanding and agrees to plan    Lab visit scheduled    Education provided: Please call back if any changes to your diet, medications or how you've been taking warfarin and Contact [phone]  with any changes, questions or concerns.     Plan made per [facility] anticoagulation protocol    Latosha Perdomo RN  Anticoagulation Clinic  10/14/2021    _______________________________________________________________________     Anticoagulation Episode Summary     Current INR goal:  2.0-3.0   TTR:  50.7 % (12 mo)   Target end date:  Indefinite   Send INR reminders to:  [facility]    Indications    Long-term (current) use of anticoagulants [Z79.01] [Z79.01]  PE (pulmonary thromboembolism) (H) [I26.99]  Recurrent deep venous thrombosis (H) [I82.409]           Comments:  INR Referral renewed, see 8/26/20 telephone encounter.         Anticoagulation Care Providers     Provider Role Specialty Phone number    Hamzah Thomas MD Referring Internal  Medicine [phone]    Alyse Faye MD [facility] Internal Medicine [phone]

## 2021-10-21 ENCOUNTER — LAB (OUTPATIENT)
Dept: LAB | Facility: CLINIC | Age: 46
End: 2021-10-21
Payer: COMMERCIAL

## 2021-10-21 ENCOUNTER — ANTICOAGULATION THERAPY VISIT (OUTPATIENT)
Dept: ANTICOAGULATION | Facility: CLINIC | Age: 46
End: 2021-10-21

## 2021-10-21 DIAGNOSIS — I82.409 RECURRENT DEEP VENOUS THROMBOSIS (H): ICD-10-CM

## 2021-10-21 DIAGNOSIS — Z79.01 LONG TERM CURRENT USE OF ANTICOAGULANT THERAPY: Primary | ICD-10-CM

## 2021-10-21 DIAGNOSIS — Z79.01 LONG TERM CURRENT USE OF ANTICOAGULANT THERAPY: ICD-10-CM

## 2021-10-21 DIAGNOSIS — I26.99 PE (PULMONARY THROMBOEMBOLISM) (H): ICD-10-CM

## 2021-10-21 LAB — INR BLD: 1.8 (ref 0.9–1.1)

## 2021-10-21 PROCEDURE — 85610 PROTHROMBIN TIME: CPT

## 2021-10-21 PROCEDURE — 36416 COLLJ CAPILLARY BLOOD SPEC: CPT

## 2021-10-21 NOTE — PROGRESS NOTES
ANTICOAGULATION MANAGEMENT     Nelsy Cota 46 year old female is on warfarin with subtherapeutic INR result. (Goal INR 2.0-3.0)    Recent labs: (last 7 days)     10/21/21  0802   INR 1.8*       ASSESSMENT     Source(s): Chart Review and Patient/Caregiver Call       Warfarin doses taken: Warfarin taken as instructed    Diet: No new diet changes identified (continues to eat one salad weekly)    New illness, injury, or hospitalization: No    Medication/supplement changes: None noted    Signs or symptoms of bleeding or clotting: No    Previous INR: Therapeutic last 2(+) visits    Additional findings: Patient has noticed that over the past 2 weeks, she has started to not retain as much fluid in her abdomen.  She is feeling a lot better today.  INR has slowly lowered over the past couple weeks, so maintenance dose was adjusted slightly.     PLAN     Recommended plan for ongoing change(s) affecting INR     Dosing Instructions:  Increase your warfarin dose (4% change) with next INR in 1 week       Summary  As of 10/21/2021    Full warfarin instructions:  8 mg every Sun, Tue, Thu; 10 mg all other days   Next INR check:  10/28/2021             Telephone call with Nelsy who agrees to plan and repeated back plan correctly    Lab visit scheduled    Education provided: Please call back if any changes to your diet, medications or how you've been taking warfarin    Plan made per [facility] anticoagulation protocol    Vania Morse RN  Anticoagulation Clinic  10/21/2021    _______________________________________________________________________     Anticoagulation Episode Summary     Current INR goal:  2.0-3.0   TTR:  51.1 % (1 y)   Target end date:  Indefinite   Send INR reminders to:  [facility]    Indications    Long-term (current) use of anticoagulants [Z79.01] [Z79.01]  PE (pulmonary thromboembolism) (H) [I26.99]  Recurrent deep venous thrombosis (H) [I82.409]           Comments:  INR Referral renewed, see  8/26/20 telephone encounter.         Anticoagulation Care Providers     Provider Role Specialty Phone number    Hamzah Thomas MD Referring Internal Medicine [phone]    Alyse Faye MD Referring Internal Medicine [phone]

## 2021-10-28 ENCOUNTER — ANTICOAGULATION THERAPY VISIT (OUTPATIENT)
Dept: NURSING | Facility: CLINIC | Age: 46
End: 2021-10-28

## 2021-10-28 ENCOUNTER — LAB (OUTPATIENT)
Dept: LAB | Facility: CLINIC | Age: 46
End: 2021-10-28
Payer: COMMERCIAL

## 2021-10-28 DIAGNOSIS — I82.409 RECURRENT DEEP VENOUS THROMBOSIS (H): ICD-10-CM

## 2021-10-28 DIAGNOSIS — I26.99 PE (PULMONARY THROMBOEMBOLISM) (H): ICD-10-CM

## 2021-10-28 DIAGNOSIS — Z79.01 LONG TERM CURRENT USE OF ANTICOAGULANT THERAPY: ICD-10-CM

## 2021-10-28 LAB — INR BLD: 1.8 (ref 0.9–1.1)

## 2021-10-28 PROCEDURE — 36416 COLLJ CAPILLARY BLOOD SPEC: CPT

## 2021-10-28 PROCEDURE — 85610 PROTHROMBIN TIME: CPT

## 2021-10-28 NOTE — PROGRESS NOTES
ANTICOAGULATION MANAGEMENT     Nelsy Cota 46 year old female is on warfarin with subtherapeutic INR result. (Goal INR 2.0-3.0)    Recent labs: (last 7 days)     10/28/21  0808   INR 1.8*       ASSESSMENT     Source(s): Chart Review and Patient/Caregiver Call       Warfarin doses taken: Warfarin taken as instructed    Diet: No new diet changes identified    New illness, injury, or hospitalization: No    Medication/supplement changes: None noted    Signs or symptoms of bleeding or clotting: No    Previous INR: Subtherapeutic    Additional findings: Pt reports that her health is improving     PLAN     Recommended plan for ongoing change(s) affecting INR     Dosing Instructions: Booster dose then Increase your warfarin dose (9.4% change) with next INR in 2 weeks       Summary  As of 10/28/2021    Full warfarin instructions:  10/28: 14 mg; Otherwise 10 mg every day   Next INR check:  11/11/2021             Telephone call with Nelsy who agrees to plan and repeated back plan correctly    Lab visit scheduled    Education provided: Importance of notifying clinic for changes in medications; a sooner lab recheck maybe needed. and Contact [phone]  with any changes, questions or concerns.     Plan made with [facility] Pharmacist Shauna Espinoza RN  Anticoagulation Clinic  10/28/2021    _______________________________________________________________________     Anticoagulation Episode Summary     Current INR goal:  2.0-3.0   TTR:  51.1 % (1 y)   Target end date:  Indefinite   Send INR reminders to:  [facility]    Indications    Long-term (current) use of anticoagulants [Z79.01] [Z79.01]  PE (pulmonary thromboembolism) (H) [I26.99]  Recurrent deep venous thrombosis (H) [I82.409]           Comments:  INR Referral renewed, see 8/26/20 telephone encounter.         Anticoagulation Care Providers     Provider Role Specialty Phone number    Hamzah Thomas MD Referring Internal  Medicine [phone]    Alyes Faye MD [facility] Internal Medicine [phone]

## 2021-10-28 NOTE — PROGRESS NOTES
Anticoagulation Management    Unable to reach Nelsy today.    Today's INR result of 1.8 is subtherapeutic (goal INR of 2.0-3.0).  Result received from: Clinic Lab    Follow up required to confirm warfarin dose taken and assess for changes     Shauna Kam RPH reviewed    Left a voicemail message advising pt to take 14 mg or 7 tablets of warfarin today 10/28/21 then start taking 10 mg or 5 tablets daily. Recheck INR in 10-14 days.    Requested a call back    Transfer to [phone]    Anticoagulation clinic to follow up    Madiha Espinoza RN

## 2021-11-11 ENCOUNTER — LAB (OUTPATIENT)
Dept: LAB | Facility: CLINIC | Age: 46
End: 2021-11-11
Payer: COMMERCIAL

## 2021-11-11 ENCOUNTER — ANTICOAGULATION THERAPY VISIT (OUTPATIENT)
Dept: ANTICOAGULATION | Facility: CLINIC | Age: 46
End: 2021-11-11

## 2021-11-11 DIAGNOSIS — I82.409 RECURRENT DEEP VENOUS THROMBOSIS (H): ICD-10-CM

## 2021-11-11 DIAGNOSIS — I26.99 PE (PULMONARY THROMBOEMBOLISM) (H): ICD-10-CM

## 2021-11-11 DIAGNOSIS — Z79.01 LONG TERM CURRENT USE OF ANTICOAGULANT THERAPY: ICD-10-CM

## 2021-11-11 DIAGNOSIS — Z79.01 LONG TERM CURRENT USE OF ANTICOAGULANT THERAPY: Primary | ICD-10-CM

## 2021-11-11 LAB — INR BLD: 2.7 (ref 0.9–1.1)

## 2021-11-11 PROCEDURE — 36416 COLLJ CAPILLARY BLOOD SPEC: CPT

## 2021-11-11 PROCEDURE — 85610 PROTHROMBIN TIME: CPT

## 2021-11-11 NOTE — PROGRESS NOTES
ANTICOAGULATION MANAGEMENT     Nelsy Cota 46 year old female is on warfarin with therapeutic INR result. (Goal INR 2.0-3.0)    Recent labs: (last 7 days)     11/11/21  0757   INR 2.7*       ASSESSMENT     Source(s): Chart Review and Patient/Caregiver Call       Warfarin doses taken: Warfarin taken as instructed    Diet: No new diet changes identified    New illness, injury, or hospitalization: No    Medication/supplement changes: None noted    Signs or symptoms of bleeding or clotting: No    Previous INR: Subtherapeutic    Additional findings: None     PLAN     Recommended plan for no diet, medication or health factor changes affecting INR     Dosing Instructions: Continue your current warfarin dose with next INR in 2 weeks       Summary  As of 11/11/2021    Full warfarin instructions:  10 mg every day   Next INR check:  11/24/2021             Telephone call with Nelsy who verbalizes understanding and agrees to plan    Lab visit scheduled    Education provided: Please call back if any changes to your diet, medications or how you've been taking warfarin    Plan made per [facility] anticoagulation protocol    Vania Morse RN  Anticoagulation Clinic  11/11/2021    _______________________________________________________________________     Anticoagulation Episode Summary     Current INR goal:  2.0-3.0   TTR:  54.1 % (1 y)   Target end date:  Indefinite   Send INR reminders to:  [facility]    Indications    Long-term (current) use of anticoagulants [Z79.01] [Z79.01]  PE (pulmonary thromboembolism) (H) [I26.99]  Recurrent deep venous thrombosis (H) [I82.409]           Comments:           Anticoagulation Care Providers     Provider Role Specialty Phone number    Hamzah Thomas MD Referring Internal Medicine [phone]    Alyse Faye MD Referring Internal Medicine [phone]

## 2021-11-24 ENCOUNTER — LAB (OUTPATIENT)
Dept: LAB | Facility: CLINIC | Age: 46
End: 2021-11-24
Payer: COMMERCIAL

## 2021-11-24 ENCOUNTER — ANTICOAGULATION THERAPY VISIT (OUTPATIENT)
Dept: ANTICOAGULATION | Facility: CLINIC | Age: 46
End: 2021-11-24

## 2021-11-24 DIAGNOSIS — Z79.01 LONG TERM CURRENT USE OF ANTICOAGULANT THERAPY: ICD-10-CM

## 2021-11-24 DIAGNOSIS — Z79.01 LONG TERM CURRENT USE OF ANTICOAGULANT THERAPY: Primary | ICD-10-CM

## 2021-11-24 DIAGNOSIS — I82.409 RECURRENT DEEP VENOUS THROMBOSIS (H): ICD-10-CM

## 2021-11-24 DIAGNOSIS — I26.99 PE (PULMONARY THROMBOEMBOLISM) (H): ICD-10-CM

## 2021-11-24 LAB — INR BLD: 3 (ref 0.9–1.1)

## 2021-11-24 PROCEDURE — 85610 PROTHROMBIN TIME: CPT

## 2021-11-24 PROCEDURE — 36416 COLLJ CAPILLARY BLOOD SPEC: CPT

## 2021-11-24 NOTE — PROGRESS NOTES
ANTICOAGULATION MANAGEMENT     Nelsy Cota 46 year old female is on warfarin with therapeutic INR result. (Goal INR 2.0-3.0)    Recent labs: (last 7 days)     11/24/21  0805   INR 3.0*       ASSESSMENT     Source(s): Chart Review and Patient/Caregiver Call       Warfarin doses taken: Warfarin taken as instructed    Diet: No new diet changes identified    New illness, injury, or hospitalization: No    Medication/supplement changes: None noted    Signs or symptoms of bleeding or clotting: No    Previous INR: Therapeutic last 2(+) visits.  INR is slowly trending up.  Will keep weekly appointment at this time.    Additional findings: None     PLAN     Recommended plan for no diet, medication or health factor changes affecting INR     Dosing Instructions: Continue your current warfarin dose with next INR in 1 week       Summary  As of 11/24/2021    Full warfarin instructions:  10 mg every day   Next INR check:  12/2/2021             Telephone call with Nelsy who agrees to plan and repeated back plan correctly    Lab visit scheduled    Education provided: Please call back if any changes to your diet, medications or how you've been taking warfarin    Plan made per [facility] anticoagulation protocol    Vania Morse RN  Anticoagulation Clinic  11/24/2021    _______________________________________________________________________     Anticoagulation Episode Summary     Current INR goal:  2.0-3.0   TTR:  55.1 % (1 y)   Target end date:  Indefinite   Send INR reminders to:  [facility]    Indications    Long-term (current) use of anticoagulants [Z79.01] [Z79.01]  PE (pulmonary thromboembolism) (H) [I26.99]  Recurrent deep venous thrombosis (H) [I82.409]           Comments:           Anticoagulation Care Providers     Provider Role Specialty Phone number    Hamzah Thomas MD Referring Internal Medicine [phone]    Alyse Faye MD Referring Internal Medicine [phone]

## 2021-11-29 DIAGNOSIS — I26.99 PE (PULMONARY THROMBOEMBOLISM) (H): ICD-10-CM

## 2021-11-29 DIAGNOSIS — Z79.01 LONG TERM CURRENT USE OF ANTICOAGULANTS WITH INR GOAL OF 2.0-3.0: ICD-10-CM

## 2021-11-29 DIAGNOSIS — I82.409 RECURRENT DEEP VENOUS THROMBOSIS (H): ICD-10-CM

## 2021-11-30 RX ORDER — WARFARIN SODIUM 2 MG/1
TABLET ORAL
Qty: 420 TABLET | Refills: 0 | Status: SHIPPED | OUTPATIENT
Start: 2021-11-30 | End: 2022-02-24

## 2021-11-30 NOTE — TELEPHONE ENCOUNTER
Prescription approved per [facility] Refill Protocol.  Latosha Perdomo RN, BSN  Anticoagulation Clinic

## 2021-12-02 ENCOUNTER — LAB (OUTPATIENT)
Dept: LAB | Facility: CLINIC | Age: 46
End: 2021-12-02
Payer: COMMERCIAL

## 2021-12-02 ENCOUNTER — ANTICOAGULATION THERAPY VISIT (OUTPATIENT)
Dept: ANTICOAGULATION | Facility: CLINIC | Age: 46
End: 2021-12-02

## 2021-12-02 DIAGNOSIS — Z79.01 LONG TERM CURRENT USE OF ANTICOAGULANT THERAPY: Primary | ICD-10-CM

## 2021-12-02 DIAGNOSIS — Z79.01 LONG TERM CURRENT USE OF ANTICOAGULANT THERAPY: ICD-10-CM

## 2021-12-02 DIAGNOSIS — I82.409 RECURRENT DEEP VENOUS THROMBOSIS (H): ICD-10-CM

## 2021-12-02 DIAGNOSIS — I26.99 PE (PULMONARY THROMBOEMBOLISM) (H): ICD-10-CM

## 2021-12-02 LAB — INR BLD: 2 (ref 0.9–1.1)

## 2021-12-02 PROCEDURE — 85610 PROTHROMBIN TIME: CPT

## 2021-12-02 PROCEDURE — 36416 COLLJ CAPILLARY BLOOD SPEC: CPT

## 2021-12-02 NOTE — PROGRESS NOTES
ANTICOAGULATION MANAGEMENT     Nelsy Cota 46 year old female is on warfarin with therapeutic INR result. (Goal INR 2.0-3.0)    Recent labs: (last 7 days)     12/02/21  0753   INR 2.0*       ASSESSMENT     Source(s): Chart Review and Patient/Caregiver Call       Warfarin doses taken: Warfarin taken as instructed    Diet: No new diet changes identified    New illness, injury, or hospitalization: No    Medication/supplement changes: None noted    Signs or symptoms of bleeding or clotting: No    Previous INR: Therapeutic last 2(+) visits    Additional findings: None     PLAN     Recommended plan for no diet, medication or health factor changes affecting INR     Dosing Instructions: Continue your current warfarin dose with next INR in 2 weeks       Summary  As of 12/2/2021    Full warfarin instructions:  10 mg every day   Next INR check:  12/16/2021             Telephone call with Nelsy who agrees to plan and repeated back plan correctly    Lab visit scheduled    Education provided: Please call back if any changes to your diet, medications or how you've been taking warfarin    Plan made per [facility] anticoagulation protocol    Vania Morse RN  Anticoagulation Clinic  12/2/2021    _______________________________________________________________________     Anticoagulation Episode Summary     Current INR goal:  2.0-3.0   TTR:  55.1 % (1 y)   Target end date:  Indefinite   Send INR reminders to:  [facility]    Indications    Long-term (current) use of anticoagulants [Z79.01] [Z79.01]  PE (pulmonary thromboembolism) (H) [I26.99]  Recurrent deep venous thrombosis (H) [I82.409]           Comments:           Anticoagulation Care Providers     Provider Role Specialty Phone number    Hamzah Thomas MD Referring Internal Medicine [phone]    Alyse Faye MD Referring Internal Medicine [phone]

## 2021-12-16 ENCOUNTER — LAB (OUTPATIENT)
Dept: LAB | Facility: CLINIC | Age: 46
End: 2021-12-16
Payer: COMMERCIAL

## 2021-12-16 ENCOUNTER — ANTICOAGULATION THERAPY VISIT (OUTPATIENT)
Dept: ANTICOAGULATION | Facility: CLINIC | Age: 46
End: 2021-12-16

## 2021-12-16 DIAGNOSIS — Z79.01 LONG TERM CURRENT USE OF ANTICOAGULANT THERAPY: Primary | ICD-10-CM

## 2021-12-16 DIAGNOSIS — I82.409 RECURRENT DEEP VENOUS THROMBOSIS (H): ICD-10-CM

## 2021-12-16 DIAGNOSIS — Z79.01 LONG TERM CURRENT USE OF ANTICOAGULANT THERAPY: ICD-10-CM

## 2021-12-16 DIAGNOSIS — I26.99 PE (PULMONARY THROMBOEMBOLISM) (H): ICD-10-CM

## 2021-12-16 LAB — INR BLD: 2.4 (ref 0.9–1.1)

## 2021-12-16 PROCEDURE — 85610 PROTHROMBIN TIME: CPT

## 2021-12-16 PROCEDURE — 36416 COLLJ CAPILLARY BLOOD SPEC: CPT

## 2021-12-16 NOTE — PROGRESS NOTES
ANTICOAGULATION MANAGEMENT     Nelsy Cota 46 year old female is on warfarin with therapeutic INR result. (Goal INR 2.0-3.0)    Recent labs: (last 7 days)     12/16/21  0750   INR 2.4*       ASSESSMENT     Source(s): Chart Review and Patient/Caregiver Call       Warfarin doses taken: Warfarin taken as instructed    Diet: No new diet changes identified    New illness, injury, or hospitalization: No    Medication/supplement changes: None noted    Signs or symptoms of bleeding or clotting: No    Previous INR: Therapeutic last 2(+) visits    Additional findings: None     PLAN     Recommended plan for no diet, medication or health factor changes affecting INR     Dosing Instructions: Continue your current warfarin dose with next INR in 2 weeks       Summary  As of 12/16/2021    Full warfarin instructions:  10 mg every day   Next INR check:  12/29/2021             Telephone call with Nelsy who verbalizes understanding and agrees to plan    Lab visit scheduled    Education provided: Please call back if any changes to your diet, medications or how you've been taking warfarin and Contact [phone]  with any changes, questions or concerns.     Plan made per [facility] anticoagulation protocol    Latosha Perdomo RN  Anticoagulation Clinic  12/16/2021    _______________________________________________________________________     Anticoagulation Episode Summary     Current INR goal:  2.0-3.0   TTR:  55.2 % (1 y)   Target end date:  Indefinite   Send INR reminders to:  [facility]    Indications    Long-term (current) use of anticoagulants [Z79.01] [Z79.01]  PE (pulmonary thromboembolism) (H) [I26.99]  Recurrent deep venous thrombosis (H) [I82.409]           Comments:           Anticoagulation Care Providers     Provider Role Specialty Phone number    Hamzah Thomas MD Referring Internal Medicine [phone]    Alyse Faye MD Referring Internal Medicine [phone]

## 2021-12-29 ENCOUNTER — ANTICOAGULATION THERAPY VISIT (OUTPATIENT)
Dept: ANTICOAGULATION | Facility: CLINIC | Age: 46
End: 2021-12-29

## 2021-12-29 ENCOUNTER — LAB (OUTPATIENT)
Dept: LAB | Facility: CLINIC | Age: 46
End: 2021-12-29
Payer: COMMERCIAL

## 2021-12-29 DIAGNOSIS — I26.99 PE (PULMONARY THROMBOEMBOLISM) (H): ICD-10-CM

## 2021-12-29 DIAGNOSIS — Z79.01 LONG TERM CURRENT USE OF ANTICOAGULANT THERAPY: ICD-10-CM

## 2021-12-29 DIAGNOSIS — I82.409 RECURRENT DEEP VENOUS THROMBOSIS (H): ICD-10-CM

## 2021-12-29 DIAGNOSIS — Z79.01 LONG TERM CURRENT USE OF ANTICOAGULANT THERAPY: Primary | ICD-10-CM

## 2021-12-29 LAB — INR BLD: 4.6 (ref 0.9–1.1)

## 2021-12-29 PROCEDURE — 36416 COLLJ CAPILLARY BLOOD SPEC: CPT

## 2021-12-29 PROCEDURE — 85610 PROTHROMBIN TIME: CPT

## 2021-12-29 NOTE — PROGRESS NOTES
ANTICOAGULATION MANAGEMENT     Nelsy Cota 46 year old female is on warfarin with supratherapeutic INR result. (Goal INR 2.0-3.0)    Recent labs: (last 7 days)     12/29/21  0746   INR 4.6*       ASSESSMENT     Source(s): Chart Review and Patient/Caregiver Call       Warfarin doses taken: Warfarin taken as instructed.  Patient is not sure if she missed a dose over the weekend since she did notice 2 of her warfarin pills on the counter one day.    Diet: Change in alcohol intake may be affecting INR. had alcohol over the weekend.     New illness, injury, or hospitalization: No    Medication/supplement changes: None noted    Signs or symptoms of bleeding or clotting: No    Previous INR: Therapeutic last 2(+) visits    Additional findings: None     PLAN     Recommended plan for temporary change(s) affecting INR     Dosing Instructions: Hold dose today, and partial hold tomorrow then continue your current warfarin dose with next INR in 1 week       Summary  As of 12/29/2021    Full warfarin instructions:  12/29: Hold; 12/30: 4 mg; Otherwise 10 mg every day   Next INR check:  1/7/2022             Telephone call with Nelsy who agrees to plan and repeated back plan correctly    Lab visit scheduled    Education provided: Please call back if any changes to your diet, medications or how you've been taking warfarin, Monitoring for bleeding signs and symptoms and Contact [phone]  with any changes, questions or concerns.     Plan made per [facility] anticoagulation protocol    Vania Morse RN  Anticoagulation Clinic  12/29/2021    _______________________________________________________________________     Anticoagulation Episode Summary     Current INR goal:  2.0-3.0   TTR:  56.2 % (1 y)   Target end date:  Indefinite   [facility]    Indications    Long-term (current) use of anticoagulants [Z79.01] [Z79.01]  PE (pulmonary thromboembolism) (H) [I26.99]  Recurrent deep venous  thrombosis (H) [I82.409]           Comments:           Anticoagulation Care Providers     Provider Role Specialty Phone number    Hamzah Thomas MD Referring Internal Medicine [phone]    Alyse Faye MD Referring Internal Medicine [phone]

## 2022-01-07 ENCOUNTER — ANTICOAGULATION THERAPY VISIT (OUTPATIENT)
Dept: ANTICOAGULATION | Facility: CLINIC | Age: 47
End: 2022-01-07

## 2022-01-07 ENCOUNTER — LAB (OUTPATIENT)
Dept: LAB | Facility: CLINIC | Age: 47
End: 2022-01-07
Payer: COMMERCIAL

## 2022-01-07 DIAGNOSIS — I82.409 RECURRENT DEEP VENOUS THROMBOSIS (H): ICD-10-CM

## 2022-01-07 DIAGNOSIS — Z79.01 LONG TERM CURRENT USE OF ANTICOAGULANT THERAPY: ICD-10-CM

## 2022-01-07 DIAGNOSIS — I26.99 PE (PULMONARY THROMBOEMBOLISM) (H): ICD-10-CM

## 2022-01-07 DIAGNOSIS — Z79.01 LONG TERM CURRENT USE OF ANTICOAGULANT THERAPY: Primary | ICD-10-CM

## 2022-01-07 LAB — INR BLD: 2.3 (ref 0.9–1.1)

## 2022-01-07 PROCEDURE — 36416 COLLJ CAPILLARY BLOOD SPEC: CPT

## 2022-01-07 PROCEDURE — 85610 PROTHROMBIN TIME: CPT

## 2022-01-07 NOTE — PROGRESS NOTES
ANTICOAGULATION MANAGEMENT     Nelsy Cota 46 year old female is on warfarin with therapeutic INR result. (Goal INR 2.0-3.0)    Recent labs: (last 7 days)     01/07/22  0820   INR 2.3*       ASSESSMENT     Source(s): Chart Review and Patient/Caregiver Call       Warfarin doses taken: Warfarin taken as instructed    Diet: No new diet changes identified    New illness, injury, or hospitalization: No    Medication/supplement changes: None noted    Signs or symptoms of bleeding or clotting: No    Previous INR: Supratherapeutic    Additional findings: None     PLAN     Recommended plan for no diet, medication or health factor changes affecting INR     Dosing Instructions: Continue your current warfarin dose with next INR in 1 week       Summary  As of 1/7/2022    Full warfarin instructions:  10 mg every day   Next INR check:  1/13/2022             Telephone call with Nelsy who agrees to plan and repeated back plan correctly    Lab visit scheduled    Education provided: Please call back if any changes to your diet, medications or how you've been taking warfarin    Plan made per [facility] anticoagulation protocol    Vania Morse RN  Anticoagulation Clinic  1/7/2022    _______________________________________________________________________     Anticoagulation Episode Summary     Current INR goal:  2.0-3.0   TTR:  56.9 % (1 y)   Target end date:  Indefinite   Send INR reminders to:  [facility]    Indications    Long-term (current) use of anticoagulants [Z79.01] [Z79.01]  PE (pulmonary thromboembolism) (H) [I26.99]  Recurrent deep venous thrombosis (H) [I82.409]           Comments:           Anticoagulation Care Providers     Provider Role Specialty Phone number    Hamzah Thomas MD Referring Internal Medicine [phone]    Alyse Faye MD Referring Internal Medicine [phone]

## 2022-01-13 ENCOUNTER — ANTICOAGULATION THERAPY VISIT (OUTPATIENT)
Dept: ANTICOAGULATION | Facility: CLINIC | Age: 47
End: 2022-01-13

## 2022-01-13 ENCOUNTER — LAB (OUTPATIENT)
Dept: LAB | Facility: CLINIC | Age: 47
End: 2022-01-13
Payer: COMMERCIAL

## 2022-01-13 DIAGNOSIS — Z79.01 LONG TERM CURRENT USE OF ANTICOAGULANT THERAPY: ICD-10-CM

## 2022-01-13 DIAGNOSIS — I82.409 RECURRENT DEEP VENOUS THROMBOSIS (H): ICD-10-CM

## 2022-01-13 DIAGNOSIS — I26.99 PE (PULMONARY THROMBOEMBOLISM) (H): ICD-10-CM

## 2022-01-13 DIAGNOSIS — Z79.01 LONG TERM CURRENT USE OF ANTICOAGULANT THERAPY: Primary | ICD-10-CM

## 2022-01-13 LAB — INR BLD: 3.9 (ref 0.9–1.1)

## 2022-01-13 PROCEDURE — 36416 COLLJ CAPILLARY BLOOD SPEC: CPT

## 2022-01-13 PROCEDURE — 85610 PROTHROMBIN TIME: CPT

## 2022-01-13 NOTE — PROGRESS NOTES
ANTICOAGULATION MANAGEMENT     Nelsy Cota 46 year old female is on warfarin with supratherapeutic INR result. (Goal INR 2.0-3.0)    Recent labs: (last 7 days)     01/13/22  0813   INR 3.9*       ASSESSMENT     Source(s): Chart Review and Patient/Caregiver Call       Warfarin doses taken: Warfarin taken as instructed    Diet: No new diet changes identified    New illness, injury, or hospitalization: No    Medication/supplement changes: None noted    Signs or symptoms of bleeding or clotting: No    Previous INR: Therapeutic last visit; previously outside of goal range    Additional findings: None     PLAN     Recommended plan for no diet, medication or health factor changes affecting INR     Dosing Instructions: Partial hold then Decrease your warfarin dose (6% change) with next INR in 1 week       Summary  As of 1/13/2022    Full warfarin instructions:  1/13: 6 mg; Otherwise 8 mg every Tue, Sat; 10 mg all other days   Next INR check:  1/20/2022             Telephone call with Nelsy who agrees to plan and repeated back plan correctly    Lab visit scheduled    Education provided: Please call back if any changes to your diet, medications or how you've been taking warfarin    Plan made per [facility] anticoagulation protocol    Vania Morse RN  Anticoagulation Clinic  1/13/2022    _______________________________________________________________________     Anticoagulation Episode Summary     Current INR goal:  2.0-3.0   TTR:  56.8 % (1 y)   Target end date:  Indefinite   Send INR reminders to:  [facility]    Indications    Long-term (current) use of anticoagulants [Z79.01] [Z79.01]  PE (pulmonary thromboembolism) (H) [I26.99]  Recurrent deep venous thrombosis (H) [I82.409]           Comments:           Anticoagulation Care Providers     Provider Role Specialty Phone number    Hamzah Thomas MD Referring Internal Medicine [phone]    Alyse Faye MD Referring Internal Medicine  [phone]

## 2022-01-27 ENCOUNTER — LAB (OUTPATIENT)
Dept: LAB | Facility: CLINIC | Age: 47
End: 2022-01-27
Payer: COMMERCIAL

## 2022-01-27 ENCOUNTER — ANTICOAGULATION THERAPY VISIT (OUTPATIENT)
Dept: ANTICOAGULATION | Facility: CLINIC | Age: 47
End: 2022-01-27

## 2022-01-27 DIAGNOSIS — Z79.01 LONG TERM CURRENT USE OF ANTICOAGULANT THERAPY: ICD-10-CM

## 2022-01-27 DIAGNOSIS — I82.409 RECURRENT DEEP VENOUS THROMBOSIS (H): ICD-10-CM

## 2022-01-27 DIAGNOSIS — I26.99 PE (PULMONARY THROMBOEMBOLISM) (H): ICD-10-CM

## 2022-01-27 DIAGNOSIS — Z79.01 LONG TERM CURRENT USE OF ANTICOAGULANT THERAPY: Primary | ICD-10-CM

## 2022-01-27 LAB — INR BLD: 2.4 (ref 0.9–1.1)

## 2022-01-27 PROCEDURE — 85610 PROTHROMBIN TIME: CPT

## 2022-01-27 PROCEDURE — 36416 COLLJ CAPILLARY BLOOD SPEC: CPT

## 2022-01-27 NOTE — PROGRESS NOTES
ANTICOAGULATION MANAGEMENT     Nelsy Cota 46 year old female is on warfarin with therapeutic INR result. (Goal INR 2.0-3.0)    Recent labs: (last 7 days)     01/27/22  0800   INR 2.4*       ASSESSMENT     Source(s): Chart Review and Patient/Caregiver Call       Warfarin doses taken: Warfarin taken as instructed    Diet: No new diet changes identified    New illness, injury, or hospitalization: Yes: Shingles infection.  Was seen in  on 1/19.  Symptoms are improving.    Medication/supplement changes: Valacyclovir for 3 days which can increase INR.  Last dose was on 1/22    Signs or symptoms of bleeding or clotting: No    Previous INR: Supratherapeutic    Additional findings: None     PLAN     Recommended plan for temporary change(s) affecting INR     Dosing Instructions: Continue your current warfarin dose with next INR in 2 weeks       Summary  As of 1/27/2022    Full warfarin instructions:  8 mg every Tue, Sat; 10 mg all other days   Next INR check:  2/10/2022             Telephone call with Nelsy who agrees to plan and repeated back plan correctly    Lab visit scheduled    Education provided: Please call back if any changes to your diet, medications or how you've been taking warfarin    Plan made per [facility] anticoagulation protocol    Vania Morse RN  Anticoagulation Clinic  1/27/2022    _______________________________________________________________________     Anticoagulation Episode Summary     Current INR goal:  2.0-3.0   TTR:  55.5 % (1 y)   Target end date:  Indefinite   [facility]    Indications    Long-term (current) use of anticoagulants [Z79.01] [Z79.01]  PE (pulmonary thromboembolism) (H) [I26.99]  Recurrent deep venous thrombosis (H) [I82.409]           Comments:           Anticoagulation Care Providers     Provider Role Specialty Phone number    Hamzah Thomas MD Referring Internal Medicine [phone]    Alyse Faye MD Referring  Internal Medicine [phone]

## 2022-02-10 ENCOUNTER — LAB (OUTPATIENT)
Dept: LAB | Facility: CLINIC | Age: 47
End: 2022-02-10
Payer: COMMERCIAL

## 2022-02-10 ENCOUNTER — ANTICOAGULATION THERAPY VISIT (OUTPATIENT)
Dept: ANTICOAGULATION | Facility: CLINIC | Age: 47
End: 2022-02-10

## 2022-02-10 DIAGNOSIS — I26.99 PE (PULMONARY THROMBOEMBOLISM) (H): ICD-10-CM

## 2022-02-10 DIAGNOSIS — Z79.01 LONG TERM CURRENT USE OF ANTICOAGULANT THERAPY: ICD-10-CM

## 2022-02-10 DIAGNOSIS — I82.409 RECURRENT DEEP VENOUS THROMBOSIS (H): ICD-10-CM

## 2022-02-10 DIAGNOSIS — Z79.01 LONG TERM CURRENT USE OF ANTICOAGULANT THERAPY: Primary | ICD-10-CM

## 2022-02-10 LAB — INR BLD: 2.4 (ref 0.9–1.1)

## 2022-02-10 PROCEDURE — 36415 COLL VENOUS BLD VENIPUNCTURE: CPT

## 2022-02-10 PROCEDURE — 85610 PROTHROMBIN TIME: CPT

## 2022-02-10 NOTE — PROGRESS NOTES
ANTICOAGULATION MANAGEMENT     Nelsy Cota 46 year old female is on warfarin with therapeutic INR result. (Goal INR 2.0-3.0)    Recent labs: (last 7 days)     02/10/22  0758   INR 2.4*       ASSESSMENT     Source(s): Chart Review and Patient/Caregiver Call       Warfarin doses taken: Warfarin taken as instructed    Diet: No new diet changes identified    New illness, injury, or hospitalization: recently had shingles.  These symptoms have cleared up.    Medication/supplement changes: None noted    Signs or symptoms of bleeding or clotting: No    Previous INR: Therapeutic last visit; previously outside of goal range    Additional findings: None     PLAN     Recommended plan for no diet, medication or health factor changes affecting INR     Dosing Instructions: Continue your current warfarin dose with next INR in 3 weeks       Summary  As of 2/10/2022    Full warfarin instructions:  8 mg every Tue, Sat; 10 mg all other days   Next INR check:  3/3/2022             Telephone call with Nelsy who agrees to plan and repeated back plan correctly    Lab visit scheduled    Education provided: Please call back if any changes to your diet, medications or how you've been taking warfarin    Plan made per [facility] anticoagulation protocol    Vania Morse RN  Anticoagulation Clinic  2/10/2022    _______________________________________________________________________     Anticoagulation Episode Summary     Current INR goal:  2.0-3.0   TTR:  59.3 % (1 y)   Target end date:  Indefinite   Send INR reminders to:  [facility]    Indications    Long-term (current) use of anticoagulants [Z79.01] [Z79.01]  PE (pulmonary thromboembolism) (H) [I26.99]  Recurrent deep venous thrombosis (H) [I82.409]           Comments:           Anticoagulation Care Providers     Provider Role Specialty Phone number    Hamzah Thomas MD Referring Internal Medicine [phone]    Alyse Faye MD Referring Internal  Medicine [phone]

## 2022-02-23 DIAGNOSIS — Z79.01 LONG TERM CURRENT USE OF ANTICOAGULANTS WITH INR GOAL OF 2.0-3.0: ICD-10-CM

## 2022-02-23 DIAGNOSIS — I82.409 RECURRENT DEEP VENOUS THROMBOSIS (H): ICD-10-CM

## 2022-02-23 DIAGNOSIS — I26.99 PE (PULMONARY THROMBOEMBOLISM) (H): ICD-10-CM

## 2022-02-24 ENCOUNTER — TELEPHONE (OUTPATIENT)
Dept: INTERNAL MEDICINE | Facility: CLINIC | Age: 47
End: 2022-02-24
Payer: COMMERCIAL

## 2022-02-24 RX ORDER — WARFARIN SODIUM 2 MG/1
TABLET ORAL
Qty: 400 TABLET | Refills: 0 | Status: SHIPPED | OUTPATIENT
Start: 2022-02-24

## 2022-02-24 NOTE — TELEPHONE ENCOUNTER
Call placed to patient regarding refill of medication. [employer] message sent with detailed message.  Please help patient to schedule appointment.

## 2022-02-24 NOTE — TELEPHONE ENCOUNTER
Last INR: 02/10/22=2.4  Next INR: 3/3/22  INR referral and OV up-to-date: Last OV 03/23/21--note placed on refill to schedule OV / INR referral up-to-date      Prescription approved per [facility] Refill Protocol.    Sarah Holbrook RN

## 2022-02-24 NOTE — TELEPHONE ENCOUNTER
Warfarin filled for now, pt is due for office visit in 03/22. Last seen 03/21. Please advise appt for any further refills

## 2022-03-03 ENCOUNTER — LAB (OUTPATIENT)
Dept: LAB | Facility: CLINIC | Age: 47
End: 2022-03-03
Payer: COMMERCIAL

## 2022-03-03 ENCOUNTER — ANTICOAGULATION THERAPY VISIT (OUTPATIENT)
Dept: ANTICOAGULATION | Facility: CLINIC | Age: 47
End: 2022-03-03

## 2022-03-03 DIAGNOSIS — I82.409 RECURRENT DEEP VENOUS THROMBOSIS (H): ICD-10-CM

## 2022-03-03 DIAGNOSIS — I26.99 PE (PULMONARY THROMBOEMBOLISM) (H): ICD-10-CM

## 2022-03-03 DIAGNOSIS — Z79.01 LONG TERM CURRENT USE OF ANTICOAGULANT THERAPY: Primary | ICD-10-CM

## 2022-03-03 DIAGNOSIS — Z79.01 LONG TERM CURRENT USE OF ANTICOAGULANT THERAPY: ICD-10-CM

## 2022-03-03 LAB — INR BLD: 3.6 (ref 0.9–1.1)

## 2022-03-03 PROCEDURE — 85610 PROTHROMBIN TIME: CPT

## 2022-03-03 PROCEDURE — 36416 COLLJ CAPILLARY BLOOD SPEC: CPT

## 2022-03-03 NOTE — PROGRESS NOTES
ANTICOAGULATION MANAGEMENT     Nelsy Cota 46 year old female is on warfarin with supratherapeutic INR result. (Goal INR 2.0-3.0)    Recent labs: (last 7 days)     03/03/22  0759   INR 3.6*       ASSESSMENT       Source(s): Chart Review and Patient/Caregiver Call       Warfarin doses taken: Warfarin taken as instructed    Diet: No new diet changes identified.  Usually has salad once weekly.  She had a larger salad than usual this week and ate it over the course of 2 days.    New illness, injury, or hospitalization: No    Medication/supplement changes: has been taking a little more Tylenol recently due to her ankle pain, but takes <1000 mg daily and does not take the Tylenol every day.    Signs or symptoms of bleeding or clotting: No    Previous INR: Therapeutic last 2(+) visits    Additional findings: None       PLAN     Recommended plan for temporary change(s) affecting INR     Dosing Instructions: Partial hold then continue your current warfarin dose with next INR in 2 weeks       Summary  As of 3/3/2022    Full warfarin instructions:  3/3: 4 mg; Otherwise 8 mg every Tue, Sat; 10 mg all other days   Next INR check:  3/17/2022             Telephone call with Nelsy who agrees to plan and repeated back plan correctly    Lab visit scheduled    Education provided: Please call back if any changes to your diet, medications or how you've been taking warfarin and Monitoring for bleeding signs and symptoms    Plan made per [facility] anticoagulation protocol    Vania Morse RN  Anticoagulation Clinic  3/3/2022    _______________________________________________________________________     Anticoagulation Episode Summary     Current INR goal:  2.0-3.0   TTR:  58.4 % (1 y)   Target end date:  Indefinite   Send INR reminders to:  [facility]    Indications    Long-term (current) use of anticoagulants [Z79.01] [Z79.01]  PE (pulmonary thromboembolism) (H) [I26.99]  Recurrent deep venous thrombosis (H)  [I82.409]           Comments:           Anticoagulation Care Providers     Provider Role Specialty Phone number    Hamzah Thomas MD Referring Internal Medicine [phone]    Alyse Faye MD Referring Internal Medicine [phone]

## 2022-03-17 ENCOUNTER — ANTICOAGULATION THERAPY VISIT (OUTPATIENT)
Dept: ANTICOAGULATION | Facility: CLINIC | Age: 47
End: 2022-03-17

## 2022-03-17 ENCOUNTER — LAB (OUTPATIENT)
Dept: LAB | Facility: CLINIC | Age: 47
End: 2022-03-17
Payer: COMMERCIAL

## 2022-03-17 ENCOUNTER — TELEPHONE (OUTPATIENT)
Dept: INTERNAL MEDICINE | Facility: CLINIC | Age: 47
End: 2022-03-17

## 2022-03-17 DIAGNOSIS — Z79.01 LONG TERM CURRENT USE OF ANTICOAGULANT THERAPY: Primary | ICD-10-CM

## 2022-03-17 DIAGNOSIS — I26.99 PE (PULMONARY THROMBOEMBOLISM) (H): ICD-10-CM

## 2022-03-17 DIAGNOSIS — I82.409 RECURRENT DEEP VENOUS THROMBOSIS (H): ICD-10-CM

## 2022-03-17 DIAGNOSIS — Z79.01 LONG TERM CURRENT USE OF ANTICOAGULANT THERAPY: ICD-10-CM

## 2022-03-17 LAB — INR BLD: 6.3 (ref 0.9–1.1)

## 2022-03-17 PROCEDURE — 85610 PROTHROMBIN TIME: CPT

## 2022-03-17 PROCEDURE — 36416 COLLJ CAPILLARY BLOOD SPEC: CPT

## 2022-03-17 NOTE — PROGRESS NOTES
ANTICOAGULATION MANAGEMENT     Nelsy Cota 46 year old female is on warfarin with supratherapeutic INR result. (Goal INR 2.0-3.0)    Recent labs: (last 7 days)     03/17/22  0755   INR 6.3*       ASSESSMENT       Source(s): Chart Review    Previous INR was Supratherapeutic    Medication, diet, health changes since last INR chart reviewed; none identified     Patient reported at last INR check that she was having ankle pain and taking Tylenol.            PLAN     Left message for patient to call INR Clinic to discuss results.

## 2022-03-17 NOTE — PROGRESS NOTES
Last seen 1 yr ago, pt needs appt for physical and pap . Pl advise pt to schedule appt with me in 04/22

## 2022-03-17 NOTE — PROGRESS NOTES
ANTICOAGULATION MANAGEMENT     Nelsy Cota 46 year old female is on warfarin with supratherapeutic INR result. (Goal INR 2.0-3.0)    Recent labs: (last 7 days)     03/17/22  0755   INR 6.3*       ASSESSMENT       Source(s): Chart Review and Patient/Caregiver Call       Warfarin doses taken: Warfarin taken as instructed    Diet: No new diet changes identified.  Patient did have a large shot of alcohol last evening.  She had been obtaining from alcohol for several months prior to last night.     New illness, injury, or hospitalization: No    Medication/supplement changes: None noted    Signs or symptoms of bleeding or clotting: No    Previous INR: Supratherapeutic.  Maintenance dose reduced since INR has been consistently elevated.    Additional findings: None       PLAN     Recommended plan for temporary change(s) affecting INR     Dosing Instructions: Hold dose then Decrease your warfarin dose (7% change) with next INR in 4 days       Summary  As of 3/17/2022    Full warfarin instructions:  3/17: Hold; Otherwise 10 mg every Mon, Wed, Thu; 8 mg all other days   Next INR check:  3/21/2022             Telephone call with Nelsy who agrees to plan and repeated back plan correctly    Lab visit scheduled    Education provided: Please call back if any changes to your diet, medications or how you've been taking warfarin and Potential interaction between warfarin and alcohol    Plan made per [facility] anticoagulation protocol    Vania Morse RN  Anticoagulation Clinic  3/17/2022    _______________________________________________________________________     Anticoagulation Episode Summary     Current INR goal:  2.0-3.0   TTR:  54.6 % (1 y)   Target end date:  Indefinite   Send INR reminders to:  [facility]    Indications    Long-term (current) use of anticoagulants [Z79.01] [Z79.01]  PE (pulmonary thromboembolism) (H) [I26.99]  Recurrent deep venous thrombosis (H) [I82.409]           Comments:            Anticoagulation Care Providers     Provider Role Specialty Phone number    Hamzah Thomas MD Referring Internal Medicine [phone]    Alyse Faye MD Referring Internal Medicine [phone]

## 2022-03-21 ENCOUNTER — ANTICOAGULATION THERAPY VISIT (OUTPATIENT)
Dept: ANTICOAGULATION | Facility: CLINIC | Age: 47
End: 2022-03-21

## 2022-03-21 ENCOUNTER — LAB (OUTPATIENT)
Dept: LAB | Facility: CLINIC | Age: 47
End: 2022-03-21
Payer: COMMERCIAL

## 2022-03-21 DIAGNOSIS — I26.99 PE (PULMONARY THROMBOEMBOLISM) (H): ICD-10-CM

## 2022-03-21 DIAGNOSIS — Z79.01 LONG TERM CURRENT USE OF ANTICOAGULANT THERAPY: ICD-10-CM

## 2022-03-21 DIAGNOSIS — I82.409 RECURRENT DEEP VENOUS THROMBOSIS (H): ICD-10-CM

## 2022-03-21 DIAGNOSIS — Z79.01 LONG TERM CURRENT USE OF ANTICOAGULANT THERAPY: Primary | ICD-10-CM

## 2022-03-21 LAB — INR BLD: 2.5 (ref 0.9–1.1)

## 2022-03-21 PROCEDURE — 36416 COLLJ CAPILLARY BLOOD SPEC: CPT

## 2022-03-21 PROCEDURE — 85610 PROTHROMBIN TIME: CPT

## 2022-03-21 NOTE — PROGRESS NOTES
ANTICOAGULATION MANAGEMENT     Nelsy Cota 46 year old female is on warfarin with therapeutic INR result. (Goal INR 2.0-3.0)    Recent labs: (last 7 days)     03/21/22  0740   INR 2.5*       ASSESSMENT       Source(s): Chart Review and Patient/Caregiver Call       Warfarin doses taken: Warfarin taken as instructed with intentional hold on 3/17/22 and warfarin MD was reduced 7%.    Diet: No new diet changes identified     Patient states she did not drink any alcohol over the weekend.    New illness, injury, or hospitalization: No    Medication/supplement changes: None noted    Signs or symptoms of bleeding or clotting: No    Previous INR: Supratherapeutic    Additional findings: I forgot to inform patient to schedule annual visit with her PCP       PLAN     Recommended plan for no diet, medication or health factor changes affecting INR     Dosing Instructions: Continue your current warfarin dose with next INR in 4 days       Summary  As of 3/21/2022    Full warfarin instructions:  10 mg every Mon, Wed, Fri; 8 mg all other days   Next INR check:  3/25/2022             Telephone call with Nelsy who verbalizes understanding and agrees to plan    Lab visit scheduled    Education provided: Monitoring for bleeding signs and symptoms and Contact [phone]  with any changes, questions or concerns.     Plan made per [facility] anticoagulation protocol    Sarah Holbrook, RN  Anticoagulation Clinic  3/21/2022    _______________________________________________________________________     Anticoagulation Episode Summary     Current INR goal:  2.0-3.0   TTR:  53.7 % (1 y)   Target end date:  Indefinite   Send INR reminders to:  [facility]    Indications    Long-term (current) use of anticoagulants [Z79.01] [Z79.01]  PE (pulmonary thromboembolism) (H) [I26.99]  Recurrent deep venous thrombosis (H) [I82.409]           Comments:           Anticoagulation Care Providers     Provider Role Specialty Phone number     Hamzah Thomas MD Referring Internal Medicine [phone]    Alyse Faye MD Referring Internal Medicine [phone]

## 2022-03-21 NOTE — PROGRESS NOTES
ANTICOAGULATION MANAGEMENT     Nelsy Cota 46 year old female is on warfarin with therapeutic INR result. (Goal INR 2.0-3.0)    Recent labs: (last 7 days)     03/21/22  0740   INR 2.5*       ASSESSMENT       Source(s): Chart Review    Previous INR was Supratherapeutic  Medication, diet, health changes since last INR chart reviewed; none identified   Changed dosing to 10mg MWF versus MWTh         PLAN     Unable to reach Nelsy today.    Left message to continue current dose of warfarin 10 mg tonight. Request call back for assessment.   Left VM to call [phone] with transfer to Sarah Smith:[phone] OR [facility]      Follow up required to discuss dosing instructions and confirm understanding of instructions    Sarah Holbrook RN  Anticoagulation Clinic  3/21/2022

## 2022-03-25 ENCOUNTER — PATIENT OUTREACH (OUTPATIENT)
Dept: OBGYN | Facility: CLINIC | Age: 47
End: 2022-03-25
Payer: COMMERCIAL

## 2022-03-25 DIAGNOSIS — R87.611 PAPANICOLAOU SMEAR OF CERVIX WITH ATYPICAL SQUAMOUS CELLS CANNOT EXCLUDE HIGH GRADE SQUAMOUS INTRAEPITHELIAL LESION (ASC-H): ICD-10-CM

## 2022-03-25 NOTE — LETTER
March 25, 2022      Nelsy Cota  [address] [address]        Dear ,    This letter is to remind you that you are due for your follow-up Pap smear and Human Papillomavirus (HPV) test.    Please call [phone] to schedule your appointment at your earliest convenience.    If you have completed the appointment outside of the [facility] system, please have the records forwarded to our office. We will update your chart for your provider to review before your next annual wellness visit.     Thank you for choosing [facility]!      Sincerely,    Your [facility] Care Team

## 2022-04-01 ENCOUNTER — TELEPHONE (OUTPATIENT)
Dept: ANTICOAGULATION | Facility: CLINIC | Age: 47
End: 2022-04-01
Payer: COMMERCIAL

## 2022-04-01 NOTE — TELEPHONE ENCOUNTER
ANTICOAGULATION     Nelsy Cota is overdue for INR check.      Left message for patient to call and schedule lab appointment as soon as possible. If returning call, please schedule.     Vania Morse RN

## 2022-04-08 ENCOUNTER — TELEPHONE (OUTPATIENT)
Dept: ANTICOAGULATION | Facility: CLINIC | Age: 47
End: 2022-04-08
Payer: COMMERCIAL

## 2022-04-15 ENCOUNTER — TELEPHONE (OUTPATIENT)
Dept: ANTICOAGULATION | Facility: CLINIC | Age: 47
End: 2022-04-15
Payer: COMMERCIAL

## 2022-04-15 NOTE — LETTER
April 15, 2022      Nelsy Cota  [address] [place] [address]      Dear Nelsy,    You are currently under the care of [facility] Anticoagulation Management Program for your warfarin (Coumadin ) therapy.  We are contacting you because our records show you were due for an INR on 3/25/22.    There are potentially serious risks when taking warfarin without careful monitoring and we want to make sure you are safely managed.  Routine INR monitoring is required for warfarin refills.     Please call [phone]  as soon as possible to schedule an appointment.  If there has been a change in your care or other concerns, please let us know so we can help and or update our records.     Sincerely,       [facility]

## 2022-04-15 NOTE — TELEPHONE ENCOUNTER
ANTICOAGULATION     Nelsy Cota is overdue for INR check.      Reminder letter sent    Vania Morse RN

## 2022-05-03 ENCOUNTER — TELEPHONE (OUTPATIENT)
Dept: ANTICOAGULATION | Facility: CLINIC | Age: 47
End: 2022-05-03
Payer: COMMERCIAL

## 2022-05-03 NOTE — TELEPHONE ENCOUNTER
ANTICOAGULATION     Nelsy Cota is overdue for INR check.      Left message for patient to call and schedule lab appointment as soon as possible. If returning call, please schedule.     Anticoagulation clinic notificiation    Dr. Thomas,    Your patient, Nelsy Cota,  is past due for an INR check  The patient s last INR was 2.5 on 3/21/22 and was due to come back on 3/25/22.    Nelsy Cota received phone calls and letters over the last several weeks in attempt to arrange a follow up appointment.  Nelsy Cota will be sent another letter today.     No action is required from you unless you have additional information or if you would like to reach out personally to Nelsy Cota.    Please don t hesitate to contact the Anticoagulation Management Program at [phone] if you have any questions or concerns.     Sincerely,     [facility] Anticoagulation Management Program

## 2022-05-03 NOTE — LETTER
May 3, 2022      Nelsy Cota  [address] [place] [address]      Dear Nelsy,    You are currently under the care of [facility] Anticoagulation Management Program for your warfarin (Coumadin ) therapy.  We are contacting you because our records show you were due for an INR on 3/25/22.    There are potentially serious risks when taking warfarin without careful monitoring and we want to make sure you are safely managed.  Routine INR monitoring is required for warfarin refills.     Please call [phone]  as soon as possible to schedule an appointment.  If there has been a change in your care or other concerns, please let us know so we can help and or update our records.     Sincerely,       [facility] Anticoagulation [facility] Program

## 2022-05-25 NOTE — TELEPHONE ENCOUNTER
[address] [employer]  FYI to provider - Patient is lost to pap tracking follow-up. Attempts to contact pt have been made per reminder process and there has been no reply and/or no appt scheduled. Contact hx listed below.     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 Plan colp bef 6/23/21 4/9/21 [place] ECC: benign. Plan 1 year cotest  3/17/22 appt reminder phone call to patient from Dr. Thomas's office  3/25/22 Reminder letter sent.   4/27/22 Reminder call-LM  5/25/22 Lost to follow up

## 2022-06-10 ENCOUNTER — TELEPHONE (OUTPATIENT)
Dept: ANTICOAGULATION | Facility: CLINIC | Age: 47
End: 2022-06-10
Payer: COMMERCIAL

## 2022-06-10 NOTE — TELEPHONE ENCOUNTER
ANTICOAGULATION MANAGEMENT PROGRAM    Dr. Thomas,     Our records indicate that Nelsy Cota remains past due to check INR. Nelsy Cota was contacted multiple times over at least the last 8 weeks to attempt to arrange a follow up appointment.    Nelsy Cota last had an INR checked on 3/21/22 and was due for follow up on 3/25/22     Called patient,Left message for patient to call and schedule lab appointment as soon as possible. If returning call, please schedule.      At this time Nelsy Cota will be moved to noncompliant status within the program until their referral expires on 6/30/22. While in noncompliant status the patient would continue to be contacted every 6 weeks by the anticoagulation program to attempt to schedule patient. You will be notified of each contact attempt to make you aware of patient's ongoing noncompliance.        Thank you,     [facility] Anticoagulation Management Program

## 2022-06-11 ENCOUNTER — HEALTH MAINTENANCE LETTER (OUTPATIENT)
Age: 47
End: 2022-06-11

## 2022-07-11 ENCOUNTER — DOCUMENTATION ONLY (OUTPATIENT)
Dept: ANTICOAGULATION | Facility: CLINIC | Age: 47
End: 2022-07-11

## 2022-07-11 DIAGNOSIS — Z79.01 LONG TERM CURRENT USE OF ANTICOAGULANT THERAPY: Primary | ICD-10-CM

## 2022-07-11 DIAGNOSIS — I26.99 PE (PULMONARY THROMBOEMBOLISM) (H): ICD-10-CM

## 2022-07-11 DIAGNOSIS — I82.409 RECURRENT DEEP VENOUS THROMBOSIS (H): ICD-10-CM

## 2022-07-11 NOTE — PROGRESS NOTES
ANTICOAGULATION  MANAGEMENT    Nelsy Cota is being discharged from the [facility] Anticoagulation Management Program ([facility]).    Patient [facility] yearly referral is , patient has also not seen provider in greater than a year, discharging due to noncompliance.    Reason for discharge: non-compliance with Anticoagulation Management Program despite outreach.  Last  INR was done on 3/21/22. Letter mailed to patient advising them to schedule visit with referring provider.       Anticoagulation episode resolved, [facility] referral closed and Standing order discontinued    Patient may be accepted back in [facility] program after an office visit to discuss non-compliance and check INR. Patient must be agreeable to follow the monitoring recommendations of the program and will need a new referral to be re-enrolled.    Ligia Perez RN

## 2022-10-16 ENCOUNTER — HEALTH MAINTENANCE LETTER (OUTPATIENT)
Age: 47
End: 2022-10-16

## 2023-06-17 ENCOUNTER — HEALTH MAINTENANCE LETTER (OUTPATIENT)
Age: 48
End: 2023-06-17

## 2024-08-10 ENCOUNTER — HEALTH MAINTENANCE LETTER (OUTPATIENT)
Age: 49
End: 2024-08-10

## 2024-08-20 ENCOUNTER — MYC REFILL (OUTPATIENT)
Dept: INTERNAL MEDICINE | Facility: CLINIC | Age: 49
End: 2024-08-20
Payer: COMMERCIAL

## 2024-08-20 DIAGNOSIS — K70.31 ALCOHOLIC CIRRHOSIS OF LIVER WITH ASCITES (H): ICD-10-CM

## 2024-08-20 RX ORDER — FUROSEMIDE 40 MG
40 TABLET ORAL DAILY
Qty: 60 TABLET | Refills: 3 | Status: CANCELLED | OUTPATIENT
Start: 2024-08-20

## 2024-08-20 RX ORDER — SPIRONOLACTONE 25 MG/1
25 TABLET ORAL DAILY
Qty: 45 TABLET | Refills: 0 | Status: CANCELLED | OUTPATIENT
Start: 2024-08-20

## 2024-08-21 NOTE — TELEPHONE ENCOUNTER
Left voicemail for patient to callback to clinic. Provider has not seen patient since 2021. Upon callback please verify if patient has established care elsewhere or if they need to schedule appointment with provider to obtain refill

## 2024-08-21 NOTE — TELEPHONE ENCOUNTER
I have not seen this patient since 2021.  Please advise pharmacy to  forward to appropriate provider

## 2025-01-23 ENCOUNTER — NURSE TRIAGE (OUTPATIENT)
Dept: INTERNAL MEDICINE | Facility: CLINIC | Age: 50
End: 2025-01-23
Payer: COMMERCIAL

## 2025-01-23 NOTE — TELEPHONE ENCOUNTER
S-(situation): Patient was transferred via red line due to concerns of hitting her head while on anticoagulation.      B-(background): Patient is not on anticoagulation.      A-(assessment): Patient states she slipped and fell on some ice back on Dec 14.  At the time of injury hit the back of her head.  Denies any other issues at the time of the injury.  She denies any current neuro symptoms except for a mild headache that has been increasing in frequency.      R-(recommendations): See in office today or tomorrow.  Patient transferred back to her home clinic's team to assist with scheduling appointment.  Discussed with patient when to go in, she verbalized understanding.      Kristina Kjellberg, MSN, RN    Reason for Disposition   After 3 days and headache persists    Additional Information   Negative: ACUTE NEURO SYMPTOM and symptom present now (Definition: Difficult to awaken OR confused thinking and talking OR slurred speech OR weakness of arms or legs OR unsteady walking.)   Negative: Knocked out (unconscious) > 1 minute   Negative: Seizure (convulsion) occurred  (Exception: Prior history of seizures and now alert and without Acute Neuro Symptoms.)   Negative: Neck pain after dangerous injury (e.g., MVA, diving, trampoline, contact sports, fall > 10 feet or 3 meters)  (Exception: Neck pain began > 1 hour after injury.)   Negative: Major bleeding (actively dripping or spurting) that can't be stopped   Negative: Penetrating head injury (e.g., knife, gunshot wound, metal object)   Negative: Sounds like a life-threatening emergency to the triager   Negative: Can't remember what happened (amnesia)   Negative: Vomiting once or more   Negative: Loss of vision or double vision is present now   Negative: Watery or blood-tinged fluid dripping from the nose or ears   Negative: ACUTE NEURO SYMPTOM and now fine  (Definition: Difficult to awaken OR confused thinking and talking OR slurred speech OR weakness of arms or legs OR  "unsteady walking.)   Negative: Knocked out (unconscious) < 1 minute and now fine   Negative: SEVERE headache   Negative: Dangerous injury (e.g., MVA, diving, trampoline, contact sports, fall > 10 feet or 3 meters) or severe blow from hard object (e.g., golf club or baseball bat)   Negative: Large swelling or bruise (> 2 inches or 5 cm)   Negative: Skin is split open or gaping (length > 1/2 inch or 12 mm)   Negative: Bleeding won't stop after 10 minutes of direct pressure (using correct technique)   Negative: Taking Coumadin (warfarin) or other strong blood thinner, or known bleeding disorder (e.g., thrombocytopenia)   Negative: Age over 64 years with an area of head swelling or bruise   Negative: Sounds like a serious injury to the triager   Negative: Patient is confused or is an unreliable provider of information (e.g., dementia, severe intellectual disability, alcohol intoxication)   Negative: No prior tetanus shots (or is not fully vaccinated) and any wound (e.g., cut or scrape)   Negative: HIV positive or severe immunodeficiency (severely weak immune system) and DIRTY cut or scrape   Negative: One or two 'black eyes' (bruising, purple color of eyelids), onset over 24 hours after head injury   Negative: Patient wants to be seen   Negative: Last tetanus shot > 5 years ago and DIRTY cut or scrape   Negative: Last tetanus shot > 10 years ago and CLEAN cut or scrape    Answer Assessment - Initial Assessment Questions  1. MECHANISM: \"How did the injury happen?\" For falls, ask: \"What height did you fall from?\" and \"What surface did you fall against?\"       Slipped on ice and fell and hit head  2. ONSET: \"When did the injury happen?\" (e.g., minutes, hours ago)       Dec 14   3. NEUROLOGIC SYMPTOMS: \"Was there any loss of consciousness?\" \"Are there any other neurological symptoms?\"       No, mild headache  4. MENTAL STATUS: \"Does the person know who they are, who you are, and where they are?\"       A & O x3  5. LOCATION: " "\"What part of the head was hit?\"       Back of head  6. SCALP APPEARANCE: \"What does the scalp look like? Is it bleeding now?\" If Yes, ask: \"Is it difficult to stop?\"       Normal  7. SIZE: For cuts, bruises, or swelling, ask: \"How large is it?\" (e.g., inches or centimeters)       No  8. PAIN: \"Is there any pain?\" If Yes, ask: \"How bad is it?\" (Scale 0-10; or none, mild, moderate, severe)      Mild headaches constant now  9. TETANUS: For any breaks in the skin, ask: \"When was the last tetanus booster?\"      No  10. BLOOD THINNERS: \"Do you take any blood thinners?\" (e.g., aspirin, clopidogrel / Plavix, coumadin, heparin). Notes: Other strong blood thinners include: Arixtra (fondaparinux), Eliquis (apixaban), Pradaxa (dabigatran), and Xarelto (rivaroxaban).       No blood thinners  11. OTHER SYMPTOMS: \"Do you have any other symptoms?\" (e.g., neck pain, vomiting)        No  12. PREGNANCY: \"Is there any chance you are pregnant?\" \"When was your last menstrual period?\"        No    Protocols used: Head Injury-A-[place]    "